# Patient Record
Sex: MALE | Race: WHITE | NOT HISPANIC OR LATINO | Employment: OTHER | URBAN - METROPOLITAN AREA
[De-identification: names, ages, dates, MRNs, and addresses within clinical notes are randomized per-mention and may not be internally consistent; named-entity substitution may affect disease eponyms.]

---

## 2017-01-01 ENCOUNTER — TELEPHONE (OUTPATIENT)
Dept: INTERNAL MEDICINE | Facility: CLINIC | Age: 77
End: 2017-01-01

## 2017-01-01 DIAGNOSIS — N18.30 CHRONIC KIDNEY DISEASE (CKD), STAGE III (MODERATE) (H): ICD-10-CM

## 2017-01-01 NOTE — TELEPHONE ENCOUNTER
Please call patient and discuss renal insufficiency, likely caused by NSAID use.   I advise him to stop taking celebrex, and continue to avoid NSAIDs such as aleve, ibuprofen.   Would like him to repeat creat in 2-4 weeks, then reassess.    Consider tylenol arthritis two twice daily to help manage his joint pains.

## 2017-01-23 ENCOUNTER — DOCUMENTATION ONLY (OUTPATIENT)
Dept: INTERNAL MEDICINE | Facility: CLINIC | Age: 77
End: 2017-01-23

## 2017-01-23 DIAGNOSIS — N18.30 CHRONIC KIDNEY DISEASE (CKD), STAGE III (MODERATE) (H): Primary | ICD-10-CM

## 2017-01-24 DIAGNOSIS — N18.30 CHRONIC KIDNEY DISEASE (CKD), STAGE III (MODERATE) (H): ICD-10-CM

## 2017-01-24 LAB
CREAT SERPL-MCNC: 1.29 MG/DL (ref 0.66–1.25)
CREAT UR-MCNC: 181 MG/DL
GFR SERPL CREATININE-BSD FRML MDRD: 54 ML/MIN/1.7M2
HGB BLD-MCNC: 14.8 G/DL (ref 13.3–17.7)
MICROALBUMIN UR-MCNC: 33 MG/L
MICROALBUMIN/CREAT UR: 18.07 MG/G CR (ref 0–17)

## 2017-01-24 PROCEDURE — 82565 ASSAY OF CREATININE: CPT | Performed by: INTERNAL MEDICINE

## 2017-01-24 PROCEDURE — 82043 UR ALBUMIN QUANTITATIVE: CPT | Performed by: INTERNAL MEDICINE

## 2017-01-24 PROCEDURE — 36415 COLL VENOUS BLD VENIPUNCTURE: CPT | Performed by: INTERNAL MEDICINE

## 2017-01-24 PROCEDURE — 85018 HEMOGLOBIN: CPT | Performed by: INTERNAL MEDICINE

## 2017-01-30 ENCOUNTER — MYC MEDICAL ADVICE (OUTPATIENT)
Dept: INTERNAL MEDICINE | Facility: CLINIC | Age: 77
End: 2017-01-30

## 2017-01-30 DIAGNOSIS — M54.50 LOW BACK PAIN WITHOUT SCIATICA, UNSPECIFIED BACK PAIN LATERALITY, UNSPECIFIED CHRONICITY: Primary | ICD-10-CM

## 2017-02-02 ENCOUNTER — MYC MEDICAL ADVICE (OUTPATIENT)
Dept: INTERNAL MEDICINE | Facility: CLINIC | Age: 77
End: 2017-02-02

## 2017-02-02 RX ORDER — PREDNISONE 10 MG/1
10 TABLET ORAL DAILY
Qty: 30 TABLET | Refills: 1 | Status: SHIPPED | OUTPATIENT
Start: 2017-02-02 | End: 2017-04-12

## 2017-03-23 ENCOUNTER — TELEPHONE (OUTPATIENT)
Dept: NEUROSURGERY | Facility: CLINIC | Age: 77
End: 2017-03-23

## 2017-03-23 NOTE — TELEPHONE ENCOUNTER
Left detailed message for pt. He had surgery over a year ago and explained that he did just get Prednisone on 2-7-17 would not recommend repeat.

## 2017-03-24 DIAGNOSIS — M54.50 LOW BACK PAIN WITHOUT SCIATICA, UNSPECIFIED BACK PAIN LATERALITY, UNSPECIFIED CHRONICITY: ICD-10-CM

## 2017-03-24 RX ORDER — PREDNISONE 10 MG/1
10 TABLET ORAL DAILY
Qty: 30 TABLET | Refills: 1 | Status: CANCELLED | OUTPATIENT
Start: 2017-03-24

## 2017-03-24 NOTE — TELEPHONE ENCOUNTER
.predniSONE (DELTASONE) 10 MG tablet      Last Written Prescription Date: 2/2/17  Last Fill Quantity: 30,  # refills: 1   Last Office Visit with [facility], [facility] or [facility] prescribing provider: 12/30/16

## 2017-03-26 NOTE — TELEPHONE ENCOUNTER
Patient was advised in January to schedule follow-up appointment (discuss 1/30 message).   I am concerned that he has not done the follow-up he was advised.  Also, didn't respond to all my questions.     Neurosurgery has suggest that he not be on prednisone from their viewpoint.    I will refill if and only if patient schedules follow-up to review risks and benefits of this medication.  First available.  In the meantime, I would like him to try to decrease his dosage gradually.

## 2017-03-27 NOTE — TELEPHONE ENCOUNTER
Spoke with pt, and he stated he wasn't going to make an appointment, and that he didn't know how to decrease the dosage as for he is only taking 10 mg. He declined making an appointment with me and said he would handle it on his own.

## 2017-05-18 ENCOUNTER — OFFICE VISIT (OUTPATIENT)
Dept: INTERNAL MEDICINE | Facility: CLINIC | Age: 77
End: 2017-05-18
Payer: COMMERCIAL

## 2017-05-18 VITALS
BODY MASS INDEX: 29.79 KG/M2 | OXYGEN SATURATION: 100 % | TEMPERATURE: 98.2 F | WEIGHT: 190.2 LBS | RESPIRATION RATE: 16 BRPM | HEART RATE: 64 BPM | DIASTOLIC BLOOD PRESSURE: 82 MMHG | SYSTOLIC BLOOD PRESSURE: 128 MMHG

## 2017-05-18 DIAGNOSIS — N40.1 BENIGN NON-NODULAR PROSTATIC HYPERPLASIA WITH LOWER URINARY TRACT SYMPTOMS: ICD-10-CM

## 2017-05-18 DIAGNOSIS — N18.30 CHRONIC KIDNEY DISEASE (CKD), STAGE III (MODERATE) (H): Primary | ICD-10-CM

## 2017-05-18 DIAGNOSIS — R26.89 BALANCE PROBLEMS: ICD-10-CM

## 2017-05-18 DIAGNOSIS — M54.50 LOW BACK PAIN WITHOUT SCIATICA, UNSPECIFIED BACK PAIN LATERALITY, UNSPECIFIED CHRONICITY: ICD-10-CM

## 2017-05-18 LAB
ANION GAP SERPL CALCULATED.3IONS-SCNC: 7 MMOL/L (ref 3–14)
BUN SERPL-MCNC: 25 MG/DL (ref 7–30)
CALCIUM SERPL-MCNC: 9.4 MG/DL (ref 8.5–10.1)
CHLORIDE SERPL-SCNC: 109 MMOL/L (ref 94–109)
CO2 SERPL-SCNC: 25 MMOL/L (ref 20–32)
CREAT SERPL-MCNC: 1.4 MG/DL (ref 0.66–1.25)
GFR SERPL CREATININE-BSD FRML MDRD: 49 ML/MIN/1.7M2
GLUCOSE SERPL-MCNC: 108 MG/DL (ref 70–99)
POTASSIUM SERPL-SCNC: 5.1 MMOL/L (ref 3.4–5.3)
SODIUM SERPL-SCNC: 141 MMOL/L (ref 133–144)

## 2017-05-18 PROCEDURE — 80048 BASIC METABOLIC PNL TOTAL CA: CPT | Performed by: INTERNAL MEDICINE

## 2017-05-18 PROCEDURE — 36415 COLL VENOUS BLD VENIPUNCTURE: CPT | Performed by: INTERNAL MEDICINE

## 2017-05-18 PROCEDURE — 99214 OFFICE O/P EST MOD 30 MIN: CPT | Performed by: INTERNAL MEDICINE

## 2017-05-18 RX ORDER — PREDNISONE 10 MG/1
10 TABLET ORAL DAILY
Qty: 30 TABLET | Refills: 1 | Status: SHIPPED | OUTPATIENT
Start: 2017-05-18 | End: 2017-07-20

## 2017-05-18 RX ORDER — TAMSULOSIN HYDROCHLORIDE 0.4 MG/1
0.4 CAPSULE ORAL DAILY
Qty: 90 CAPSULE | Status: ON HOLD | OUTPATIENT
Start: 2017-05-18 | End: 2017-09-18

## 2017-05-18 NOTE — NURSING NOTE
"Chief Complaint   Patient presents with     Recheck Medication     Prednisone     Arthritis       Initial /82  Pulse 64  Temp 98.2  F (36.8  C) (Oral)  Resp 16  Wt 190 lb 3.2 oz (86.3 kg)  SpO2 100%  BMI 29.79 kg/m2 Estimated body mass index is 29.79 kg/(m^2) as calculated from the following:    Height as of 12/30/16: 5' 7\" (1.702 m).    Weight as of this encounter: 190 lb 3.2 oz (86.3 kg).  Blood pressure completed using cuff size: regular    "

## 2017-05-18 NOTE — PROGRESS NOTES
SUBJECTIVE:                                                    Greyson Haas is a 76 year old male who presents to clinic today for the following health issues:      Medication Followup of Arthritis    Taking Medication as prescribed: yes  Prednisone 10 mg daily    Side Effects:  None    Medication Helping Symptoms:  ?    When he golfs, takes 200 mg of ibuprofen.   Golfing 3 times weekly.  Taking baby aspirin daily  Most bothered by pain in back and legs.   He's not sure the prednisone is helping.       Reviewed and updated as needed this visit by clinical staff:  Medications  Allergies  Soc Hx   Additional history: as documented    Additional issues to address:  1.  Slow voiding, worse at times.   Nocturia x 1-2.  No dysuria, blood.   Problems since catheter fall 2015.   Flomax used very infrequently, helps.  2.  Legs feel stiff, imbalance.   Hasn't fallen.   Problems since lumbar fusion.  Did PT several sessions without much help.   Rides bike and does weights at club, hasn't helped.  - uses cane when out walking, for balance and to take weight off L leg.  - holds on treadmill  - drives gold cart right up to the green      ROS:    Constitutional, HEENT, cardiovascular, pulmonary, gi and gu systems are negative, except as otherwise noted.    OBJECTIVE:                                                      /82  Pulse 64  Temp 98.2  F (36.8  C) (Oral)  Resp 16  Wt 190 lb 3.2 oz (86.3 kg)  SpO2 100%  BMI 29.79 kg/m2  Body mass index is 29.79 kg/(m^2).   No apparent distress  Reg pulse   Mild weakness of dorsiflexion feet  Gait is wide based, slightly uneven       ASSESSMENT:                                                      1.  Follow-up chronic kidney disease, stage 3.   Mostly avoiding NSAIDs.  2.  Arthralgias, helped most by NSAID use  3.  BPH.   Problems/symptoms started after catheter (fall 2015).   Benefit from flomax, but unclear why he hasn't taken this regularly.  3.  Leg weakness,  balance/neuropathic problems after lumbar surgery.    PLAN:                                                      1.  MEDICATIONS:        - Start taking flomax every day       - try stopping prednisone for a couple of weeks, if possible       - Continue other medications without change  2.  Update MD regarding the flomax in a month via Mychart, and also to let MD know if stopping the prednisone made anything worse.  3.  Consider stopping the simvastatin for a month, or trying Lyrica emperically for leg symptoms     Neville Nick MD  [facility]

## 2017-05-18 NOTE — MR AVS SNAPSHOT
After Visit Summary   5/18/2017    Greyson Haas    MRN: [id number]           Patient Information     Date Of Birth          1940        Visit Information        Provider Department      5/18/2017 10:30 AM Neville Nick MD [facility]        Today's Diagnoses     Chronic Kidney Disease, Stage III    -  1    Low back pain without sciatica, unspecified back pain laterality, unspecified chronicity        Benign non-nodular prostatic hyperplasia with lower urinary tract symptoms          Care Instructions    PLAN:                                                      1.  MEDICATIONS:        - Start taking flomax every day       - try stopping prednisone for a couple of weeks, if possible       - Continue other medications without change  2.  Update MD regarding the flomax in a month via SP3H, and also to let MD know if stopping the prednisone made anything worse.  3.  Consider stopping the simvastatin for a month, or trying Lyrica emperically for leg symptoms         Follow-ups after your visit        Who to contact     If you have questions or need follow up information about today's clinic visit or your schedule please contact [facility] directly at [phone].  Normal or non-critical lab and imaging results will be communicated to you by [employer], letter or phone within 4 business days after the clinic has received the results. If you do not hear from us within 7 days, please contact the clinic through [employer] or phone. If you have a critical or abnormal lab result, we will notify you by phone as soon as possible.  Submit refill requests through [employer] or call your pharmacy and they will forward the refill request to us. Please allow 3 business days for your refill to be completed.          Additional Information About Your Visit        LilLuxehart Information     [employer] gives you secure access to your electronic health record. If you see a  primary care provider, you can also send messages to your care team and make appointments. If you have questions, please call your primary care clinic.  If you do not have a primary care provider, please call [phone] and they will assist you.        Care EveryWhere ID     This is your Care EveryWhere ID. This could be used by other organizations to access your [facility] medical records  [id number]        Your Vitals Were     Pulse Temperature Respirations Pulse Oximetry BMI (Body Mass Index)       64 98.2  F (36.8  C) (Oral) 16 100% 29.79 kg/m2        Blood Pressure from Last 3 Encounters:   05/18/17 128/82   12/30/16 124/78   06/22/16 108/75    Weight from Last 3 Encounters:   05/18/17 190 lb 3.2 oz (86.3 kg)   12/30/16 188 lb 8 oz (85.5 kg)   06/22/16 183 lb (83 kg)              We Performed the Following     Basic metabolic panel          Today's Medication Changes          These changes are accurate as of: 5/18/17 11:49 AM.  If you have any questions, ask your nurse or doctor.               Stop taking these medicines if you haven't already. Please contact your care team if you have questions.     celeBREX 200 MG capsule   Generic drug:  celecoxib   Stopped by:  Neville Nick MD           dorzolamide-timolol 2-0.5 % ophthalmic solution   Commonly known as:  COSOPT   Stopped by:  Neville Nick MD           order for DME   Stopped by:  Neville Nick MD                Where to get your medicines      These medications were sent to [facility] [address] [place] [address]  [address] [facility] [address]     Phone:  [phone]     predniSONE 10 MG tablet    tamsulosin 0.4 MG capsule                Primary Care Provider Office Phone # Fax #    Neville Nick [phone]       [facility] [address]  [facility] [address]        Thank you!     Thank you for choosing [facility]  for your care.  Our goal is always to provide you with excellent care. Hearing back from our patients is one way we can continue to improve our services. Please take a few minutes to complete the written survey that you may receive in the mail after your visit with us. Thank you!             Your Updated Medication List - Protect others around you: Learn how to safely use, store and throw away your medicines at www.disposemymeds.org.          This list is accurate as of: 5/18/17 11:49 AM.  Always use your most recent med list.                   Brand Name Dispense Instructions for use    ACETAMINOPHEN PO      Take 1,000 mg by mouth daily as needed for pain Reported on 5/18/2017       allopurinol 300 MG tablet    ZYLOPRIM    90 tablet    Take 1 tablet (300 mg) by mouth daily       aspirin 81 MG tablet      Take 81 mg by mouth daily       doxycycline 100 MG capsule    VIBRAMYCIN     Take 100 mg by mouth daily       lisinopril 20 MG tablet    PRINIVIL/ZESTRIL    45 tablet    Take 0.5 tablets (10 mg) by mouth daily       predniSONE 10 MG tablet    DELTASONE    30 tablet    Take 1 tablet (10 mg) by mouth daily       simvastatin 20 MG tablet    ZOCOR    90 tablet    Take 1 tablet (20 mg) by mouth daily       tamsulosin 0.4 MG capsule    FLOMAX    90 capsule    Take 1 capsule (0.4 mg) by mouth daily       VIAGRA PO      Take 50 mg by mouth daily as needed

## 2017-05-18 NOTE — PATIENT INSTRUCTIONS
PLAN:                                                      1.  MEDICATIONS:        - Start taking flomax every day       - try stopping prednisone for a couple of weeks, if possible       - Continue other medications without change  2.  Update MD regarding the flomax in a month via Mychart, and also to let MD know if stopping the prednisone made anything worse.  3.  Consider stopping the simvastatin for a month, or trying Lyrica emperically for leg symptoms

## 2017-06-19 ENCOUNTER — RADIANT APPOINTMENT (OUTPATIENT)
Dept: GENERAL RADIOLOGY | Facility: CLINIC | Age: 77
End: 2017-06-19
Attending: ORTHOPAEDIC SURGERY
Payer: COMMERCIAL

## 2017-06-19 DIAGNOSIS — R52 PAIN: ICD-10-CM

## 2017-06-19 PROCEDURE — 73502 X-RAY EXAM HIP UNI 2-3 VIEWS: CPT | Mod: TC

## 2017-06-24 DIAGNOSIS — M54.50 LOW BACK PAIN WITHOUT SCIATICA, UNSPECIFIED BACK PAIN LATERALITY, UNSPECIFIED CHRONICITY: ICD-10-CM

## 2017-06-26 NOTE — TELEPHONE ENCOUNTER
predniSONE (DELTASONE) 10 MG tablet      Last Written Prescription Date: 05/18/2017  Last Fill Quantity: 30,  # refills: 1   Last Office Visit with [facility], [facility] or [facility] prescribing provider: 05/18/2017

## 2017-06-28 NOTE — TELEPHONE ENCOUNTER
"Pt returned call, states he has not had the prednisone x 2 days now, states that he continues to have pain.     Also pt states that he does not take the Flomax daily, \"does not want to\", takes it \"here and there\" and it does help.      "

## 2017-06-28 NOTE — TELEPHONE ENCOUNTER
Remind patient that steroids have many long-term complications     Patient was to stop the prednisone, and let me know the results. He was also to give me an update regarding the Flomax. He did not do so. Please call him and find out if he tried stopping prednisone, and what happened.    Do not stop medication at this time, if he hasn't tried this already. If this is the case, needs an office visit to discuss further.

## 2017-06-29 NOTE — TELEPHONE ENCOUNTER
Patient says he has been off prednisone for 3 days now, pain is worse when off of it. Limps around. Has been taking tylenol, it helps but not like ibuprofen did. Says was taken off celebrex and ibuprofen. Asked patient if tylenol helps the same amount that prednisone did. He is unsure. Unsure if prednisone even helped. He said he will try to pay attention to this more and call us back with update. Before when he took prednisone he would just take tylenol when golfing.

## 2017-06-30 NOTE — TELEPHONE ENCOUNTER
Patient was taking 650 mg tylenol. Advised of Dr. Nick's message. He will try the higher dose tylenol BID instead and let us know if that is not working.

## 2017-06-30 NOTE — TELEPHONE ENCOUNTER
Please make sure patient has tried higher dose Tylenol, namely Tylenol arthritis 1300 mg at twice daily.    If so, and it is not working, would resume prednisone 10 mg daily, and schedule follow-up appointment with me.    Please note that there was a prescription sent to [facility] on May 18 for #30 with 1 additional refill, and he should have enough at this time.

## 2017-07-09 RX ORDER — PREDNISONE 10 MG/1
TABLET ORAL
Qty: 30 TABLET | Refills: 0 | OUTPATIENT
Start: 2017-07-09

## 2017-07-20 ENCOUNTER — OFFICE VISIT (OUTPATIENT)
Dept: INTERNAL MEDICINE | Facility: CLINIC | Age: 77
End: 2017-07-20
Payer: COMMERCIAL

## 2017-07-20 VITALS
BODY MASS INDEX: 29.88 KG/M2 | WEIGHT: 190.4 LBS | HEIGHT: 67 IN | TEMPERATURE: 98.4 F | OXYGEN SATURATION: 98 % | DIASTOLIC BLOOD PRESSURE: 82 MMHG | SYSTOLIC BLOOD PRESSURE: 132 MMHG | HEART RATE: 68 BPM

## 2017-07-20 DIAGNOSIS — M25.552 HIP PAIN, LEFT: Primary | ICD-10-CM

## 2017-07-20 DIAGNOSIS — N18.30 CHRONIC KIDNEY DISEASE (CKD), STAGE III (MODERATE) (H): ICD-10-CM

## 2017-07-20 DIAGNOSIS — N40.1 BENIGN NON-NODULAR PROSTATIC HYPERPLASIA WITH LOWER URINARY TRACT SYMPTOMS: ICD-10-CM

## 2017-07-20 PROCEDURE — 99214 OFFICE O/P EST MOD 30 MIN: CPT | Performed by: INTERNAL MEDICINE

## 2017-07-20 RX ORDER — PREDNISONE 10 MG/1
TABLET ORAL
Start: 2017-07-20 | End: 2017-08-03

## 2017-07-20 NOTE — PATIENT INSTRUCTIONS
PLAN:                                                      1.  MEDICATIONS:        - Resume prednisone at 10 mg dose daily.   If no better after 4 days, increase to 20 mg daily       - Continue other medications without change  2.  Send message with update in 1 week  3.  Consider referral to Physical Therapy

## 2017-07-20 NOTE — PROGRESS NOTES
"  SUBJECTIVE:                                                    Greyson Haas is a 76 year old male who presents to clinic today for the following health issues:      New Joint Pain/Bursitis     Onset: 6 weeks    Description:   Location: L lateral thigh, sore spot.   Some lateral and medial pain, L hip area down to knee and below at times.   Pain can be very severe with certain motions, lifting, walking.     Character: throbbing, sometimes dull pains.  Marked pain going up stairs    Intensity: moderate    Progression of Symptoms: same    Accompanying Signs & Symptoms:  Other symptoms: radiation of pain to L groin area and L knee     History:   Previous similar pain: YES      Precipitating factors:   Trauma or overuse: no     Alleviating factors:  Improved by: Patient took some left over endomycin from previous gout and feels like that helped some     Therapies Tried and outcome: steroid injection into burse from Dr. Hood 6/19, zero benefit, worse since.   Tylenol, rest, exercises-not helpful.   Used leftover indocin with benefit        Reviewed and updated as needed this visit by clinical staff:  Medications  Allergies  Soc Hx   Additional history: as documented    Additional issues to address:  1.  Patient tried going off simvastatin for a month, with no difference.  2.  Stopped prednisone.   Overall body aching was a bit worse.  Has been using up to 2000 mg daily (1000 bid), helps pains and stiffness a little.   IBP and celebrex clearly more benefit.    3.  Taking flomax every other day, helping flow, not frequency.    ROS:    Constitutional, cardiovascular, pulmonary, gi and gu systems are negative, except as otherwise noted.      OBJECTIVE:                                                    /82  Pulse 68  Temp 98.4  F (36.9  C) (Oral)  Ht 5' 7\" (1.702 m)  Wt 190 lb 6.4 oz (86.4 kg)  SpO2 98%  BMI 29.82 kg/m2  Body mass index is 29.82 kg/(m^2).   No apparent distress  Limping badly, favoring L " hip  Mild focal tenderness posterior to greater trochanter  Normal range of motion hips, minimal pain with full ROM  Normal strength lower extremities   Mild pain with hip flex, external rotation of hip, hip abduction on left       ASSESSMENT:                                                      1.  Musculoskeletal L hip area pain.   Bursitis versus other cause.  2.  CKD3, for which we are avoiding NSAIDs  3.  BPH, symptoms some better    PLAN:                                                      1.  MEDICATIONS:        - Resume prednisone at 10 mg dose daily.   If no better after 4 days, increase to 20 mg daily       - Continue other medications without change  2.  Send message with update in 1 week  3.  Consider referral to Physical Therapy -  patient declines pending above  4.  Plan to follow-up with ortho if not improved.    Neville Nick MD  [facility]

## 2017-07-20 NOTE — MR AVS SNAPSHOT
After Visit Summary   7/20/2017    Greyson Haas    MRN: [id number]           Patient Information     Date Of Birth          1940        Visit Information        Provider Department      7/20/2017 11:00 AM Neville Nick MD [facility]        Today's Diagnoses     Hip pain, left    -  1    Chronic Kidney Disease, Stage III        Benign non-nodular prostatic hyperplasia with lower urinary tract symptoms          Care Instructions    PLAN:                                                      1.  MEDICATIONS:        - Resume prednisone at 10 mg dose daily.   If no better after 4 days, increase to 20 mg daily       - Continue other medications without change  2.  Send message with update in 1 week  3.  Consider referral to Physical Therapy           Follow-ups after your visit        Who to contact     If you have questions or need follow up information about today's clinic visit or your schedule please contact [facility] directly at [phone].  Normal or non-critical lab and imaging results will be communicated to you by MyChart, letter or phone within 4 business days after the clinic has received the results. If you do not hear from us within 7 days, please contact the clinic through [employer] In[employer] or phone. If you have a critical or abnormal lab result, we will notify you by phone as soon as possible.  Submit refill requests through [employer] or call your pharmacy and they will forward the refill request to us. Please allow 3 business days for your refill to be completed.          Additional Information About Your Visit        MyChart Information     [employer] gives you secure access to your electronic health record. If you see a primary care provider, you can also send messages to your care team and make appointments. If you have questions, please call your primary care clinic.  If you do not have a primary care provider, please call [phone]  "and they will assist you.        Care EveryWhere ID     This is your Care EveryWhere ID. This could be used by other organizations to access your [facility] medical records  [id number]        Your Vitals Were     Pulse Temperature Height Pulse Oximetry BMI (Body Mass Index)       68 98.4  F (36.9  C) (Oral) 5' 7\" (1.702 m) 98% 29.82 kg/m2        Blood Pressure from Last 3 Encounters:   07/20/17 132/82   05/18/17 128/82   12/30/16 124/78    Weight from Last 3 Encounters:   07/20/17 190 lb 6.4 oz (86.4 kg)   05/18/17 190 lb 3.2 oz (86.3 kg)   12/30/16 188 lb 8 oz (85.5 kg)              Today, you had the following     No orders found for display         Today's Medication Changes          These changes are accurate as of: 7/20/17 12:04 PM.  If you have any questions, ask your nurse or doctor.               These medicines have changed or have updated prescriptions.        Dose/Directions    predniSONE 10 MG tablet   Commonly known as:  DELTASONE   This may have changed:    - how much to take  - how to take this  - when to take this  - additional instructions   Used for:  Hip pain, left   Changed by:  Neville Nick MD        Take one daily, as directed   Refills:  0            Where to get your medicines      Some of these will need a paper prescription and others can be bought over the counter.  Ask your nurse if you have questions.     You don't need a prescription for these medications     predniSONE 10 MG tablet                Primary Care Provider Office Phone # Fax #    Neville Nick [phone]       [facility] [address] [place] [address]        Equal Access to Services     JESSICA SIFUENTES AH: Hadii lulú Bowers, wajulioda luqadaha, qaybta kaalmada mariusz, og bolanos. So [facility] [phone].    ATENCIÓN: Si habla español, tiene a [place] disposición servicios gratuitos de asistencia lingüística. Llame al [phone].    We comply " with applicable federal civil rights laws and [place] laws. We do not discriminate on the basis of race, color, national origin, age, disability sex, sexual orientation or gender identity.            Thank you!     Thank you for choosing [facility]  for your care. Our goal is always to provide you with excellent care. Hearing back from our patients is one way we can continue to improve our services. Please take a few minutes to complete the written survey that you may receive in the mail after your visit with us. Thank you!             Your Updated Medication List - Protect others around you: Learn how to safely use, store and throw away your medicines at www.disposemymeds.org.          This list is accurate as of: 7/20/17 12:04 PM.  Always use your most recent med list.                   Brand Name Dispense Instructions for use Diagnosis    ACETAMINOPHEN PO      Take 1,000 mg by mouth daily as needed for pain Reported on 5/18/2017        allopurinol 300 MG tablet    ZYLOPRIM    90 tablet    Take 1 tablet (300 mg) by mouth daily    Gout, unspecified       aspirin 81 MG tablet      Take 81 mg by mouth daily        doxycycline 100 MG capsule    VIBRAMYCIN     Take 100 mg by mouth daily    Blepharitis, unspecified laterality       lisinopril 20 MG tablet    PRINIVIL/ZESTRIL    45 tablet    Take 0.5 tablets (10 mg) by mouth daily    Essential hypertension       predniSONE 10 MG tablet    DELTASONE     Take one daily, as directed    Hip pain, left       simvastatin 20 MG tablet    ZOCOR    90 tablet    Take 1 tablet (20 mg) by mouth daily    Hyperlipidemia LDL goal <130       tamsulosin 0.4 MG capsule    FLOMAX    90 capsule    Take 1 capsule (0.4 mg) by mouth daily    Benign non-nodular prostatic hyperplasia with lower urinary tract symptoms       VIAGRA PO      Take 50 mg by mouth daily as needed

## 2017-07-20 NOTE — NURSING NOTE
"Chief Complaint   Patient presents with     Musculoskeletal Problem     bursitis        Initial /82  Pulse 68  Temp 98.4  F (36.9  C) (Oral)  Ht 5' 7\" (1.702 m)  Wt 190 lb 6.4 oz (86.4 kg)  SpO2 98%  BMI 29.82 kg/m2 Estimated body mass index is 29.82 kg/(m^2) as calculated from the following:    Height as of this encounter: 5' 7\" (1.702 m).    Weight as of this encounter: 190 lb 6.4 oz (86.4 kg).  Medication Reconciliation: complete   Lesley Walker MA   "

## 2017-08-02 DIAGNOSIS — M25.552 HIP PAIN, LEFT: ICD-10-CM

## 2017-08-02 NOTE — TELEPHONE ENCOUNTER
prednisone      Last Written Prescription Date:  HISTORICAL  Last Fill Quantity: na,   # refills: na  Last Office Visit with [facility], [facility] or  [facility] prescribing provider: 7/20/17  Future Office visit:       Routing refill request to provider for review/approval because:  Drug not on the [facility], [facility] or [facility] refill protocol or controlled substance

## 2017-08-03 RX ORDER — PREDNISONE 10 MG/1
TABLET ORAL
Qty: 30 TABLET | Refills: 0 | OUTPATIENT
Start: 2017-08-03

## 2017-08-03 NOTE — TELEPHONE ENCOUNTER
Thanks much.   Since the prednisone is not working, and has the potential for significant long term complications, it should be discontinued.    Please contact patient and advise.

## 2017-08-03 NOTE — TELEPHONE ENCOUNTER
Patient reported he took the Prednisone as prescribed after last office for approximately 1 week with no relief in pain/discomfort.  Saw Dr. Hood and had an MRI of L) hip last Friday.  [facility] and will follow up with Dr. Hood.  Per patient, if injection doesn't help alleviate pain he discussed having a L) DOTTIE with Dr. Hood.

## 2017-08-03 NOTE — TELEPHONE ENCOUNTER
See 7/20 office visit.   Patient has not done follow-up notification, as was emphasized.       Please call and remind him that the after visit summaries we give him have a purpose, and we encourage him to use them to better his care.    Please have him address all instructions from 7/20 office visit.

## 2017-08-18 DIAGNOSIS — M10.9 GOUT, UNSPECIFIED: ICD-10-CM

## 2017-08-18 NOTE — TELEPHONE ENCOUNTER
allopurinol       Last Written Prescription Date: 05/13/16  Last Fill Quantity: 90, # refills: prn  Last Office Visit with G, P or [facility] prescribing provider:  07/20/17        Uric Acid   Date Value Ref Range Status   04/01/2013 4.1 3.5 - 8.5 mg/dL Final   ]  Creatinine   Date Value Ref Range Status   05/18/2017 1.40 (H) 0.66 - 1.25 mg/dL Final   ]  Lab Results   Component Value Date    WBC 6.4 11/08/2015     Lab Results   Component Value Date    RBC 3.58 11/08/2015     Lab Results   Component Value Date    HGB 14.8 01/24/2017     Lab Results   Component Value Date    HCT 32.6 11/08/2015     No components found for: MCT  Lab Results   Component Value Date    MCV 91 11/08/2015     Lab Results   Component Value Date    MCH 30.7 11/08/2015     Lab Results   Component Value Date    MCHC 33.7 11/08/2015     Lab Results   Component Value Date    RDW 13.7 11/08/2015     Lab Results   Component Value Date     11/08/2015     Lab Results   Component Value Date    AST 33 06/15/2010     Lab Results   Component Value Date    ALT 29 12/30/2016

## 2017-08-22 RX ORDER — ALLOPURINOL 300 MG/1
TABLET ORAL
Qty: 90 TABLET | Refills: 3 | Status: ON HOLD | OUTPATIENT
Start: 2017-08-22 | End: 2017-09-18

## 2017-09-01 ENCOUNTER — OFFICE VISIT (OUTPATIENT)
Dept: INTERNAL MEDICINE | Facility: CLINIC | Age: 77
End: 2017-09-01
Payer: COMMERCIAL

## 2017-09-01 VITALS
TEMPERATURE: 98.4 F | DIASTOLIC BLOOD PRESSURE: 68 MMHG | BODY MASS INDEX: 30.79 KG/M2 | WEIGHT: 196.2 LBS | OXYGEN SATURATION: 99 % | HEART RATE: 69 BPM | SYSTOLIC BLOOD PRESSURE: 104 MMHG | HEIGHT: 67 IN

## 2017-09-01 DIAGNOSIS — I10 ESSENTIAL HYPERTENSION: ICD-10-CM

## 2017-09-01 DIAGNOSIS — N18.30 CHRONIC KIDNEY DISEASE (CKD), STAGE III (MODERATE) (H): ICD-10-CM

## 2017-09-01 DIAGNOSIS — Z01.818 PREOP GENERAL PHYSICAL EXAM: Primary | ICD-10-CM

## 2017-09-01 DIAGNOSIS — E78.5 HYPERLIPIDEMIA LDL GOAL <130: ICD-10-CM

## 2017-09-01 LAB
ANION GAP SERPL CALCULATED.3IONS-SCNC: 6 MMOL/L (ref 3–14)
BASOPHILS # BLD AUTO: 0 10E9/L (ref 0–0.2)
BASOPHILS NFR BLD AUTO: 0.1 %
BUN SERPL-MCNC: 18 MG/DL (ref 7–30)
CALCIUM SERPL-MCNC: 9.4 MG/DL (ref 8.5–10.1)
CHLORIDE SERPL-SCNC: 102 MMOL/L (ref 94–109)
CO2 SERPL-SCNC: 27 MMOL/L (ref 20–32)
CREAT SERPL-MCNC: 1.23 MG/DL (ref 0.66–1.25)
DIFFERENTIAL METHOD BLD: NORMAL
EOSINOPHIL # BLD AUTO: 0.1 10E9/L (ref 0–0.7)
EOSINOPHIL NFR BLD AUTO: 1.8 %
ERYTHROCYTE [DISTWIDTH] IN BLOOD BY AUTOMATED COUNT: 13.8 % (ref 10–15)
GFR SERPL CREATININE-BSD FRML MDRD: 57 ML/MIN/1.7M2
GLUCOSE SERPL-MCNC: 94 MG/DL (ref 70–99)
HCT VFR BLD AUTO: 42.7 % (ref 40–53)
HGB BLD-MCNC: 14 G/DL (ref 13.3–17.7)
LYMPHOCYTES # BLD AUTO: 1.9 10E9/L (ref 0.8–5.3)
LYMPHOCYTES NFR BLD AUTO: 26.5 %
MCH RBC QN AUTO: 30.7 PG (ref 26.5–33)
MCHC RBC AUTO-ENTMCNC: 32.8 G/DL (ref 31.5–36.5)
MCV RBC AUTO: 94 FL (ref 78–100)
MONOCYTES # BLD AUTO: 0.7 10E9/L (ref 0–1.3)
MONOCYTES NFR BLD AUTO: 10.2 %
NEUTROPHILS # BLD AUTO: 4.4 10E9/L (ref 1.6–8.3)
NEUTROPHILS NFR BLD AUTO: 61.4 %
PLATELET # BLD AUTO: 172 10E9/L (ref 150–450)
POTASSIUM SERPL-SCNC: 4.3 MMOL/L (ref 3.4–5.3)
RBC # BLD AUTO: 4.56 10E12/L (ref 4.4–5.9)
SODIUM SERPL-SCNC: 135 MMOL/L (ref 133–144)
WBC # BLD AUTO: 7.1 10E9/L (ref 4–11)

## 2017-09-01 PROCEDURE — 36415 COLL VENOUS BLD VENIPUNCTURE: CPT | Performed by: INTERNAL MEDICINE

## 2017-09-01 PROCEDURE — 93000 ELECTROCARDIOGRAM COMPLETE: CPT | Performed by: INTERNAL MEDICINE

## 2017-09-01 PROCEDURE — 80048 BASIC METABOLIC PNL TOTAL CA: CPT | Performed by: INTERNAL MEDICINE

## 2017-09-01 PROCEDURE — 85025 COMPLETE CBC W/AUTO DIFF WBC: CPT | Performed by: INTERNAL MEDICINE

## 2017-09-01 PROCEDURE — 99215 OFFICE O/P EST HI 40 MIN: CPT | Performed by: INTERNAL MEDICINE

## 2017-09-01 RX ORDER — LISINOPRIL 20 MG/1
TABLET ORAL
Status: ON HOLD
Start: 2017-09-01 | End: 2017-09-18

## 2017-09-01 NOTE — NURSING NOTE
"Chief Complaint   Patient presents with     Pre-Op Exam       Initial /68  Pulse 69  Temp 98.4  F (36.9  C) (Oral)  Ht 5' 7\" (1.702 m)  Wt 196 lb 3.2 oz (89 kg)  SpO2 99%  BMI 30.73 kg/m2 Estimated body mass index is 30.73 kg/(m^2) as calculated from the following:    Height as of this encounter: 5' 7\" (1.702 m).    Weight as of this encounter: 196 lb 3.2 oz (89 kg).  Medication Reconciliation: complete   Lesley Walker MA   "

## 2017-09-01 NOTE — PROGRESS NOTES
[address] [address]  [phone]  Dept: [phone]    PRE-OP EVALUATION:  Today's date: 2017    Greyson Haas (: 1940) presents for pre-operative evaluation assessment as requested by Dr. Hood.  He requires evaluation and anesthesia risk assessment prior to undergoing surgery/procedure for treatment of L hip  .  Proposed procedure: Left Total Hip Arthroplasty     Date of Surgery/ Procedure: 2017  Time of Surgery/ Procedure: 11:30 AM  Hospital/Surgical Facility: [facility]    Primary Physician: Neville Nikc  Type of Anesthesia Anticipated: General    Patient has a Health Care Directive or Living Will:  YES scanned in    Preop Questions 2017   1.  Do you have a history of heart attack, stroke, stent, bypass or surgery on an artery in the head, neck, heart or legs? No   2.  Do you ever have any pain or discomfort in your chest? No   3.  Do you have a history of  Heart Failure? No   4.   Are you troubled by shortness of breath when:  walking on a level surface, or up a slight hill, or at night? No   5.  Do you currently have a cold, bronchitis or other respiratory infection? No   6.  Do you have a cough, shortness of breath, or wheezing? No   7.  Do you sometimes get pains in the calves of your legs when you walk? No   8. Do you or anyone in your family have previous history of blood clots? No   9.  Do you or does anyone in your family have a serious bleeding problem such as prolonged bleeding following surgeries or cuts? No   10. Have you ever had problems with anemia or been told to take iron pills? No   11. Have you had any abnormal blood loss such as black, tarry or bloody stools? No   12. Have you ever had a blood transfusion? No   13. Have you or any of your relatives ever had problems with anesthesia? No   14. Do you have sleep apnea, excessive snoring or daytime drowsiness? No   15. Do you have any prosthetic heart  valves? No   16. Do you have prosthetic joints? YES - L knee           HPI:                                                      Brief HPI related to upcoming procedure: progressive and now severe pain in the left hip area.   Groin to lateral hip, down to and including the left knee.      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   Pain has been very severe past month, with any movement of hip or leg.    HYPERTENSION - Patient has longstanding history of mod-severe HTN , currently denies any symptoms referable to elevated blood pressure. Recently, blood pressure readings have been in normal range. Current medication regimen is as listed below. Patient denies any side effects of medication.                                                                                                                                                                                          .  HYPERLIPIDEMIA - Patient has a long history of significant Hyperlipidemia requiring medication for treatment with recent good control.                                                                                                                                                    .  RENAL INSUFFICIENCY - Patient has a longstanding history of chronic renal insufficiency of moderate severity.                                    .    MEDICAL HISTORY:                                                    Patient Active Problem List    Diagnosis Date Noted     Benign non-nodular prostatic hyperplasia with lower urinary tract symptoms 05/18/2017     Priority: Medium     S/P lumbar fusion 11/05/2015     Priority: Medium     Lumbar stenosis 04/23/2015     Priority: Medium     Previous lumbar surgery Dr. Pop  S/P re-do L4-S1 decompression with fusion on 11/5/2015 for claudication and right foot drop Dr. Grove       Kidney stones 09/20/2012     Priority: Medium     First 2004, EWSL Dr. Underwood  Next 2012  Multiple calcium  mariaelena 9/2012       Advanced directives, counseling/discussion 01/19/2012     Priority: Medium     Discussed advance care planning with patient; however, patient declined at this time.   12/9/2013:  Full code, no extended support  Advance Care Planning:   Initial facilitation introduction:   Greyson Haas presented for initial session regarding ACP at the clinic. He was accompanied by wife Mellissa. [employer] information provided and resources reviewed. He currently wishes to complete an ACP document.  He currently has the following questions or concerns about Advance Care Planning: none.  Confirmed/documented designated decision maker(s). See permanent comments section of demographics in clinical tab. Confirmed code status reflects current choices .   Added by Irias Guerrero on 3/31/2015           Pain in joint, lower leg 02/08/2011     Priority: Medium     Abnormal gait 02/08/2011     Priority: Medium     KNEE JOINT REPLACEMENT 02/07/2011     Priority: Medium     Basal cell carcinoma 01/19/2011     Priority: Medium     Chest, Moh's 1/11, Dr. Raj Berg following       Erectile dysfunction 01/19/2011     Priority: Medium     Gouty arthropathy 04/29/2008     Priority: Medium              Anxiety state 01/15/2007     Priority: Medium     Sprain of acromioclavicular ligament 10/17/2006     Priority: Medium     Problem list name updated by automated process. Provider to review       Glaucoma      Priority: Medium     Followed by Dr. Michel         Hyperlipidemia LDL goal <130 06/06/2005     Priority: Medium     Chronic Kidney Disease, Stage III 02/02/2005     Priority: Medium     GOUTY TOPHUS 09/23/2004     Priority: Medium     R third toe       Tinnitus 05/19/2004     Priority: Medium     LVH 05/22/2003     Priority: Medium     Diverticulosis of large intestine 05/12/2003     Priority: Medium     Problem list name updated by automated process. Provider to review       Essential hypertension 02/10/2003      "Priority: Medium     History of rectal cancer 03/28/2002     Priority: Medium     Dr. Rubin        Past Medical History:   Diagnosis Date      RENAL INSUFFICIENCY      Arthritis      Calculus of kidney 9/02, 6/04     Chronic Kidney Disease, Stage III 2/2/2005     Chronic Tinnitus 1960's     Diverticulosis of colon (without mention of hemorrhage)      Essential hypertension, benign      GLAUCOMA     Followed by Dr. 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 Rubin     Pulmonary Nodule, stable      Tubular Adenoma 3/07     Past Surgical History:   Procedure Laterality Date     C NONSPECIFIC PROCEDURE  6/04    cysto, R ureteral stent, ESWL     C NONSPECIFIC PROCEDURE  CHILD    TONSILLECTOMY     C NONSPECIFIC PROCEDURE  AGE 4    L heel osteomyelitis with \"bone scraping\"     OPTICAL TRACKING SYSTEM FUSION SPINE POSTERIOR LUMBAR TWO LEVELS N/A 11/5/2015    Procedure: OPTICAL TRACKING SYSTEM FUSION SPINE POSTERIOR LUMBAR TWO LEVELS;  Surgeon: Mina Grove MD;  Location:  OR     SURGICAL HISTORY OF -   1991    B Shoulder surgeries     SURGICAL HISTORY OF -   1992    lumbar laminectomy     SURGICAL HISTORY OF -   1998    lumbar decompression    Dr. Pop     SURGICAL HISTORY OF -   9/06    L shoulder    Dr. Foley     SURGICAL HISTORY OF -   11/07    partial amputation R 3rd toe (tophus)  Dr. Neal     SURGICAL HISTORY OF -   2010    bilateral cataracts       SURGICAL HISTORY OF -   1/11    Mohs L chest, Dr. Atnhony     SURGICAL HISTORY OF - 1/11    L TKA   Dr. Thomson     Current Outpatient Prescriptions   Medication Sig Dispense Refill     allopurinol (ZYLOPRIM) 300 MG tablet TAKE ONE TABLET BY MOUTH ONCE DAILY 90 tablet 3     tamsulosin (FLOMAX) 0.4 MG capsule " "Take 1 capsule (0.4 mg) by mouth daily 90 capsule prn     simvastatin (ZOCOR) 20 MG tablet Take 1 tablet (20 mg) by mouth daily 90 tablet prn     lisinopril (PRINIVIL/ZESTRIL) 20 MG tablet Take 0.5 tablets (10 mg) by mouth daily 45 tablet prn     ACETAMINOPHEN PO Take 1,000 mg by mouth daily as needed for pain Reported on 5/18/2017       Sildenafil Citrate (VIAGRA PO) Take 50 mg by mouth daily as needed       doxycycline (VIBRAMYCIN) 100 MG capsule Take 100 mg by mouth daily        aspirin 81 MG tablet Take 81 mg by mouth daily        OTC products: None, except as noted above    No Known Allergies   Latex Allergy: NO    Social History   Substance Use Topics     Smoking status: Former Smoker     Packs/day: 1.00     Years: 33.00     Quit date: 1/1/1983     Smokeless tobacco: Never Used     Alcohol use Yes      Comment: Ave 2 daily or more.  Heavy days 5-6, some days none.     History   Drug Use No       REVIEW OF SYSTEMS:                                                    C: NEGATIVE for fever, chills, change in weight  I: NEGATIVE for worrisome rashes, moles or lesions  E: Blepharitis symptoms persist, otherwise NEGATIVE for vision changes or irritation  E/M: NEGATIVE for ear, mouth and throat problems  R: NEGATIVE for significant cough or SOB  B: NEGATIVE for masses, tenderness or discharge  CV: NEGATIVE for chest pain, palpitations or peripheral edema  GI: NEGATIVE for nausea, abdominal pain, heartburn, or change in bowel habits  : Nocturia x 2, decreased stream, otherwise NEGATIVE for frequency, dysuria, or hematuria  M: NEGATIVE for significant arthralgias or myalgia  N: NEGATIVE for weakness, dizziness or paresthesias  E: NEGATIVE for temperature intolerance, skin/hair changes  H: NEGATIVE for bleeding problems  P: NEGATIVE for changes in mood or affect    EXAM:                                                    /68  Pulse 69  Temp 98.4  F (36.9  C) (Oral)  Ht 5' 7\" (1.702 m)  Wt 196 lb 3.2 oz (89 " kg)  SpO2 99%  BMI 30.73 kg/m2    GENERAL APPEARANCE: healthy, alert and no distress     EYES: Eyes grossly normal to inspection     HENT: ear canals and TM's normal and nose and mouth without ulcers or lesions     NECK: no adenopathy, no asymmetry, masses, or scars and thyroid normal to palpation     RESP: lungs clear to auscultation - no rales, rhonchi or wheezes     CV: regular rates and rhythm, normal S1 S2, no S3 or S4 and no murmur, click or rub     ABDOMEN:  soft, nontender, no HSM or masses and bowel sounds normal     MS: extremities normal- no gross deformities noted, no evidence of inflammation in joints, FROM in all extremities.     SKIN: no suspicious lesions or rashes     NEURO: mentation intact, strength and speech normal     PSYCH: mentation appears normal. and affect normal/bright     LYMPHATICS: normal ant/post cervical, supraclavicular nodes    DIAGNOSTICS:                                                    EKG: appears normal, NSR, normal axis, normal intervals, no acute ST/T changes c/w ischemia, no LVH by voltage criteria, unchanged from previous tracings    Recent Labs   Lab Test  05/18/17   1156  01/24/17   0827  12/30/16   1027   11/08/15   0726   11/06/15   0748   02/07/11   1030  01/28/11   0553   HGB   --   14.8   --    --   11.0*   < >  11.3*   < >   --    --    PLT   --    --    --    --   151   --   131*   < >   --    --    INR   --    --    --    --    --    --    --    --   1.89*  1.69*   NA  141   --   143   --   139   < >  143   < >   --    --    POTASSIUM  5.1   --   4.4   --   3.7   < >  3.9   < >   --    --    CR  1.40*  1.29*  1.50*   < >  1.15   < >  1.34*   < >   --    --     < > = values in this interval not displayed.      CBC, BMP normal    IMPRESSION:                                                    Reason for surgery/procedure:  L hip and leg pain, felt related to end stage DJD of hip    Diagnosis/reason for consult:   1.  HTN, controlled.   Blood pressure currently  lower than ideal.  2.  Chronic kidney disease   3.  Other medical status stable    The proposed surgical procedure is considered INTERMEDIATE risk.    REVISED CARDIAC RISK INDEX  The patient has the following serious cardiovascular risks for perioperative complications such as (MI, PE, VFib and 3  AV Block):  No serious cardiac risks  INTERPRETATION: 0 risks: Class I (very low risk - 0.4% complication rate)    The patient has the following additional risks for perioperative complications:  No identified additional risks      ICD-10-CM    1. Preop general physical exam Z01.818 EKG 12-lead complete w/read - Clinics     CBC with platelets differential   2. Essential hypertension I10 lisinopril (PRINIVIL/ZESTRIL) 20 MG tablet     Basic metabolic panel   3. Hyperlipidemia LDL goal <130 E78.5    4. Chronic Kidney Disease, Stage III N18.3        RECOMMENDATIONS:                                                        APPROVAL GIVEN to proceed with proposed procedure, without further diagnostic evaluation    -- Patient is to take NO scheduled medications on the day of surgery   -- Hold aspirin, ibuprofen, aleve, for a week before surgery  -- Try taking two tramadol at a time, to see if this helps     Signed Electronically by: Neville Nick MD    Copy of this evaluation report is provided to requesting physician.    [place] Preop Guidelines

## 2017-09-01 NOTE — MR AVS SNAPSHOT
After Visit Summary   9/1/2017    Greyson Haas    MRN: [id number]           Patient Information     Date Of Birth          1940        Visit Information        Provider Department      9/1/2017 11:00 AM Neville Nick MD [facility]        Today's Diagnoses     Preop general physical exam    -  1    Essential hypertension          Care Instructions      Before Your Surgery      Call your surgeon if there is any change in your health. This includes signs of a cold or flu (such as a sore throat, runny nose, cough, rash or fever).    Do not smoke, drink alcohol or take over the counter medicine (unless your surgeon or primary care doctor tells you to) for the 24 hours before and after surgery.    If you take prescribed drugs: Follow your doctor s orders about which medicines to take and which to stop until after surgery.    Eating and drinking prior to surgery: follow the instructions from your surgeon    Take a shower or bath the night before surgery. Use the soap your surgeon gave you to gently clean your skin. If you do not have soap from your surgeon, use your regular soap. Do not shave or scrub the surgery site.  Wear clean pajamas and have clean sheets on your bed.     OK for surgery if labs are normal    -- Take NO scheduled medications on the day of surgery   -- Hold aspirin, ibuprofen, aleve, for a week before surgery  -- Try taking two tramadol at a time, to see if this helps          Follow-ups after your visit        Your next 10 appointments already scheduled     Sep 21, 2017   Procedure with Aurelio Hood MD   [facility] (--)    [address]  [facility] [id number] [phone]              Who to contact     If you have questions or need follow up information about today's clinic visit or your schedule please contact [facility] directly at [phone].  Normal or non-critical lab and  "imaging results will be communicated to you by MyChart, letter or phone within 4 business days after the clinic has received the results. If you do not hear from us within 7 days, please contact the clinic through [employer] or phone. If you have a critical or abnormal lab result, we will notify you by phone as soon as possible.  Submit refill requests through [employer] or call your pharmacy and they will forward the refill request to us. Please allow 3 business days for your refill to be completed.          Additional Information About Your Visit        i[employer]ories[facility]ursenav Information     [employer] gives you secure access to your electronic health record. If you see a primary care provider, you can also send messages to your care team and make appointments. If you have questions, please call your primary care clinic.  If you do not have a primary care provider, please call [phone] and they will assist you.        Care EveryWhere ID     This is your Care EveryWhere ID. This could be used by other organizations to access your [facility] medical records  [id number]        Your Vitals Were     Pulse Temperature Height Pulse Oximetry BMI (Body Mass Index)       69 98.4  F (36.9  C) (Oral) 5' 7\" (1.702 m) 99% 30.73 kg/m2        Blood Pressure from Last 3 Encounters:   09/01/17 104/68   07/20/17 132/82   05/18/17 128/82    Weight from Last 3 Encounters:   09/01/17 196 lb 3.2 oz (89 kg)   07/20/17 190 lb 6.4 oz (86.4 kg)   05/18/17 190 lb 3.2 oz (86.3 kg)              We Performed the Following     Basic metabolic panel     CBC with platelets differential     EKG 12-lead complete w/read - Clinics          Today's Medication Changes          These changes are accurate as of: 9/1/17 12:04 PM.  If you have any questions, ask your nurse or doctor.               These medicines have changed or have updated prescriptions.        Dose/Directions    lisinopril 20 MG tablet   Commonly known as:  PRINIVIL/ZESTRIL   This may have changed:    - how " much to take  - how to take this  - when to take this  - additional instructions   Used for:  Essential hypertension   Changed by:  Neville Nick MD        Take 1/4 tablet daily   Refills:  0            Where to get your medicines      Some of these will need a paper prescription and others can be bought over the counter.  Ask your nurse if you have questions.     You don't need a prescription for these medications     lisinopril 20 MG tablet                Primary Care Provider Office Phone # Fax #    Neville Nick [phone]       [address] [facility] [address]        Equal Access to Services     [facility]: Hadii aad ku hadasho Soomaali, waaxda luqadaha, qaybta kaalmada adeegyada, waxwally wetzel haysascha yee . So [facility] [phone].    ATENCIÓN: Si habla español, tiene a [place] disposición servicios gratuitos de asistencia lingüística. [facility] [phone].    We comply with applicable federal civil rights laws and [place] laws. We do not discriminate on the basis of race, color, national origin, age, disability sex, sexual orientation or gender identity.            Thank you!     Thank you for choosing [facility]  for your care. Our goal is always to provide you with excellent care. Hearing back from our patients is one way we can continue to improve our services. Please take a few minutes to complete the written survey that you may receive in the mail after your visit with us. Thank you!             Your Updated Medication List - Protect others around you: Learn how to safely use, store and throw away your medicines at www.disposemymeds.org.          This list is accurate as of: 9/1/17 12:04 PM.  Always use your most recent med list.                   Brand Name Dispense Instructions for use Diagnosis    ACETAMINOPHEN PO      Take 1,000 mg by mouth daily as needed for pain Reported on 5/18/2017        allopurinol 300 MG tablet     ZYLOPRIM    90 tablet    TAKE ONE TABLET BY MOUTH ONCE DAILY    Gout, unspecified       aspirin 81 MG tablet      Take 81 mg by mouth daily        doxycycline 100 MG capsule    VIBRAMYCIN     Take 100 mg by mouth daily    Blepharitis, unspecified laterality       lisinopril 20 MG tablet    PRINIVIL/ZESTRIL     Take 1/4 tablet daily    Essential hypertension       simvastatin 20 MG tablet    ZOCOR    90 tablet    Take 1 tablet (20 mg) by mouth daily    Hyperlipidemia LDL goal <130       tamsulosin 0.4 MG capsule    FLOMAX    90 capsule    Take 1 capsule (0.4 mg) by mouth daily    Benign non-nodular prostatic hyperplasia with lower urinary tract symptoms       VIAGRA PO      Take 50 mg by mouth daily as needed

## 2017-09-01 NOTE — PATIENT INSTRUCTIONS
Before Your Surgery      Call your surgeon if there is any change in your health. This includes signs of a cold or flu (such as a sore throat, runny nose, cough, rash or fever).    Do not smoke, drink alcohol or take over the counter medicine (unless your surgeon or primary care doctor tells you to) for the 24 hours before and after surgery.    If you take prescribed drugs: Follow your doctor s orders about which medicines to take and which to stop until after surgery.    Eating and drinking prior to surgery: follow the instructions from your surgeon    Take a shower or bath the night before surgery. Use the soap your surgeon gave you to gently clean your skin. If you do not have soap from your surgeon, use your regular soap. Do not shave or scrub the surgery site.  Wear clean pajamas and have clean sheets on your bed.     OK for surgery if labs are normal    -- Take NO scheduled medications on the day of surgery   -- Hold aspirin, ibuprofen, aleve, for a week before surgery  -- Try taking two tramadol at a time, to see if this helps

## 2017-09-05 ENCOUNTER — TRANSFERRED RECORDS (OUTPATIENT)
Dept: HEALTH INFORMATION MANAGEMENT | Facility: CLINIC | Age: 77
End: 2017-09-05

## 2017-09-12 ENCOUNTER — MYC MEDICAL ADVICE (OUTPATIENT)
Dept: INTERNAL MEDICINE | Facility: CLINIC | Age: 77
End: 2017-09-12

## 2017-09-12 DIAGNOSIS — M25.552 HIP PAIN, LEFT: Primary | ICD-10-CM

## 2017-09-12 RX ORDER — TRAMADOL HYDROCHLORIDE 50 MG/1
50-100 TABLET ORAL EVERY 8 HOURS PRN
Qty: 20 TABLET | Refills: 0 | Status: ON HOLD
Start: 2017-09-12 | End: 2017-09-18

## 2017-09-14 NOTE — H&P (VIEW-ONLY)
[address] [address]  [phone]  Dept: [phone]    PRE-OP EVALUATION:  Today's date: 2017    Greyson Haas (: 1940) presents for pre-operative evaluation assessment as requested by Dr. Hood.  He requires evaluation and anesthesia risk assessment prior to undergoing surgery/procedure for treatment of L hip  .  Proposed procedure: Left Total Hip Arthroplasty     Date of Surgery/ Procedure: 2017  Time of Surgery/ Procedure: 11:30 AM  Hospital/Surgical Facility: [facility]    Primary Physician: Neville Nick  Type of Anesthesia Anticipated: General    Patient has a Health Care Directive or Living Will:  YES scanned in    Preop Questions 2017   1.  Do you have a history of heart attack, stroke, stent, bypass or surgery on an artery in the head, neck, heart or legs? No   2.  Do you ever have any pain or discomfort in your chest? No   3.  Do you have a history of  Heart Failure? No   4.   Are you troubled by shortness of breath when:  walking on a level surface, or up a slight hill, or at night? No   5.  Do you currently have a cold, bronchitis or other respiratory infection? No   6.  Do you have a cough, shortness of breath, or wheezing? No   7.  Do you sometimes get pains in the calves of your legs when you walk? No   8. Do you or anyone in your family have previous history of blood clots? No   9.  Do you or does anyone in your family have a serious bleeding problem such as prolonged bleeding following surgeries or cuts? No   10. Have you ever had problems with anemia or been told to take iron pills? No   11. Have you had any abnormal blood loss such as black, tarry or bloody stools? No   12. Have you ever had a blood transfusion? No   13. Have you or any of your relatives ever had problems with anesthesia? No   14. Do you have sleep apnea, excessive snoring or daytime drowsiness? No   15. Do you have any prosthetic heart  valves? No   16. Do you have prosthetic joints? YES - L knee           HPI:                                                      Brief HPI related to upcoming procedure: progressive and now severe pain in the left hip area.   Groin to lateral hip, down to and including the left knee.      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   Pain has been very severe past month, with any movement of hip or leg.    HYPERTENSION - Patient has longstanding history of mod-severe HTN , currently denies any symptoms referable to elevated blood pressure. Recently, blood pressure readings have been in normal range. Current medication regimen is as listed below. Patient denies any side effects of medication.                                                                                                                                                                                          .  HYPERLIPIDEMIA - Patient has a long history of significant Hyperlipidemia requiring medication for treatment with recent good control.                                                                                                                                                    .  RENAL INSUFFICIENCY - Patient has a longstanding history of chronic renal insufficiency of moderate severity.                                    .    MEDICAL HISTORY:                                                    Patient Active Problem List    Diagnosis Date Noted     Benign non-nodular prostatic hyperplasia with lower urinary tract symptoms 05/18/2017     Priority: Medium     S/P lumbar fusion 11/05/2015     Priority: Medium     Lumbar stenosis 04/23/2015     Priority: Medium     Previous lumbar surgery Dr. Pop  S/P re-do L4-S1 decompression with fusion on 11/5/2015 for claudication and right foot drop Dr. Grove       Kidney stones 09/20/2012     Priority: Medium     First 2004, EWSL Dr. Underwood  Next 2012  Multiple calcium  mariaelena 9/2012       Advanced directives, counseling/discussion 01/19/2012     Priority: Medium     Discussed advance care planning with patient; however, patient declined at this time.   12/9/2013:  Full code, no extended support  Advance Care Planning:   Initial facilitation introduction:   Greyson Haas presented for initial session regarding ACP at the clinic. He was accompanied by wife Mellissa. [employer] information provided and resources reviewed. He currently wishes to complete an ACP document.  He currently has the following questions or concerns about Advance Care Planning: none.  Confirmed/documented designated decision maker(s). See permanent comments section of demographics in clinical tab. Confirmed code status reflects current choices .   Added by Irais Guerrero on 3/31/2015           Pain in joint, lower leg 02/08/2011     Priority: Medium     Abnormal gait 02/08/2011     Priority: Medium     KNEE JOINT REPLACEMENT 02/07/2011     Priority: Medium     Basal cell carcinoma 01/19/2011     Priority: Medium     Chest, Moh's 1/11, Dr. Raj Berg following       Erectile dysfunction 01/19/2011     Priority: Medium     Gouty arthropathy 04/29/2008     Priority: Medium              Anxiety state 01/15/2007     Priority: Medium     Sprain of acromioclavicular ligament 10/17/2006     Priority: Medium     Problem list name updated by automated process. Provider to review       Glaucoma      Priority: Medium     Followed by Dr. Michel         Hyperlipidemia LDL goal <130 06/06/2005     Priority: Medium     Chronic Kidney Disease, Stage III 02/02/2005     Priority: Medium     GOUTY TOPHUS 09/23/2004     Priority: Medium     R third toe       Tinnitus 05/19/2004     Priority: Medium     LVH 05/22/2003     Priority: Medium     Diverticulosis of large intestine 05/12/2003     Priority: Medium     Problem list name updated by automated process. Provider to review       Essential hypertension 02/10/2003      "Priority: Medium     History of rectal cancer 03/28/2002     Priority: Medium     Dr. Rubin        Past Medical History:   Diagnosis Date      RENAL INSUFFICIENCY      Arthritis      Calculus of kidney 9/02, 6/04     Chronic Kidney Disease, Stage III 2/2/2005     Chronic Tinnitus 1960's     Diverticulosis of colon (without mention of hemorrhage)      Essential hypertension, benign      GLAUCOMA     Followed by Dr. 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 Rubin     Pulmonary Nodule, stable      Tubular Adenoma 3/07     Past Surgical History:   Procedure Laterality Date     C NONSPECIFIC PROCEDURE  6/04    cysto, R ureteral stent, ESWL     C NONSPECIFIC PROCEDURE  CHILD    TONSILLECTOMY     C NONSPECIFIC PROCEDURE  AGE 4    L heel osteomyelitis with \"bone scraping\"     OPTICAL TRACKING SYSTEM FUSION SPINE POSTERIOR LUMBAR TWO LEVELS N/A 11/5/2015    Procedure: OPTICAL TRACKING SYSTEM FUSION SPINE POSTERIOR LUMBAR TWO LEVELS;  Surgeon: Mina Grove MD;  Location:  OR     SURGICAL HISTORY OF -   1991    B Shoulder surgeries     SURGICAL HISTORY OF -   1992    lumbar laminectomy     SURGICAL HISTORY OF -   1998    lumbar decompression    Dr. Pop     SURGICAL HISTORY OF -   9/06    L shoulder    Dr. Foley     SURGICAL HISTORY OF -   11/07    partial amputation R 3rd toe (tophus)  Dr. Neal     SURGICAL HISTORY OF -   2010    bilateral cataracts       SURGICAL HISTORY OF -   1/11    Mohs L chest, Dr. Anthony     SURGICAL HISTORY OF - 1/11    L TKA   Dr. Thomson     Current Outpatient Prescriptions   Medication Sig Dispense Refill     allopurinol (ZYLOPRIM) 300 MG tablet TAKE ONE TABLET BY MOUTH ONCE DAILY 90 tablet 3     tamsulosin (FLOMAX) 0.4 MG capsule " "Take 1 capsule (0.4 mg) by mouth daily 90 capsule prn     simvastatin (ZOCOR) 20 MG tablet Take 1 tablet (20 mg) by mouth daily 90 tablet prn     lisinopril (PRINIVIL/ZESTRIL) 20 MG tablet Take 0.5 tablets (10 mg) by mouth daily 45 tablet prn     ACETAMINOPHEN PO Take 1,000 mg by mouth daily as needed for pain Reported on 5/18/2017       Sildenafil Citrate (VIAGRA PO) Take 50 mg by mouth daily as needed       doxycycline (VIBRAMYCIN) 100 MG capsule Take 100 mg by mouth daily        aspirin 81 MG tablet Take 81 mg by mouth daily        OTC products: None, except as noted above    No Known Allergies   Latex Allergy: NO    Social History   Substance Use Topics     Smoking status: Former Smoker     Packs/day: 1.00     Years: 33.00     Quit date: 1/1/1983     Smokeless tobacco: Never Used     Alcohol use Yes      Comment: Ave 2 daily or more.  Heavy days 5-6, some days none.     History   Drug Use No       REVIEW OF SYSTEMS:                                                    C: NEGATIVE for fever, chills, change in weight  I: NEGATIVE for worrisome rashes, moles or lesions  E: Blepharitis symptoms persist, otherwise NEGATIVE for vision changes or irritation  E/M: NEGATIVE for ear, mouth and throat problems  R: NEGATIVE for significant cough or SOB  B: NEGATIVE for masses, tenderness or discharge  CV: NEGATIVE for chest pain, palpitations or peripheral edema  GI: NEGATIVE for nausea, abdominal pain, heartburn, or change in bowel habits  : Nocturia x 2, decreased stream, otherwise NEGATIVE for frequency, dysuria, or hematuria  M: NEGATIVE for significant arthralgias or myalgia  N: NEGATIVE for weakness, dizziness or paresthesias  E: NEGATIVE for temperature intolerance, skin/hair changes  H: NEGATIVE for bleeding problems  P: NEGATIVE for changes in mood or affect    EXAM:                                                    /68  Pulse 69  Temp 98.4  F (36.9  C) (Oral)  Ht 5' 7\" (1.702 m)  Wt 196 lb 3.2 oz (89 " kg)  SpO2 99%  BMI 30.73 kg/m2    GENERAL APPEARANCE: healthy, alert and no distress     EYES: Eyes grossly normal to inspection     HENT: ear canals and TM's normal and nose and mouth without ulcers or lesions     NECK: no adenopathy, no asymmetry, masses, or scars and thyroid normal to palpation     RESP: lungs clear to auscultation - no rales, rhonchi or wheezes     CV: regular rates and rhythm, normal S1 S2, no S3 or S4 and no murmur, click or rub     ABDOMEN:  soft, nontender, no HSM or masses and bowel sounds normal     MS: extremities normal- no gross deformities noted, no evidence of inflammation in joints, FROM in all extremities.     SKIN: no suspicious lesions or rashes     NEURO: mentation intact, strength and speech normal     PSYCH: mentation appears normal. and affect normal/bright     LYMPHATICS: normal ant/post cervical, supraclavicular nodes    DIAGNOSTICS:                                                    EKG: appears normal, NSR, normal axis, normal intervals, no acute ST/T changes c/w ischemia, no LVH by voltage criteria, unchanged from previous tracings    Recent Labs   Lab Test  05/18/17   1156  01/24/17   0827  12/30/16   1027   11/08/15   0726   11/06/15   0748   02/07/11   1030  01/28/11   0553   HGB   --   14.8   --    --   11.0*   < >  11.3*   < >   --    --    PLT   --    --    --    --   151   --   131*   < >   --    --    INR   --    --    --    --    --    --    --    --   1.89*  1.69*   NA  141   --   143   --   139   < >  143   < >   --    --    POTASSIUM  5.1   --   4.4   --   3.7   < >  3.9   < >   --    --    CR  1.40*  1.29*  1.50*   < >  1.15   < >  1.34*   < >   --    --     < > = values in this interval not displayed.      CBC, BMP normal    IMPRESSION:                                                    Reason for surgery/procedure:  L hip and leg pain, felt related to end stage DJD of hip    Diagnosis/reason for consult:   1.  HTN, controlled.   Blood pressure currently  lower than ideal.  2.  Chronic kidney disease   3.  Other medical status stable    The proposed surgical procedure is considered INTERMEDIATE risk.    REVISED CARDIAC RISK INDEX  The patient has the following serious cardiovascular risks for perioperative complications such as (MI, PE, VFib and 3  AV Block):  No serious cardiac risks  INTERPRETATION: 0 risks: Class I (very low risk - 0.4% complication rate)    The patient has the following additional risks for perioperative complications:  No identified additional risks      ICD-10-CM    1. Preop general physical exam Z01.818 EKG 12-lead complete w/read - Clinics     CBC with platelets differential   2. Essential hypertension I10 lisinopril (PRINIVIL/ZESTRIL) 20 MG tablet     Basic metabolic panel   3. Hyperlipidemia LDL goal <130 E78.5    4. Chronic Kidney Disease, Stage III N18.3        RECOMMENDATIONS:                                                        APPROVAL GIVEN to proceed with proposed procedure, without further diagnostic evaluation    -- Patient is to take NO scheduled medications on the day of surgery   -- Hold aspirin, ibuprofen, aleve, for a week before surgery  -- Try taking two tramadol at a time, to see if this helps     Signed Electronically by: Neville Nick MD    Copy of this evaluation report is provided to requesting physician.    [facility] Preop Guidelines

## 2017-09-18 ENCOUNTER — ANESTHESIA (OUTPATIENT)
Dept: SURGERY | Facility: CLINIC | Age: 77
DRG: 470 | End: 2017-09-18
Payer: MEDICARE

## 2017-09-18 ENCOUNTER — HOSPITAL ENCOUNTER (INPATIENT)
Facility: CLINIC | Age: 77
LOS: 4 days | Discharge: HOME | DRG: 470 | End: 2017-09-22
Attending: ORTHOPAEDIC SURGERY | Admitting: ORTHOPAEDIC SURGERY
Payer: MEDICARE

## 2017-09-18 ENCOUNTER — ANESTHESIA EVENT (OUTPATIENT)
Dept: SURGERY | Facility: CLINIC | Age: 77
DRG: 470 | End: 2017-09-18
Payer: MEDICARE

## 2017-09-18 ENCOUNTER — APPOINTMENT (OUTPATIENT)
Dept: PHYSICAL THERAPY | Facility: CLINIC | Age: 77
DRG: 470 | End: 2017-09-18
Attending: ORTHOPAEDIC SURGERY
Payer: MEDICARE

## 2017-09-18 ENCOUNTER — APPOINTMENT (OUTPATIENT)
Dept: GENERAL RADIOLOGY | Facility: CLINIC | Age: 77
DRG: 470 | End: 2017-09-18
Attending: ORTHOPAEDIC SURGERY
Payer: MEDICARE

## 2017-09-18 DIAGNOSIS — Z96.642 H/O TOTAL HIP ARTHROPLASTY, LEFT: Primary | ICD-10-CM

## 2017-09-18 LAB
ABO + RH BLD: NORMAL
ABO + RH BLD: NORMAL
BLD GP AB SCN SERPL QL: NORMAL
BLOOD BANK CMNT PATIENT-IMP: NORMAL
CREAT SERPL-MCNC: 1.18 MG/DL (ref 0.66–1.25)
ERYTHROCYTE [DISTWIDTH] IN BLOOD BY AUTOMATED COUNT: 13.1 % (ref 10–15)
GFR SERPL CREATININE-BSD FRML MDRD: 60 ML/MIN/1.7M2
HCT VFR BLD AUTO: 37.1 % (ref 40–53)
HGB BLD-MCNC: 12.9 G/DL (ref 13.3–17.7)
INR PPP: 0.99 (ref 0.86–1.14)
MCH RBC QN AUTO: 30.5 PG (ref 26.5–33)
MCHC RBC AUTO-ENTMCNC: 34.8 G/DL (ref 31.5–36.5)
MCV RBC AUTO: 88 FL (ref 78–100)
PLATELET # BLD AUTO: 213 10E9/L (ref 150–450)
POTASSIUM SERPL-SCNC: 4 MMOL/L (ref 3.4–5.3)
RBC # BLD AUTO: 4.23 10E12/L (ref 4.4–5.9)
SPECIMEN EXP DATE BLD: NORMAL
WBC # BLD AUTO: 5.6 10E9/L (ref 4–11)

## 2017-09-18 PROCEDURE — 25000128 H RX IP 250 OP 636: Performed by: NURSE ANESTHETIST, CERTIFIED REGISTERED

## 2017-09-18 PROCEDURE — 25000125 ZZHC RX 250: Performed by: ORTHOPAEDIC SURGERY

## 2017-09-18 PROCEDURE — 25000128 H RX IP 250 OP 636: Performed by: ORTHOPAEDIC SURGERY

## 2017-09-18 PROCEDURE — 88304 TISSUE EXAM BY PATHOLOGIST: CPT | Performed by: ORTHOPAEDIC SURGERY

## 2017-09-18 PROCEDURE — 97530 THERAPEUTIC ACTIVITIES: CPT | Mod: GP

## 2017-09-18 PROCEDURE — 86900 BLOOD TYPING SEROLOGIC ABO: CPT | Performed by: ORTHOPAEDIC SURGERY

## 2017-09-18 PROCEDURE — C1776 JOINT DEVICE (IMPLANTABLE): HCPCS | Performed by: ORTHOPAEDIC SURGERY

## 2017-09-18 PROCEDURE — 37000008 ZZH ANESTHESIA TECHNICAL FEE, 1ST 30 MIN: Performed by: ORTHOPAEDIC SURGERY

## 2017-09-18 PROCEDURE — 36415 COLL VENOUS BLD VENIPUNCTURE: CPT | Performed by: ORTHOPAEDIC SURGERY

## 2017-09-18 PROCEDURE — 85610 PROTHROMBIN TIME: CPT | Performed by: ORTHOPAEDIC SURGERY

## 2017-09-18 PROCEDURE — C1713 ANCHOR/SCREW BN/BN,TIS/BN: HCPCS | Performed by: ORTHOPAEDIC SURGERY

## 2017-09-18 PROCEDURE — 40000986 XR PELVIS PORT 1/2 VW

## 2017-09-18 PROCEDURE — 40000170 ZZH STATISTIC PRE-PROCEDURE ASSESSMENT II: Performed by: ORTHOPAEDIC SURGERY

## 2017-09-18 PROCEDURE — 25000125 ZZHC RX 250: Performed by: NURSE ANESTHETIST, CERTIFIED REGISTERED

## 2017-09-18 PROCEDURE — 25000132 ZZH RX MED GY IP 250 OP 250 PS 637: Mod: GY | Performed by: ORTHOPAEDIC SURGERY

## 2017-09-18 PROCEDURE — 82565 ASSAY OF CREATININE: CPT | Performed by: ORTHOPAEDIC SURGERY

## 2017-09-18 PROCEDURE — S0020 INJECTION, BUPIVICAINE HYDRO: HCPCS | Performed by: ORTHOPAEDIC SURGERY

## 2017-09-18 PROCEDURE — 27210794 ZZH OR GENERAL SUPPLY STERILE: Performed by: ORTHOPAEDIC SURGERY

## 2017-09-18 PROCEDURE — 88311 DECALCIFY TISSUE: CPT | Performed by: ORTHOPAEDIC SURGERY

## 2017-09-18 PROCEDURE — 37000009 ZZH ANESTHESIA TECHNICAL FEE, EACH ADDTL 15 MIN: Performed by: ORTHOPAEDIC SURGERY

## 2017-09-18 PROCEDURE — 12000007 ZZH R&B INTERMEDIATE

## 2017-09-18 PROCEDURE — 88304 TISSUE EXAM BY PATHOLOGIST: CPT | Mod: 26 | Performed by: ORTHOPAEDIC SURGERY

## 2017-09-18 PROCEDURE — 0SRB01A REPLACEMENT OF LEFT HIP JOINT WITH METAL SYNTHETIC SUBSTITUTE, UNCEMENTED, OPEN APPROACH: ICD-10-PCS | Performed by: ORTHOPAEDIC SURGERY

## 2017-09-18 PROCEDURE — 97161 PT EVAL LOW COMPLEX 20 MIN: CPT | Mod: GP

## 2017-09-18 PROCEDURE — 88311 DECALCIFY TISSUE: CPT | Mod: 26 | Performed by: ORTHOPAEDIC SURGERY

## 2017-09-18 PROCEDURE — 86901 BLOOD TYPING SEROLOGIC RH(D): CPT | Performed by: ORTHOPAEDIC SURGERY

## 2017-09-18 PROCEDURE — 27210995 ZZH RX 272: Performed by: ORTHOPAEDIC SURGERY

## 2017-09-18 PROCEDURE — 99222 1ST HOSP IP/OBS MODERATE 55: CPT | Performed by: PHYSICIAN ASSISTANT

## 2017-09-18 PROCEDURE — 85027 COMPLETE CBC AUTOMATED: CPT | Performed by: ORTHOPAEDIC SURGERY

## 2017-09-18 PROCEDURE — 36000093 ZZH SURGERY LEVEL 4 1ST 30 MIN: Performed by: ORTHOPAEDIC SURGERY

## 2017-09-18 PROCEDURE — 25000128 H RX IP 250 OP 636: Performed by: ANESTHESIOLOGY

## 2017-09-18 PROCEDURE — 84132 ASSAY OF SERUM POTASSIUM: CPT | Performed by: ORTHOPAEDIC SURGERY

## 2017-09-18 PROCEDURE — 99207 ZZC CONSULT E&M CHANGED TO INITIAL LEVEL: CPT | Performed by: PHYSICIAN ASSISTANT

## 2017-09-18 PROCEDURE — 25800025 ZZH RX 258: Performed by: ORTHOPAEDIC SURGERY

## 2017-09-18 PROCEDURE — A9270 NON-COVERED ITEM OR SERVICE: HCPCS | Mod: GY | Performed by: ORTHOPAEDIC SURGERY

## 2017-09-18 PROCEDURE — 40000193 ZZH STATISTIC PT WARD VISIT

## 2017-09-18 PROCEDURE — 36000063 ZZH SURGERY LEVEL 4 EA 15 ADDTL MIN: Performed by: ORTHOPAEDIC SURGERY

## 2017-09-18 PROCEDURE — 97110 THERAPEUTIC EXERCISES: CPT | Mod: GP

## 2017-09-18 PROCEDURE — 71000012 ZZH RECOVERY PHASE 1 LEVEL 1 FIRST HR: Performed by: ORTHOPAEDIC SURGERY

## 2017-09-18 PROCEDURE — 86850 RBC ANTIBODY SCREEN: CPT | Performed by: ORTHOPAEDIC SURGERY

## 2017-09-18 PROCEDURE — 25000566 ZZH SEVOFLURANE, EA 15 MIN: Performed by: ORTHOPAEDIC SURGERY

## 2017-09-18 DEVICE — IMPLANTABLE DEVICE: Type: IMPLANTABLE DEVICE | Site: HIP | Status: FUNCTIONAL

## 2017-09-18 DEVICE — IMP SCR BONE CAN ACE 6.5X35MM 1217-35-500: Type: IMPLANTABLE DEVICE | Site: HIP | Status: FUNCTIONAL

## 2017-09-18 DEVICE — IMP SCR BONE CAN ACE 6.5X30MM 1217-30-500: Type: IMPLANTABLE DEVICE | Site: HIP | Status: FUNCTIONAL

## 2017-09-18 DEVICE — IMP CUP ACE PINNACLE 60MM 1217-22-060: Type: IMPLANTABLE DEVICE | Site: HIP | Status: FUNCTIONAL

## 2017-09-18 RX ORDER — BUPIVACAINE HYDROCHLORIDE AND EPINEPHRINE 5; 5 MG/ML; UG/ML
INJECTION, SOLUTION PERINEURAL PRN
Status: DISCONTINUED | OUTPATIENT
Start: 2017-09-18 | End: 2017-09-18 | Stop reason: HOSPADM

## 2017-09-18 RX ORDER — LIDOCAINE 40 MG/G
CREAM TOPICAL
Status: DISCONTINUED | OUTPATIENT
Start: 2017-09-18 | End: 2017-09-22 | Stop reason: HOSPADM

## 2017-09-18 RX ORDER — LISINOPRIL 5 MG/1
5 TABLET ORAL EVERY MORNING
Status: DISCONTINUED | OUTPATIENT
Start: 2017-09-19 | End: 2017-09-18

## 2017-09-18 RX ORDER — FENTANYL CITRATE 50 UG/ML
INJECTION, SOLUTION INTRAMUSCULAR; INTRAVENOUS PRN
Status: DISCONTINUED | OUTPATIENT
Start: 2017-09-18 | End: 2017-09-18

## 2017-09-18 RX ORDER — ONDANSETRON 2 MG/ML
4 INJECTION INTRAMUSCULAR; INTRAVENOUS EVERY 30 MIN PRN
Status: DISCONTINUED | OUTPATIENT
Start: 2017-09-18 | End: 2017-09-18 | Stop reason: HOSPADM

## 2017-09-18 RX ORDER — TAMSULOSIN HYDROCHLORIDE 0.4 MG/1
0.4 CAPSULE ORAL EVERY MORNING
Status: DISCONTINUED | OUTPATIENT
Start: 2017-09-18 | End: 2017-09-22 | Stop reason: HOSPADM

## 2017-09-18 RX ORDER — LIDOCAINE HYDROCHLORIDE 20 MG/ML
INJECTION, SOLUTION INFILTRATION; PERINEURAL PRN
Status: DISCONTINUED | OUTPATIENT
Start: 2017-09-18 | End: 2017-09-18

## 2017-09-18 RX ORDER — WARFARIN SODIUM 7.5 MG/1
7.5 TABLET ORAL
Status: COMPLETED | OUTPATIENT
Start: 2017-09-18 | End: 2017-09-18

## 2017-09-18 RX ORDER — CHLORHEXIDINE GLUCONATE 40 MG/ML
SOLUTION TOPICAL ONCE
Status: DISCONTINUED | OUTPATIENT
Start: 2017-09-18 | End: 2017-09-18 | Stop reason: HOSPADM

## 2017-09-18 RX ORDER — PROCHLORPERAZINE MALEATE 5 MG
5 TABLET ORAL EVERY 6 HOURS PRN
Status: DISCONTINUED | OUTPATIENT
Start: 2017-09-18 | End: 2017-09-22 | Stop reason: HOSPADM

## 2017-09-18 RX ORDER — ONDANSETRON 2 MG/ML
4 INJECTION INTRAMUSCULAR; INTRAVENOUS EVERY 6 HOURS PRN
Status: DISCONTINUED | OUTPATIENT
Start: 2017-09-18 | End: 2017-09-22 | Stop reason: HOSPADM

## 2017-09-18 RX ORDER — FERROUS GLUCONATE 324(38)MG
324 TABLET ORAL DAILY
Status: DISCONTINUED | OUTPATIENT
Start: 2017-09-18 | End: 2017-09-22 | Stop reason: HOSPADM

## 2017-09-18 RX ORDER — HYDROXYZINE HYDROCHLORIDE 10 MG/1
10 TABLET, FILM COATED ORAL EVERY 6 HOURS PRN
Status: DISCONTINUED | OUTPATIENT
Start: 2017-09-18 | End: 2017-09-22 | Stop reason: HOSPADM

## 2017-09-18 RX ORDER — AMOXICILLIN 250 MG
1-2 CAPSULE ORAL 2 TIMES DAILY
Status: DISCONTINUED | OUTPATIENT
Start: 2017-09-18 | End: 2017-09-22 | Stop reason: HOSPADM

## 2017-09-18 RX ORDER — SODIUM CHLORIDE, SODIUM LACTATE, POTASSIUM CHLORIDE, CALCIUM CHLORIDE 600; 310; 30; 20 MG/100ML; MG/100ML; MG/100ML; MG/100ML
INJECTION, SOLUTION INTRAVENOUS CONTINUOUS
Status: DISCONTINUED | OUTPATIENT
Start: 2017-09-18 | End: 2017-09-18 | Stop reason: HOSPADM

## 2017-09-18 RX ORDER — ACETAMINOPHEN 325 MG/1
975 TABLET ORAL EVERY 8 HOURS
Status: DISPENSED | OUTPATIENT
Start: 2017-09-18 | End: 2017-09-21

## 2017-09-18 RX ORDER — SODIUM CHLORIDE 9 MG/ML
INJECTION, SOLUTION INTRAVENOUS CONTINUOUS
Status: DISCONTINUED | OUTPATIENT
Start: 2017-09-18 | End: 2017-09-21

## 2017-09-18 RX ORDER — DORZOLAMIDE HYDROCHLORIDE AND TIMOLOL MALEATE 20; 5 MG/ML; MG/ML
1 SOLUTION/ DROPS OPHTHALMIC 2 TIMES DAILY
Status: DISCONTINUED | OUTPATIENT
Start: 2017-09-18 | End: 2017-09-22 | Stop reason: HOSPADM

## 2017-09-18 RX ORDER — ALLOPURINOL 300 MG/1
300 TABLET ORAL EVERY MORNING
Status: DISCONTINUED | OUTPATIENT
Start: 2017-09-18 | End: 2017-09-22 | Stop reason: HOSPADM

## 2017-09-18 RX ORDER — ONDANSETRON 2 MG/ML
INJECTION INTRAMUSCULAR; INTRAVENOUS PRN
Status: DISCONTINUED | OUTPATIENT
Start: 2017-09-18 | End: 2017-09-18

## 2017-09-18 RX ORDER — HYDROMORPHONE HYDROCHLORIDE 1 MG/ML
.3-.5 INJECTION, SOLUTION INTRAMUSCULAR; INTRAVENOUS; SUBCUTANEOUS
Status: DISCONTINUED | OUTPATIENT
Start: 2017-09-18 | End: 2017-09-22 | Stop reason: HOSPADM

## 2017-09-18 RX ORDER — CEFAZOLIN SODIUM 1 G/3ML
1 INJECTION, POWDER, FOR SOLUTION INTRAMUSCULAR; INTRAVENOUS SEE ADMIN INSTRUCTIONS
Status: DISCONTINUED | OUTPATIENT
Start: 2017-09-18 | End: 2017-09-18 | Stop reason: HOSPADM

## 2017-09-18 RX ORDER — BUPIVACAINE HYDROCHLORIDE 2.5 MG/ML
INJECTION, SOLUTION EPIDURAL; INFILTRATION; INTRACAUDAL PRN
Status: DISCONTINUED | OUTPATIENT
Start: 2017-09-18 | End: 2017-09-18 | Stop reason: HOSPADM

## 2017-09-18 RX ORDER — BENZOCAINE/MENTHOL 6 MG-10 MG
LOZENGE MUCOUS MEMBRANE DAILY PRN
COMMUNITY
End: 2019-01-21

## 2017-09-18 RX ORDER — FENTANYL CITRATE 0.05 MG/ML
25-50 INJECTION, SOLUTION INTRAMUSCULAR; INTRAVENOUS
Status: DISCONTINUED | OUTPATIENT
Start: 2017-09-18 | End: 2017-09-18 | Stop reason: HOSPADM

## 2017-09-18 RX ORDER — ACETAMINOPHEN 325 MG/1
650 TABLET ORAL EVERY 4 HOURS PRN
Status: DISCONTINUED | OUTPATIENT
Start: 2017-09-21 | End: 2017-09-22 | Stop reason: HOSPADM

## 2017-09-18 RX ORDER — EPHEDRINE SULFATE 50 MG/ML
INJECTION, SOLUTION INTRAMUSCULAR; INTRAVENOUS; SUBCUTANEOUS PRN
Status: DISCONTINUED | OUTPATIENT
Start: 2017-09-18 | End: 2017-09-18

## 2017-09-18 RX ORDER — NEOSTIGMINE METHYLSULFATE 1 MG/ML
VIAL (ML) INJECTION PRN
Status: DISCONTINUED | OUTPATIENT
Start: 2017-09-18 | End: 2017-09-18

## 2017-09-18 RX ORDER — NALOXONE HYDROCHLORIDE 0.4 MG/ML
.1-.4 INJECTION, SOLUTION INTRAMUSCULAR; INTRAVENOUS; SUBCUTANEOUS
Status: DISCONTINUED | OUTPATIENT
Start: 2017-09-18 | End: 2017-09-22 | Stop reason: HOSPADM

## 2017-09-18 RX ORDER — SIMVASTATIN 10 MG
20 TABLET ORAL EVERY MORNING
Status: DISCONTINUED | OUTPATIENT
Start: 2017-09-18 | End: 2017-09-22 | Stop reason: HOSPADM

## 2017-09-18 RX ORDER — CEFAZOLIN SODIUM 2 G/100ML
2 INJECTION, SOLUTION INTRAVENOUS EVERY 8 HOURS
Status: COMPLETED | OUTPATIENT
Start: 2017-09-18 | End: 2017-09-19

## 2017-09-18 RX ORDER — OXYCODONE HYDROCHLORIDE 5 MG/1
5-10 TABLET ORAL
Status: DISCONTINUED | OUTPATIENT
Start: 2017-09-18 | End: 2017-09-19

## 2017-09-18 RX ORDER — ONDANSETRON 4 MG/1
4 TABLET, ORALLY DISINTEGRATING ORAL EVERY 6 HOURS PRN
Status: DISCONTINUED | OUTPATIENT
Start: 2017-09-18 | End: 2017-09-22 | Stop reason: HOSPADM

## 2017-09-18 RX ORDER — HYDROMORPHONE HYDROCHLORIDE 1 MG/ML
.3-.5 INJECTION, SOLUTION INTRAMUSCULAR; INTRAVENOUS; SUBCUTANEOUS EVERY 5 MIN PRN
Status: DISCONTINUED | OUTPATIENT
Start: 2017-09-18 | End: 2017-09-18 | Stop reason: HOSPADM

## 2017-09-18 RX ORDER — ONDANSETRON 4 MG/1
4 TABLET, ORALLY DISINTEGRATING ORAL EVERY 30 MIN PRN
Status: DISCONTINUED | OUTPATIENT
Start: 2017-09-18 | End: 2017-09-18 | Stop reason: HOSPADM

## 2017-09-18 RX ORDER — GLYCOPYRROLATE 0.2 MG/ML
INJECTION, SOLUTION INTRAMUSCULAR; INTRAVENOUS PRN
Status: DISCONTINUED | OUTPATIENT
Start: 2017-09-18 | End: 2017-09-18

## 2017-09-18 RX ORDER — CEFAZOLIN SODIUM 2 G/100ML
2 INJECTION, SOLUTION INTRAVENOUS
Status: COMPLETED | OUTPATIENT
Start: 2017-09-18 | End: 2017-09-18

## 2017-09-18 RX ORDER — DOXYCYCLINE 100 MG/1
100 CAPSULE ORAL EVERY OTHER DAY
Status: DISCONTINUED | OUTPATIENT
Start: 2017-09-19 | End: 2017-09-22 | Stop reason: HOSPADM

## 2017-09-18 RX ORDER — PROPOFOL 10 MG/ML
INJECTION, EMULSION INTRAVENOUS PRN
Status: DISCONTINUED | OUTPATIENT
Start: 2017-09-18 | End: 2017-09-18

## 2017-09-18 RX ADMIN — DORZOLAMIDE HYDROCHLORIDE AND TIMOLOL MALEATE 1 DROP: 20; 5 SOLUTION/ DROPS OPHTHALMIC at 20:03

## 2017-09-18 RX ADMIN — HYDROXYZINE HYDROCHLORIDE 10 MG: 10 TABLET ORAL at 15:15

## 2017-09-18 RX ADMIN — SENNOSIDES AND DOCUSATE SODIUM 2 TABLET: 8.6; 5 TABLET ORAL at 20:01

## 2017-09-18 RX ADMIN — HYDROXYZINE HYDROCHLORIDE 10 MG: 10 TABLET ORAL at 22:17

## 2017-09-18 RX ADMIN — ONDANSETRON 4 MG: 2 INJECTION INTRAMUSCULAR; INTRAVENOUS at 09:18

## 2017-09-18 RX ADMIN — Medication 5 MG: at 09:15

## 2017-09-18 RX ADMIN — Medication 5 MG: at 07:49

## 2017-09-18 RX ADMIN — HYDROMORPHONE HYDROCHLORIDE 0.5 MG: 1 INJECTION, SOLUTION INTRAMUSCULAR; INTRAVENOUS; SUBCUTANEOUS at 10:03

## 2017-09-18 RX ADMIN — FENTANYL CITRATE 50 MCG: 50 INJECTION, SOLUTION INTRAMUSCULAR; INTRAVENOUS at 10:40

## 2017-09-18 RX ADMIN — HYDROMORPHONE HYDROCHLORIDE 0.5 MG: 1 INJECTION, SOLUTION INTRAMUSCULAR; INTRAVENOUS; SUBCUTANEOUS at 08:25

## 2017-09-18 RX ADMIN — ROCURONIUM BROMIDE 5 MG: 10 INJECTION INTRAVENOUS at 08:47

## 2017-09-18 RX ADMIN — OXYCODONE HYDROCHLORIDE 10 MG: 5 TABLET ORAL at 23:00

## 2017-09-18 RX ADMIN — FENTANYL CITRATE 50 MCG: 50 INJECTION, SOLUTION INTRAMUSCULAR; INTRAVENOUS at 10:09

## 2017-09-18 RX ADMIN — FENTANYL CITRATE 50 MCG: 50 INJECTION, SOLUTION INTRAMUSCULAR; INTRAVENOUS at 08:34

## 2017-09-18 RX ADMIN — PHENYLEPHRINE HYDROCHLORIDE 100 MCG: 10 INJECTION, SOLUTION INTRAMUSCULAR; INTRAVENOUS; SUBCUTANEOUS at 07:50

## 2017-09-18 RX ADMIN — PHENYLEPHRINE HYDROCHLORIDE 100 MCG: 10 INJECTION, SOLUTION INTRAMUSCULAR; INTRAVENOUS; SUBCUTANEOUS at 07:58

## 2017-09-18 RX ADMIN — ACETAMINOPHEN 975 MG: 325 TABLET, FILM COATED ORAL at 13:10

## 2017-09-18 RX ADMIN — PHENYLEPHRINE HYDROCHLORIDE 100 MCG: 10 INJECTION, SOLUTION INTRAMUSCULAR; INTRAVENOUS; SUBCUTANEOUS at 07:49

## 2017-09-18 RX ADMIN — FENTANYL CITRATE 50 MCG: 50 INJECTION, SOLUTION INTRAMUSCULAR; INTRAVENOUS at 08:05

## 2017-09-18 RX ADMIN — HYDROMORPHONE HYDROCHLORIDE 0.5 MG: 1 INJECTION, SOLUTION INTRAMUSCULAR; INTRAVENOUS; SUBCUTANEOUS at 22:17

## 2017-09-18 RX ADMIN — PROPOFOL 200 MG: 10 INJECTION, EMULSION INTRAVENOUS at 07:41

## 2017-09-18 RX ADMIN — FERROUS GLUCONATE 324 MG: 324 TABLET ORAL at 18:46

## 2017-09-18 RX ADMIN — CEFAZOLIN SODIUM 2 G: 2 INJECTION, SOLUTION INTRAVENOUS at 07:43

## 2017-09-18 RX ADMIN — CEFAZOLIN SODIUM 2 G: 2 INJECTION, SOLUTION INTRAVENOUS at 16:40

## 2017-09-18 RX ADMIN — GLYCOPYRROLATE 0.7 MG: 0.2 INJECTION, SOLUTION INTRAMUSCULAR; INTRAVENOUS at 09:24

## 2017-09-18 RX ADMIN — HYDROMORPHONE HYDROCHLORIDE 0.5 MG: 1 INJECTION, SOLUTION INTRAMUSCULAR; INTRAVENOUS; SUBCUTANEOUS at 14:09

## 2017-09-18 RX ADMIN — SODIUM CHLORIDE, POTASSIUM CHLORIDE, SODIUM LACTATE AND CALCIUM CHLORIDE: 600; 310; 30; 20 INJECTION, SOLUTION INTRAVENOUS at 07:29

## 2017-09-18 RX ADMIN — LIDOCAINE HYDROCHLORIDE 100 MG: 20 INJECTION, SOLUTION INFILTRATION; PERINEURAL at 07:41

## 2017-09-18 RX ADMIN — OXYCODONE HYDROCHLORIDE 10 MG: 5 TABLET ORAL at 16:39

## 2017-09-18 RX ADMIN — PHENYLEPHRINE HYDROCHLORIDE 100 MCG: 10 INJECTION, SOLUTION INTRAMUSCULAR; INTRAVENOUS; SUBCUTANEOUS at 07:46

## 2017-09-18 RX ADMIN — NEOSTIGMINE METHYLSULFATE 4.5 MG: 1 INJECTION INTRAMUSCULAR; INTRAVENOUS; SUBCUTANEOUS at 09:24

## 2017-09-18 RX ADMIN — OXYCODONE HYDROCHLORIDE 10 MG: 5 TABLET ORAL at 13:10

## 2017-09-18 RX ADMIN — FENTANYL CITRATE 50 MCG: 50 INJECTION, SOLUTION INTRAMUSCULAR; INTRAVENOUS at 07:38

## 2017-09-18 RX ADMIN — OXYCODONE HYDROCHLORIDE 10 MG: 5 TABLET ORAL at 20:01

## 2017-09-18 RX ADMIN — SODIUM CHLORIDE, POTASSIUM CHLORIDE, SODIUM LACTATE AND CALCIUM CHLORIDE: 600; 310; 30; 20 INJECTION, SOLUTION INTRAVENOUS at 09:10

## 2017-09-18 RX ADMIN — WARFARIN SODIUM 7.5 MG: 7.5 TABLET ORAL at 18:46

## 2017-09-18 RX ADMIN — SODIUM CHLORIDE: 9 INJECTION, SOLUTION INTRAVENOUS at 11:56

## 2017-09-18 RX ADMIN — PHENYLEPHRINE HYDROCHLORIDE 0.25 MCG/KG/MIN: 10 INJECTION, SOLUTION INTRAMUSCULAR; INTRAVENOUS; SUBCUTANEOUS at 07:54

## 2017-09-18 RX ADMIN — ACETAMINOPHEN 975 MG: 325 TABLET, FILM COATED ORAL at 21:17

## 2017-09-18 RX ADMIN — PHENYLEPHRINE HYDROCHLORIDE 100 MCG: 10 INJECTION, SOLUTION INTRAMUSCULAR; INTRAVENOUS; SUBCUTANEOUS at 08:34

## 2017-09-18 RX ADMIN — ROCURONIUM BROMIDE 50 MG: 10 INJECTION INTRAVENOUS at 07:41

## 2017-09-18 RX ADMIN — Medication 5 MG: at 07:46

## 2017-09-18 RX ADMIN — Medication 5 MG: at 08:34

## 2017-09-18 RX ADMIN — HYDROMORPHONE HYDROCHLORIDE 0.5 MG: 1 INJECTION, SOLUTION INTRAMUSCULAR; INTRAVENOUS; SUBCUTANEOUS at 11:53

## 2017-09-18 RX ADMIN — MIDAZOLAM HYDROCHLORIDE 0.5 MG: 1 INJECTION, SOLUTION INTRAMUSCULAR; INTRAVENOUS at 07:29

## 2017-09-18 NOTE — IP AVS SNAPSHOT
MRN:[id number]                      After Visit Summary   9/18/2017    Greyson Haas    MRN: [id number]           Thank you!     Thank you for choosing [facility] for your care. Our goal is always to provide you with excellent care. Hearing back from our patients is one way we can continue to improve our services. Please take a few minutes to complete the written survey that you may receive in the mail after you visit with us. Thank you!        Patient Information     Date Of Birth          1940        Designated Caregiver       Most Recent Value    Caregiver    Will someone help with your care after discharge? yes    Name of designated caregiver Anabel    Phone number of caregiver same as pt    Caregiver address same as pt      About your hospital stay     You were admitted on:  September 18, 2017 You last received care in the:  [facility] [address] [facility]    You were discharged on:  September 22, 2017        Reason for your hospital stay       Osteoarthritis Left Hip                  Who to Call     For medical emergencies, please call 911.  For non-urgent questions about your medical care, please call your primary care provider or clinic, [phone]  For questions related to your surgery, please call your surgery clinic        Attending Provider     Provider Specialty    Aurelio Hood MD Surgery       Primary Care Provider Office Phone # Fax #    Neville Nick -[phone]      After Care Instructions     Activity - Ambulate in hallway       Every shift            Activity - Up ad aj           Activity - Up with assistive device           Activity - Up with nursing assistance           Additional Discharge Instructions       inr  Monday  And call results  So I may adjust the  Warfarin dose.            Diet       Follow this diet upon discharge Regular  :            Weight bearing status       wbat            Wound care       Site:   aquacel on   Change  Only  If  saturating  Instructions:                  Follow-up Appointments     Follow Up       Please follow up with urology for a routine check of how well you're emptying your bladder with voiding and to discuss the kidney abnormality seen on the renal ultrasound (and further imaging that should be done to rule out a harmful abnormality)    [facility]  [address] [place] [address], [address]  [place]. [address]    You may call [phone] with any questions or concerns or if you need to cancel/reschedule appointment.            Follow Up and recommended labs and tests       Follow up with me in 2  weeks            Follow-up and recommended labs and tests        Follow up with Dr Hood/Josias Dangelo in 2 weeks                  Additional Services     Physical Therapy Referral       This therapy referral will be filtered to a centralized scheduling office at [facility] and the patient will receive a call to schedule an appointment at a [facility] location most convenient for them. *   3 visits  over next  10  days    [facility] provides Physical Therapy evaluation and treatment and many specialty services across the [facility] system.  If requesting a specialty program, please choose from the list below.    If you have not heard from the scheduling office within 2 business days, please call [phone] for all locations, with the exception of [place], please call [phone].  Treatment: Evaluation & Treatment  Special Instructions/Modalities: howard  Exercises   wbat  flesx  Less than  120 degrees  Special Programs: post total hip    Please be aware that coverage of these services is subject to the terms and limitations of your health insurance plan.  Call member services at your health plan with any benefit or coverage questions.      **Note to Provider:  If you are referring outside of [facility] for the therapy appointment, please list the name of the  "location in the \"special instructions\" above, print the referral and give to the patient to schedule the appointment.                  Future tests that were ordered for you     AntiEmbolism Stockings       Bilateral below knee length.On in the morning, off at night                  Further instructions from your care team       TOTAL HIP REPLACEMENT TAKE HOME INSTRUCTIONS  Your surgeon will answer any questions about your progress. General guidelines for your care are listed below. Your surgeon may give additional instructions for your care at home. Please follow them carefully.    Activity Level  1. Physical activity may be resumed gradually according to your comfort level and your surgeon s instructions. Follow your exercise program as instructed by your therapist. Do exercises at least twice daily. Refer to pages 19-22 of your \"Total Hip Replacement \" booklet for details.  2. Complete exercises two hours before bedtime to minimize the effect pain may have on sleep.  3. Do not cross legs. Do not bend past 90 .    Good Health Practices  1. Maintain an adequate fluid intake and eat a well balanced diet.  2. Be sure to include the basic food groups, such as dairy products, meat/fish, vegetables, and fruit. Each of these foods contribute to wound healing and increasing your strength.  3. Surgery, decreased activity and pain medication all contribute to a descrease in bowel activity that can result in constipation. It is recommended that you increase your liquid intake, add fiber to your diet, increase activity, and decrease pain medication use. If you have any problems, notify your physician.  4. Wear your anti-embolism stockings day and night until seen by your surgeon. Remove twice a day for one hour at a time. You may hand wash and air dry your stockings.  5. Notify your dentist of your total hip surgery and call your dentist one week before a dental appointment for antibiotics.  If dentist will not prescribe " antibiotics call your surgeon to ask on next steps.      Incision/Dressing Care  1. Keep incision clean and dry.  2. Cover incision if you are still having drainage.  3.  If you have a waterproof dressing ____you can___   Shower. Leave dressing in place until you follow up with your surgeon.    Things to Watch For  1. Check incision daily for increased redness, tenderness, swelling, or drainage along the incision line. If these occur, please notify your doctor. Also, call if you develop a fever above 101 .  2. Please notify your doctor if you experience any calf pain and/or if you have surgical pain not relieved by the pain medication prescribed by your doctor.  3. Shortness of breath unrelieved by rest.    Call you surgeons office with any questions.      Revised 05/08/17        Warfarin Instruction     You have started taking a medicine called warfarin. This is a blood-thinning medicine (anticoagulant). It helps prevent and treat blood clots.      Before leaving the hospital, make sure you know how much to take and how long to take it.      You will need regular blood tests to make sure your blood is clotting safely. It is very important to see your doctor for regular blood tests.    Talk to your doctor before taking any new medicine (this includes over-the-counter drugs and herbal products). Many medicines can interact with warfarin. This may cause more bleeding or too much clotting.     Eating a lot of vitamin K--found in green, leafy vegetables--can change the way warfarin works in your body. Do NOT avoid these foods. Instead, try to eat the same amount each day.     Bleeding is the most common side-effect of warfarin. You may notice bleeding gums, a bloody nose, bruises and bleeding longer when you cut yourself. See a doctor at once if:   o You cough up blood  o You find blood in your stool (poop)  o You have a deep cut, or a cut that bleeds longer than 10 minutes   o You have a bad cut, hard fall, accident or  "hit your head (go to urgent care or the emergency room).    For women who can get pregnant: This medicine can harm an unborn baby. Be very careful not to get pregnant while taking this medicine. If you think you might be pregnant, call your doctor right away.    For more information, read \"Guide to Warfarin Therapy,  the booklet you received in the hospital.        Pending Results     Date and Time Order Name Status Description    9/20/2017 1325 Blood culture Preliminary     9/20/2017 1325 Blood culture Preliminary             Statement of Approval     Ordered          09/22/17 0741  I have reviewed and agree with all the recommendations and orders detailed in this document.  EFFECTIVE NOW     Approved and electronically signed by:  Aurelio Hood MD             Admission Information     Date & Time Provider Department Dept. Phone    9/18/2017 Aurelio Hood MD [facility] [address] [facility] [phone]      Your Vitals Were     Blood Pressure Pulse Temperature Respirations Pulse Oximetry       116/77 (BP Location: Right arm) 81 98.6  F (37  C) (Oral) 17 97%       MyChart Information     [facility] gives you secure access to your electronic health record. If you see a primary care provider, you can also send messages to your care team and make appointments. If you have questions, please call your primary care clinic.  If you do not have a primary care provider, please call [phone] and they will assist you.        Care EveryWhere ID     This is your Care EveryWhere ID. This could be used by other organizations to access your [facility] medical records  [id number]        Equal Access to Services     LETY SIFUENTES AH: Hadii lulú kelly Soporfirio, waaxda luqadaha, qaybta kaalmada og vee. So [facility] [phone].    ATENCIÓN: Si habla español, tiene a [place] disposición servicios gratuitos de asistencia lingüística. Llame al [phone].    We comply with " applicable federal civil rights laws and [place] laws. We do not discriminate on the basis of race, color, national origin, age, disability sex, sexual orientation or gender identity.               Review of your medicines      START taking        Dose / Directions    ferrous gluconate 324 (38 FE) MG tablet   Commonly known as:  FERGON        Dose:  324 mg   Take 1 tablet (324 mg) by mouth daily   Quantity:  60 tablet   Refills:  0       HYDROcodone-acetaminophen 5-325 MG per tablet   Commonly known as:  NORCO        Dose:  1-2 tablet   Take 1-2 tablets by mouth every 6 hours as needed for moderate to severe pain   Quantity:  80 tablet   Refills:  0       senna-docusate 8.6-50 MG per tablet   Commonly known as:  SENOKOT-S;PERICOLACE        Dose:  1-2 tablet   Take 1-2 tablets by mouth 2 times daily   Quantity:  100 tablet   Refills:  0       * Warfarin Therapy Reminder        Take as  Directed  Dose  Will change   Stop  After  10 days   Quantity:  1 each   Refills:  0       * warfarin 2.5 MG tablet   Commonly known as:  COUMADIN        Take 2.5 mg   Thursday and  Friday,  5 mg   Saturday  And  Sunday  Will ck inr on  monday   Quantity:  30 tablet   Refills:  0       * Notice:  This list has 2 medication(s) that are the same as other medications prescribed for you. Read the directions carefully, and ask your doctor or other care provider to review them with you.      CONTINUE these medicines which have NOT CHANGED        Dose / Directions    ACETAMINOPHEN PO        Dose:  1000 mg   Take 1,000 mg by mouth daily as needed for pain Reported on 5/18/2017   Refills:  0       DORZOLAMIDE HCL-TIMOLOL MAL OP        Dose:  1 drop   Place 1 drop into both eyes 2 times daily   Refills:  0       FLOMAX PO        Dose:  0.4 mg   Take 0.4 mg by mouth every morning   Refills:  0       hydrocortisone 1 % cream   Commonly known as:  CORTAID        Apply topically daily as needed for rash or itching (on legs.)   Refills:  0        PRINIVIL PO        Dose:  5 mg   Take 5 mg by mouth every morning (patient takes 0.25 X 20 mg = 5 mg dose)   Refills:  0       REFRESH OP        Dose:  1 drop   Apply 1 drop to eye daily as needed   Refills:  0       ULTRAM PO        Dose:   mg   Take  mg by mouth every 8 hours as needed for moderate to severe pain   Refills:  0       VIAGRA PO        Dose:  50 mg   Take 50 mg by mouth daily as needed   Refills:  0       VIBRAMYCIN PO        Dose:  100 mg   Take 100 mg by mouth every other day (for eyes)   Refills:  0       VITAMIN B-12 SL        Dose:  500 mg   Place 500 mg under the tongue every morning   Refills:  0       ZOCOR PO        Dose:  20 mg   Take 20 mg by mouth every morning   Refills:  0       ZYLOPRIM PO        Dose:  300 mg   Take 300 mg by mouth every morning   Refills:  0         STOP taking     ASPIRIN EC PO                Where to get your medicines      These medications were sent to [facility] - [address]  [address], [place] [address]     Phone:  [phone]     ferrous gluconate 324 (38 FE) MG tablet    senna-docusate 8.6-50 MG per tablet    warfarin 2.5 MG tablet         Some of these will need a paper prescription and others can be bought over the counter. Ask your nurse if you have questions.     Bring a paper prescription for each of these medications     HYDROcodone-acetaminophen 5-325 MG per tablet    Warfarin Therapy Reminder               ANTIBIOTIC INSTRUCTION     You've Been Prescribed an Antibiotic - Now What?  Your healthcare team thinks that you or your loved one might have an infection. Some infections can be treated with antibiotics, which are powerful, life-saving drugs. Like all medications, antibiotics have side effects and should only be used when necessary. There are some important things you should know about your antibiotic treatment.      Your healthcare team may run tests before you start taking an  antibiotic.    Your team may take samples (e.g., from your blood, urine or other areas) to run tests to look for bacteria. These test can be important to determine if you need an antibiotic at all and, if you do, which antibiotic will work best.      Within a few days, your healthcare team might change or even stop your antibiotic.    Your team may start you on an antibiotic while they are working to find out what is making you sick.    Your team might change your antibiotic because test results show that a different antibiotic would be better to treat your infection.    In some cases, once your team has more information, they learn that you do not need an antibiotic at all. They may find out that you don't have an infection, or that the antibiotic you're taking won't work against your infection. For example, an infection caused by a virus can't be treated with antibiotics. Staying on an antibiotic when you don't need it is more likely to be harmful than helpful.      You may experience side effects from your antibiotic.    Like all medications, antibiotics have side effects. Some of these can be serious.    Let you healthcare team know if you have any known allergies when you are admitted to the hospital.    One significant side effect of nearly all antibiotics is the risk of severe and sometimes deadly diarrhea caused by Clostridium difficile (C. Difficile). This occurs when a person takes antibiotics because some good germs are destroyed. Antibiotic use allows C. diificile to take over, putting patients at high risk for this serious infection.    As a patient or caregiver, it is important to understand your or your loved one's antibiotic treatment. It is especially important for caregivers to speak up when patients can't speak for themselves. Here are some important questions to ask your healthcare team.    What infection is this antibiotic treating and how do you know I have that infection?    What side effects  might occur from this antibiotic?    How long will I need to take this antibiotic?    Is it safe to take this antibiotic with other medications or supplements (e.g., vitamins) that I am taking?     Are there any special directions I need to know about taking this antibiotic? For example, should I take it with food?    How will I be monitored to know whether my infection is responding to the antibiotic?    What tests may help to make sure the right antibiotic is prescribed for me?      Information provided by:  www.cdc.gov/getsmart  U.S. Department of Health and Human Services  Centers for disease Control and Prevention  National Center for Emerging and Zoonotic Infectious Diseases  Division of Healthcare Quality Promotion         Protect others around you: Learn how to safely use, store and throw away your medicines at www.disposemymeds.org.             Medication List: This is a list of all your medications and when to take them. Check marks below indicate your daily home schedule. Keep this list as a reference.      Medications           Morning Afternoon Evening Bedtime As Needed    ACETAMINOPHEN PO   Take 1,000 mg by mouth daily as needed for pain Reported on 5/18/2017   Last time this was given:  650 mg on 9/22/2017  6:05 AM                                DORZOLAMIDE HCL-TIMOLOL MAL OP   Place 1 drop into both eyes 2 times daily   Last time this was given:  1 drop on 9/22/2017 10:00 AM                                ferrous gluconate 324 (38 FE) MG tablet   Commonly known as:  FERGON   Take 1 tablet (324 mg) by mouth daily   Last time this was given:  324 mg on 9/22/2017  9:58 AM                                FLOMAX PO   Take 0.4 mg by mouth every morning   Last time this was given:  0.4 mg on 9/22/2017  9:58 AM                                HYDROcodone-acetaminophen 5-325 MG per tablet   Commonly known as:  NORCO   Take 1-2 tablets by mouth every 6 hours as needed for moderate to severe pain   Last time this  was given:  2 tablets on 9/20/2017 12:22 PM                                hydrocortisone 1 % cream   Commonly known as:  CORTAID   Apply topically daily as needed for rash or itching (on legs.)                                PRINIVIL PO   Take 5 mg by mouth every morning (patient takes 0.25 X 20 mg = 5 mg dose)                                REFRESH OP   Apply 1 drop to eye daily as needed   Last time this was given:  1 drop on 9/22/2017 10:01 AM                                senna-docusate 8.6-50 MG per tablet   Commonly known as:  SENOKOT-S;PERICOLACE   Take 1-2 tablets by mouth 2 times daily   Last time this was given:  2 tablets on 9/22/2017  9:58 AM                                ULTRAM PO   Take  mg by mouth every 8 hours as needed for moderate to severe pain                                VIAGRA PO   Take 50 mg by mouth daily as needed                                VIBRAMYCIN PO   Take 100 mg by mouth every other day (for eyes)   Last time this was given:  100 mg on 9/19/2017  6:42 AM                                VITAMIN B-12 SL   Place 500 mg under the tongue every morning                                * Warfarin Therapy Reminder   Take as  Directed  Dose  Will change   Stop  After  10 days                                * warfarin 2.5 MG tablet   Commonly known as:  COUMADIN   Take 2.5 mg   Thursday and  Friday,  5 mg   Saturday  And  Sunday  Will ck inr on  monday   Last time this was given:  2.5 mg on 9/21/2017  9:50 PM                                ZOCOR PO   Take 20 mg by mouth every morning                                ZYLOPRIM PO   Take 300 mg by mouth every morning                                * Notice:  This list has 2 medication(s) that are the same as other medications prescribed for you. Read the directions carefully, and ask your doctor or other care provider to review them with you.

## 2017-09-18 NOTE — BRIEF OP NOTE
[facility] Brief Operative Note    Pre-operative diagnosis: END STAGE LEFT HIP OA   Post-operative diagnosis  same   Procedure: Procedure(s):  LEFT TOTAL HIP ARTHROPLASTY(DEPUY)^ - Wound Class: I-Clean   Surgeon(s): Surgeon(s) and Role:     * Aurelio Hood MD - Primary   asst  earl Dangelo   Estimated blood loss:   See anesthesia  log   Specimens:   Head  Sent  To path  Due  To atypical   Pain  And   Progression  o  disease   Findings:

## 2017-09-18 NOTE — PROGRESS NOTES
Telephone report given to [facility] RN.  Patient will be transported to . 2 via cart accompanied by na.  Belongings: cane and a hospital bag. Family : wife in [place]

## 2017-09-18 NOTE — PROGRESS NOTES
09/18/17 1500   Quick Adds   Type of Visit Initial PT Evaluation   Living Environment   Lives With spouse   Living Arrangements house   Number of Stairs to Enter Home 2  (no rail)   Number of Stairs Within Home 0   Transportation Available family or friend will provide;car   Self-Care   Usual Activity Tolerance moderate  (Limited community ambulator)   Equipment Currently Used at Home cane, straight;walker, rolling;raised toilet  (4WW)   Functional Level Prior   Ambulation 1-->assistive equipment  (FWW or cane)   Transferring 1-->assistive equipment   Toileting 0-->independent   Bathing 0-->independent   Dressing 2-->assistive person  (Wife would occasionally help)   Fall history within last six months no   General Information   Onset of Illness/Injury or Date of Surgery - Date 09/18/17   Referring Physician MD Sana   Patient/Family Goals Statement Return home   Pertinent History of Current Problem (include personal factors and/or comorbidities that impact the POC) 76 year old male s/p L DOTTIE.    Precautions/Limitations fall precautions;left hip precautions   Weight-Bearing Status - LLE weight-bearing as tolerated   General Info Comments Posterior-lateral or posterior precautions, WBAT, ambulate with assist   Cognitive Status Examination   Orientation orientation to person, place and time   Level of Consciousness alert;confused   Follows Commands and Answers Questions able to follow single-step instructions   Pain Assessment   Patient Currently in Pain Yes, see Vital Sign flowsheet   Integumentary/Edema   Integumentary/Edema Comments Dressing to L LE clean, dry and intact. +Hemovac.    Range of Motion (ROM)   ROM Comment Decreased L LE AROM secondary to pain, weakness. Other extremities WFL's.    Strength   Strength Comments Decreased generally. Needs physical assist with mobility as noted below.    Bed Mobility   Bed Mobility Comments Supine to sit with mod A x 1, HOB elevated.    Transfer Skills   Transfer  "Comments Sit to/from stand with CGA.    Gait   Gait Comments Ambulates 3' with FWW and CGA.    Balance   Balance Comments Requires bilateral UE support on FWW for safe mobility at this time.    Sensory Examination   Sensory Perception Comments Denies burning, numbness and tingling   Coordination   Coordination no deficits were identified   Modality Interventions   Planned Modality Interventions Cryotherapy   Planned Modality Interventions Comments PRN   General Therapy Interventions   Planned Therapy Interventions bed mobility training;gait training;ROM;strengthening;transfer training;home program guidelines;progressive activity/exercise   Clinical Impression   Criteria for Skilled Therapeutic Intervention yes, treatment indicated   PT Diagnosis Impaired gait   Influenced by the following impairments Pain, decreased L LE AROM/strength, impaired balance   Functional limitations due to impairments Decreased independence with bed mobility, transfers and ambulation   Clinical Presentation Stable/Uncomplicated   Clinical Presentation Rationale Medically stable.    Clinical Decision Making (Complexity) Low complexity   Therapy Frequency` 2 times/day   Predicted Duration of Therapy Intervention (days/wks) 4 days   Anticipated Discharge Disposition Home with Assist;Home with Home Therapy   Risk & Benefits of therapy have been explained Yes   Patient, Family & other staff in agreement with plan of care Yes   [facility] [employer] \"6 Clicks\"   2016, Trustees of [facility], under license to [employer].  All rights reserved.   6 Clicks Short Forms Basic Mobility Inpatient Short Form   [facility] AM-PAC  \"6 Clicks\" V.2 Basic Mobility Inpatient Short Form   1. Turning from your back to your side while in a flat bed without using bedrails? 3 - A Little   2. Moving from lying on your back to sitting on the side of a flat bed without using bedrails? 2 - A Lot   3. Moving to and from a bed to a chair (including " a wheelchair)? 3 - A Little   4. Standing up from a chair using your arms (e.g., wheelchair, or bedside chair)? 3 - A Little   5. To walk in hospital room? 3 - A Little   6. Climbing 3-5 steps with a railing? 2 - A Lot   Basic Mobility Raw Score (Score out of 24.Lower scores equate to lower levels of function) 16   Total Evaluation Time   Total Evaluation Time (Minutes) 10

## 2017-09-18 NOTE — IP AVS SNAPSHOT
[address] [facility] [address]    [address] [place] [address]    Phone:  [phone]                                       After Visit Summary   9/18/2017    Greyson Haas    MRN: [id number]           After Visit Summary Signature Page     I have received my discharge instructions, and my questions have been answered. I have discussed any challenges I see with this plan with the nurse or doctor.    ..........................................................................................................................................  Patient/Patient Representative Signature      ..........................................................................................................................................  Patient Representative Print Name and Relationship to Patient    ..................................................               ................................................  Date                                            Time    ..........................................................................................................................................  Reviewed by Signature/Title    ...................................................              ..............................................  Date                                                            Time

## 2017-09-18 NOTE — IP AVS SNAPSHOT
` `           [place] [address] [facility]: [phone]                 INTERAGENCY TRANSFER FORM - NOTES (H&P, Discharge Summary, Consults, Procedures, Therapies)   2017                    Hospital Admission Date: 2017  GREYSON YUAN LYNNETTE   : 1940  Sex: Male        Patient PCP Information     Provider PCP Type    Neville Nick MD General         History & Physicals      Interval H&P Note by Rochelle Cortez at 2017 10:41 AM     Author:  Rochelle Cortez Service:  (none) Author Type:  HUC    Filed:  2017 10:42 AM Date of Service:  2017 10:41 AM Creation Time:  2017 10:41 AM    Status:  Signed :  Rochelle Cortez [place]         This note is for the purpose of making the H&P performed in clinic within the last 30 days available in the hospital encounter.[ME1.1]     Revision History        User Key Date/Time User Provider Type Action    > ME1.1 2017 10:42 AM Rochelle Cortez [facility] [place]          Source Note     Author:  Neville Nick MD Service:  (none) Author Type:  Physician    Filed:  2017  2:11 AM Date of Service:  2017 11:00 AM Creation Time:  2017 10:42 AM    Status:  Signed :  Neville Nick MD (Physician)              [address] [address]  [phone]  Dept: [phone]    PRE-OP EVALUATION:  Today's date: 2017    Greyson Haas (: 1940) presents for pre-operative evaluation assessment as requested by Dr. Hood.  He requires evaluation and anesthesia risk assessment prior to undergoing surgery/procedure for treatment of L hip  .  Proposed procedure: Left Total Hip Arthroplasty     Date of Surgery/ Procedure: 2017  Time of Surgery/ Procedure: 11:30 AM  Hospital/Surgical Facility: [facility]    Primary Physician: Neville Nick  Type of Anesthesia Anticipated: General    Patient has a Health Care Directive  or Living Will:  YES scanned in    Preop Questions 9/1/2017   1.  Do you have a history of heart attack, stroke, stent, bypass or surgery on an artery in the head, neck, heart or legs? No   2.  Do you ever have any pain or discomfort in your chest? No   3.  Do you have a history of  Heart Failure? No   4.   Are you troubled by shortness of breath when:  walking on a level surface, or up a slight hill, or at night? No   5.  Do you currently have a cold, bronchitis or other respiratory infection? No   6.  Do you have a cough, shortness of breath, or wheezing? No   7.  Do you sometimes get pains in the calves of your legs when you walk? No   8. Do you or anyone in your family have previous history of blood clots? No   9.  Do you or does anyone in your family have a serious bleeding problem such as prolonged bleeding following surgeries or cuts? No   10. Have you ever had problems with anemia or been told to take iron pills? No   11. Have you had any abnormal blood loss such as black, tarry or bloody stools? No   12. Have you ever had a blood transfusion? No   13. Have you or any of your relatives ever had problems with anesthesia? No   14. Do you have sleep apnea, excessive snoring or daytime drowsiness? No   15. Do you have any prosthetic heart valves? No   16. Do you have prosthetic joints? YES - L knee           HPI:                                                      Brief HPI related to upcoming procedure:[LK1.1] progressive and now severe pain in the left hip area.   Groin to lateral hip, down to and including the left knee.      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   Pain has been very severe past month, with any movement of hip or leg.    HYPERTENSION - Patient has longstanding history of mod-severe HTN , currently denies any symptoms referable to elevated blood pressure.[BE1.1] Recently, b[BE1.2]lood pressure readings[BE1.1] have[BE1.2] been in normal range. Current  medication regimen is as listed below. Patient denies any side effects of medication.                                                                                                                                                                                          .  HYPERLIPIDEMIA - Patient has a long history of significant Hyperlipidemia requiring medication for treatment with recent[BE1.1] good[BE1.2] control.                                                                                                                                                    .  RENAL INSUFFICIENCY - Patient has a longstanding history of chronic renal insufficiency of moderate severity.                                    .[BE1.1]    MEDICAL HISTORY:[LK1.1]                                                    Patient Active Problem List    Diagnosis Date Noted     Benign non-nodular prostatic hyperplasia with lower urinary tract symptoms 05/18/2017     Priority: Medium     S/P lumbar fusion 11/05/2015     Priority: Medium     Lumbar stenosis 04/23/2015     Priority: Medium     Previous lumbar surgery Dr. Pop  S/P re-do L4-S1 decompression with fusion on 11/5/2015 for claudication and right foot drop Dr. Grove       Kidney stones 09/20/2012     Priority: Medium     First 2004, EWSL Dr. Underwood  Next 2012  Multiple calcium stones 9/2012       Advanced directives, counseling/discussion 01/19/2012     Priority: Medium     Discussed advance care planning with patient; however, patient declined at this time.   12/9/2013:  Full code, no extended support  Advance Care Planning:   Initial facilitation introduction:   Greyson Haas presented for initial session regarding ACP at the clinic. He was accompanied by wife Mellissa. Honoring Choices information provided and resources reviewed. He currently wishes to complete an ACP document.  He currently has the following questions or concerns about Advance Care Planning: none.   Confirmed/documented designated decision maker(s). See permanent comments section of demographics in clinical tab. Confirmed code status reflects current choices .   Added by Irais Guerrero on 3/31/2015           Pain in joint, lower leg 02/08/2011     Priority: Medium     Abnormal gait 02/08/2011     Priority: Medium     KNEE JOINT REPLACEMENT 02/07/2011     Priority: Medium     Basal cell carcinoma 01/19/2011     Priority: Medium     Chest, Moh's 1/11, Dr. Raj Berg following       Erectile dysfunction 01/19/2011     Priority: Medium     Gouty arthropathy 04/29/2008     Priority: Medium              Anxiety state 01/15/2007     Priority: Medium     Sprain of acromioclavicular ligament 10/17/2006     Priority: Medium     Problem list name updated by automated process. Provider to review       Glaucoma      Priority: Medium     Followed by Dr. Michel         Hyperlipidemia LDL goal <130 06/06/2005     Priority: Medium     Chronic Kidney Disease, Stage III 02/02/2005     Priority: Medium     GOUTY TOPHUS 09/23/2004     Priority: Medium     R third toe       Tinnitus 05/19/2004     Priority: Medium     LVH 05/22/2003     Priority: Medium     Diverticulosis of large intestine 05/12/2003     Priority: Medium     Problem list name updated by automated process. Provider to review       Essential hypertension 02/10/2003     Priority: Medium     History of rectal cancer 03/28/2002     Priority: Medium     Dr. Rubin[BE1.3]        Past Medical History:   Diagnosis Date      RENAL INSUFFICIENCY      Arthritis      Calculus of kidney 9/02, 6/04     Chronic Kidney Disease, Stage III 2/2/2005     Chronic Tinnitus 1960's     Diverticulosis of colon (without mention of hemorrhage)      Essential hypertension, benign      GLAUCOMA     Followed by Dr. Monique     Gouty arthropathy 4/29/2008     Gouty tophi of other sites 8/04    R third toe     HYPERLIPIDEMIA      Hyperplastic Polyps Colon 11/03     LVH 5/03     Malignant  "neoplasm of rectum (H) 3/02    Colon cancer, T1N0 lesion treated with three episodes endocavitary radiation by Dr. Rubin     Pulmonary Nodule, stable      Tubular Adenoma 3/07     Past Surgical History:   Procedure Laterality Date     C NONSPECIFIC PROCEDURE  6/04    cysto, R ureteral stent, ESWL     C NONSPECIFIC PROCEDURE  CHILD    TONSILLECTOMY     C NONSPECIFIC PROCEDURE  AGE 4    L heel osteomyelitis with \"bone scraping\"     OPTICAL TRACKING SYSTEM FUSION SPINE POSTERIOR LUMBAR TWO LEVELS N/A 11/5/2015    Procedure: OPTICAL TRACKING SYSTEM FUSION SPINE POSTERIOR LUMBAR TWO LEVELS;  Surgeon: Mina Grove MD;  Location: [facility] OR     SURGICAL HISTORY OF -   1991    B Shoulder surgeries     SURGICAL HISTORY OF -   1992    lumbar laminectomy     SURGICAL HISTORY OF -   1998    lumbar decompression    Dr. Pop     SURGICAL HISTORY OF -   9/06    L shoulder    Dr. Foley     SURGICAL HISTORY OF -   11/07    partial amputation R 3rd toe (tophus)  Dr. Neal     SURGICAL HISTORY OF -   2010    bilateral cataracts       SURGICAL HISTORY OF -   1/11    Mohs L chest, Dr. Anthony     SURGICAL HISTORY OF - 1/11    L TKA   Dr. Thomson     Current Outpatient Prescriptions   Medication Sig Dispense Refill     allopurinol (ZYLOPRIM) 300 MG tablet TAKE ONE TABLET BY MOUTH ONCE DAILY 90 tablet 3     tamsulosin (FLOMAX) 0.4 MG capsule Take 1 capsule (0.4 mg) by mouth daily 90 capsule prn     simvastatin (ZOCOR) 20 MG tablet Take 1 tablet (20 mg) by mouth daily 90 tablet prn     lisinopril (PRINIVIL/ZESTRIL) 20 MG tablet Take 0.5 tablets (10 mg) by mouth daily 45 tablet prn     ACETAMINOPHEN PO Take 1,000 mg by mouth daily as needed for pain Reported on 5/18/2017       Sildenafil Citrate (VIAGRA PO) Take 50 mg by mouth daily as needed       doxycycline (VIBRAMYCIN) 100 MG capsule Take 100 mg by mouth daily        aspirin 81 MG tablet Take 81 mg by mouth daily        OTC products:[LK1.1] None, except as noted " "above[BE1.2]    No Known Allergies   Latex Allergy:[LK1.1] NO[BE1.2]    Social History   Substance Use Topics     Smoking status: Former Smoker     Packs/day: 1.00     Years: 33.00     Quit date: 1/1/1983     Smokeless tobacco: Never Used     Alcohol use Yes      Comment: Ave 2 daily or more.  Heavy days 5-6, some days none.     History   Drug Use No       REVIEW OF SYSTEMS:[LK1.1]                                                    C: NEGATIVE for fever, chills, change in weight  I: NEGATIVE for worrisome rashes, moles or lesions  E: Blepharitis[BE1.2] symptoms persist[BE1.1], otherwise NEGATIVE for vision changes or irritation  E/M: NEGATIVE for ear, mouth and throat problems  R: NEGATIVE for significant cough or SOB  B: NEGATIVE for masses, tenderness or discharge  CV: NEGATIVE for chest pain, palpitations or peripheral edema  GI: NEGATIVE for nausea, abdominal pain, heartburn, or change in bowel habits  :[BE1.2] Nocturia x 2, decreased stream[BE1.1], otherwise NEGATIVE for frequency, dysuria, or hematuria  M: NEGATIVE for significant arthralgias or myalgia  N: NEGATIVE for weakness, dizziness or paresthesias  E: NEGATIVE for temperature intolerance, skin/hair changes  H: NEGATIVE for bleeding problems  P: NEGATIVE for changes in mood or affect[BE1.2]    EXAM:[LK1.1]                                                    /68  Pulse 69  Temp 98.4  F (36.9  C) (Oral)  Ht 5' 7\" (1.702 m)  Wt 196 lb 3.2 oz (89 kg)  SpO2 99%  BMI 30.73 kg/m2[BE1.4]    GENERAL APPEARANCE: healthy, alert and no distress     EYES: Eyes grossly normal to inspection     HENT: ear canals and TM's normal and nose and mouth without ulcers or lesions     NECK: no adenopathy, no asymmetry, masses, or scars and thyroid normal to palpation     RESP: lungs clear to auscultation - no rales, rhonchi or wheezes     CV: regular rates and rhythm, normal S1 S2, no S3 or S4 and no murmur, click or rub     ABDOMEN:  soft, nontender, no HSM or " masses and bowel sounds normal     MS: extremities normal- no gross deformities noted, no evidence of inflammation in joints, FROM in all extremities.     SKIN: no suspicious lesions or rashes     NEURO: mentation intact, strength and speech normal     PSYCH: mentation appears normal. and affect normal/bright     LYMPHATICS: normal ant/post cervical, supraclavicular nodes    DIAGNOSTI[BE1.2]CS:[LK1.1]                                                    EKG: appears normal, NSR, normal axis, normal intervals, no acute ST/T changes c/w ischemia, no LVH by voltage criteria, unchanged from previous tracings[BE1.5]    Recent Labs   Lab Test  05/18/17   1156  01/24/17   0827  12/30/16   1027   11/08/15   0726   11/06/15   0748   02/07/11   1030  01/28/11   0553   HGB   --   14.8   --    --   11.0*   < >  11.3*   < >   --    --    PLT   --    --    --    --   151   --   131*   < >   --    --    INR   --    --    --    --    --    --    --    --   1.89*  1.69*   NA  141   --   143   --   139   < >  143   < >   --    --    POTASSIUM  5.1   --   4.4   --   3.7   < >  3.9   < >   --    --    CR  1.40*  1.29*  1.50*   < >  1.15   < >  1.34*   < >   --    --     < > = values in this interval not displayed.[LK1.1]      CBC, BMP normal[BE1.2]    IMPRESSION:[LK1.1]                                                    Reason for surgery/procedure:  L hip and leg pain, felt related to end stage DJD of hip    Diagnosis/reason for consult:   1.  HTN, controlled.   Blood pressure currently lower than ideal.  2.  Chronic kidney disease   3.  Other medical status stable[BE1.5]    The proposed surgical procedure is considered[LK1.1] INTERMEDIATE[BE1.5] risk.    REVISED CARDIAC RISK INDEX  The patient has the following serious cardiovascular risks for perioperative complications such as (MI, PE, VFib and 3  AV Block):[LK1.1]  No serious cardiac risks[BE1.5]  INTERPRETATION:[LK1.1] 0 risks: Class I (very low risk - 0.4% complication  rate)[BE1.5]    The patient has the following additional risks for perioperative complications:[LK1.1]  No identified additional risks[BE1.5]      ICD-10-CM    1. Preop general physical exam Z01.818 EKG 12-lead complete w/read - Clinics     CBC with platelets differential   2. Essential hypertension I10 lisinopril (PRINIVIL/ZESTRIL) 20 MG tablet     Basic metabolic panel   3. Hyperlipidemia LDL goal <130 E78.5    4. Chronic Kidney Disease, Stage III N18.3[BE1.6]        RECOMMENDATIONS:[LK1.1]                                                        APPROVAL GIVEN to proceed with proposed procedure, without further diagnostic evaluation[BE1.2]    -- Patient is to take NO scheduled medications on the day of surgery   -- Hold aspirin, ibuprofen, aleve, for a week before surgery  -- Try taking two tramadol at a time, to see if this helps[BE1.5]     Signed Electronically by: Neville Nick MD    Copy of this evaluation report is provided to requesting physician.    [facility] Preop Guidelines[LK1.1]      Revision History        User Key Date/Time User Provider Type Action    > BE1.6 9/14/2017 10:42 AM Neville Nick MD Physician Sign     BE1.2 9/7/2017  2:05 AM Neville Nick MD Physician      BE1.3 9/1/2017 12:00 PM Neville Nick MD Physician      BE1.5 9/1/2017 11:57 AM Neville Nick MD Physician      BE1.4 9/1/2017 11:44 AM Neville Nick MD Physician      BE1.1 9/1/2017 11:39 AM Neville Nick MD Physician      LK1.1 9/1/2017 11:15 AM Lesley Walker Medical Assistant                   H&P (View-Only) by Neville Nick MD at 9/1/2017 11:00 AM     Author:  Neville Nick MD Service:  (none) Author Type:  Physician    Filed:  9/7/2017  2:11 AM Date of Service:  9/1/2017 11:00 AM Creation Time:  9/14/2017 10:42 AM    Status:  Signed :  Neville Nick MD (Physician)           [address] [id number] [phone]  Dept:  [phone]    PRE-OP EVALUATION:  Today's date: 2017    Greyson Haas (: 1940) presents for pre-operative evaluation assessment as requested by Dr. Hood.  He requires evaluation and anesthesia risk assessment prior to undergoing surgery/procedure for treatment of L hip  .  Proposed procedure: Left Total Hip Arthroplasty     Date of Surgery/ Procedure: 2017  Time of Surgery/ Procedure: 11:30 AM  Hospital/Surgical Facility: [facility]    Primary Physician: Neville Nick  Type of Anesthesia Anticipated: General    Patient has a Health Care Directive or Living Will:  YES scanned in    Preop Questions 2017   1.  Do you have a history of heart attack, stroke, stent, bypass or surgery on an artery in the head, neck, heart or legs? No   2.  Do you ever have any pain or discomfort in your chest? No   3.  Do you have a history of  Heart Failure? No   4.   Are you troubled by shortness of breath when:  walking on a level surface, or up a slight hill, or at night? No   5.  Do you currently have a cold, bronchitis or other respiratory infection? No   6.  Do you have a cough, shortness of breath, or wheezing? No   7.  Do you sometimes get pains in the calves of your legs when you walk? No   8. Do you or anyone in your family have previous history of blood clots? No   9.  Do you or does anyone in your family have a serious bleeding problem such as prolonged bleeding following surgeries or cuts? No   10. Have you ever had problems with anemia or been told to take iron pills? No   11. Have you had any abnormal blood loss such as black, tarry or bloody stools? No   12. Have you ever had a blood transfusion? No   13. Have you or any of your relatives ever had problems with anesthesia? No   14. Do you have sleep apnea, excessive snoring or daytime drowsiness? No   15. Do you have any prosthetic heart valves? No   16. Do you have prosthetic joints? YES - L knee           HPI:                                                       Brief HPI related to upcoming procedure:[LK1.1] progressive and now severe pain in the left hip area.   Groin to lateral hip, down to and including the left knee.      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   Pain has been very severe past month, with any movement of hip or leg.    HYPERTENSION - Patient has longstanding history of mod-severe HTN , currently denies any symptoms referable to elevated blood pressure.[BE1.1] Recently, b[BE1.2]lood pressure readings[BE1.1] have[BE1.2] been in normal range. Current medication regimen is as listed below. Patient denies any side effects of medication.                                                                                                                                                                                          .  HYPERLIPIDEMIA - Patient has a long history of significant Hyperlipidemia requiring medication for treatment with recent[BE1.1] good[BE1.2] control.                                                                                                                                                    .  RENAL INSUFFICIENCY - Patient has a longstanding history of chronic renal insufficiency of moderate severity.                                    .[BE1.1]    MEDICAL HISTORY:[LK1.1]                                                    Patient Active Problem List    Diagnosis Date Noted     Benign non-nodular prostatic hyperplasia with lower urinary tract symptoms 05/18/2017     Priority: Medium     S/P lumbar fusion 11/05/2015     Priority: Medium     Lumbar stenosis 04/23/2015     Priority: Medium     Previous lumbar surgery Dr. Pop  S/P re-do L4-S1 decompression with fusion on 11/5/2015 for claudication and right foot drop Dr. Grove       Kidney stones 09/20/2012     Priority: Medium     First 2004, EWSL Dr. Underwood  Next 2012  Multiple calcium stones 9/2012       Advanced directives,  counseling/discussion 01/19/2012     Priority: Medium     Discussed advance care planning with patient; however, patient declined at this time.   12/9/2013:  Full code, no extended support  Advance Care Planning:   Initial facilitation introduction:   Greyson Haas presented for initial session regarding ACP at the clinic. He was accompanied by wife Mellissa. Honoring Choices information provided and resources reviewed. He currently wishes to complete an ACP document.  He currently has the following questions or concerns about Advance Care Planning: none.  Confirmed/documented designated decision maker(s). See permanent comments section of demographics in clinical tab. Confirmed code status reflects current choices .   Added by Irais Guerrero on 3/31/2015           Pain in joint, lower leg 02/08/2011     Priority: Medium     Abnormal gait 02/08/2011     Priority: Medium     KNEE JOINT REPLACEMENT 02/07/2011     Priority: Medium     Basal cell carcinoma 01/19/2011     Priority: Medium     Chest, Moh's 1/11, Dr. Raj Berg following       Erectile dysfunction 01/19/2011     Priority: Medium     Gouty arthropathy 04/29/2008     Priority: Medium              Anxiety state 01/15/2007     Priority: Medium     Sprain of acromioclavicular ligament 10/17/2006     Priority: Medium     Problem list name updated by automated process. Provider to review       Glaucoma      Priority: Medium     Followed by Dr. Michel         Hyperlipidemia LDL goal <130 06/06/2005     Priority: Medium     Chronic Kidney Disease, Stage III 02/02/2005     Priority: Medium     GOUTY TOPHUS 09/23/2004     Priority: Medium     R third toe       Tinnitus 05/19/2004     Priority: Medium     LVH 05/22/2003     Priority: Medium     Diverticulosis of large intestine 05/12/2003     Priority: Medium     Problem list name updated by automated process. Provider to review       Essential hypertension 02/10/2003     Priority: Medium     History of rectal  "cancer 03/28/2002     Priority: Medium     Dr. Rubin[BE1.3]        Past Medical History:   Diagnosis Date      RENAL INSUFFICIENCY      Arthritis      Calculus of kidney 9/02, 6/04     Chronic Kidney Disease, Stage III 2/2/2005     Chronic Tinnitus 1960's     Diverticulosis of colon (without mention of hemorrhage)      Essential hypertension, benign      GLAUCOMA     Followed by Dr. 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 Rubin     Pulmonary Nodule, stable      Tubular Adenoma 3/07     Past Surgical History:   Procedure Laterality Date     C NONSPECIFIC PROCEDURE  6/04    cysto, R ureteral stent, ESWL     C NONSPECIFIC PROCEDURE  CHILD    TONSILLECTOMY     C NONSPECIFIC PROCEDURE  AGE 4    L heel osteomyelitis with \"bone scraping\"     OPTICAL TRACKING SYSTEM FUSION SPINE POSTERIOR LUMBAR TWO LEVELS N/A 11/5/2015    Procedure: OPTICAL TRACKING SYSTEM FUSION SPINE POSTERIOR LUMBAR TWO LEVELS;  Surgeon: Mina Grove MD;  Location:  OR     SURGICAL HISTORY OF -   1991    B Shoulder surgeries     SURGICAL HISTORY OF -   1992    lumbar laminectomy     SURGICAL HISTORY OF -   1998    lumbar decompression    Dr. Pop     SURGICAL HISTORY OF -   9/06    L shoulder    Dr. Foley     SURGICAL HISTORY OF -   11/07    partial amputation R 3rd toe (tophus)  Dr. Neal     SURGICAL HISTORY OF -   2010    bilateral cataracts       SURGICAL HISTORY OF -   1/11    Mohs L chest, Dr. Anthony     SURGICAL HISTORY OF - 1/11    L TKA   Dr. Thomson     Current Outpatient Prescriptions   Medication Sig Dispense Refill     allopurinol (ZYLOPRIM) 300 MG tablet TAKE ONE TABLET BY MOUTH ONCE DAILY 90 tablet 3     tamsulosin (FLOMAX) 0.4 MG capsule Take 1 capsule (0.4 mg) by mouth " daily 90 capsule prn     simvastatin (ZOCOR) 20 MG tablet Take 1 tablet (20 mg) by mouth daily 90 tablet prn     lisinopril (PRINIVIL/ZESTRIL) 20 MG tablet Take 0.5 tablets (10 mg) by mouth daily 45 tablet prn     ACETAMINOPHEN PO Take 1,000 mg by mouth daily as needed for pain Reported on 5/18/2017       Sildenafil Citrate (VIAGRA PO) Take 50 mg by mouth daily as needed       doxycycline (VIBRAMYCIN) 100 MG capsule Take 100 mg by mouth daily        aspirin 81 MG tablet Take 81 mg by mouth daily        OTC products:[LK1.1] None, except as noted above[BE1.2]    No Known Allergies   Latex Allergy:[LK1.1] NO[BE1.2]    Social History   Substance Use Topics     Smoking status: Former Smoker     Packs/day: 1.00     Years: 33.00     Quit date: 1/1/1983     Smokeless tobacco: Never Used     Alcohol use Yes      Comment: Ave 2 daily or more.  Heavy days 5-6, some days none.     History   Drug Use No       REVIEW OF SYSTEMS:[LK1.1]                                                    C: NEGATIVE for fever, chills, change in weight  I: NEGATIVE for worrisome rashes, moles or lesions  E: Blepharitis[BE1.2] symptoms persist[BE1.1], otherwise NEGATIVE for vision changes or irritation  E/M: NEGATIVE for ear, mouth and throat problems  R: NEGATIVE for significant cough or SOB  B: NEGATIVE for masses, tenderness or discharge  CV: NEGATIVE for chest pain, palpitations or peripheral edema  GI: NEGATIVE for nausea, abdominal pain, heartburn, or change in bowel habits  :[BE1.2] Nocturia x 2, decreased stream[BE1.1], otherwise NEGATIVE for frequency, dysuria, or hematuria  M: NEGATIVE for significant arthralgias or myalgia  N: NEGATIVE for weakness, dizziness or paresthesias  E: NEGATIVE for temperature intolerance, skin/hair changes  H: NEGATIVE for bleeding problems  P: NEGATIVE for changes in mood or affect[BE1.2]    EXAM:[LK1.1]                                                    /68  Pulse 69  Temp 98.4  F (36.9  C) (Oral)   "Ht 5' 7\" (1.702 m)  Wt 196 lb 3.2 oz (89 kg)  SpO2 99%  BMI 30.73 kg/m2[BE1.4]    GENERAL APPEARANCE: healthy, alert and no distress     EYES: Eyes grossly normal to inspection     HENT: ear canals and TM's normal and nose and mouth without ulcers or lesions     NECK: no adenopathy, no asymmetry, masses, or scars and thyroid normal to palpation     RESP: lungs clear to auscultation - no rales, rhonchi or wheezes     CV: regular rates and rhythm, normal S1 S2, no S3 or S4 and no murmur, click or rub     ABDOMEN:  soft, nontender, no HSM or masses and bowel sounds normal     MS: extremities normal- no gross deformities noted, no evidence of inflammation in joints, FROM in all extremities.     SKIN: no suspicious lesions or rashes     NEURO: mentation intact, strength and speech normal     PSYCH: mentation appears normal. and affect normal/bright     LYMPHATICS: normal ant/post cervical, supraclavicular nodes    DIAGNOSTI[BE1.2]CS:[LK1.1]                                                    EKG: appears normal, NSR, normal axis, normal intervals, no acute ST/T changes c/w ischemia, no LVH by voltage criteria, unchanged from previous tracings[BE1.5]    Recent Labs   Lab Test  05/18/17   1156  01/24/17   0827  12/30/16   1027   11/08/15   0726   11/06/15   0748   02/07/11   1030  01/28/11   0553   HGB   --   14.8   --    --   11.0*   < >  11.3*   < >   --    --    PLT   --    --    --    --   151   --   131*   < >   --    --    INR   --    --    --    --    --    --    --    --   1.89*  1.69*   NA  141   --   143   --   139   < >  143   < >   --    --    POTASSIUM  5.1   --   4.4   --   3.7   < >  3.9   < >   --    --    CR  1.40*  1.29*  1.50*   < >  1.15   < >  1.34*   < >   --    --     < > = values in this interval not displayed.[LK1.1]      CBC, BMP normal[BE1.2]    IMPRESSION:[LK1.1]                                                    Reason for surgery/procedure:  L hip and leg pain, felt related to end stage DJD " of hip    Diagnosis/reason for consult:   1.  HTN, controlled.   Blood pressure currently lower than ideal.  2.  Chronic kidney disease   3.  Other medical status stable[BE1.5]    The proposed surgical procedure is considered[LK1.1] INTERMEDIATE[BE1.5] risk.    REVISED CARDIAC RISK INDEX  The patient has the following serious cardiovascular risks for perioperative complications such as (MI, PE, VFib and 3  AV Block):[LK1.1]  No serious cardiac risks[BE1.5]  INTERPRETATION:[LK1.1] 0 risks: Class I (very low risk - 0.4% complication rate)[BE1.5]    The patient has the following additional risks for perioperative complications:[LK1.1]  No identified additional risks[BE1.5]      ICD-10-CM    1. Preop general physical exam Z01.818 EKG 12-lead complete w/read - Clinics     CBC with platelets differential   2. Essential hypertension I10 lisinopril (PRINIVIL/ZESTRIL) 20 MG tablet     Basic metabolic panel   3. Hyperlipidemia LDL goal <130 E78.5    4. Chronic Kidney Disease, Stage III N18.3[BE1.6]        RECOMMENDATIONS:[LK1.1]                                                        APPROVAL GIVEN to proceed with proposed procedure, without further diagnostic evaluation[BE1.2]    -- Patient is to take NO scheduled medications on the day of surgery   -- Hold aspirin, ibuprofen, aleve, for a week before surgery  -- Try taking two tramadol at a time, to see if this helps[BE1.5]     Signed Electronically by: Neville Nick MD    Copy of this evaluation report is provided to requesting physician.    [facility] Preop Guidelines[LK1.1]     Revision History        User Key Date/Time User Provider Type Action    > BE1.6 9/14/2017 10:42 AM Neville Nick MD Physician Sign     BE1.2 9/7/2017  2:05 AM Neville Nick MD Physician      BE1.3 9/1/2017 12:00 PM Neville Nick MD Physician      BE1.5 9/1/2017 11:57 AM Neville Nick MD Physician      BE1.4 9/1/2017 11:44 AM Neville Nick MD Physician      BE1.1 9/1/2017 11:39  AM Neville Nick MD Physician      LK1.1 9/1/2017 11:15 AM Lesley Walker Medical Assistant                   Discharge Summaries     No notes of this type exist for this encounter.         Consult Notes      Consults by Andreia Irvin PA-C at 9/21/2017 12:17 PM     Author:  Andreia Irvin PA-C Service:  Urology Author Type:  Physician Assistant - LUCIANA    Filed:  9/21/2017  2:33 PM Date of Service:  9/21/2017 12:17 PM Creation Time:  9/21/2017 12:16 PM    Status:  Cosign Needed :  Andreia Irvin PA-C (Physician Assistant - LUCIANA)    Cosign Required:  Yes         Consult Orders:    1. Urology IP Consult: urinary retention. possible renal mass Lt, and mild hydronephrosis.; Consultant may enter orders: Yes; Patient to be seen: Routine - within 24 hours; Requested Clinic/Group: [facility] [[id number]] ordered by Candida Chen MD at 09/20/17 1636                Urology consult for:[MS1.1] Urinary retention, possible lesion on renal U/S[MS1.2]   Req provider:[MS1.1] Dr. Chen[MS1.2]    HPI: Pt is an[MS1.1] unassigned 77yo male with a urologic PMH significant for kidney stones,[MS1.2] BPH on Flomax, nocturia, ?one UTI years ago. Pt saw Dr. Marrufo ~10-15 years ago for the kidney stones. Pt's full PMH is listed below. Pt was admitted in the care of Orthopedics 9/18/17 and is status post left total hip arthroplasty. Procedure was performed by Dr. Hood under general anesthesia.  The patient tolerated the procedure well with estimated blood loss of 400 mL. After surgery, pt was voiding in small amounts and PTA Flomax was ordered. Pt continued to struggle to fully empty bladder. Pt was bladder scanned for post-void residuals. Pt did have a PVR as high as 600cc, but then he was able to void 180cc and was straight cathed for 400cc. His other PVRs have been between 250-400cc. The hospitalist service was concerned, so urology was consulted.    Today, pt is found resting comfortably in  bed. Pt reports he hasn't followed with a urologist in a long time, he only followed 1-2 times in the past for kidney stones. Pt has some irritative voiding symptoms at baseline. He reports he usually has some frequency, urgency, slow stream, intermittent hesitancy, some feeling of incomplete bladder emptying and usually nocturia 2-3x per night, though there are some nights he sleeps through the night. Pt denies dysuria aside from expected discomfort following catheterization, hematuria, pyuria, and foul smell to the urine. Pt acknowledges that he probably isn't emptying his bladder all the way at baseline, but he feels like he's getting by ok and he didn't come into the hospital to treat that. He just wants to focus on the hip right now.[MS1.3] He would prefer to address the urination problem at a later time when he's feeling better and is fully recovered from the hip.[MS1.4]       Past Medical History:   Diagnosis Date      RENAL INSUFFICIENCY      Arthritis      Calculus of kidney 9/02, 6/04     Chronic Kidney Disease, Stage III 2/2/2005     Chronic Tinnitus 1960's     Diverticulosis of colon (without mention of hemorrhage)      Essential hypertension, benign      Glaucoma     Followed by Dr. 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 Rubin     Nocturia     x 2-3     Pulmonary Nodule, stable      Tubular Adenoma 3/07     Review Of Systems:   General: Denies F/C  Respiratory: Denies SOB  Cardiovascular: Denies CP  Gastrointestinal: Denies N/V  Genitourinary: See HPI  Musculoskeletal: Denies LE[MS1.1] swelling[MS1.3]  Neurologic: Denies HA  Psychiatric: Denies confusion  Skin: no concerning lesions  Hematology/immunology: no unexpected bruising    Past Surgical History:  "  Procedure Laterality Date     ARTHROPLASTY HIP Left 9/18/2017    Procedure: ARTHROPLASTY HIP;  LEFT TOTAL HIP ARTHROPLASTY(DEPUY)^;  Surgeon: Aurelio Hood MD;  Location: [facility] OR     C NONSPECIFIC PROCEDURE  6/04    cysto, R ureteral stent, ESWL     C NONSPECIFIC PROCEDURE  CHILD    TONSILLECTOMY     C NONSPECIFIC PROCEDURE  AGE 4    L heel osteomyelitis with \"bone scraping\"     OPTICAL TRACKING SYSTEM FUSION SPINE POSTERIOR LUMBAR TWO LEVELS N/A 11/5/2015    Procedure: OPTICAL TRACKING SYSTEM FUSION SPINE POSTERIOR LUMBAR TWO LEVELS;  Surgeon: Mina Grove MD;  Location: [facility] OR     SURGICAL HISTORY OF -   1991    B Shoulder surgeries     SURGICAL HISTORY OF -   1992    lumbar laminectomy     SURGICAL HISTORY OF -   1998    lumbar decompression    Dr. Pop     SURGICAL HISTORY OF -   9/06    L shoulder    Dr. Foley     SURGICAL HISTORY OF -   11/07    partial amputation R 3rd toe ([facility])  Dr. Neal     SURGICAL HISTORY OF -   2010    bilateral cataracts       SURGICAL HISTORY OF -   1/11    Mohs L chest, Dr. Anthony     SURGICAL HISTORY OF -   1/11    L TKA   Dr. Thomson     Social Hx:  Social History     Social History     Marital status:      Spouse name: N/A     Number of children: N/A     Years of education: N/A     Occupational History     Not on file.     Social History Main Topics     Smoking status: Former Smoker     Packs/day: 1.00     Years: 27.00     Quit date: 1/1/1983     Smokeless tobacco: Never Used     Alcohol use Yes      Comment: Ave 2 daily or more.  Heavy days 5-6, some days none.     Drug use: No     Sexual activity: Yes     Partners: Female     Other Topics Concern     Caffeine Concern Yes     3 cups     Social History Narrative     Meds:  Prescriptions Prior to Admission   Medication Sig Dispense Refill Last Dose     Allopurinol (ZYLOPRIM PO) Take 300 mg by mouth every morning   9/17/2017 at am     Doxycycline Monohydrate (VIBRAMYCIN PO) Take 100 mg by mouth every " other day (for eyes)   9/17/2017 at am     Lisinopril (PRINIVIL PO) Take 5 mg by mouth every morning (patient takes 0.25 X 20 mg = 5 mg dose)   9/18/2017 at 0430     Simvastatin (ZOCOR PO) Take 20 mg by mouth every morning   9/17/2017 at am     Tamsulosin HCl (FLOMAX PO) Take 0.4 mg by mouth every morning   9/17/2017 at am     TraMADol HCl (ULTRAM PO) Take  mg by mouth every 8 hours as needed for moderate to severe pain   9/17/2017 at 2100     DORZOLAMIDE HCL-TIMOLOL MAL OP Place 1 drop into both eyes 2 times daily   9/18/2017 at 0430     Polyvinyl Alcohol-Povidone (REFRESH OP) Apply 1 drop to eye daily as needed   9/17/2017 at hs     Cyanocobalamin (VITAMIN B-12 SL) Place 500 mg under the tongue every morning   9/17/2017 at am     hydrocortisone (CORTAID) 1 % cream Apply topically daily as needed for rash or itching (on legs.)   9/13/2017 at am     ACETAMINOPHEN PO Take 1,000 mg by mouth daily as needed for pain Reported on 5/18/2017   Over 1 month ago at prn     Sildenafil Citrate (VIAGRA PO) Take 50 mg by mouth daily as needed   Over 1 month ago at prn     [DISCONTINUED] ASPIRIN EC PO Take 81 mg by mouth every morning   9/14/2017 at am      No Known Allergies    Labs:  ROUTINE IP LABS (Last four results)  BMP  Recent Labs  Lab 09/21/17  0603 09/20/17  0620 09/19/17  0655 09/18/17  0700     --  138  --    POTASSIUM 3.7  --  4.0 4.0   CHLORIDE 107  --  106  --    MIREYA 9.0  --  8.4*  --    CO2 23  --  24  --    BUN 18  --  19  --    CR 1.43* 1.51* 1.56* 1.18   * 117* 132*  --      CBC  Recent Labs  Lab 09/21/17  0603 09/20/17  1553 09/20/17  0620 09/19/17  0655 09/18/17  0700   WBC 5.5 6.6  --   --  5.6   RBC 3.05*  --   --   --  4.23*   HGB 9.1*  --  9.8* 10.7* 12.9*   HCT 27.0*  --   --   --  37.1*   MCV 89  --   --   --  88   MCH 29.8  --   --   --  30.5   MCHC 33.7  --   --   --  34.8   RDW 13.1  --   --   --  13.1   *  --   --   --  213     INR  Recent Labs  Lab 09/21/17  0603  09/20/17  0620 09/19/17  0655 09/18/17  0700   INR 1.89* 1.53* 1.21* 0.99       UA RESULTS:  Recent Labs   Lab Test  09/20/17   1425   04/21/10   1038   COLOR  Yellow   < >  Yellow   APPEARANCE  Clear   < >  Clear   URINEGLC  Negative   < >  Negative   URINEBILI  Negative   < >  Negative   URINEKETONE  Negative   < >  Negative   SG  1.009   < >  1.025   UBLD  Negative   < >  Negative   URINEPH  5.5   < >  5.0   PROTEIN  10*   < >  Negative   UROBILINOGEN   --    --   0.2   NITRITE  Negative   < >  Negative   LEUKEST  Negative   < >  Negative   RBCU  <1   < >  O - 2   WBCU  1   < >  O - 2    < > = values in this interval not displayed.     UC:[MS1.1]Not done as UA benign[MS1.3]    Exam:  /73 (BP Location: Right arm)  Pulse 86  Temp 98.8  F (37.1  C) (Oral)  Resp 17  SpO2 94%    Intake/Output Summary (Last 24 hours) at 09/21/17 1243  Last data filed at 09/21/17 1014   Gross per 24 hour   Intake              240 ml   Output             2250 ml   Net            -2010 ml     EXAM:   Constitutional: healthy, alert, no acute distress and cooperative   Cardiovascular: RRR  Respiratory: CTAB anteriorly, no secondary muscle use  Psychiatric: mentation appears normal and affect bright[MS1.1]. Pt pleasant[MS1.2]  Neck: no asymmetry  Abdomen:  abdomen soft, non-tender. No masses, no CVAT  : No urinary catheter in place  MSK: no calf tenderness,[MS1.1] PCDs on[MS1.2]  Skin: no open lesions, extremities warm and well perfused  Hematology: no bruising on visible skin    Assessment/plan:[MS1.1]    Urinary retention, BPH with nocturia at baseline[MS1.2]   -[MS1.1]Continue Flomax   -We recommend rodriguez placement. However, pt does not want rodirguez placed as he feels like his urination is mostly at his baseline and he does ok with that and pt thinks his urination will get better with time and as his mobility gets better.   -Pt was warned that he is at risk for going into acute urinary retention and it's possible he would have  to return to the ER or have some damage to his bladder. Pt acknowledges this risk, but feels he'll be fine and prefers to not have a catheter.   -I offered to make pt a follow-up appointment in the clinic in a couple of weeks to reassess the voiding. Pt declined- pt wants to recover from the ortho surgery and then make a F/U after that. Pt was given a card with out info and I'll put an order in the discharge orders    Possible lesion on renal U/S   -Abdominal MRI with contrast per radiology recommendations   -Can order at an outpatient F/U as were unable to do this morning at pt must be NPO[MS1.2]      Discussed exam findings, lab and imaging results and plan with Dr. Casas.  Please contact me with any questions or concerns.     Andreia Irvin PA-C  [facility]  Pager:  [phone]  After 4pm and on weekends, please call [phone][MS1.1]       Revision History        User Key Date/Time User Provider Type Action    > MS1.4 9/21/2017  2:33 PM Andreia Irvin PA-C Physician Assistant - C Sign     MS1.3 9/21/2017  2:32 PM Andreia Irvin PA-C Physician Assistant - C Sign     MS1.2 9/21/2017  1:46 PM Andreia Irvin PA-C Physician Assistant - C      MS1.1 9/21/2017 12:43 PM Andreia Irvin PA-C Physician Assistant - C             Consults signed by Marie Shearer PA-C at 9/18/2017  3:04 PM  Also signed by Jose Henriquez MD at 9/19/2017  8:28 AM      Author:  Marie Shearer PA-C Service:  Hospitalist Author Type:  Physician Assistant - LUCIANA    Filed:  9/18/2017  3:04 PM Date of Service:  9/18/2017  1:49 PM Creation Time:  9/18/2017  2:59 PM    Status:  Attested :  Marie Shearer PA-C (Physician Assistant - LUCIANA)    Cosigner:  Jose Henriquez MD at 9/19/2017  8:28 AM        Attestation signed by Jose Henriquez MD at 9/19/2017  8:28 AM        Physician Attestation   I, Jose Henriquez, have reviewed and discussed with the advanced practice provider their history,  physical and plan for Greyson Haas. I did not participate in a shared visit by interviewing or examining the patient and this should be billed as an advanced practice provider only visit.    Jose Henriquez  Date of Service (when I saw the patient): I did not personally see this patient today.                               PRIMARY CARE PROVIDER:  Neville Nick MD      REQUESTING PHYSICIAN:  Aurelio Hood MD      REASON FOR CONSULTATION:  Postoperative medical management.      HISTORY OF PRESENT ILLNESS:  Greyson Haas is a very pleasant 76-year-old male with past medical history of hypertension, glaucoma and dyslipidemia who is admitted in the care of Orthopedics and is status post left total hip arthroplasty.  Procedure was performed by Dr. Hood under general anesthesia.  The patient tolerated the procedure well with estimated blood loss of 400 mL.      Presently the patient is evaluated in his hospital room.  He is having some postoperative pain currently, though just received a dose of IV Dilaudid.  He denies chest pain or shortness breath.  No nausea or vomiting.      PAST MEDICAL HISTORY:   1.  BPH.   2.  History of lumbar fusion.   3.  History of nephrolithiasis.   4.  Gout.   5.  Dyslipidemia.   6.  Hypertension.   7.  Glaucoma.      PRIOR TO ADMISSION MEDICATIONS:[KG1.1]   Prior to Admission medications    Medication Sig Last Dose Taking? Auth Provider   Allopurinol (ZYLOPRIM PO) Take 300 mg by mouth every morning 9/17/2017 at am Yes Reported, Patient   ASPIRIN EC PO Take 81 mg by mouth every morning 9/14/2017 at am Yes Reported, Patient   Doxycycline Monohydrate (VIBRAMYCIN PO) Take 100 mg by mouth every other day (for eyes) 9/17/2017 at am Yes Reported, Patient   Lisinopril (PRINIVIL PO) Take 5 mg by mouth every morning (patient takes 0.25 X 20 mg = 5 mg dose) 9/18/2017 at 0430 Yes Reported, Patient   Simvastatin (ZOCOR PO) Take 20 mg by mouth every morning 9/17/2017 at am Yes Reported, Patient    Tamsulosin HCl (FLOMAX PO) Take 0.4 mg by mouth every morning 9/17/2017 at am Yes Reported, Patient   TraMADol HCl (ULTRAM PO) Take  mg by mouth every 8 hours as needed for moderate to severe pain 9/17/2017 at 2100 Yes Reported, Patient   DORZOLAMIDE HCL-TIMOLOL MAL OP Place 1 drop into both eyes 2 times daily 9/18/2017 at 0430 Yes Reported, Patient   Polyvinyl Alcohol-Povidone (REFRESH OP) Apply 1 drop to eye daily as needed 9/17/2017 at hs Yes Reported, Patient   Cyanocobalamin (VITAMIN B-12 SL) Place 500 mg under the tongue every morning 9/17/2017 at am Yes Reported, Patient   hydrocortisone (CORTAID) 1 % cream Apply topically daily as needed for rash or itching (on legs.) 9/13/2017 at am Yes Reported, Patient   ACETAMINOPHEN PO Take 1,000 mg by mouth daily as needed for pain Reported on 5/18/2017 Over 1 month ago at prn Yes Reported, Patient   Sildenafil Citrate (VIAGRA PO) Take 50 mg by mouth daily as needed Over 1 month ago at prn Yes Reported, Patient[KG1.2]          SURGICAL HISTORY:   1.  Bilateral shoulder arthroplasties.   2.  Lumbar laminectomy.   3.  Decompression.   4.  Partial amputation of left toe.   5.  Bilateral cataracts.   6.  Mohs.   7.  Left TKA.   8.  ESWL.   9.  Tonsillectomy.      FAMILY HISTORY:     1.  Mother had dyslipidemia.   2.  Father had CHF.      SOCIAL HISTORY:  He is a previous smoker, he quit smoking in 1983.  Drinks alcohol 2 drinks per night.  Denies history of withdrawal.      REVIEW OF SYSTEMS:  A 10-point review of systems was completed.  Pertinent positives noted in the HPI, other systems negative.      PHYSICAL EXAMINATION:   GENERAL:  Greyson Haas is a well-developed, well-nourished 76-year-old male who is lying in bed and appears in pain.   VITAL SIGNS:  Blood pressure is 92/64, heart rate 68, temperature 97.8.   HEENT:  Normocephalic, atraumatic.  Eyes:  Pupils equal, round, reactive to light, lids and sclerae are clear, extraocular movements intact.   Oropharynx is midline, posterior pharynx is clear.  Mucous membranes appear moist.   NECK:  Supple, no adenopathy, no thyromegaly.   CARDIOVASCULAR:  Regular rate and rhythm, no murmurs.   PULMONARY:  Normal effort.  Clear to auscultation bilaterally.   ABDOMEN:  Normal bowel sounds, abdomen is soft and nontender.   EXTREMITIES:  Dorsalis pedis and radial pulses are palpable bilaterally.   NEUROLOGIC:  Alert and oriented.  Cranial nerves II-XII grossly intact.      LABORATORY DATA:  Reviewed.      ASSESSMENT:  Dominic Haas is a 76-year-old male with past medical history of hypertension, dyslipidemia and glaucoma who is admitted to the care of Orthopedics and is status post left total hip arthroplasty.   1.  Status post left total hip arthroplasty,  postoperative day #0.  Routine postoperative cares and pain control will be deferred to Orthopedics.   2.  Hypertension.  BP is soft postoperatively, though the patient is asymptomatic.  Her prior to admission regimen includes lisinopril 5 mg daily.  Will hold for now and resume as indicated.   3.  Glaucoma.  Continue prior to admission eye drops as well as prophylactic doxycycline.   4.  Gout.  Continue prior to admission allopurinol.   5.  Dyslipidemia.  Continue prior to admission Zocor.   6.  Deep venous thrombosis prophylaxis.  Deferred to Orthopedics.  The patient has been initiated on warfarin postoperatively.      CODE STATUS:  Full code.      We, the Hospitalist Service, thank you for this consult.  We will follow along with you.  Please call with questions.      This patient was discussed with Dr. Henriquez of the Hospitalist Service who independently interviewed the patient.  He is in agreement with the above plan.         JOSE LUIS HENRIQUEZ MD       As dictated by JONATHAN HUTTON PA-C            D: 2017 13:49   T: 2017 14:59   MT: CD      Name:     DOMINIC HAAS   MRN:      [id number]        Account:       [id number]   :      1940            Consult Date:  09/18/2017      Document: [id number]       cc: Neville Hood MD[KG1.1]        Revision History        User Key Date/Time User Provider Type Action    > KG1.1 9/18/2017  3:04 PM Marie Shearer PA-C Physician Assistant - LUCIANA Sign     KG1.2 9/18/2017  3:02 PM Marie Shearer PA-C Physician Assistant - LUCIANA      [N/A] 9/18/2017  2:59 PM Marie Shearer PA-C Physician Assistant - LUCIANA Edit                     Progress Notes - Physician (Notes from 09/18/17 through 09/21/17)      Progress Notes by Thao Dukes MSW at 9/21/2017  3:42 PM     Author:  Thao Dukes MSW Service:  (none) Author Type:      Filed:  9/21/2017  3:57 PM Date of Service:  9/21/2017  3:42 PM Creation Time:  9/21/2017  3:42 PM    Status:  Signed :  Thao Dukes MSW ()         Care Transition Initial Assessment - ADDIE  Reason For Consult: discharge planning  Met with: Patient and Family    Active Problems:    H/O total hip arthroplasty, left         DATA  Lives With: spouse  Living Arrangements: house  Description of Support System: Supportive, Involved  Who is your support system?: Wife, Children  Identified issues/concerns regarding health management: SW consulted for discharge planning. Patient is Greyson, a 76 year-old male who was admitted on 9/18 for a total hip arthroplasty. His tentative discharge date is 9/22. Patient lives with his wife in a house with 2 stairs to enter the home and 12 stairs within the home. Previous to this admission, he was independent with ADLs. His wife is able to assist him at home. SW met with patient, contacted his wife via phone, and reviewed medical record. Discharge is unclear at this time. Per ortho MD, the recommendation is TCU. Per physical and occupational therapy notes, the recommendation is home with support. SW spoke to patient's wife, who asked for [employer] [place] Patient said he would like  to avoid private room fees. SW put in referrals for all 3 of these facilities via DOD. SW will continue to coordinate with medical staff on 9/22 to create a firmer discharge plan.               Transportation Available: car, family or friend will provide    ASSESSMENT  Cognitive Status:  awake, alert and oriented  Concerns to be addressed: discharge planning.     PLAN  Financial costs for the patient includes N/A.  Patient given options and choices for discharge TCU choices.  Patient/family is agreeable to the plan?  Yes  Patient Goals and Preferences: Unclear at this time.  Patient anticipates discharging to:  Unclear at this time.[NO1.1]    Thao Dukes[NO1.2], BRANDY MERRILL[NO1.1]           Revision History        User Key Date/Time User Provider Type Action    > NO1.2 9/21/2017  3:57 PM Thao Dukes MSW  Sign     NO1.1 9/21/2017  3:42 PM Thao Dukes MSW              Progress Notes by Candida Chen MD at 9/21/2017  3:11 PM     Author:  Candida Chen MD Service:  Hospitalist Author Type:  Physician    Filed:  9/21/2017  3:14 PM Date of Service:  9/21/2017  3:11 PM Creation Time:  9/21/2017  3:11 PM    Status:  Signed :  Candida Chen MD (Physician)         Hospitalist update note:    Spiked temp of 100.6 (was 101.9 yesterday), no focal symptoms. Work up yesterday negative. WBC today remains normal. Procal remains elevated though slight down today. Continue to hold off on abx. Follow fever curve.  - Re culture blood x2 if T>101.    Candida Chen MD  Hospitalist[TK1.1]     Revision History        User Key Date/Time User Provider Type Action    > TK1.1 9/21/2017  3:14 PM Candida Chen MD Physician Sign            Progress Notes by Candida Chen MD at 9/21/2017 12:26 PM     Author:  Candida Chen MD Service:  Hospitalist Author Type:  Physician    Filed:  9/21/2017 12:36 PM Date of Service:  9/21/2017 12:26 PM Creation Time:  9/21/2017 12:26 PM    Status:   Signed :  Candida hCen MD (Physician)         [facility]    Hospitalist Progress Note    Date of Service (when I saw the patient): 09/21/2017    Assessment & Plan     Greyson Haas is a 76-year-old male with past medical history of hypertension, dyslipidemia and glaucoma who is admitted to the care of Orthopedics and is status post left total hip arthroplasty.      Status post left total hip arthroplasty on 9/18/17:   - Routine postoperative cares and pain control will be deferred to Orthopedics.     Post op fever: Daily spike of fever>101 on post op day 1 and 2.  - No focal symptoms except urinary retention  - 9/20: CXR and UA unremarkable, Blood cultures sent, pro sergo elevated but WBC counts remain normal.   - Suspect likely due to atelactasis. Encourage IS, reviewed with patient.   - Hold off on abx, and follow fever curve and culture.     JOSELITO, on CKD stage 3  Urinary retention and possible renal mass.  Suspect JOSELITO is due to low BP, and possibly BPH/obstructive. Has ongoing BPH symptoms and was recommended flomax by PCP.   - admission creatinine 1.23 trended up to 1.56 and now 1.43.   - encourage po fluids, continue holding PTA Lisinopril & repeat labs daily am  - Urology consult given suspected renal mass vs normal lobulation, and urinary retention and Lt hydronephrosis. MRI kidney ordered.  - discussed with patient and RN to have rodriguez catheter instead of straight cath if develops retention again.     Hypertension:    - BP is soft postoperatively, though the patient was asymptomatic  - encourage po fluids, continue holding prior to admission lisinopril 5 mg daily.      Acute Blood loss anemia  Thrombocytopenia  - Anemia likely due to recent surgery and blood loss,   - preop hgb 12.9-> 10.7->9.8->9.1 this am  - consider OTC po iron at d/c  - thrombocytopenia likely due to consumption. Monitor.     Glaucoma and blepharitis  - Continue prior to admission eye drops as well as  prophylactic doxycycline.      Gout:   - Continue prior to admission allopurinol.      Dyslipidemia:    - Continue prior to admission Zocor.      Deep venous thrombosis prophylaxis:  on warfarin  post operatively.       CODE STATUS:  Full code    Disposition :To TCU, Likely 1-2 days when urology work up complete, and afebrile >24 hours. Discussed with patient, discussed with his wife 9/21      Candida Chen MD  Hospitalist    Interval History    Fever 101.9 yesterday afternoon then trended down. Denies purulent cough, dyspnea, chest pain. Has urinary hesitancy and retaining and needed straight cath x2 in last 24 hours, but denies burning urination/suprapubic pain, and pain.    - Post op pain controlled. Doing well otherwise, worked with therapy this am and feels tired.    -Data reviewed today: I reviewed all new labs and imaging results over the last 24 hours. I personally reviewed no images or EKG's today.    Physical Exam   Temp: 98.8  F (37.1  C) Temp src: Oral BP: 108/73 Pulse: 86 Heart Rate: 82 Resp: 16 SpO2: 94 % O2 Device: None (Room air)    There were no vitals filed for this visit.  Vital Signs with Ranges  Temp:  [98.2  F (36.8  C)-101.9  F (38.8  C)] 98.8  F (37.1  C)  Pulse:  [86-92] 86  Heart Rate:  [] 82  Resp:  [16] 16  BP: ()/(61-73) 108/73  SpO2:  [90 %-96 %] 94 %  I/O last 3 completed shifts:  In: 480 [P.O.:480]  Out: 2150 [Urine:2150]    Constitutional: Alert, awake. Up in chair, not in distress.   HEENT: PERRLA EOMI  Respiratory: Bilateral equal air entry, basilar crepts,no wheezing or respiratory distress. On room air.  Cardiovascular: Regular s1s2, no murmur, rub or gallop. No tachycardia.  GI: Soft, non distended, non tender, bowel tones active.  Skin/Integumen: No rash, no blister.  Other:  Lt hip with dressing.    Medications     Warfarin Therapy Reminder       NaCl 100 mL/hr at 09/19/17 1854       allopurinol (ZYLOPRIM) tablet 300 mg  300 mg Oral QAM     dorzolamide-timolol   1 drop Both Eyes BID     doxycycline  100 mg Oral Every Other Day     simvastatin (ZOCOR) tablet 20 mg  20 mg Oral QAM     tamsulosin  0.4 mg Oral QAM     sodium chloride (PF)  3 mL Intracatheter Q8H     ferrous gluconate  324 mg Oral Daily     acetaminophen  975 mg Oral Q8H     senna-docusate  1-2 tablet Oral BID       Data     Recent Labs  Lab 09/21/17  0603 09/20/17  1553 09/20/17  0620 09/19/17  0655 09/18/17  0700   WBC 5.5 6.6  --   --  5.6   HGB 9.1*  --  9.8* 10.7* 12.9*   MCV 89  --   --   --  88   *  --   --   --  213   INR 1.89*  --  1.53* 1.21* 0.99     --   --  138  --    POTASSIUM 3.7  --   --  4.0 4.0   CHLORIDE 107  --   --  106  --    CO2 23  --   --  24  --    BUN 18  --   --  19  --    CR 1.43*  --  1.51* 1.56* 1.18   ANIONGAP 9  --   --  8  --    MIREYA 9.0  --   --  8.4*  --    *  --  117* 132*  --        Recent Results (from the past 24 hour(s))   XR Chest 2 Views    Narrative    XR CHEST 2 VW 9/20/2017 2:04 PM    COMPARISON: 9/20/2012    HISTORY: Fever.      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 No new focal airspace disease. No pleural effusion  or pneumothorax.    JORDAN LAGOS MD   US Renal Complete    Narrative    ULTRASOUND RETROPERITONEAL COMPLETE 9/20/2017 3:23 PM     HISTORY: To evaluate for hydronephrosis.    COMPARISON: None.    FINDINGS: The bilateral renal parenchyma are normal in echogenicity  without evidence for shadowing stone. Dromedary hump versus possible  mass in the mid right kidney measuring 3.2 x 2.6 x 3.4 cm. Exophytic  simple cyst in the inferior left kidney measuring 7.4 cm. Mild left  hydronephrosis. Right kidney measures 11.0 x 6.0 x 6.2 cm and the left  measures 13.2 x 6.4 x 4.8 cm. Cortical thickness is 1.3 cm on the  right and 1.4 cm on the left.  Bladder is unremarkable given its level  of distention.      Impression    IMPRESSION:   1. Mild left hydronephrosis.  2. Possible  normal lobulation/dromedary hump in the left kidney versus  a solid left renal mass. Contrast-enhanced renal MRI recommended for  further evaluation.    LISA PINTO MD[TK1.1]           Revision History        User Key Date/Time User Provider Type Action    > TK1.1 2017 12:36 PM Candida Chen MD Physician Sign            Progress Notes by Aurelio Hood MD at 2017 10:42 AM     Author:  Aurelio Hood MD Service:  Orthopedics Author Type:  Physician    Filed:  2017 10:49 AM Date of Service:  2017 10:42 AM Creation Time:  2017 10:42 AM    Status:  Addendum :  Aurelio Hood MD (Physician)         Greyson Haas  2017  POD #3    Doing well.  Frustrated  With lack of getting him  Out  Of bed  To walk  Or even  Sit  Up.  Temperatures:  Current - Temp: 98.8  F (37.1  C); Max - Temp  Av.6  F (37.6  C)  Min: 98.2  F (36.8  C)  Max: 101.9  F (38.8  C)  Pulse range: Pulse  Av  Min: 86  Max: 92  Blood pressure range: Systolic (24hrs), Av , Min:99 , Max:115   ; Diastolic (24hrs), Av, Min:61, Max:73    CMS:  intact  Labs:   Results for orders placed or performed during the hospital encounter of 17 (from the past 24 hour(s))   XR Chest 2 Views    Narrative    XR CHEST 2 VW 2017 2:04 PM    COMPARISON: 2012    HISTORY: Fever.      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 No new focal airspace disease. No pleural effusion  or pneumothorax.    JORDAN LAGOS MD   UA with Microscopic reflex to Culture   Result Value Ref Range    Color Urine Yellow     Appearance Urine Clear     Glucose Urine Negative NEG^Negative mg/dL    Bilirubin Urine Negative NEG^Negative    Ketones Urine Negative NEG^Negative mg/dL    Specific Gravity Urine 1.009 1.003 - 1.035    Blood Urine Negative NEG^Negative    pH Urine 5.5 5.0 - 7.0 pH    Protein Albumin Urine 10 (A) NEG^Negative mg/dL    Urobilinogen mg/dL  Normal 0.0 - 2.0 mg/dL    Nitrite Urine Negative NEG^Negative    Leukocyte Esterase Urine Negative NEG^Negative    Source Catheterized Urine     WBC Urine 1 0 - 2 /HPF    RBC Urine <1 0 - 2 /HPF    Mucous Urine Present (A) NEG^Negative /LPF   Blood culture   Result Value Ref Range    Specimen Description Blood Right Arm     Special Requests Aerobic and anaerobic bottles received     Culture Micro No growth after 3 hours    Blood culture   Result Value Ref Range    Specimen Description Blood Left Arm     Special Requests Aerobic and anaerobic bottles received     Culture Micro No growth after 3 hours    US Renal Complete    Narrative    ULTRASOUND RETROPERITONEAL COMPLETE 9/20/2017 3:23 PM     HISTORY: To evaluate for hydronephrosis.    COMPARISON: None.    FINDINGS: The bilateral renal parenchyma are normal in echogenicity  without evidence for shadowing stone. Dromedary hump versus possible  mass in the mid right kidney measuring 3.2 x 2.6 x 3.4 cm. Exophytic  simple cyst in the inferior left kidney measuring 7.4 cm. Mild left  hydronephrosis. Right kidney measures 11.0 x 6.0 x 6.2 cm and the left  measures 13.2 x 6.4 x 4.8 cm. Cortical thickness is 1.3 cm on the  right and 1.4 cm on the left.  Bladder is unremarkable given its level  of distention.      Impression    IMPRESSION:   1. Mild left hydronephrosis.  2. Possible normal lobulation/dromedary hump in the left kidney versus  a solid left renal mass. Contrast-enhanced renal MRI recommended for  further evaluation.    LISA PINTO MD   Procalcitonin   Result Value Ref Range    Procalcitonin 1.87 ng/ml   WBC and differential   Result Value Ref Range    WBC 6.6 4.0 - 11.0 10e9/L    Diff Method Automated Method     % Neutrophils 70.2 %    % Lymphocytes 16.7 %    % Monocytes 11.1 %    % Eosinophils 1.7 %    % Basophils 0.0 %    % Immature Granulocytes 0.3 %    Nucleated RBCs 0 0 /100    Absolute Neutrophil 4.6 1.6 - 8.3 10e9/L    Absolute Lymphocytes 1.1 0.8 -  5.3 10e9/L    Absolute Monocytes 0.7 0.0 - 1.3 10e9/L    Absolute Eosinophils 0.1 0.0 - 0.7 10e9/L    Absolute Basophils 0.0 0.0 - 0.2 10e9/L    Abs Immature Granulocytes 0.0 0 - 0.4 10e9/L    Absolute Nucleated RBC 0.0    INR   Result Value Ref Range    INR 1.89 (H) 0.86 - 1.14   CBC with platelets differential   Result Value Ref Range    WBC 5.5 4.0 - 11.0 10e9/L    RBC Count 3.05 (L) 4.4 - 5.9 10e12/L    Hemoglobin 9.1 (L) 13.3 - 17.7 g/dL    Hematocrit 27.0 (L) 40.0 - 53.0 %    MCV 89 78 - 100 fl    MCH 29.8 26.5 - 33.0 pg    MCHC 33.7 31.5 - 36.5 g/dL    RDW 13.1 10.0 - 15.0 %    Platelet Count 137 (L) 150 - 450 10e9/L    Diff Method Automated Method     % Neutrophils 66.9 %    % Lymphocytes 20.2 %    % Monocytes 10.5 %    % Eosinophils 1.8 %    % Basophils 0.2 %    % Immature Granulocytes 0.4 %    Nucleated RBCs 0 0 /100    Absolute Neutrophil 3.7 1.6 - 8.3 10e9/L    Absolute Lymphocytes 1.1 0.8 - 5.3 10e9/L    Absolute Monocytes 0.6 0.0 - 1.3 10e9/L    Absolute Eosinophils 0.1 0.0 - 0.7 10e9/L    Absolute Basophils 0.0 0.0 - 0.2 10e9/L    Abs Immature Granulocytes 0.0 0 - 0.4 10e9/L    Absolute Nucleated RBC 0.0    Basic metabolic panel   Result Value Ref Range    Sodium 139 133 - 144 mmol/L    Potassium 3.7 3.4 - 5.3 mmol/L    Chloride 107 94 - 109 mmol/L    Carbon Dioxide 23 20 - 32 mmol/L    Anion Gap 9 3 - 14 mmol/L    Glucose 101 (H) 70 - 99 mg/dL    Urea Nitrogen 18 7 - 30 mg/dL    Creatinine 1.43 (H) 0.66 - 1.25 mg/dL    GFR Estimate 48 (L) >60 mL/min/1.7m2    GFR Estimate If Black 58 (L) >60 mL/min/1.7m2    Calcium 9.0 8.5 - 10.1 mg/dL       PLAN:  Discharge plan: now wants  A nh   Prefers  PHB      After  Urology sees to decide  What  To do  About  Catheter    If not  Emergent  Further work  Could be done as out pt  As I  Am  Aware  How  Limited urologist are  With a fresh  Total hip pt.[JT1.1]  insp spirometer  As  Most likely atelectasis  From bed  Rest  And  A general   Anesthesia  Mobilize[JT1.2]    Pt  Feels  He  Is  Doing fine aswell.[JT1.3]     Revision History        User Key Date/Time User Provider Type Action    > JT1.3 9/21/2017 10:49 AM Aurelio Hood MD Physician Addend     JT1.2 9/21/2017 10:48 AM Aurelio Hood MD Physician Addend     JT1.1 9/21/2017 10:44 AM Aurelio Hood MD Physician Sign            Progress Notes by Aurelio Hood MD at 9/21/2017  6:07 AM     Author:  Aurelio Hood MD Service:  Orthopedics Author Type:  Physician    Filed:  9/21/2017  6:09 AM Date of Service:  9/21/2017  6:07 AM Creation Time:  9/21/2017  6:07 AM    Status:  Signed :  Aurelio Hood MD (Physician)         Ortho     Low  Grade  Temp  Jamarcus  Pod 2       assymptomatic    Wbc  Nl    There  Was  No  Listing  Of 101.9  Only  100.1       All theses  Are  So  Common after  A general  Anesthesia  Especially when  Pt  Has  Barely  Been  Gotten out of  Bed\  Plan  D/c  As  Scheduled    Ck  uc  As  He  ahd a  Irizarry  In place.[JT1.1]     Revision History        User Key Date/Time User Provider Type Action    > JT1.1 9/21/2017  6:09 AM Aurelio Hood MD Physician Sign            Progress Notes by Candida Chen MD at 9/20/2017  4:37 PM     Author:  Candida Chen MD Service:  Hospitalist Author Type:  Physician    Filed:  9/20/2017  4:41 PM Date of Service:  9/20/2017  4:37 PM Creation Time:  9/20/2017  4:37 PM    Status:  Signed :  Candida Chen MD (Physician)         Hospitalist update:    Spiked fever >101  No focal symptoms except urinary retention  CXR and UA unremarkable  Blood cultures sent  WBC normal, pro calcitonin pending.    Hold off on abx as no clear source of infection  Follow fever curve.  Urology consult given suspected renal mass vs normal lobulation, also urinary retention.    Called his wife Mellissa, says she is at store and to call later, will follow  Candida Chen MD  Hospitalist[TK1.1]     Revision History        User Key Date/Time  User Provider Type Action    > TK1.1 9/20/2017  4:41 PM Candida Chen MD Physician Sign            Progress Notes by Jo Rodriguez RN at 9/20/2017  2:49 PM     Author:  Jo Rodriguez RN Service:  Skilled Nursing Author Type:  Registered Nurse    Filed:  9/20/2017  2:51 PM Date of Service:  9/20/2017  2:49 PM Creation Time:  9/20/2017  2:49 PM    Status:  Signed :  Jo Rodriguez RN (Registered Nurse)         Straight cath due to PVR 469ml. Pt tolerating straight cath well. Very little resistance when inserting catheter. Bleeding from urethra. Denies pain. INR 1.53 this AM. Will monitor.[LY1.1]      Revision History        User Key Date/Time User Provider Type Action    > LY1.1 9/20/2017  2:51 PM Jo Rodriguez RN Registered Nurse Sign            Progress Notes by Candida Chen MD at 9/20/2017 10:30 AM     Author:  Candida Chen MD Service:  Hospitalist Author Type:  Physician    Filed:  9/20/2017 10:38 AM Date of Service:  9/20/2017 10:30 AM Creation Time:  9/20/2017 10:30 AM    Status:  Signed :  Candida Chen MD (Physician)         [facility]    Hospitalist Progress Note    Date of Service (when I saw the patient): 09/20/2017    Assessment & Plan     Greyson Haas is a 76-year-old male with past medical history of hypertension, dyslipidemia and glaucoma who is admitted to the care of Orthopedics and is status post left total hip arthroplasty.      Status post left total hip arthroplasty on 9/18/17:   - Routine postoperative cares and pain control will be deferred to Orthopedics.      JOSELITO, on CKD stage 3: Suspect  due to low BP, and possibly BPH/obstructive.  - admission creatinine 1.23 trended up and stable at 1.56.   - encourage po fluids, continue holding PTA Lisinopril & repeat labs daily am  - will check for renal US given BPH symptoms     Hypertension:    - BP is soft postoperatively, though the patient was asymptomatic  - encourage po fluids, continue holding  prior to admission lisinopril 5 mg daily.  - BP normal now     Acute Blood loss anemia:  - due to recent surg  - preop hgb 12.9-> 10.7->9.8 this am  - consider OTC po iron at d/c     BPH:  - restarted PTA Flomax,  bladder scan & straight if needed (patient declined)     Glaucoma and blepharitis  - Continue prior to admission eye drops as well as prophylactic doxycycline.      Gout:   - Continue prior to admission allopurinol.      Dyslipidemia:    - Continue prior to admission Zocor.      Deep venous thrombosis prophylaxis:  on warfarin  postoperatively.       CODE STATUS:  Full code    Disposition : plan per primary team.     Disposition: Expected discharge: monitor another day with progression of therapy and also fever curve, and then per primary. Discussed with patient and RN.    Candida Chen MD  Hospitalist    Interval History   Spiked fever yesterday evening>101. Denies cough, dyspnea, and pain, diarrhea. Had urinary hesitancy and PVR was 204 but denies dysuria or increased frequency.   - Post op pain controlled. Doing well otherwise, worked with therapy this am and feels tired.    -Data reviewed today: I reviewed all new labs and imaging results over the last 24 hours. I personally reviewed no images or EKG's today.    Physical Exam   Temp: 98.3  F (36.8  C) Temp src: Oral BP: 114/76 Pulse: 106 Heart Rate: 68 Resp: 16 SpO2: 96 % O2 Device: None (Room air)    There were no vitals filed for this visit.  Vital Signs with Ranges  Temp:  [97.9  F (36.6  C)-101.2  F (38.4  C)] 98.3  F (36.8  C)  Pulse:  [106] 106  Heart Rate:  [] 68  Resp:  [16-20] 16  BP: ()/(55-80) 114/76  SpO2:  [94 %-98 %] 96 %  I/O last 3 completed shifts:  In: 3000 [P.O.:1200; I.V.:1800]  Out: 550 [Urine:550]    Constitutional: Alert, awake. Not in distress.   HEENT: PERRLA EOMI  Respiratory: Bilateral equal air entry, clear to auscultation. No respiratory distress.  Cardiovascular: Regular s1s2, no murmur, rub or gallop. No  tachycardia.  GI: Soft, non distended, non tender, bowel tones active.  Skin/Integumen: No rash, no blister.  Other:  Lt hip with dressing.    Medications     Warfarin Therapy Reminder       NaCl 100 mL/hr at 17 1854       warfarin  5 mg Oral ONCE at 18:00     allopurinol (ZYLOPRIM) tablet 300 mg  300 mg Oral QAM     dorzolamide-timolol  1 drop Both Eyes BID     doxycycline  100 mg Oral Every Other Day     simvastatin (ZOCOR) tablet 20 mg  20 mg Oral QAM     tamsulosin  0.4 mg Oral QAM     sodium chloride (PF)  3 mL Intracatheter Q8H     ferrous gluconate  324 mg Oral Daily     acetaminophen  975 mg Oral Q8H     senna-docusate  1-2 tablet Oral BID       Data     Recent Labs  Lab 17  0620 17  0655 17  0700   WBC  --   --  5.6   HGB 9.8* 10.7* 12.9*   MCV  --   --  88   PLT  --   --  213   INR 1.53* 1.21* 0.99   NA  --  138  --    POTASSIUM  --  4.0 4.0   CHLORIDE  --  106  --    CO2  --  24  --    BUN  --  19  --    CR 1.51* 1.56* 1.18   ANIONGAP  --  8  --    MIREYA  --  8.4*  --    * 132*  --        No results found for this or any previous visit (from the past 24 hour(s)).[TK1.1]     Revision History        User Key Date/Time User Provider Type Action    > TK1.1 2017 10:38 AM Candida Chen MD Physician Sign            Progress Notes by Joesph Dangelo at 2017 10:31 AM     Author:  Joesph Dangelo Service:  (none) Author Type:  (none)    Filed:  2017 10:32 AM Date of Service:  2017 10:31 AM Creation Time:  2017 10:31 AM    Status:  Signed :  Joesph Dangelo  2017  POD #2 Left Total hip Arthroplasty    Doing well.  Clean wound without signs of infection.  Normal healing wound.  No immediate surgical complications identified.  No excessive bleeding  Pain well-controlled.  Tolerating physical therapy and rehabilitation well.  Temperatures:  Current - Temp: 98.3  F (36.8  C); Max - Temp  Av.9  F (37.2  C)  Min: 97.9   F (36.6  C)  Max: 101.2  F (38.4  C)  Pulse range: Pulse  Av  Min: 106  Max: 106  Blood pressure range: Systolic (24hrs), Av , Min:81 , Max:122   ; Diastolic (24hrs), Av, Min:55, Max:80    CMS:   Labs:   Results for orders placed or performed during the hospital encounter of 17 (from the past 24 hour(s))   Hemoglobin   Result Value Ref Range    Hemoglobin 9.8 (L) 13.3 - 17.7 g/dL   INR   Result Value Ref Range    INR 1.53 (H) 0.86 - 1.14   Glucose   Result Value Ref Range    Glucose 117 (H) 70 - 99 mg/dL   Creatinine   Result Value Ref Range    Creatinine 1.51 (H) 0.66 - 1.25 mg/dL    GFR Estimate 45 (L) >60 mL/min/1.7m2    GFR Estimate If Black 54 (L) >60 mL/min/1.7m2       PLAN: [facility] [place][GW1.1]         Revision History        User Key Date/Time User Provider Type Action    > GW1.1 2017 10:32 AM Joesph Dangelo (none) Sign            Progress Notes by Alex Hills OT at 2017  8:40 AM     Author:  Alex Hills OT Service:  (none) Author Type:  Occupational Therapist    Filed:  2017  8:40 AM Date of Service:  2017  8:40 AM Creation Time:  2017  8:40 AM    Status:  Signed :  Alex Hills OT (Occupational Therapist)          17 0830   Quick Adds   Type of Visit Initial Occupational Therapy Evaluation   Living Environment   Lives With spouse   Living Arrangements house  (1 level with basement)   Home Accessibility stairs to enter home;stairs within home   Number of Stairs to Enter Home 2   Number of Stairs Within Home 12   Transportation Available car;family or friend will provide   Living Environment Comment pt has been independent in ADLS at home   Self-Care   Dominant Hand right   Usual Activity Tolerance good   Current Activity Tolerance fair   Regular Exercise no   Equipment Currently Used at Home cane, straight;walker, rolling  (4 WW)   Functional Level Prior   Ambulation 1-->assistive equipment   Transferring 1-->assistive equipment    Toileting 0-->independent   Bathing 0-->independent   Dressing 0-->independent   Eating 0-->independent   Communication 0-->understands/communicates without difficulty   Swallowing 0-->swallows foods/liquids without difficulty   Cognition 0 - no cognition issues reported   Fall history within last six months no   Which of the above functional risks had a recent onset or change? ambulation;transferring;bathing;dressing   Prior Functional Level Comment pt is retired, wife is home and can help   General Information   Onset of Illness/Injury or Date of Surgery - Date 09/18/17   Referring Physician Aurelio Shields   Patient/Family Goals Statement return home to usual routine   Additional Occupational Profile Info/Pertinent History of Current Problem pt admitted for a left THR.  PMH includes JOSELITO stage III, HTn, BPH, glaucoma, gout, dyslipidemia   Precautions/Limitations fall precautions;left hip precautions   General Observations Pt laying in bed, willing to participate   Cognitive Status Examination   Orientation orientation to person, place and time   Level of Consciousness alert   Able to Follow Commands WNL/WFL   Personal Safety (Cognitive) WNL/WFL   Memory intact   Attention No deficits were identified   Visual Perception   Visual Perception Wears glasses   Visual Perception Comments pt states he has blepharitis as well   Pain Assessment   Patient Currently in Pain Yes, see Vital Sign flowsheet   Range of Motion (ROM)   ROM Quick Adds No deficits were identified   ROM Comment B UEs   Strength   Manual Muscle Testing Quick Adds No deficits were identified   Strength Comments B UEs   Coordination   Upper Extremity Coordination No deficits were identified   Mobility   Bed Mobility Bed mobility skill: Supine to sit   Bed Mobility Skill: Supine to Sit   Level of [place]: Supine/Sit moderate assist (50% patients effort)   Physical Assist/Nonphysical Assist: Supine/Sit verbal cues;1 person assist   Assistive Device:  Supine/Sit bedrail   Transfer Skill: Bed to Chair/Chair to Bed   Level of [place]: Bed to Chair minimum assist (75% patients effort)   Physical Assist/Nonphysical Assist: Bed to Chair verbal cues;1 person assist   Weight-Bearing Restrictions weight-bearing as tolerated   Assistive Device - Transfer Skill Bed to Chair Chair to Bed Rehab Eval rolling walker   Transfer Skill: Sit to Stand   Level of [place]: Sit/Stand minimum assist (75% patients effort)   Physical Assist/Nonphysical Assist: Sit/Stand verbal cues;1 person assist   Transfer Skill: Sit to Stand weight-bearing as tolerated   Assistive Device for Transfer: Sit/Stand rolling walker   Lower Body Dressing   Level of [place]: Dress Lower Body stand-by assist   Physical Assist/Nonphysical Assist: Dress Lower Body verbal cues;1 person assist   Assistive Device long-handled shoe horn;reacher   Eating/Self Feeding   Level of [place]: Eating independent   Activities of Daily Living Analysis   Impairments Contributing to Impaired Activities of Daily Living pain;post surgical precautions;ROM decreased;strength decreased   General Therapy Interventions   Planned Therapy Interventions ADL retraining;transfer training;home program guidelines   Clinical Impression   Criteria for Skilled Therapeutic Interventions Met yes, treatment indicated   OT Diagnosis decreased independence with ADLs   Influenced by the following impairments pain, post surgical precautions, weakness   Assessment of Occupational Performance 3-5 Performance Deficits   Identified Performance Deficits decreased independence with grooming, bathing, dressing, functional mobility, household tasks   Clinical Decision Making (Complexity) Low complexity   Therapy Frequency daily   Predicted Duration of Therapy Intervention (days/wks) 3 days   Anticipated Discharge Disposition Home with Assist   Risks and Benefits of Treatment have been explained. Yes   Patient, Family & other staff in  "agreement with plan of care Yes   [facility] TM \"6 Clicks\"   2016, Trustees of [facility], under license to [employer].  All rights reserved.   6 Clicks Short Forms Daily Activity Inpatient Short Form   [facility] AM-PAC  \"6 Clicks\" Daily Activity Inpatient Short Form   1. Putting on and taking off regular lower body clothing? 3 - A Little   2. Bathing (including washing, rinsing, drying)? 3 - A Little   3. Toileting, which includes using toilet, bedpan or urinal? 3 - A Little   4. Putting on and taking off regular upper body clothing? 4 - None   5. Taking care of personal grooming such as brushing teeth? 4 - None   6. Eating meals? 4 - None   Daily Activity Raw Score (Score out of 24.Lower scores equate to lower levels of function) 21   Total Evaluation Time   Total Evaluation Time (Minutes) 15[JF1.1]        Revision History        User Key Date/Time User Provider Type Action    > JF1.1 9/20/2017  8:40 AM Alex Hills OT Occupational Therapist Sign            Progress Notes by Robyn Ford MD at 9/19/2017  4:30 PM     Author:  Robyn Ford MD Service:  Hospitalist Author Type:  Physician    Filed:  9/19/2017  6:12 PM Date of Service:  9/19/2017  4:30 PM Creation Time:  9/19/2017  4:30 PM    Status:  Signed :  oRbyn Ford MD (Physician)         [facility]  Hospitalist Progress Note  Date of service (when I saw the patient) 09/19/2017:         Assessment and Plan:    Ms Greyson Haas is a 76-year-old male with past medical history of hypertension, dyslipidemia and glaucoma who is admitted to the care of Orthopedics and is status post left total hip arthroplasty.      Status post left total hip arthroplasty on 9/18/17:   - Routine postoperative cares and pain control will be deferred to Orthopedics.      JOSELITO, C KD stage 3:[PK1.1]  - due to low BP, recent ACEI (on hold)[PK1.2]  - admission creatinine 1.23-> 1.56 this " am[PK1.1]  - encourage po fluids, continue holding PTA Lisinopril &repeat labs in am[PK1.2]     Hypertension[PK1.1]:    -[PK1.2] BP is soft postoperatively, though the patient is asymptomatic[PK1.1]  - encourage po fluids,continue holding[PK1.2] prior to admission lisinopril 5 mg daily.[PK1.1]     Acute Blood loss anemia:  - due to recent surg  - preop hgb 12.9-> 10.7 this am  - consider OTC po iron at d/c     BPH:  - pt not voided as yet   - restarted PTA Flomax, encourage po fluid, bladder scan & straight if needed[PK1.2]     Glaucoma:[PK1.1]    -[PK1.2] Continue prior to admission eye drops as well as prophylactic doxycycline.      Gout:   - Continue prior to admission allopurinol.      Dyslipidemia:    - Continue prior to admission Zocor.      Deep venous thrombosis prophylaxis:  on warfarin  postoperatively.       CODE STATUS:  Full code  Disposition : plan per primary team.    Robyn Ford MD.  Hospitalist [phone] (7am -6 pm)                 Interval History:[PK1.1]   Not voided since am[PK1.2]              Medications:       warfarin  5 mg Oral ONCE at 18:00     allopurinol (ZYLOPRIM) tablet 300 mg  300 mg Oral QAM     dorzolamide-timolol  1 drop Both Eyes BID     doxycycline  100 mg Oral Every Other Day     simvastatin (ZOCOR) tablet 20 mg  20 mg Oral QAM     tamsulosin  0.4 mg Oral QAM     sodium chloride (PF)  3 mL Intracatheter Q8H     ferrous gluconate  324 mg Oral Daily     acetaminophen  975 mg Oral Q8H     senna-docusate  1-2 tablet Oral BID     HYDROcodone-acetaminophen, polyvinyl alcohol-povidone, lidocaine (buffered or not buffered), lidocaine 4%, sodium chloride (PF), sore throat lozenge, naloxone, Warfarin Therapy Reminder, HYDROmorphone, [START ON 9/21/2017] acetaminophen, hydrOXYzine, prochlorperazine **OR** prochlorperazine, ondansetron **OR** ondansetron               Physical Exam:   Blood pressure 102/70, temperature 97.9  F (36.6  C), temperature source Oral, resp. rate 16, SpO2  96 %.  Wt Readings from Last 4 Encounters:   17 89 kg (196 lb 3.2 oz)   17 86.4 kg (190 lb 6.4 oz)   17 86.3 kg (190 lb 3.2 oz)   16 85.5 kg (188 lb 8 oz)         Vital Sign Ranges  Temperature Temp  Av.3  F (37.4  C)  Min: 97.9  F (36.6  C)  Max: 100.4  F (38  C)   Blood pressure Systolic (24hrs), Av , Min:81 , Max:115        Diastolic (24hrs), Av, Min:55, Max:81      Pulse No Data Recorded   Respirations Resp  Av  Min: 16  Max: 16   Pulse oximetry SpO2  Av %  Min: 92 %  Max: 98 %         Intake/Output Summary (Last 24 hours) at 17 1630  Last data filed at 17 0918   Gross per 24 hour   Intake              820 ml   Output              350 ml   Net              470 ml       Constitutional: Awake, alert, cooperative, no apparent distress   Lungs: Clear to auscultation bilaterally, no crackles or wheezing   Cardiovascular: Regular rate and rhythm, normal S1 and S2, and no murmur noted   Abdomen: Normal bowel sounds, soft, non-distended, non-tender   Skin: No rashes, no cyanosis, no edema   Neuro:                Data:   All laboratory and imaging data in the past 24 hours reviewed[PK1.1]       Revision History        User Key Date/Time User Provider Type Action    > PK1.2 2017  6:12 PM Robyn Ford MD Physician Sign     PK1.1 2017  4:30 PM Robyn Ford MD Physician             Progress Notes by Aurelio Hood MD at 2017 11:53 AM     Author:  Aurelio Hood MD Service:  Orthopedics Author Type:  Physician    Filed:  2017 12:16 PM Date of Service:  2017 11:53 AM Creation Time:  2017 11:53 AM    Status:  Addendum :  Aurelio Hood MD (Physician)         Greyson Haas  2017  POD #1    Doing well.  No immediate surgical complications identified.  No excessive bleeding  Pain well-controlled.  Tolerating physical therapy and rehabilitation well.  Temperatures:  Current - Temp: 97.9  F  (36.6  C); Max - Temp  Av.5  F (37.5  C)  Min: 97.9  F (36.6  C)  Max: 100.4  F (38  C)  Pulse range: No Data Recorded  Blood pressure range: Systolic (24hrs), Av , Min:81 , Max:143   ; Diastolic (24hrs), Av, Min:55, Max:87    CMS: intact    xrays  Look good  Labs:   Results for orders placed or performed during the hospital encounter of 17 (from the past 24 hour(s))   Hemoglobin   Result Value Ref Range    Hemoglobin 10.7 (L) 13.3 - 17.7 g/dL   Basic metabolic panel   Result Value Ref Range    Sodium 138 133 - 144 mmol/L    Potassium 4.0 3.4 - 5.3 mmol/L    Chloride 106 94 - 109 mmol/L    Carbon Dioxide 24 20 - 32 mmol/L    Anion Gap 8 3 - 14 mmol/L    Glucose 132 (H) 70 - 99 mg/dL    Urea Nitrogen 19 7 - 30 mg/dL    Creatinine 1.56 (H) 0.66 - 1.25 mg/dL    GFR Estimate 43 (L) >60 mL/min/1.7m2    GFR Estimate If Black 52 (L) >60 mL/min/1.7m2    Calcium 8.4 (L) 8.5 - 10.1 mg/dL   INR   Result Value Ref Range    INR 1.21 (H) 0.86 - 1.14       PLAN:  Discharge plan: home[JT1.1]    Thursday  Push po fluids    Cr  Up a  Bit    pukse is nl  With  bp occ dropping  To  98  To  100[JT1.2]       Revision History        User Key Date/Time User Provider Type Action    > JT1.2 2017 12:16 PM Aurelio Hood MD Physician Addend     JT1.1 2017 11:54 AM Aurelio Hood MD Physician Sign            Progress Notes by Norah Thomas PT at 2017  3:40 PM     Author:  Norah Thomas PT Service:  (none) Author Type:  Physical Therapist    Filed:  2017  3:40 PM Date of Service:  2017  3:40 PM Creation Time:  2017  3:40 PM    Status:  Signed :  Bowell, [place], PT (Physical Therapist)            17 1500   Quick Adds   Type of Visit Initial PT Evaluation   Living Environment   Lives With spouse   Living Arrangements house   Number of Stairs to Enter Home 2  (no rail)   Number of Stairs Within Home 0   Transportation Available family or friend will provide;car   Self-Care    Usual Activity Tolerance moderate  (Limited community ambulator)   Equipment Currently Used at Home cane, straight;walker, rolling;raised toilet  (4WW)   Functional Level Prior   Ambulation 1-->assistive equipment  (FWW or cane)   Transferring 1-->assistive equipment   Toileting 0-->independent   Bathing 0-->independent   Dressing 2-->assistive person  (Wife would occasionally help)   Fall history within last six months no   General Information   Onset of Illness/Injury or Date of Surgery - Date 09/18/17   Referring Physician MD Sana   Patient/Family Goals Statement Return home   Pertinent History of Current Problem (include personal factors and/or comorbidities that impact the POC) 76 year old male s/p L DOTTIE.    Precautions/Limitations fall precautions;left hip precautions   Weight-Bearing Status - LLE weight-bearing as tolerated   General Info Comments Posterior-lateral or posterior precautions, WBAT, ambulate with assist   Cognitive Status Examination   Orientation orientation to person, place and time   Level of Consciousness alert;confused   Follows Commands and Answers Questions able to follow single-step instructions   Pain Assessment   Patient Currently in Pain Yes, see Vital Sign flowsheet   Integumentary/Edema   Integumentary/Edema Comments Dressing to L LE clean, dry and intact. +Hemovac.    Range of Motion (ROM)   ROM Comment Decreased L LE AROM secondary to pain, weakness. Other extremities WFL's.    Strength   Strength Comments Decreased generally. Needs physical assist with mobility as noted below.    Bed Mobility   Bed Mobility Comments Supine to sit with mod A x 1, HOB elevated.    Transfer Skills   Transfer Comments Sit to/from stand with CGA.    Gait   Gait Comments Ambulates 3' with FWW and CGA.    Balance   Balance Comments Requires bilateral UE support on FWW for safe mobility at this time.    Sensory Examination   Sensory Perception Comments Denies burning, numbness and tingling  "  Coordination   Coordination no deficits were identified   Modality Interventions   Planned Modality Interventions Cryotherapy   Planned Modality Interventions Comments PRN   General Therapy Interventions   Planned Therapy Interventions bed mobility training;gait training;ROM;strengthening;transfer training;home program guidelines;progressive activity/exercise   Clinical Impression   Criteria for Skilled Therapeutic Intervention yes, treatment indicated   PT Diagnosis Impaired gait   Influenced by the following impairments Pain, decreased L LE AROM/strength, impaired balance   Functional limitations due to impairments Decreased independence with bed mobility, transfers and ambulation   Clinical Presentation Stable/Uncomplicated   Clinical Presentation Rationale Medically stable.    Clinical Decision Making (Complexity) Low complexity   Therapy Frequency` 2 times/day   Predicted Duration of Therapy Intervention (days/wks) 4 days   Anticipated Discharge Disposition Home with Assist;Home with Home Therapy   Risk & Benefits of therapy have been explained Yes   Patient, Family & other staff in agreement with plan of care Yes   [facility] \"6 Clicks\"   2016, Trustees of [facility], under license to [employer].  All rights reserved.   6 Clicks Short Forms Basic Mobility Inpatient Short Form   [facility]  \"6 Clicks\" V.2 Basic Mobility Inpatient Short Form   1. Turning from your back to your side while in a flat bed without using bedrails? 3 - A Little   2. Moving from lying on your back to sitting on the side of a flat bed without using bedrails? 2 - A Lot   3. Moving to and from a bed to a chair (including a wheelchair)? 3 - A Little   4. Standing up from a chair using your arms (e.g., wheelchair, or bedside chair)? 3 - A Little   5. To walk in hospital room? 3 - A Little   6. Climbing 3-5 steps with a railing? 2 - A Lot   Basic Mobility Raw Score (Score out of 24.Lower scores equate " to lower levels of function) 16   Total Evaluation Time   Total Evaluation Time (Minutes) 10[BB1.1]        Revision History        User Key Date/Time User Provider Type Action    > BB1.1 9/18/2017  3:40 PM Norah Thomas PT Physical Therapist Sign            Progress Notes by Kuldeep Kothari at 9/18/2017  2:53 PM     Author:  Kuldeep Kothari Service:  Spiritual Health Author Type:      Filed:  9/18/2017  3:02 PM Date of Service:  9/18/2017  2:53 PM Creation Time:  9/18/2017  2:53 PM    Status:  Signed :  Kuldeep Kothari ()         SPIRITUAL HEALTH SERVICES Progress Note  [facility]     visited pt on request. Pt recovering from hip replacement and was a bit groggy from pain meds but wanted to talk. Pt is lifelong member of [employer], has been  to his wife, Mellissa, for 54 years has 5 children and 11 grandchildren. He has been retired from the [email or web address] business for 20 years and plays golf 3 times a week with a group of long time friends. Pt says he has been blessed and expressed thanks to God. Pt declined visit from  but says he will accept communion from lay eucharistic ministers.    SH provided reflective listening and encouragement. Pt welcomed prayer. Pt says he will DSC in a day or two.    No plans  To follow but  remains available on request.    Kuldeep Kothari M.Div.  Chaplain Resident  [phone] Pager[LW1.1]     Revision History        User Key Date/Time User Provider Type Action    > LW1.1 9/18/2017  3:02 PM Kuldeep Kothari Sign            Progress Notes by Shawn Martinez RN at 9/18/2017 10:42 AM     Author:  Shawn Martinez RN Service:  (none) Author Type:  Registered Nurse    Filed:  9/18/2017 10:43 AM Date of Service:  9/18/2017 10:42 AM Creation Time:  9/18/2017 10:42 AM    Status:  Signed :  Shawn Martinez RN (Registered Nurse)         Telephone report given to [facility] RN.  Patient [facility] cart accompanied by na.  Belongings:  cane and a hospital bag. Family : wife in [place][AL1.1]     Revision History        User Key Date/Time User Provider Type Action    > AL1.1 9/18/2017 10:43 AM Shawn Martinez RN Registered Nurse Sign            Progress Notes by Shawn Martinez RN at 9/18/2017 10:16 AM     Author:  Shawn Martinez RN Service:  (none) Author Type:  Registered Nurse    Filed:  9/18/2017 10:16 AM Date of Service:  9/18/2017 10:16 AM Creation Time:  9/18/2017 10:16 AM    Status:  Signed :  Shawn Martinez RN (Registered Nurse)         2 X-ray view/s of  the left hip done in PACU.[AL1.1]     Revision History        User Key Date/Time User Provider Type Action    > AL1.1 9/18/2017 10:16 AM Shawn Martinez RN Registered Nurse Sign            Progress Notes by Katiuska Thompson at 9/18/2017  6:45 AM     Author:  Katiuska Thompson Service:  (none) Author Type:  (none)    Filed:  9/18/2017  6:46 AM Date of Service:  9/18/2017  6:45 AM Creation Time:  9/18/2017  6:45 AM    Status:  Signed :  Katiuska Thompson         Admission medication history interview status for the 9/18/2017  admission is complete. See EPIC admission navigator for prior to admission medications     Medication history source reliability:Good    Medication history interview source(s):Patient    Medication history resources (including written lists, pill bottles, clinic record):None    Primary pharmacy.[facility]    Additional medication history information not noted on PTA med list :None    Time spent in this activity: 30 minutes    Prior to Admission medications    Medication Sig Last Dose Taking? Auth Provider   Allopurinol (ZYLOPRIM PO) Take 300 mg by mouth every morning 9/17/2017 at am Yes Reported, Patient   ASPIRIN EC PO Take 81 mg by mouth every morning 9/14/2017 at am Yes Reported, Patient   Doxycycline Monohydrate (VIBRAMYCIN PO) Take 100 mg by mouth every other day (for eyes) 9/17/2017 at am Yes Reported, Patient   Lisinopril (PRINIVIL PO) Take 5 mg by  mouth every morning (patient takes 0.25 X 20 mg = 5 mg dose) 9/18/2017 at 0430 Yes Reported, Patient   Simvastatin (ZOCOR PO) Take 20 mg by mouth every morning 9/17/2017 at am Yes Reported, Patient   Tamsulosin HCl (FLOMAX PO) Take 0.4 mg by mouth every morning 9/17/2017 at am Yes Reported, Patient   TraMADol HCl (ULTRAM PO) Take  mg by mouth every 8 hours as needed for moderate to severe pain 9/17/2017 at 2100 Yes Reported, Patient   DORZOLAMIDE HCL-TIMOLOL MAL OP Place 1 drop into both eyes 2 times daily 9/18/2017 at 0430 Yes Reported, Patient   Polyvinyl Alcohol-Povidone (REFRESH OP) Apply 1 drop to eye daily as needed 9/17/2017 at hs Yes Reported, Patient   Cyanocobalamin (VITAMIN B-12 SL) Place 500 mg under the tongue every morning 9/17/2017 at am Yes Reported, Patient   hydrocortisone (CORTAID) 1 % cream Apply topically daily as needed for rash or itching (on legs.) 9/13/2017 at am Yes Reported, Patient   ACETAMINOPHEN PO Take 1,000 mg by mouth daily as needed for pain Reported on 5/18/2017 Over 1 month ago at prn Yes Reported, Patient   Sildenafil Citrate (VIAGRA PO) Take 50 mg by mouth daily as needed Over 1 month ago at prn Yes Reported, Patient[CN1.1]            Revision History        User Key Date/Time User Provider Type Action    > CN1.1 9/18/2017  6:46 AM Katiuska Thompson (none) Sign                  Procedure Notes     No notes of this type exist for this encounter.         Progress Notes - Therapies (Notes from 09/18/17 through 09/21/17)      Progress Notes by Joesph Dangelo at 9/20/2017 10:31 AM     Author:  Joesph Dangelo Service:  (none) Author Type:  (none)    Filed:  9/20/2017 10:32 AM Date of Service:  9/20/2017 10:31 AM Creation Time:  9/20/2017 10:31 AM    Status:  Signed :  Joesph Dangelo  9/20/2017  POD #2 Left Total hip Arthroplasty    Doing well.  Clean wound without signs of infection.  Normal healing wound.  No immediate surgical  complications identified.  No excessive bleeding  Pain well-controlled.  Tolerating physical therapy and rehabilitation well.  Temperatures:  Current - Temp: 98.3  F (36.8  C); Max - Temp  Av.9  F (37.2  C)  Min: 97.9  F (36.6  C)  Max: 101.2  F (38.4  C)  Pulse range: Pulse  Av  Min: 106  Max: 106  Blood pressure range: Systolic (24hrs), Av , Min:81 , Max:122   ; Diastolic (24hrs), Av, Min:55, Max:80    CMS:   Labs:   Results for orders placed or performed during the hospital encounter of 17 (from the past 24 hour(s))   Hemoglobin   Result Value Ref Range    Hemoglobin 9.8 (L) 13.3 - 17.7 g/dL   INR   Result Value Ref Range    INR 1.53 (H) 0.86 - 1.14   Glucose   Result Value Ref Range    Glucose 117 (H) 70 - 99 mg/dL   Creatinine   Result Value Ref Range    Creatinine 1.51 (H) 0.66 - 1.25 mg/dL    GFR Estimate 45 (L) >60 mL/min/1.7m2    GFR Estimate If Black 54 (L) >60 mL/min/1.7m2       PLAN: Home [place][GW1.1]         Revision History        User Key Date/Time User Provider Type Action    > GW1.1 2017 10:32 AM Joesph Dangelo (none) Sign            Progress Notes by Alex Hills OT at 2017  8:40 AM     Author:  Alex Hills OT Service:  (none) Author Type:  Occupational Therapist    Filed:  2017  8:40 AM Date of Service:  2017  8:40 AM Creation Time:  2017  8:40 AM    Status:  Signed :  Alex Hills OT (Occupational Therapist)          17 0830   Quick Adds   Type of Visit Initial Occupational Therapy Evaluation   Living Environment   Lives With spouse   Living Arrangements house  (1 level with basement)   Home Accessibility stairs to enter home;stairs within home   Number of Stairs to Enter Home 2   Number of Stairs Within Home 12   Transportation Available car;family or friend will provide   Living Environment Comment pt has been independent in ADLS at home   Self-Care   Dominant Hand right   Usual Activity Tolerance good   Current  Activity Tolerance fair   Regular Exercise no   Equipment Currently Used at Home cane, straight;walker, rolling  (4 WW)   Functional Level Prior   Ambulation 1-->assistive equipment   Transferring 1-->assistive equipment   Toileting 0-->independent   Bathing 0-->independent   Dressing 0-->independent   Eating 0-->independent   Communication 0-->understands/communicates without difficulty   Swallowing 0-->swallows foods/liquids without difficulty   Cognition 0 - no cognition issues reported   Fall history within last six months no   Which of the above functional risks had a recent onset or change? ambulation;transferring;bathing;dressing   Prior Functional Level Comment pt is retired, wife is home and can help   General Information   Onset of Illness/Injury or Date of Surgery - Date 09/18/17   Referring Physician Aurelio Shields   Patient/Family Goals Statement return home to usual routine   Additional Occupational Profile Info/Pertinent History of Current Problem pt admitted for a left THR.  PMH includes JOSELITO stage III, HTn, BPH, glaucoma, gout, dyslipidemia   Precautions/Limitations fall precautions;left hip precautions   General Observations Pt laying in bed, willing to participate   Cognitive Status Examination   Orientation orientation to person, place and time   Level of Consciousness alert   Able to Follow Commands WNL/WFL   Personal Safety (Cognitive) WNL/WFL   Memory intact   Attention No deficits were identified   Visual Perception   Visual Perception Wears glasses   Visual Perception Comments pt states he has blepharitis as well   Pain Assessment   Patient Currently in Pain Yes, see Vital Sign flowsheet   Range of Motion (ROM)   ROM Quick Adds No deficits were identified   ROM Comment B UEs   Strength   Manual Muscle Testing Quick Adds No deficits were identified   Strength Comments B UEs   Coordination   Upper Extremity Coordination No deficits were identified   Mobility   Bed Mobility Bed mobility skill:  Supine to sit   Bed Mobility Skill: Supine to Sit   Level of Jerome: Supine/Sit moderate assist (50% patients effort)   Physical Assist/Nonphysical Assist: Supine/Sit verbal cues;1 person assist   Assistive Device: Supine/Sit bedrail   Transfer Skill: Bed to Chair/Chair to Bed   Level of Jerome: Bed to Chair minimum assist (75% patients effort)   Physical Assist/Nonphysical Assist: Bed to Chair verbal cues;1 person assist   Weight-Bearing Restrictions weight-bearing as tolerated   Assistive Device - Transfer Skill Bed to Chair Chair to Bed Rehab Eval rolling walker   Transfer Skill: Sit to Stand   Level of Jerome: Sit/Stand minimum assist (75% patients effort)   Physical Assist/Nonphysical Assist: Sit/Stand verbal cues;1 person assist   Transfer Skill: Sit to Stand weight-bearing as tolerated   Assistive Device for Transfer: Sit/Stand rolling walker   Lower Body Dressing   Level of Jerome: Dress Lower Body stand-by assist   Physical Assist/Nonphysical Assist: Dress Lower Body verbal cues;1 person assist   Assistive Device long-handled shoe horn;reacher   Eating/Self Feeding   Level of Jerome: Eating independent   Activities of Daily Living Analysis   Impairments Contributing to Impaired Activities of Daily Living pain;post surgical precautions;ROM decreased;strength decreased   General Therapy Interventions   Planned Therapy Interventions ADL retraining;transfer training;home program guidelines   Clinical Impression   Criteria for Skilled Therapeutic Interventions Met yes, treatment indicated   OT Diagnosis decreased independence with ADLs   Influenced by the following impairments pain, post surgical precautions, weakness   Assessment of Occupational Performance 3-5 Performance Deficits   Identified Performance Deficits decreased independence with grooming, bathing, dressing, functional mobility, household tasks   Clinical Decision Making (Complexity) Low complexity   Therapy Frequency  "daily   Predicted Duration of Therapy Intervention (days/wks) 3 days   Anticipated Discharge Disposition Home with Assist   Risks and Benefits of Treatment have been explained. Yes   Patient, Family & other staff in agreement with plan of care Yes   [facility] TM \"6 Clicks\"   2016, Trustees of [facility], under license to [employer].  All rights reserved.   6 Clicks Short Forms Daily Activity Inpatient Short Form   [facility]  \"6 Clicks\" Daily Activity Inpatient Short Form   1. Putting on and taking off regular lower body clothing? 3 - A Little   2. Bathing (including washing, rinsing, drying)? 3 - A Little   3. Toileting, which includes using toilet, bedpan or urinal? 3 - A Little   4. Putting on and taking off regular upper body clothing? 4 - None   5. Taking care of personal grooming such as brushing teeth? 4 - None   6. Eating meals? 4 - None   Daily Activity Raw Score (Score out of 24.Lower scores equate to lower levels of function) 21   Total Evaluation Time   Total Evaluation Time (Minutes) 15[JF1.1]        Revision History        User Key Date/Time User Provider Type Action    > JF1.1 9/20/2017  8:40 AM Alex Hills OT Occupational Therapist Sign            Progress Notes by Norah Thomas PT at 9/18/2017  3:40 PM     Author:  Norah Thomas PT Service:  (none) Author Type:  Physical Therapist    Filed:  9/18/2017  3:40 PM Date of Service:  9/18/2017  3:40 PM Creation Time:  9/18/2017  3:40 PM    Status:  Signed :  Norah Thomas PT (Physical Therapist)            09/18/17 1500   Quick Adds   Type of Visit Initial PT Evaluation   Living Environment   Lives With spouse   Living Arrangements house   Number of Stairs to Enter Home 2  (no rail)   Number of Stairs Within Home 0   Transportation Available family or friend will provide;car   Self-Care   Usual Activity Tolerance moderate  (Limited community ambulator)   Equipment Currently Used at Home cane, " straight;walker, rolling;raised toilet  (4WW)   Functional Level Prior   Ambulation 1-->assistive equipment  (FWW or cane)   Transferring 1-->assistive equipment   Toileting 0-->independent   Bathing 0-->independent   Dressing 2-->assistive person  (Wife would occasionally help)   Fall history within last six months no   General Information   Onset of Illness/Injury or Date of Surgery - Date 09/18/17   Referring Physician MD Sana   Patient/Family Goals Statement Return home   Pertinent History of Current Problem (include personal factors and/or comorbidities that impact the POC) 76 year old male s/p L DOTTIE.    Precautions/Limitations fall precautions;left hip precautions   Weight-Bearing Status - LLE weight-bearing as tolerated   General Info Comments Posterior-lateral or posterior precautions, WBAT, ambulate with assist   Cognitive Status Examination   Orientation orientation to person, place and time   Level of Consciousness alert;confused   Follows Commands and Answers Questions able to follow single-step instructions   Pain Assessment   Patient Currently in Pain Yes, see Vital Sign flowsheet   Integumentary/Edema   Integumentary/Edema Comments Dressing to L LE clean, dry and intact. +Hemovac.    Range of Motion (ROM)   ROM Comment Decreased L LE AROM secondary to pain, weakness. Other extremities WFL's.    Strength   Strength Comments Decreased generally. Needs physical assist with mobility as noted below.    Bed Mobility   Bed Mobility Comments Supine to sit with mod A x 1, HOB elevated.    Transfer Skills   Transfer Comments Sit to/from stand with CGA.    Gait   Gait Comments Ambulates 3' with FWW and CGA.    Balance   Balance Comments Requires bilateral UE support on FWW for safe mobility at this time.    Sensory Examination   Sensory Perception Comments Denies burning, numbness and tingling   Coordination   Coordination no deficits were identified   Modality Interventions   Planned Modality Interventions  "Cryotherapy   Planned Modality Interventions Comments PRN   General Therapy Interventions   Planned Therapy Interventions bed mobility training;gait training;ROM;strengthening;transfer training;home program guidelines;progressive activity/exercise   Clinical Impression   Criteria for Skilled Therapeutic Intervention yes, treatment indicated   PT Diagnosis Impaired gait   Influenced by the following impairments Pain, decreased L LE AROM/strength, impaired balance   Functional limitations due to impairments Decreased independence with bed mobility, transfers and ambulation   Clinical Presentation Stable/Uncomplicated   Clinical Presentation Rationale Medically stable.    Clinical Decision Making (Complexity) Low complexity   Therapy Frequency` 2 times/day   Predicted Duration of Therapy Intervention (days/wks) 4 days   Anticipated Discharge Disposition Home with Assist;Home with Home Therapy   Risk & Benefits of therapy have been explained Yes   Patient, Family & other staff in agreement with plan of care Yes   [facility] TM \"6 Clicks\"   2016, Trustees of [facility], under license to "[employer].  All rights reserved.   6 Clicks Short Forms Basic Mobility Inpatient Short Form   [facility]  \"6 Clicks\" V.2 Basic Mobility Inpatient Short Form   1. Turning from your back to your side while in a flat bed without using bedrails? 3 - A Little   2. Moving from lying on your back to sitting on the side of a flat bed without using bedrails? 2 - A Lot   3. Moving to and from a bed to a chair (including a wheelchair)? 3 - A Little   4. Standing up from a chair using your arms (e.g., wheelchair, or bedside chair)? 3 - A Little   5. To walk in hospital room? 3 - A Little   6. Climbing 3-5 steps with a railing? 2 - A Lot   Basic Mobility Raw Score (Score out of 24.Lower scores equate to lower levels of function) 16   Total Evaluation Time   Total Evaluation Time (Minutes) 10[BB1.1]        Revision " History        User Key Date/Time User Provider Type Action    > BB1.1 9/18/2017  3:40 PM Norah Thomas, PT Physical Therapist Sign            Progress Notes by Katiuska Thompson at 9/18/2017  6:45 AM     Author:  Katiuska Thompson Service:  (none) Author Type:  (none)    Filed:  9/18/2017  6:46 AM Date of Service:  9/18/2017  6:45 AM Creation Time:  9/18/2017  6:45 AM    Status:  Signed :  Katiuska Thompson         Admission medication history interview status for the 9/18/2017  admission is complete. See EPIC admission navigator for prior to admission medications     Medication history source reliability:Good    Medication history interview source(s):Patient    Medication history resources (including written lists, pill bottles, clinic record):None    Primary pharmacy.[facility]    Additional medication history information not noted on PTA med list :None    Time spent in this activity: 30 minutes    Prior to Admission medications    Medication Sig Last Dose Taking? Auth Provider   Allopurinol (ZYLOPRIM PO) Take 300 mg by mouth every morning 9/17/2017 at am Yes Reported, Patient   ASPIRIN EC PO Take 81 mg by mouth every morning 9/14/2017 at am Yes Reported, Patient   Doxycycline Monohydrate (VIBRAMYCIN PO) Take 100 mg by mouth every other day (for eyes) 9/17/2017 at am Yes Reported, Patient   Lisinopril (PRINIVIL PO) Take 5 mg by mouth every morning (patient takes 0.25 X 20 mg = 5 mg dose) 9/18/2017 at 0430 Yes Reported, Patient   Simvastatin (ZOCOR PO) Take 20 mg by mouth every morning 9/17/2017 at am Yes Reported, Patient   Tamsulosin HCl (FLOMAX PO) Take 0.4 mg by mouth every morning 9/17/2017 at am Yes Reported, Patient   TraMADol HCl (ULTRAM PO) Take  mg by mouth every 8 hours as needed for moderate to severe pain 9/17/2017 at 2100 Yes Reported, Patient   DORZOLAMIDE HCL-TIMOLOL MAL OP Place 1 drop into both eyes 2 times daily 9/18/2017 at 0430 Yes Reported, Patient   Polyvinyl  Alcohol-Povidone (REFRESH OP) Apply 1 drop to eye daily as needed 9/17/2017 at hs Yes Reported, Patient   Cyanocobalamin (VITAMIN B-12 SL) Place 500 mg under the tongue every morning 9/17/2017 at am Yes Reported, Patient   hydrocortisone (CORTAID) 1 % cream Apply topically daily as needed for rash or itching (on legs.) 9/13/2017 at am Yes Reported, Patient   ACETAMINOPHEN PO Take 1,000 mg by mouth daily as needed for pain Reported on 5/18/2017 Over 1 month ago at prn Yes Reported, Patient   Sildenafil Citrate (VIAGRA PO) Take 50 mg by mouth daily as needed Over 1 month ago at prn Yes Reported, Patient[CN1.1]            Revision History        User Key Date/Time User Provider Type Action    > CN1.1 9/18/2017  6:46 AM Katiuska Thompson (none) Sign

## 2017-09-18 NOTE — PLAN OF CARE
Problem: Goal Outcome Summary  Goal: Goal Outcome Summary  PT: Orders received, evaluation completed and treatment initiated. 76 year old male s/p L DOTTIE. WBAT, Posterior or posterior-lateral precautions. Pt reports modified independence at baseline with 4WW or straight cane for mobility. Pt lives in a house with 2 stairs (no rail) to enter with all needs met on one level. Pt reports his wife is in good health and can assist as needed on discharge.      Discharge Planner PT   Patient plan for discharge: Home with wife assist  Current status: Supine to/from sit with mod A. Sit to/from stand with CGA. Ambulates 7' with FWW and CGA x 1. No L LE buckling noted. Tolerates L DOTTIE exercises.   Barriers to return to prior living situation: Level of assist for mobility, stairs - will address in therapy  Recommendations for discharge: TBD POD 2  Rationale for recommendations: TBD POD 2       Entered by: Norah Thomas 09/18/2017 3:47 PM

## 2017-09-18 NOTE — ANESTHESIA PREPROCEDURE EVALUATION
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 Rubin     Nocturia     x 2-3     Pulmonary Nodule, stable      Tubular Adenoma 3/07     Past Surgical History:   Procedure Laterality Date     C NONSPECIFIC PROCEDURE  6/04    cysto, R ureteral stent, ESWL     C NONSPECIFIC PROCEDURE  CHILD    TONSILLECTOMY     C NONSPECIFIC PROCEDURE  AGE 4    L heel osteomyelitis with \"bone scraping\"     OPTICAL TRACKING SYSTEM FUSION SPINE POSTERIOR LUMBAR TWO LEVELS N/A 11/5/2015    Procedure: OPTICAL TRACKING SYSTEM FUSION SPINE POSTERIOR LUMBAR TWO LEVELS;  Surgeon: Mina Grove MD;  Location:  OR     SURGICAL HISTORY OF -   1991    B Shoulder surgeries     SURGICAL HISTORY OF -   1992    lumbar laminectomy     SURGICAL HISTORY OF -   1998    lumbar decompression    Dr. Pop     SURGICAL HISTORY OF -   9/06    L shoulder    Dr. Foley     SURGICAL HISTORY OF -   11/07    partial amputation R 3rd toe (tophus)  Dr. Neal     SURGICAL HISTORY OF -   2010    bilateral cataracts       SURGICAL HISTORY OF -   1/11    Mohs L chest, Dr. Anthony     SURGICAL HISTORY OF - 1/11    L TKA   Dr. Thomson     Prior to Admission medications    Medication Sig Start Date End Date Taking? Authorizing Provider   Allopurinol (ZYLOPRIM PO) Take 300 mg by mouth every morning   Yes " Reported, Patient   ASPIRIN EC PO Take 81 mg by mouth every morning   Yes Reported, Patient   Doxycycline Monohydrate (VIBRAMYCIN PO) Take 100 mg by mouth every other day (for eyes)   Yes Reported, Patient   Lisinopril (PRINIVIL PO) Take 5 mg by mouth every morning (patient takes 0.25 X 20 mg = 5 mg dose)   Yes Reported, Patient   Simvastatin (ZOCOR PO) Take 20 mg by mouth every morning   Yes Reported, Patient   Tamsulosin HCl (FLOMAX PO) Take 0.4 mg by mouth every morning   Yes Reported, Patient   TraMADol HCl (ULTRAM PO) Take  mg by mouth every 8 hours as needed for moderate to severe pain   Yes Reported, Patient   DORZOLAMIDE HCL-TIMOLOL MAL OP Place 1 drop into both eyes 2 times daily   Yes Reported, Patient   Polyvinyl Alcohol-Povidone (REFRESH OP) Apply 1 drop to eye daily as needed   Yes Reported, Patient   Cyanocobalamin (VITAMIN B-12 SL) Place 500 mg under the tongue every morning   Yes Reported, Patient   hydrocortisone (CORTAID) 1 % cream Apply topically daily as needed for rash or itching (on legs.)   Yes Reported, Patient   ACETAMINOPHEN PO Take 1,000 mg by mouth daily as needed for pain Reported on 5/18/2017   Yes Reported, Patient   Sildenafil Citrate (VIAGRA PO) Take 50 mg by mouth daily as needed   Yes Reported, Patient     Current Facility-Administered Medications Ordered in Epic   Medication Dose Route Frequency Last Rate Last Dose     chlorhexidine 4 % solution   Topical Once        Or     chlorhexidine 2 % pads   Topical Once        Or     antimicrobial soap   Topical Once         chlorhexidine 4 % solution   Topical Once        Or     chlorhexidine 2 % pads   Topical Once        Or     antimicrobial soap   Topical Once         chlorhexidine 2 % pads   Topical Once        Or     antimicrobial soap   Topical Once         ceFAZolin sodium-dextrose (ANCEF) infusion 2 g  2 g Intravenous Pre-Op/Pre-procedure x 1 dose         ceFAZolin (ANCEF) 1 g vial to attach to  ml bag for ADULT or 50  ml bag for PEDS  1 g Intravenous See Admin Instructions         tranexamic acid (CYKLOKAPRON) 2 g in NaCl 0.9 % 30 mL intra-articular solution  2 g INTRA-ARTICULAR Once         lidocaine 1 % 1 mL  1 mL Other Q1H PRN         lactated ringers infusion   Intravenous Continuous         No current Epic-ordered outpatient prescriptions on file.     Wt Readings from Last 1 Encounters:   09/01/17 89 kg (196 lb 3.2 oz)     Temp Readings from Last 1 Encounters:   09/18/17 36.2  C (97.2  F) (Temporal)     BP Readings from Last 6 Encounters:   09/18/17 134/86   09/01/17 104/68   07/20/17 132/82   05/18/17 128/82   12/30/16 124/78   06/22/16 108/75     Pulse Readings from Last 4 Encounters:   09/01/17 69   07/20/17 68   05/18/17 64   12/30/16 68     Resp Readings from Last 1 Encounters:   09/18/17 16     SpO2 Readings from Last 1 Encounters:   09/18/17 100%     Recent Labs   Lab Test  09/01/17   1207  05/18/17   1156   NA  135  141   POTASSIUM  4.3  5.1   CHLORIDE  102  109   CO2  27  25   ANIONGAP  6  7   GLC  94  108*   BUN  18  25   CR  1.23  1.40*   MIREYA  9.4  9.4     Recent Labs   Lab Test  12/30/16   1027  12/11/14   1223   06/15/10   0803  10/21/09   0754   AST   --    --    --   33  49   ALT  29  27   < >  19  17    < > = values in this interval not displayed.     Recent Labs   Lab Test  09/01/17   1207  01/24/17   0827  11/08/15   0726   WBC  7.1   --   6.4   HGB  14.0  14.8  11.0*   PLT  172   --   151     Recent Labs   Lab Test  02/07/11   1030  01/28/11   0553   INR  1.89*  1.69*      No results for input(s): TROPI in the last 63221 hours.  RECENT LABS:   ECG:   ECHO:   CXR:    Anesthesia Evaluation     . Pt has had prior anesthetic.     No history of anesthetic complications          ROS/MED HX    ENT/Pulmonary:       Neurologic:       Cardiovascular:     (+) hypertension----. : . . . :. .       METS/Exercise Tolerance:     Hematologic:         Musculoskeletal:         GI/Hepatic:        (-) GERD    Renal/Genitourinary:     (+) chronic renal disease, type: CRI,       Endo:         Psychiatric:         Infectious Disease:         Malignancy:         Other:                     Physical Exam  Normal systems: cardiovascular and pulmonary    Airway   Mallampati: I  Neck ROM: full    Dental   (+) caps    Cardiovascular       Pulmonary                     Anesthesia Plan      History & Physical Review  History and physical reviewed and following examination; no interval change.    ASA Status:  2 .    NPO Status:  > 8 hours    Plan for General and ETT with Intravenous induction. Maintenance will be Balanced.    PONV prophylaxis:  Ondansetron (or other 5HT-3)  Additional equipment: Videolaryngoscope precedex      Postoperative Care      Consents  Anesthetic plan, risks, benefits and alternatives discussed with:  Patient..                          .

## 2017-09-18 NOTE — ANESTHESIA POSTPROCEDURE EVALUATION
Patient: Greyson Haas    Procedure(s):  LEFT TOTAL HIP ARTHROPLASTY(DEPUY)^ - Wound Class: I-Clean    Diagnosis:END STAGE LEFT HIP OA  Diagnosis Additional Information: No value filed.    Anesthesia Type:  General, ETT    Note:  Anesthesia Post Evaluation    Patient location during evaluation: PACU  Patient participation: Able to fully participate in evaluation  Level of consciousness: awake and alert  Pain management: adequate  Airway patency: patent  Cardiovascular status: acceptable  Respiratory status: acceptable  Hydration status: acceptable  PONV: none and controlled     Anesthetic complications: None          Last vitals:  Vitals:    09/18/17 1409 09/18/17 1424 09/18/17 1500   BP:   101/68   Resp: 18 18 18   Temp:   37.4  C (99.3  F)   SpO2:   98%         Electronically Signed By: Morteza Alvarenga MD  September 18, 2017  4:38 PM

## 2017-09-18 NOTE — CONSULTS
PRIMARY CARE PROVIDER:  Neville Nick MD      REQUESTING PHYSICIAN:  Aurelio Hood MD      REASON FOR CONSULTATION:  Postoperative medical management.      HISTORY OF PRESENT ILLNESS:  Greyson Haas is a very pleasant 76-year-old male with past medical history of hypertension, glaucoma and dyslipidemia who is admitted in the care of Orthopedics and is status post left total hip arthroplasty.  Procedure was performed by Dr. Hood under general anesthesia.  The patient tolerated the procedure well with estimated blood loss of 400 mL.      Presently the patient is evaluated in his hospital room.  He is having some postoperative pain currently, though just received a dose of IV Dilaudid.  He denies chest pain or shortness breath.  No nausea or vomiting.      PAST MEDICAL HISTORY:   1.  BPH.   2.  History of lumbar fusion.   3.  History of nephrolithiasis.   4.  Gout.   5.  Dyslipidemia.   6.  Hypertension.   7.  Glaucoma.      PRIOR TO ADMISSION MEDICATIONS:   Prior to Admission medications    Medication Sig Last Dose Taking? Auth Provider   Allopurinol (ZYLOPRIM PO) Take 300 mg by mouth every morning 9/17/2017 at am Yes Reported, Patient   ASPIRIN EC PO Take 81 mg by mouth every morning 9/14/2017 at am Yes Reported, Patient   Doxycycline Monohydrate (VIBRAMYCIN PO) Take 100 mg by mouth every other day (for eyes) 9/17/2017 at am Yes Reported, Patient   Lisinopril (PRINIVIL PO) Take 5 mg by mouth every morning (patient takes 0.25 X 20 mg = 5 mg dose) 9/18/2017 at 0430 Yes Reported, Patient   Simvastatin (ZOCOR PO) Take 20 mg by mouth every morning 9/17/2017 at am Yes Reported, Patient   Tamsulosin HCl (FLOMAX PO) Take 0.4 mg by mouth every morning 9/17/2017 at am Yes Reported, Patient   TraMADol HCl (ULTRAM PO) Take  mg by mouth every 8 hours as needed for moderate to severe pain 9/17/2017 at 2100 Yes Reported, Patient   DORZOLAMIDE HCL-TIMOLOL MAL OP Place 1 drop into both eyes 2 times daily 9/18/2017 at 0430  Yes Reported, Patient   Polyvinyl Alcohol-Povidone (REFRESH OP) Apply 1 drop to eye daily as needed 9/17/2017 at hs Yes Reported, Patient   Cyanocobalamin (VITAMIN B-12 SL) Place 500 mg under the tongue every morning 9/17/2017 at am Yes Reported, Patient   hydrocortisone (CORTAID) 1 % cream Apply topically daily as needed for rash or itching (on legs.) 9/13/2017 at am Yes Reported, Patient   ACETAMINOPHEN PO Take 1,000 mg by mouth daily as needed for pain Reported on 5/18/2017 Over 1 month ago at prn Yes Reported, Patient   Sildenafil Citrate (VIAGRA PO) Take 50 mg by mouth daily as needed Over 1 month ago at prn Yes Reported, Patient          SURGICAL HISTORY:   1.  Bilateral shoulder arthroplasties.   2.  Lumbar laminectomy.   3.  Decompression.   4.  Partial amputation of left toe.   5.  Bilateral cataracts.   6.  Mohs.   7.  Left TKA.   8.  ESWL.   9.  Tonsillectomy.      FAMILY HISTORY:     1.  Mother had dyslipidemia.   2.  Father had CHF.      SOCIAL HISTORY:  He is a previous smoker, he quit smoking in 1983.  Drinks alcohol 2 drinks per night.  Denies history of withdrawal.      REVIEW OF SYSTEMS:  A 10-point review of systems was completed.  Pertinent positives noted in the HPI, other systems negative.      PHYSICAL EXAMINATION:   GENERAL:  Greyson Haas is a well-developed, well-nourished 76-year-old male who is lying in bed and appears in pain.   VITAL SIGNS:  Blood pressure is 92/64, heart rate 68, temperature 97.8.   HEENT:  Normocephalic, atraumatic.  Eyes:  Pupils equal, round, reactive to light, lids and sclerae are clear, extraocular movements intact.  Oropharynx is midline, posterior pharynx is clear.  Mucous membranes appear moist.   NECK:  Supple, no adenopathy, no thyromegaly.   CARDIOVASCULAR:  Regular rate and rhythm, no murmurs.   PULMONARY:  Normal effort.  Clear to auscultation bilaterally.   ABDOMEN:  Normal bowel sounds, abdomen is soft and nontender.   EXTREMITIES:  Dorsalis pedis and  radial pulses are palpable bilaterally.   NEUROLOGIC:  Alert and oriented.  Cranial nerves II-XII grossly intact.      LABORATORY DATA:  Reviewed.      ASSESSMENT:  Greyson Haas is a 76-year-old male with past medical history of hypertension, dyslipidemia and glaucoma who is admitted to the care of Orthopedics and is status post left total hip arthroplasty.   1.  Status post left total hip arthroplasty,  postoperative day #0.  Routine postoperative cares and pain control will be deferred to Orthopedics.   2.  Hypertension.  BP is soft postoperatively, though the patient is asymptomatic.  Her prior to admission regimen includes lisinopril 5 mg daily.  Will hold for now and resume as indicated.   3.  Glaucoma.  Continue prior to admission eye drops as well as prophylactic doxycycline.   4.  Gout.  Continue prior to admission allopurinol.   5.  Dyslipidemia.  Continue prior to admission Zocor.   6.  Deep venous thrombosis prophylaxis.  Deferred to Orthopedics.  The patient has been initiated on warfarin postoperatively.      CODE STATUS:  Full code.      We, the Hospitalist Service, thank you for this consult.  We will follow along with you.  Please call with questions.      This patient was discussed with Dr. Henriuqez of the Hospitalist Service who independently interviewed the patient.  He is in agreement with the above plan.         JOSE LUIS HENRIQUEZ MD       As dictated by JONATHAN HUTTON PA-C            D: 2017 13:49   T: 2017 14:59   MT: [id number]      Name:     GREYSON HAAS   MRN:      [id number]        Account:       [id number]   :      1940           Consult Date:  2017      Document: [id number]       cc: Neville Hood MD

## 2017-09-18 NOTE — PLAN OF CARE
Problem: Goal Outcome Summary  Goal: Goal Outcome Summary  Outcome: No Change  Pt arrived on the floor from PACU @ 1100. VSS, A&Ox4. CMS intact, dressing CDI. Irizarry and hemovac patent. Tolerating clear liquids and crackers. Repositioning in bed. Oxycodone and IV dilaudid given for pain control. Will continue to monitor.

## 2017-09-18 NOTE — ANESTHESIA CARE TRANSFER NOTE
Patient: Greyson Haas    Procedure(s):  LEFT TOTAL HIP ARTHROPLASTY(DEPUY)^ - Wound Class: I-Clean    Diagnosis: END STAGE LEFT HIP OA  Diagnosis Additional Information: No value filed.    Anesthesia Type:   General, ETT     Note:  Airway :Face Mask  Patient transferred to:PACU  Comments: Arousable.  Denies pain, N&V.  Report to RN.      Vitals: (Last set prior to Anesthesia Care Transfer)    CRNA VITALS  9/18/2017 0911 - 9/18/2017 0944      9/18/2017             Pulse: 79    SpO2: 99 %    Resp Rate (set): 10                Electronically Signed By: Petty Hampton  September 18, 2017  9:44 AM

## 2017-09-18 NOTE — PROGRESS NOTES
SPIRITUAL HEALTH SERVICES Progress Note  [facility]     visited pt on request. Pt recovering from hip replacement and was a bit groggy from pain meds but wanted to talk. Pt is lifelong member of [facility], has been  to his wife, Mellissa, for 54 years has 5 children and 11 grandchildren. He has been retired from the [employer] business for 20 years and plays golf 3 times a week with a group of long time friends. Pt says he has been blessed and expressed thanks to God. Pt declined visit from [facility] [[email or web address]] but says he will accept communion from lay eucharistic ministers.     provided reflective listening and encouragement. Pt welcomed prayer. Pt says he will DSC in a day or two.    No plans  To follow but  remains available on request.    Kuldeep Kothari M.Div.  Chaplain Resident  [phone] Pager

## 2017-09-18 NOTE — PROGRESS NOTES
Admission medication history interview status for the 9/18/2017  admission is complete. See EPIC admission navigator for prior to admission medications     Medication history source reliability:Good    Medication history interview source(s):Patient    Medication history resources (including written lists, pill bottles, clinic record):None    Primary pharmacy.[facility]    Additional medication history information not noted on PTA med list :None    Time spent in this activity: 30 minutes    Prior to Admission medications    Medication Sig Last Dose Taking? Auth Provider   Allopurinol (ZYLOPRIM PO) Take 300 mg by mouth every morning 9/17/2017 at am Yes Reported, Patient   ASPIRIN EC PO Take 81 mg by mouth every morning 9/14/2017 at am Yes Reported, Patient   Doxycycline Monohydrate (VIBRAMYCIN PO) Take 100 mg by mouth every other day (for eyes) 9/17/2017 at am Yes Reported, Patient   Lisinopril (PRINIVIL PO) Take 5 mg by mouth every morning (patient takes 0.25 X 20 mg = 5 mg dose) 9/18/2017 at 0430 Yes Reported, Patient   Simvastatin (ZOCOR PO) Take 20 mg by mouth every morning 9/17/2017 at am Yes Reported, Patient   Tamsulosin HCl (FLOMAX PO) Take 0.4 mg by mouth every morning 9/17/2017 at am Yes Reported, Patient   TraMADol HCl (ULTRAM PO) Take  mg by mouth every 8 hours as needed for moderate to severe pain 9/17/2017 at 2100 Yes Reported, Patient   DORZOLAMIDE HCL-TIMOLOL MAL OP Place 1 drop into both eyes 2 times daily 9/18/2017 at 0430 Yes Reported, Patient   Polyvinyl Alcohol-Povidone (REFRESH OP) Apply 1 drop to eye daily as needed 9/17/2017 at hs Yes Reported, Patient   Cyanocobalamin (VITAMIN B-12 SL) Place 500 mg under the tongue every morning 9/17/2017 at am Yes Reported, Patient   hydrocortisone (CORTAID) 1 % cream Apply topically daily as needed for rash or itching (on legs.) 9/13/2017 at am Yes Reported, Patient   ACETAMINOPHEN PO Take 1,000 mg by mouth daily as needed for pain  Reported on 5/18/2017 Over 1 month ago at prn Yes Reported, Patient   Sildenafil Citrate (VIAGRA PO) Take 50 mg by mouth daily as needed Over 1 month ago at prn Yes Reported, Patient

## 2017-09-18 NOTE — IP AVS SNAPSHOT
"          [place] [address] [facility]: [phone]                                              INTERAGENCY TRANSFER FORM - LAB / IMAGING / EKG / EMG RESULTS   2017                    Hospital Admission Date: 2017  DOMINIC CHURCH   : 1940  Sex: Male        Attending Provider: Aurelio Hood MD     Allergies:  No Known Allergies    Infection:  None   Service:  SURGERY    Ht:  1.702 m (5' 7\")   Wt:  89 kg (196 lb 3.2 oz)   Admission Wt:  --    BMI:  30.73 kg/m 2   BSA:  2.05 m 2            Patient PCP Information     Provider PCP Type    Neville Nick MD General         Lab Results - 3 Days      Blood culture [[id number]]  Resulted: 17 1546, Result status: Preliminary result    Ordering provider: Candida Chen MD  17 1325 Resulting lab: [facility]    Specimen Information    Type Source Collected On   Blood  17 1445   Comment:  Left Arm          Components       Value Reference Range Flag Lab   Specimen Description Blood Left Arm      Special Requests Aerobic and anaerobic bottles received   75   Culture Micro No growth after 20 hours   75            Blood culture [[id number]]  Resulted: 17 1546, Result status: Preliminary result    Ordering provider: Candida Chen MD  17 1325 Resulting lab: [facility]    Specimen Information    Type Source Collected On   Blood  17 1440   Comment:  Right Arm          Components       Value Reference Range Flag Lab   Specimen Description Blood Right Arm      Special Requests Aerobic and anaerobic bottles received   75   Culture Micro No growth after 20 hours   75            Procalcitonin [[id number]]  Resulted: 17 1314, Result status: Final result    Ordering provider: Candida Chen MD  17 1003 Resulting lab: [facility]    Specimen Information    Type Source Collected On   Blood  17 1110          Components  "      Value Reference Range Flag Lab   Procalcitonin 1.77 ng/ml  FrStHsLb   Comment:         0.50-1.99 ng/ml  Moderate risk of systemic infection.  Recommendation:   Recommend antibiotics. Evaluate culture results and clinical features to   target antibacterial therapy. Obtain blood cultures and other relevant   cultures if not done.  If empiric antibiotics were started, recheck PCT in: 2 days to guide   antibiotic de-escalation.  Discontinue or de-escalate antibiotics when PCT   concentration is <80% of peak or abs PCT <0.5. If empiric antibiotics were NOT   started, recheck PCT in 6-24 hours to re-evaluate need for antibiotics.              Basic metabolic panel [[id number]] (Abnormal)  Resulted: 09/21/17 0639, Result status: Final result    Ordering provider: Candida Chen MD  09/21/17 0000 Resulting lab: [facility]    Specimen Information    Type Source Collected On   Blood  09/21/17 0603          Components       Value Reference Range Flag Lab   Sodium 139 133 - 144 mmol/L  FrStHsLb   Potassium 3.7 3.4 - 5.3 mmol/L  FrStHsLb   Chloride 107 94 - 109 mmol/L  FrStHsLb   Carbon Dioxide 23 20 - 32 mmol/L  FrStHsLb   Anion Gap 9 3 - 14 mmol/L  FrStHsLb   Glucose 101 70 - 99 mg/dL H FrStHsLb   Urea Nitrogen 18 7 - 30 mg/dL  FrStHsLb   Creatinine 1.43 0.66 - 1.25 mg/dL H FrStHsLb   GFR Estimate 48 >60 mL/min/1.7m2 L FrStHsLb   Comment:  Non  GFR Calc   GFR Estimate If Black 58 >60 mL/min/1.7m2 L FrStHsLb   Comment:  African American GFR Calc   Calcium 9.0 8.5 - 10.1 mg/dL  FrStHsLb            INR [[id number]] (Abnormal)  Resulted: 09/21/17 0630, Result status: Final result    Ordering provider: Aurelio Hodo MD  09/21/17 0000 Resulting lab: [facility]    Specimen Information    Type Source Collected On   Blood  09/21/17 0603          Components       Value Reference Range Flag Lab   INR 1.89 0.86 - 1.14 H FrStHsLb            CBC with platelets differential  [[id number]] (Abnormal)  Resulted: 09/21/17 0618, Result status: Final result    Ordering provider: Candida Chen MD  09/21/17 0000 Resulting lab: [facility]    Specimen Information    Type Source Collected On   Blood  09/21/17 0603          Components       Value Reference Range Flag Lab   WBC 5.5 4.0 - 11.0 10e9/L  FrStHsLb   RBC Count 3.05 4.4 - 5.9 10e12/L L FrStHsLb   Hemoglobin 9.1 13.3 - 17.7 g/dL L FrStHsLb   Hematocrit 27.0 40.0 - 53.0 % L FrStHsLb   MCV 89 78 - 100 fl  FrStHsLb   MCH 29.8 26.5 - 33.0 pg  FrStHsLb   MCHC 33.7 31.5 - 36.5 g/dL  FrStHsLb   RDW 13.1 10.0 - 15.0 %  FrStHsLb   Platelet Count 137 150 - 450 10e9/L L FrStHsLb   Diff Method Automated Method   FrStHsLb   % Neutrophils 66.9 %  FrStHsLb   % Lymphocytes 20.2 %  FrStHsLb   % Monocytes 10.5 %  FrStHsLb   % Eosinophils 1.8 %  FrStHsLb   % Basophils 0.2 %  FrStHsLb   % Immature Granulocytes 0.4 %  FrStHsLb   Nucleated RBCs 0 0 /100  FrStHsLb   Absolute Neutrophil 3.7 1.6 - 8.3 10e9/L  FrStHsLb   Absolute Lymphocytes 1.1 0.8 - 5.3 10e9/L  FrStHsLb   Absolute Monocytes 0.6 0.0 - 1.3 10e9/L  FrStHsLb   Absolute Eosinophils 0.1 0.0 - 0.7 10e9/L  FrStHsLb   Absolute Basophils 0.0 0.0 - 0.2 10e9/L  FrStHsLb   Abs Immature Granulocytes 0.0 0 - 0.4 10e9/L  FrStHsLb   Absolute Nucleated RBC 0.0   FrStHsLb            Procalcitonin [[id number]]  Resulted: 09/20/17 1705, Result status: Final result    Ordering provider: Candida Chen MD  09/20/17 1518 Resulting lab: [facility]    Specimen Information    Type Source Collected On   Blood  09/20/17 1553          Components       Value Reference Range Flag Lab   Procalcitonin 1.87 ng/ml  [facility]   Comment:         0.50-1.99 ng/ml  Moderate risk of systemic infection.  Recommendation:   Recommend antibiotics. Evaluate culture results and clinical features to   target antibacterial therapy. Obtain blood cultures and other relevant   cultures if not done.  If empiric  antibiotics were started, recheck PCT in: 2 days to guide   antibiotic de-escalation.  Discontinue or de-escalate antibiotics when PCT   concentration is <80% of peak or abs PCT <0.5. If empiric antibiotics were NOT   started, recheck PCT in 6-24 hours to re-evaluate need for antibiotics.              WBC and differential [[id number]]  Resulted: 09/20/17 1625, Result status: Final result    Ordering provider: Candida Chen MD  09/20/17 1518 Resulting lab: [facility]    Specimen Information    Type Source Collected On   Blood  09/20/17 1553          Components       Value Reference Range Flag Lab   WBC 6.6 4.0 - 11.0 10e9/L  FrStHsLb   Diff Method Automated Method   FrStHsLb   % Neutrophils 70.2 %  FrStHsLb   % Lymphocytes 16.7 %  FrStHsLb   % Monocytes 11.1 %  FrStHsLb   % Eosinophils 1.7 %  FrStHsLb   % Basophils 0.0 %  FrStHsLb   % Immature Granulocytes 0.3 %  FrStHsLb   Nucleated RBCs 0 0 /100  FrStHsLb   Absolute Neutrophil 4.6 1.6 - 8.3 10e9/L  FrStHsLb   Absolute Lymphocytes 1.1 0.8 - 5.3 10e9/L  FrStHsLb   Absolute Monocytes 0.7 0.0 - 1.3 10e9/L  FrStHsLb   Absolute Eosinophils 0.1 0.0 - 0.7 10e9/L  FrStHsLb   Absolute Basophils 0.0 0.0 - 0.2 10e9/L  FrStHsLb   Abs Immature Granulocytes 0.0 0 - 0.4 10e9/L  FrStHsLb   Absolute Nucleated RBC 0.0   FrStHsLb            Surgical pathology exam [[id number]]  Resulted: 09/20/17 1556, Result status: Final result    Ordering provider: Vilma Hood MD  09/18/17 0882 Resulting lab: [facility]    Specimen Information    Type Source Collected On   Bone Hip, Left 09/18/17 0831   Comment:  Gross & Microscopic  Formalin time: 0832  Room Phone # [phone]          Components       Value Reference Range Flag Lab   Copath Report --      Result:         Patient Name: DOMINIC CHURCH  MR#: [id number]  Specimen #: [id number]  Collected: 9/18/2017  Received: 9/18/2017  Reported: 9/20/2017 15:53  Ordering Phy(s): VILMA HOOD    For improved result formatting,  "select 'View Enhanced Report Format'  under Linked Documents section.    SPECIMEN(S):  Femoral head, left    FINAL DIAGNOSIS:  Bone, left femoral head, resection  - Morphologic features compatible with degenerative joint disease, no  evidence of malignancy    Electronically signed out by:    Ceferino Herrera M.D.    GROSS:  The specimen is received in formalin with the patient's name and proper  identification labeled \"left femoral head \".  The specimen consists of  tan surgically removed femoral head(5.5 x 5.5 x 4.1 cm) with femoral  neck(2 cm in length and 4.5 x 3.3 cm).  The articulating surface is  severely eroded.  Representative sections are submitted in one cassette  for decal. (Dictated by: Enzo Jackson 9/18/2017 03:16 PM)    MICROSCOPIC:  Sections show non-neoplastic  bone and cartilage associated with  fibrosis, granulation tissue and focal new bone formation.  The  cartilaginous component demonstrates a fibrillated appearance,  suggestive of degenerative joint disease.  Pockets of trilineage  hematopoiesis are noted within the marrow cavity.  No malignant features  are apparent.    CPT Codes:  A: 61936-UG2, 64231-RRV    TESTING LAB LOCATION:  [address]  [address]  [phone]    COLLECTION SITE:  Client: [facility]  Location: [facility] (S)              UA with Microscopic reflex to Culture [[id number]] (Abnormal)  Resulted: 09/20/17 1504, Result status: Final result    Ordering provider: Candida Chen MD  09/20/17 1325 Resulting lab: [facility]    Specimen Information    Type Source Collected On   Catheterized Urine Urine catheter 09/20/17 1425          Components       Value Reference Range Flag Lab   Color Urine Yellow   FrStHsLb   Appearance Urine Clear   FrStHsLb   Glucose Urine Negative NEG^Negative mg/dL  FrStHsLb   Bilirubin Urine Negative NEG^Negative  FrStHsLb   Ketones Urine Negative NEG^Negative mg/dL  " FrStHsLb   Specific Gravity Urine 1.009 1.003 - 1.035  FrStHsLb   Blood Urine Negative NEG^Negative  FrStHsLb   pH Urine 5.5 5.0 - 7.0 pH  FrStHsLb   Protein Albumin Urine 10 NEG^Negative mg/dL A FrStHsLb   Urobilinogen mg/dL Normal 0.0 - 2.0 mg/dL  FrStHsLb   Nitrite Urine Negative NEG^Negative  FrStHsLb   Leukocyte Esterase Urine Negative NEG^Negative  FrStHsLb   Source Catheterized Urine   FrStHsLb   WBC Urine 1 0 - 2 /HPF  FrStHsLb   RBC Urine <1 0 - 2 /HPF  FrStHsLb   Mucous Urine Present NEG^Negative /LPF A FrStHsLb            Creatinine [[id number]] (Abnormal)  Resulted: 09/20/17 0723, Result status: Final result    Ordering provider: Aurelio Hood MD  09/20/17 0620 Resulting lab: [facility]    Specimen Information    Type Source Collected On     09/20/17 0620          Components       Value Reference Range Flag Lab   Creatinine 1.51 0.66 - 1.25 mg/dL H FrStHsLb   GFR Estimate 45 >60 mL/min/1.7m2 L FrStHsLb   Comment:  Non  GFR Calc   GFR Estimate If Black 54 >60 mL/min/1.7m2 L FrStHsLb   Comment:   GFR Calc            INR [[id number]] (Abnormal)  Resulted: 09/20/17 0702, Result status: Final result    Ordering provider: Aurelio Hood MD  09/20/17 0000 Resulting lab: [facility]    Specimen Information    Type Source Collected On   Blood  09/20/17 0620          Components       Value Reference Range Flag Lab   INR 1.53 0.86 - 1.14 H FrStHsLb            Glucose [[id number]] (Abnormal)  Resulted: 09/20/17 0657, Result status: Final result    Ordering provider: Aurelio Hood MD  09/20/17 0115 Resulting lab: [facility]    Specimen Information    Type Source Collected On   Blood  09/20/17 0620          Components       Value Reference Range Flag Lab   Glucose 117 70 - 99 mg/dL H FrStHsLb            Hemoglobin [[id number]] (Abnormal)  Resulted: 09/20/17 0642, Result status: Final result    Ordering provider: Aurelio Hood,  MD  09/20/17 0000 Resulting lab: [facility]    Specimen Information    Type Source Collected On   Blood  09/20/17 0620          Components       Value Reference Range Flag Lab   Hemoglobin 9.8 13.3 - 17.7 g/dL L FrStHsLb            INR [[id number]] (Abnormal)  Resulted: 09/19/17 0734, Result status: Final result    Ordering provider: Aurelio Hood MD  09/19/17 0000 Resulting lab: [facility]    Specimen Information    Type Source Collected On   Blood  09/19/17 0655          Components       Value Reference Range Flag Lab   INR 1.21 0.86 - 1.14 H FrStHsLb            Basic metabolic panel [[id number]] (Abnormal)  Resulted: 09/19/17 0730, Result status: Final result    Ordering provider: Aurelio Hood MD  09/19/17 0000 Resulting lab: [facility]    Specimen Information    Type Source Collected On   Blood  09/19/17 0655          Components       Value Reference Range Flag Lab   Sodium 138 133 - 144 mmol/L  FrStHsLb   Potassium 4.0 3.4 - 5.3 mmol/L  FrStHsLb   Chloride 106 94 - 109 mmol/L  FrStHsLb   Carbon Dioxide 24 20 - 32 mmol/L  FrStHsLb   Anion Gap 8 3 - 14 mmol/L  FrStHsLb   Glucose 132 70 - 99 mg/dL H FrStHsLb   Urea Nitrogen 19 7 - 30 mg/dL  FrStHsLb   Creatinine 1.56 0.66 - 1.25 mg/dL H FrStHsLb   GFR Estimate 43 >60 mL/min/1.7m2 L FrStHsLb   Comment:  Non  GFR Calc   GFR Estimate If Black 52 >60 mL/min/1.7m2 L FrStHsLb   Comment:  African American GFR Calc   Calcium 8.4 8.5 - 10.1 mg/dL L FrStHsLb            Hemoglobin [[id number]] (Abnormal)  Resulted: 09/19/17 0719, Result status: Final result    Ordering provider: Aurelio Hood MD  09/19/17 0000 Resulting lab: [facility]    Specimen Information    Type Source Collected On   Blood  09/19/17 0655          Components       Value Reference Range Flag Lab   Hemoglobin 10.7 13.3 - 17.7 g/dL L FrStHsLb            INR [[id number]]  Resulted: 09/18/17 0742, Result status: Final  result    Ordering provider: Aurelio Hood MD  09/18/17 0624 Resulting lab: [facility]    Specimen Information    Type Source Collected On   Blood  09/18/17 0700          Components       Value Reference Range Flag Lab   INR 0.99 0.86 - 1.14  FrStHsLb            Creatinine [[id number]] (Abnormal)  Resulted: 09/18/17 0731, Result status: Final result    Ordering provider: Aurelio Hood MD  09/18/17 0624 Resulting lab: [facility]    Specimen Information    Type Source Collected On   Blood  09/18/17 0700          Components       Value Reference Range Flag Lab   Creatinine 1.18 0.66 - 1.25 mg/dL  FrStHsLb   GFR Estimate 60 >60 mL/min/1.7m2 L FrStHsLb   Comment:  Non  GFR Calc   GFR Estimate If Black 72 >60 mL/min/1.7m2  FrStHsLb   Comment:   GFR Calc            Potassium [[id number]]  Resulted: 09/18/17 0731, Result status: Final result    Ordering provider: Elyse Vasquez  09/18/17 0624 Resulting lab: [facility]    Specimen Information    Type Source Collected On   Blood  09/18/17 0700          Components       Value Reference Range Flag Lab   Potassium 4.0 3.4 - 5.3 mmol/L  FrStHsLb            CBC with platelets [[id number]] (Abnormal)  Resulted: 09/18/17 0715, Result status: Final result    Ordering provider: Aurelio Hood MD  09/18/17 0624 Resulting lab: [facility]    Specimen Information    Type Source Collected On   Blood  09/18/17 0700          Components       Value Reference Range Flag Lab   WBC 5.6 4.0 - 11.0 10e9/L  FrStHsLb   RBC Count 4.23 4.4 - 5.9 10e12/L L FrStHsLb   Hemoglobin 12.9 13.3 - 17.7 g/dL L FrStHsLb   Hematocrit 37.1 40.0 - 53.0 % L FrStHsLb   MCV 88 78 - 100 fl  FrStHsLb   MCH 30.5 26.5 - 33.0 pg  FrStHsLb   MCHC 34.8 31.5 - 36.5 g/dL  FrStHsLb   RDW 13.1 10.0 - 15.0 %  FrStHsLb   Platelet Count 213 150 - 450 10e9/L  FrStHsLb            Testing Performed By     Lab - Abbreviation Name  Director Address Valid Date Range    14 - FrStHsLb [facility] Unknown [address] [place] [address] 05/08/15 1057 - Present    75 - Unknown [facility] Unknown [address] [facility] [address] 01/15/15 1019 - Present    88 - Unknown [facility] Unknown Unknown 10/30/02 0000 - Present            Unresulted Labs (24h ago through future)    Start       Ordered    09/22/17 0600  CBC with platelets differential  AM DRAW,   Routine     Comments:  Last Lab Result: Hemoglobin (g/dL)       Date                     Value                 09/21/2017               9.1 (L)          ----------    09/21/17 1511    09/19/17 0600  INR  (Provider dosed and managed warfarin (COUMADIN)  )  DAILY,   Routine      09/18/17 1052    Unscheduled  Hemoglobin  CONDITIONAL X 2,   Routine     Comments:  Release on POD 1 and POD 2 if the morning Hgb is less than 8.0    09/18/17 1052         Imaging Results - 3 Days      US Renal Complete [[id number]]  Resulted: 09/20/17 1601, Result status: Final result    Ordering provider: Candida Chen MD  09/20/17 1038 Resulted by: Alex Ceballos MD    Performed: 09/20/17 1455 - 09/20/17 1523 Resulting lab: RADIOLOGY RESULTS    Narrative:       ULTRASOUND RETROPERITONEAL COMPLETE 9/20/2017 3:23 PM     HISTORY: To evaluate for hydronephrosis.    COMPARISON: None.    FINDINGS: The bilateral renal parenchyma are normal in echogenicity  without evidence for shadowing stone. Dromedary hump versus possible  mass in the mid right kidney measuring 3.2 x 2.6 x 3.4 cm. Exophytic  simple cyst in the inferior left kidney measuring 7.4 cm. Mild left  hydronephrosis. Right kidney measures 11.0 x 6.0 x 6.2 cm and the left  measures 13.2 x 6.4 x 4.8 cm. Cortical thickness is 1.3 cm on the  right and 1.4 cm on the left.  Bladder is unremarkable given its level  of distention.      Impression:       IMPRESSION:   1. Mild left hydronephrosis.  2. Possible normal  lobulation/dromedary hump in the left kidney versus  a solid left renal mass. Contrast-enhanced renal MRI recommended for  further evaluation.    LISA PINTO MD      XR Chest 2 Views [[id number]]  Resulted: 09/20/17 1407, Result status: Final result    Ordering provider: Candida Chen MD  09/20/17 1325 Resulted by: Jordan Lagos MD    Performed: 09/20/17 1354 - 09/20/17 1404 Resulting lab: RADIOLOGY RESULTS    Narrative:       XR CHEST 2 VW 9/20/2017 2:04 PM    COMPARISON: 9/20/2012    HISTORY: Fever.      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 No new focal airspace disease. No pleural effusion  or pneumothorax.    JORDAN LAGOS MD      XR Pelvis Port 1/2 Views [[id number]]  Resulted: 09/18/17 1022, Result status: Final result    Ordering provider: Aurelio Hood MD  09/18/17 0911 Resulted by: Jordan Lagos MD    Performed: 09/18/17 0940 - 09/18/17 0955 Resulting lab: RADIOLOGY RESULTS    Narrative:       XR PELVIS PORT 1/2 VW 9/18/2017 9:55 AM    COMPARISON: 6/19/2017    HISTORY: Arthroplasty.      Impression:       IMPRESSION:   1. Left total hip arthroplasty changes. Hardware appears intact.  Surgical drain in place.  2. Severe right hip osteoarthritis again seen.  3. No fractures are seen.    JORDAN LAGOS MD      Testing Performed By     Lab - Abbreviation Name Director Address Valid Date Range    104 - Rad Rslts RADIOLOGY RESULTS Unknown Unknown 02/16/05 1553 - Present            Encounter-Level Documents:     There are no encounter-level documents.      Order-Level Documents:     There are no order-level documents.

## 2017-09-19 ENCOUNTER — APPOINTMENT (OUTPATIENT)
Dept: PHYSICAL THERAPY | Facility: CLINIC | Age: 77
DRG: 470 | End: 2017-09-19
Attending: ORTHOPAEDIC SURGERY
Payer: MEDICARE

## 2017-09-19 LAB
ANION GAP SERPL CALCULATED.3IONS-SCNC: 8 MMOL/L (ref 3–14)
BUN SERPL-MCNC: 19 MG/DL (ref 7–30)
CALCIUM SERPL-MCNC: 8.4 MG/DL (ref 8.5–10.1)
CHLORIDE SERPL-SCNC: 106 MMOL/L (ref 94–109)
CO2 SERPL-SCNC: 24 MMOL/L (ref 20–32)
CREAT SERPL-MCNC: 1.56 MG/DL (ref 0.66–1.25)
GFR SERPL CREATININE-BSD FRML MDRD: 43 ML/MIN/1.7M2
GLUCOSE SERPL-MCNC: 132 MG/DL (ref 70–99)
HGB BLD-MCNC: 10.7 G/DL (ref 13.3–17.7)
INR PPP: 1.21 (ref 0.86–1.14)
POTASSIUM SERPL-SCNC: 4 MMOL/L (ref 3.4–5.3)
SODIUM SERPL-SCNC: 138 MMOL/L (ref 133–144)

## 2017-09-19 PROCEDURE — 40000193 ZZH STATISTIC PT WARD VISIT

## 2017-09-19 PROCEDURE — 25000128 H RX IP 250 OP 636: Performed by: ORTHOPAEDIC SURGERY

## 2017-09-19 PROCEDURE — 97110 THERAPEUTIC EXERCISES: CPT | Mod: GP

## 2017-09-19 PROCEDURE — A9270 NON-COVERED ITEM OR SERVICE: HCPCS | Mod: GY | Performed by: ORTHOPAEDIC SURGERY

## 2017-09-19 PROCEDURE — 36415 COLL VENOUS BLD VENIPUNCTURE: CPT | Performed by: ORTHOPAEDIC SURGERY

## 2017-09-19 PROCEDURE — 99207 ZZC MOONLIGHTING INDICATOR: CPT | Performed by: INTERNAL MEDICINE

## 2017-09-19 PROCEDURE — 99232 SBSQ HOSP IP/OBS MODERATE 35: CPT | Performed by: INTERNAL MEDICINE

## 2017-09-19 PROCEDURE — 97116 GAIT TRAINING THERAPY: CPT | Mod: GP

## 2017-09-19 PROCEDURE — 85018 HEMOGLOBIN: CPT | Performed by: ORTHOPAEDIC SURGERY

## 2017-09-19 PROCEDURE — 12000007 ZZH R&B INTERMEDIATE

## 2017-09-19 PROCEDURE — 25000132 ZZH RX MED GY IP 250 OP 250 PS 637: Mod: GY | Performed by: ORTHOPAEDIC SURGERY

## 2017-09-19 PROCEDURE — 85610 PROTHROMBIN TIME: CPT | Performed by: ORTHOPAEDIC SURGERY

## 2017-09-19 PROCEDURE — 97530 THERAPEUTIC ACTIVITIES: CPT | Mod: GP

## 2017-09-19 PROCEDURE — 80048 BASIC METABOLIC PNL TOTAL CA: CPT | Performed by: ORTHOPAEDIC SURGERY

## 2017-09-19 RX ORDER — WARFARIN SODIUM 5 MG/1
5 TABLET ORAL
Status: DISCONTINUED | OUTPATIENT
Start: 2017-09-19 | End: 2017-09-19

## 2017-09-19 RX ORDER — HYDROCODONE BITARTRATE AND ACETAMINOPHEN 5; 325 MG/1; MG/1
1-2 TABLET ORAL EVERY 4 HOURS PRN
Status: DISCONTINUED | OUTPATIENT
Start: 2017-09-19 | End: 2017-09-20

## 2017-09-19 RX ORDER — WARFARIN SODIUM 5 MG/1
5 TABLET ORAL
Status: COMPLETED | OUTPATIENT
Start: 2017-09-19 | End: 2017-09-19

## 2017-09-19 RX ADMIN — HYDROCODONE BITARTRATE AND ACETAMINOPHEN 1 TABLET: 5; 325 TABLET ORAL at 17:07

## 2017-09-19 RX ADMIN — OXYCODONE HYDROCHLORIDE 10 MG: 5 TABLET ORAL at 02:27

## 2017-09-19 RX ADMIN — HYDROCODONE BITARTRATE AND ACETAMINOPHEN 1 TABLET: 5; 325 TABLET ORAL at 13:10

## 2017-09-19 RX ADMIN — ACETAMINOPHEN 975 MG: 325 TABLET, FILM COATED ORAL at 06:42

## 2017-09-19 RX ADMIN — SODIUM CHLORIDE: 9 INJECTION, SOLUTION INTRAVENOUS at 18:54

## 2017-09-19 RX ADMIN — CEFAZOLIN SODIUM 2 G: 2 INJECTION, SOLUTION INTRAVENOUS at 01:01

## 2017-09-19 RX ADMIN — OXYCODONE HYDROCHLORIDE 10 MG: 5 TABLET ORAL at 06:42

## 2017-09-19 RX ADMIN — SENNOSIDES AND DOCUSATE SODIUM 2 TABLET: 8.6; 5 TABLET ORAL at 21:16

## 2017-09-19 RX ADMIN — WARFARIN SODIUM 5 MG: 5 TABLET ORAL at 18:46

## 2017-09-19 RX ADMIN — TAMSULOSIN HYDROCHLORIDE 0.4 MG: 0.4 CAPSULE ORAL at 14:47

## 2017-09-19 RX ADMIN — SODIUM CHLORIDE: 9 INJECTION, SOLUTION INTRAVENOUS at 09:18

## 2017-09-19 RX ADMIN — DORZOLAMIDE HYDROCHLORIDE AND TIMOLOL MALEATE 1 DROP: 20; 5 SOLUTION/ DROPS OPHTHALMIC at 20:26

## 2017-09-19 RX ADMIN — DOXYCYCLINE HYCLATE 100 MG: 100 CAPSULE, GELATIN COATED ORAL at 06:42

## 2017-09-19 RX ADMIN — HYDROCODONE BITARTRATE AND ACETAMINOPHEN 2 TABLET: 5; 325 TABLET ORAL at 21:16

## 2017-09-19 RX ADMIN — DORZOLAMIDE HYDROCHLORIDE AND TIMOLOL MALEATE 1 DROP: 20; 5 SOLUTION/ DROPS OPHTHALMIC at 09:33

## 2017-09-19 NOTE — PLAN OF CARE
Problem: Goal Outcome Summary  Goal: Goal Outcome Summary  Outcome: Therapy, progress toward functional goals as expected  Patient plan for discharge: Home with wife assist  Current status: Supine to sit with min/mod assist. Sit to/from stand with FWW and CGA. Pt able to take a few, small turning steps with FWW and CGA to turn and sit in w/c for breakfast. Tolerates L DOTTIE exercises moderately. Pt sitting up in w/c at end of session with all needs in reach and chair alarm on.   Barriers to return to prior living situation: Level of assist for mobility, stairs - will address in therapy  Recommendations for discharge: TBD POD 2  Rationale for recommendations: TBD POD 2

## 2017-09-19 NOTE — PLAN OF CARE
Problem: Goal Outcome Summary  Goal: Goal Outcome Summary  Outcome: Improving  Patient is A&O, VSS except soft BP, CMS intact, regular diet, up with assist of 1. Irizarry d/c-DTV, dressing C/D/I, IV infusing, Oxycodone and Tylenol for pain control, and Hemovac patent. He is progressing per plan of care, will continue to monitor.

## 2017-09-19 NOTE — PROGRESS NOTES
[facility]  Hospitalist Progress Note  Date of service (when I saw the patient) 09/19/2017:         Assessment and Plan:    Ms Greyson Haas is a 76-year-old male with past medical history of hypertension, dyslipidemia and glaucoma who is admitted to the care of Orthopedics and is status post left total hip arthroplasty.      Status post left total hip arthroplasty on 9/18/17:   - Routine postoperative cares and pain control will be deferred to Orthopedics.      JOSELITO, C KD stage 3:  - due to low BP, recent ACEI (on hold)  - admission creatinine 1.23-> 1.56 this am  - encourage po fluids, continue holding PTA Lisinopril &repeat labs in am     Hypertension:    - BP is soft postoperatively, though the patient is asymptomatic  - encourage po fluids,continue holding prior to admission lisinopril 5 mg daily.     Acute Blood loss anemia:  - due to recent surg  - preop hgb 12.9-> 10.7 this am  - consider OTC po iron at d/c     BPH:  - pt not voided as yet   - restarted PTA Flomax, encourage po fluid, bladder scan & straight if needed     Glaucoma:    - Continue prior to admission eye drops as well as prophylactic doxycycline.      Gout:   - Continue prior to admission allopurinol.      Dyslipidemia:    - Continue prior to admission Zocor.      Deep venous thrombosis prophylaxis:  on warfarin  postoperatively.       CODE STATUS:  Full code  Disposition : plan per primary team.    Robyn Ford MD.  Hospitalist [phone] (7am -6 pm)                 Interval History:   Not voided since am              Medications:       warfarin  5 mg Oral ONCE at 18:00     allopurinol (ZYLOPRIM) tablet 300 mg  300 mg Oral QAM     dorzolamide-timolol  1 drop Both Eyes BID     doxycycline  100 mg Oral Every Other Day     simvastatin (ZOCOR) tablet 20 mg  20 mg Oral QAM     tamsulosin  0.4 mg Oral QAM     sodium chloride (PF)  3 mL Intracatheter Q8H     ferrous gluconate  324 mg Oral Daily     acetaminophen  975 mg Oral  Q8H     senna-docusate  1-2 tablet Oral BID     HYDROcodone-acetaminophen, polyvinyl alcohol-povidone, lidocaine (buffered or not buffered), lidocaine 4%, sodium chloride (PF), sore throat lozenge, naloxone, Warfarin Therapy Reminder, HYDROmorphone, [START ON 2017] acetaminophen, hydrOXYzine, prochlorperazine **OR** prochlorperazine, ondansetron **OR** ondansetron               Physical Exam:   Blood pressure 102/70, temperature 97.9  F (36.6  C), temperature source Oral, resp. rate 16, SpO2 96 %.  Wt Readings from Last 4 Encounters:   17 89 kg (196 lb 3.2 oz)   17 86.4 kg (190 lb 6.4 oz)   17 86.3 kg (190 lb 3.2 oz)   16 85.5 kg (188 lb 8 oz)         Vital Sign Ranges  Temperature Temp  Av.3  F (37.4  C)  Min: 97.9  F (36.6  C)  Max: 100.4  F (38  C)   Blood pressure Systolic (24hrs), Av , Min:81 , Max:115        Diastolic (24hrs), Av, Min:55, Max:81      Pulse No Data Recorded   Respirations Resp  Av  Min: 16  Max: 16   Pulse oximetry SpO2  Av %  Min: 92 %  Max: 98 %         Intake/Output Summary (Last 24 hours) at 17 1630  Last data filed at 17 0918   Gross per 24 hour   Intake              820 ml   Output              350 ml   Net              470 ml       Constitutional: Awake, alert, cooperative, no apparent distress   Lungs: Clear to auscultation bilaterally, no crackles or wheezing   Cardiovascular: Regular rate and rhythm, normal S1 and S2, and no murmur noted   Abdomen: Normal bowel sounds, soft, non-distended, non-tender   Skin: No rashes, no cyanosis, no edema   Neuro:                Data:   All laboratory and imaging data in the past 24 hours reviewed

## 2017-09-19 NOTE — PLAN OF CARE
Problem: Goal Outcome Summary  Goal: Goal Outcome Summary  Outcome: Improving  Pt remains A/O, up with A1 walker and GB. IVF. rodriguez draining. Oxy, iv dilaudid, atarax for pain. Hvc. Continue to monitor.

## 2017-09-19 NOTE — PROGRESS NOTES
Greyson Haas  2017  POD #1    Doing well.  No immediate surgical complications identified.  No excessive bleeding  Pain well-controlled.  Tolerating physical therapy and rehabilitation well.  Temperatures:  Current - Temp: 97.9  F (36.6  C); Max - Temp  Av.5  F (37.5  C)  Min: 97.9  F (36.6  C)  Max: 100.4  F (38  C)  Pulse range: No Data Recorded  Blood pressure range: Systolic (24hrs), Av , Min:81 , Max:143   ; Diastolic (24hrs), Av, Min:55, Max:87    CMS: intact    xrays  Look good  Labs:   Results for orders placed or performed during the hospital encounter of 17 (from the past 24 hour(s))   Hemoglobin   Result Value Ref Range    Hemoglobin 10.7 (L) 13.3 - 17.7 g/dL   Basic metabolic panel   Result Value Ref Range    Sodium 138 133 - 144 mmol/L    Potassium 4.0 3.4 - 5.3 mmol/L    Chloride 106 94 - 109 mmol/L    Carbon Dioxide 24 20 - 32 mmol/L    Anion Gap 8 3 - 14 mmol/L    Glucose 132 (H) 70 - 99 mg/dL    Urea Nitrogen 19 7 - 30 mg/dL    Creatinine 1.56 (H) 0.66 - 1.25 mg/dL    GFR Estimate 43 (L) >60 mL/min/1.7m2    GFR Estimate If Black 52 (L) >60 mL/min/1.7m2    Calcium 8.4 (L) 8.5 - 10.1 mg/dL   INR   Result Value Ref Range    INR 1.21 (H) 0.86 - 1.14       PLAN:  Discharge plan: home    Thursday  Push po fluids    Cr  Up a  Bit    pukse is nl  With  bp occ dropping  To  98  To  100

## 2017-09-19 NOTE — PLAN OF CARE
Problem: Goal Outcome Summary  Goal: Goal Outcome Summary  Outcome: Improving  Pt A & O, BP's soft, RA. IV infusing. CMS intact, new dressing applied and hemovac d/c'd. Taking Norco for pain with relief. Voiding per urinal, fluids encouraged.Bladder scanned for 204 at 1700 with Dr. Ford present. Pt refuses straight cath,  encouraged fluids and will wait with st cath at this time.  Up w/ 1 & walker. Tolerating reg diet. Progressing per plan of care, will continue to monitor.

## 2017-09-20 ENCOUNTER — APPOINTMENT (OUTPATIENT)
Dept: PHYSICAL THERAPY | Facility: CLINIC | Age: 77
DRG: 470 | End: 2017-09-20
Attending: ORTHOPAEDIC SURGERY
Payer: MEDICARE

## 2017-09-20 ENCOUNTER — APPOINTMENT (OUTPATIENT)
Dept: GENERAL RADIOLOGY | Facility: CLINIC | Age: 77
DRG: 470 | End: 2017-09-20
Attending: HOSPITALIST
Payer: MEDICARE

## 2017-09-20 ENCOUNTER — APPOINTMENT (OUTPATIENT)
Dept: OCCUPATIONAL THERAPY | Facility: CLINIC | Age: 77
DRG: 470 | End: 2017-09-20
Attending: ORTHOPAEDIC SURGERY
Payer: MEDICARE

## 2017-09-20 ENCOUNTER — APPOINTMENT (OUTPATIENT)
Dept: ULTRASOUND IMAGING | Facility: CLINIC | Age: 77
DRG: 470 | End: 2017-09-20
Attending: HOSPITALIST
Payer: MEDICARE

## 2017-09-20 LAB
ALBUMIN UR-MCNC: 10 MG/DL
APPEARANCE UR: CLEAR
BASOPHILS # BLD AUTO: 0 10E9/L (ref 0–0.2)
BASOPHILS NFR BLD AUTO: 0 %
BILIRUB UR QL STRIP: NEGATIVE
COLOR UR AUTO: YELLOW
COPATH REPORT: NORMAL
CREAT SERPL-MCNC: 1.51 MG/DL (ref 0.66–1.25)
DIFFERENTIAL METHOD BLD: NORMAL
EOSINOPHIL # BLD AUTO: 0.1 10E9/L (ref 0–0.7)
EOSINOPHIL NFR BLD AUTO: 1.7 %
GFR SERPL CREATININE-BSD FRML MDRD: 45 ML/MIN/1.7M2
GLUCOSE SERPL-MCNC: 117 MG/DL (ref 70–99)
GLUCOSE UR STRIP-MCNC: NEGATIVE MG/DL
HGB BLD-MCNC: 9.8 G/DL (ref 13.3–17.7)
HGB UR QL STRIP: NEGATIVE
IMM GRANULOCYTES # BLD: 0 10E9/L (ref 0–0.4)
IMM GRANULOCYTES NFR BLD: 0.3 %
INR PPP: 1.53 (ref 0.86–1.14)
KETONES UR STRIP-MCNC: NEGATIVE MG/DL
LEUKOCYTE ESTERASE UR QL STRIP: NEGATIVE
LYMPHOCYTES # BLD AUTO: 1.1 10E9/L (ref 0.8–5.3)
LYMPHOCYTES NFR BLD AUTO: 16.7 %
MONOCYTES # BLD AUTO: 0.7 10E9/L (ref 0–1.3)
MONOCYTES NFR BLD AUTO: 11.1 %
MUCOUS THREADS #/AREA URNS LPF: PRESENT /LPF
NEUTROPHILS # BLD AUTO: 4.6 10E9/L (ref 1.6–8.3)
NEUTROPHILS NFR BLD AUTO: 70.2 %
NITRATE UR QL: NEGATIVE
NRBC # BLD AUTO: 0 10*3/UL
NRBC BLD AUTO-RTO: 0 /100
PH UR STRIP: 5.5 PH (ref 5–7)
PROCALCITONIN SERPL-MCNC: 1.87 NG/ML
RBC #/AREA URNS AUTO: <1 /HPF (ref 0–2)
SOURCE: ABNORMAL
SP GR UR STRIP: 1.01 (ref 1–1.03)
UROBILINOGEN UR STRIP-MCNC: NORMAL MG/DL (ref 0–2)
WBC # BLD AUTO: 6.6 10E9/L (ref 4–11)
WBC #/AREA URNS AUTO: 1 /HPF (ref 0–2)

## 2017-09-20 PROCEDURE — 85004 AUTOMATED DIFF WBC COUNT: CPT | Performed by: HOSPITALIST

## 2017-09-20 PROCEDURE — 85018 HEMOGLOBIN: CPT | Performed by: ORTHOPAEDIC SURGERY

## 2017-09-20 PROCEDURE — 76770 US EXAM ABDO BACK WALL COMP: CPT

## 2017-09-20 PROCEDURE — 36415 COLL VENOUS BLD VENIPUNCTURE: CPT | Performed by: HOSPITALIST

## 2017-09-20 PROCEDURE — 85610 PROTHROMBIN TIME: CPT | Performed by: ORTHOPAEDIC SURGERY

## 2017-09-20 PROCEDURE — 97116 GAIT TRAINING THERAPY: CPT | Mod: GP

## 2017-09-20 PROCEDURE — 82947 ASSAY GLUCOSE BLOOD QUANT: CPT | Performed by: ORTHOPAEDIC SURGERY

## 2017-09-20 PROCEDURE — 84145 PROCALCITONIN (PCT): CPT | Performed by: HOSPITALIST

## 2017-09-20 PROCEDURE — 85048 AUTOMATED LEUKOCYTE COUNT: CPT | Performed by: HOSPITALIST

## 2017-09-20 PROCEDURE — A9270 NON-COVERED ITEM OR SERVICE: HCPCS | Mod: GY | Performed by: HOSPITALIST

## 2017-09-20 PROCEDURE — 87040 BLOOD CULTURE FOR BACTERIA: CPT | Performed by: HOSPITALIST

## 2017-09-20 PROCEDURE — 99232 SBSQ HOSP IP/OBS MODERATE 35: CPT | Performed by: HOSPITALIST

## 2017-09-20 PROCEDURE — 36415 COLL VENOUS BLD VENIPUNCTURE: CPT | Performed by: ORTHOPAEDIC SURGERY

## 2017-09-20 PROCEDURE — 82565 ASSAY OF CREATININE: CPT | Performed by: ORTHOPAEDIC SURGERY

## 2017-09-20 PROCEDURE — 25000132 ZZH RX MED GY IP 250 OP 250 PS 637: Mod: GY | Performed by: HOSPITALIST

## 2017-09-20 PROCEDURE — 81001 URINALYSIS AUTO W/SCOPE: CPT | Performed by: HOSPITALIST

## 2017-09-20 PROCEDURE — 25000132 ZZH RX MED GY IP 250 OP 250 PS 637: Mod: GY | Performed by: ORTHOPAEDIC SURGERY

## 2017-09-20 PROCEDURE — 82565 ASSAY OF CREATININE: CPT | Performed by: HOSPITALIST

## 2017-09-20 PROCEDURE — A9270 NON-COVERED ITEM OR SERVICE: HCPCS | Mod: GY | Performed by: ORTHOPAEDIC SURGERY

## 2017-09-20 PROCEDURE — 25000125 ZZHC RX 250

## 2017-09-20 PROCEDURE — 40000193 ZZH STATISTIC PT WARD VISIT

## 2017-09-20 PROCEDURE — 97110 THERAPEUTIC EXERCISES: CPT | Mod: GP

## 2017-09-20 PROCEDURE — 71020 XR CHEST 2 VW: CPT

## 2017-09-20 PROCEDURE — 97165 OT EVAL LOW COMPLEX 30 MIN: CPT | Mod: GO | Performed by: OCCUPATIONAL THERAPIST

## 2017-09-20 PROCEDURE — 97535 SELF CARE MNGMENT TRAINING: CPT | Mod: GO | Performed by: OCCUPATIONAL THERAPIST

## 2017-09-20 PROCEDURE — 12000007 ZZH R&B INTERMEDIATE

## 2017-09-20 PROCEDURE — 40000133 ZZH STATISTIC OT WARD VISIT: Performed by: OCCUPATIONAL THERAPIST

## 2017-09-20 RX ORDER — WARFARIN SODIUM 5 MG/1
5 TABLET ORAL
Status: COMPLETED | OUTPATIENT
Start: 2017-09-20 | End: 2017-09-20

## 2017-09-20 RX ORDER — AMOXICILLIN 250 MG
1-2 CAPSULE ORAL 2 TIMES DAILY
Qty: 100 TABLET | Refills: 0 | Status: SHIPPED | OUTPATIENT
Start: 2017-09-20 | End: 2018-01-18

## 2017-09-20 RX ORDER — OXYCODONE HYDROCHLORIDE 5 MG/1
5-10 TABLET ORAL
Status: DISCONTINUED | OUTPATIENT
Start: 2017-09-20 | End: 2017-09-22 | Stop reason: HOSPADM

## 2017-09-20 RX ORDER — HYDROCODONE BITARTRATE AND ACETAMINOPHEN 5; 325 MG/1; MG/1
1-2 TABLET ORAL EVERY 6 HOURS PRN
Qty: 80 TABLET | Refills: 0 | Status: SHIPPED | OUTPATIENT
Start: 2017-09-20 | End: 2018-01-18

## 2017-09-20 RX ORDER — FERROUS GLUCONATE 324(38)MG
324 TABLET ORAL DAILY
Qty: 60 TABLET | Refills: 0 | Status: SHIPPED | OUTPATIENT
Start: 2017-09-20 | End: 2018-01-18

## 2017-09-20 RX ADMIN — OXYCODONE HYDROCHLORIDE 10 MG: 5 TABLET ORAL at 18:50

## 2017-09-20 RX ADMIN — TAMSULOSIN HYDROCHLORIDE 0.4 MG: 0.4 CAPSULE ORAL at 08:06

## 2017-09-20 RX ADMIN — DORZOLAMIDE HYDROCHLORIDE AND TIMOLOL MALEATE 1 DROP: 20; 5 SOLUTION/ DROPS OPHTHALMIC at 22:07

## 2017-09-20 RX ADMIN — ACETAMINOPHEN 975 MG: 325 TABLET, FILM COATED ORAL at 15:32

## 2017-09-20 RX ADMIN — ACETAMINOPHEN 975 MG: 325 TABLET, FILM COATED ORAL at 23:42

## 2017-09-20 RX ADMIN — OXYCODONE HYDROCHLORIDE 10 MG: 5 TABLET ORAL at 22:04

## 2017-09-20 RX ADMIN — SENNOSIDES AND DOCUSATE SODIUM 2 TABLET: 8.6; 5 TABLET ORAL at 22:04

## 2017-09-20 RX ADMIN — HYDROCODONE BITARTRATE AND ACETAMINOPHEN 2 TABLET: 5; 325 TABLET ORAL at 08:05

## 2017-09-20 RX ADMIN — LIDOCAINE HYDROCHLORIDE: 20 JELLY TOPICAL at 14:19

## 2017-09-20 RX ADMIN — HYDROCODONE BITARTRATE AND ACETAMINOPHEN 2 TABLET: 5; 325 TABLET ORAL at 12:22

## 2017-09-20 RX ADMIN — SENNOSIDES AND DOCUSATE SODIUM 2 TABLET: 8.6; 5 TABLET ORAL at 08:05

## 2017-09-20 RX ADMIN — HYDROCODONE BITARTRATE AND ACETAMINOPHEN 1 TABLET: 5; 325 TABLET ORAL at 02:28

## 2017-09-20 RX ADMIN — DORZOLAMIDE HYDROCHLORIDE AND TIMOLOL MALEATE 1 DROP: 20; 5 SOLUTION/ DROPS OPHTHALMIC at 08:07

## 2017-09-20 RX ADMIN — WARFARIN SODIUM 5 MG: 5 TABLET ORAL at 18:36

## 2017-09-20 NOTE — PROGRESS NOTES
Greyson Haas  2017  POD #2 Left Total hip Arthroplasty    Doing well.  Clean wound without signs of infection.  Normal healing wound.  No immediate surgical complications identified.  No excessive bleeding  Pain well-controlled.  Tolerating physical therapy and rehabilitation well.  Temperatures:  Current - Temp: 98.3  F (36.8  C); Max - Temp  Av.9  F (37.2  C)  Min: 97.9  F (36.6  C)  Max: 101.2  F (38.4  C)  Pulse range: Pulse  Av  Min: 106  Max: 106  Blood pressure range: Systolic (24hrs), Av , Min:81 , Max:122   ; Diastolic (24hrs), Av, Min:55, Max:80    CMS:   Labs:   Results for orders placed or performed during the hospital encounter of 17 (from the past 24 hour(s))   Hemoglobin   Result Value Ref Range    Hemoglobin 9.8 (L) 13.3 - 17.7 g/dL   INR   Result Value Ref Range    INR 1.53 (H) 0.86 - 1.14   Glucose   Result Value Ref Range    Glucose 117 (H) 70 - 99 mg/dL   Creatinine   Result Value Ref Range    Creatinine 1.51 (H) 0.66 - 1.25 mg/dL    GFR Estimate 45 (L) >60 mL/min/1.7m2    GFR Estimate If Black 54 (L) >60 mL/min/1.7m2       PLAN: Home [place]

## 2017-09-20 NOTE — PLAN OF CARE
Problem: Goal Outcome Summary  Goal: Goal Outcome Summary  Outcome: Therapy, progress toward functional goals as expected  Patient plan for discharge: Home with wife assist  Current status: Supine to sit with min assist for L LE. Sit to/from stand with FWW and SBA. Pt amb 50 ft x 1 with FWW and SBA. Up/down platform step x 1 with FWW and CGA. Tolerates DOTTIE exercises moderately. Pt returned supine at end of session  with all needs in reach and bed alarm on.   Barriers to return to prior living situation: Level of assist for mobility, stairs - will address in therapy  Recommendations for discharge: home with assist per the plan established by the Physical Therapist   Rationale for recommendations: Pt progressing well, will have assist from wife and has all necessary DME at home.

## 2017-09-20 NOTE — PLAN OF CARE
Problem: Goal Outcome Summary  Goal: Goal Outcome Summary  Outcome: Improving  Patient is A&O, VSS on RA, CMS intact, regular diet, up with assist of 1. He slept thru the night, dressing CDI, voiding in small amount every couple of hours, taking Norco for pain control. He is progressing per plan of care, will continue to monitor

## 2017-09-20 NOTE — PLAN OF CARE
Problem: Goal Outcome Summary  Goal: Goal Outcome Summary  Outcome: Therapy, progress towards functional goals is fair  OT: Pt is a 76 year old male admitted for a left THR.  PMH includes JOSELITO stage III, HTn, BPH, glaucoma, gout, dyslipidemia.  Lives with spouse in a one level home, 2 steps to enter.  Pt and spouse are retired.     Discharge Planner OT   Patient plan for discharge: Home with assist  Current status: OT: Pt willing to participate, complaining of some more difficulty moving left leg and pain in leg today.  Rating pain 4/10 in bed, higher once up.  Came up to EOB with Mod A.  Stood with Min A to go to chair.  Cues needed for walker safety.  Pt worked with LE dressing, able to don and doff socks with AE.  Stood to use urinal with SBA.  Set up for grooming tasks, completed washing eyes and brushing teeth with setup.    Barriers to return to prior living situation: Pain, fatigue.  Recommendations for discharge: Home with assist  Rationale for recommendations: Pt doing well despite some additional pain this morning, appears to have a good understanding of AE and precautions, good support at home.       Entered by: Alex Hills 09/20/2017 8:44 AM

## 2017-09-20 NOTE — PROGRESS NOTES
09/20/17 0830   Quick Adds   Type of Visit Initial Occupational Therapy Evaluation   Living Environment   Lives With spouse   Living Arrangements house  (1 level with basement)   Home Accessibility stairs to enter home;stairs within home   Number of Stairs to Enter Home 2   Number of Stairs Within Home 12   Transportation Available car;family or friend will provide   Living Environment Comment pt has been independent in ADLS at home   Self-Care   Dominant Hand right   Usual Activity Tolerance good   Current Activity Tolerance fair   Regular Exercise no   Equipment Currently Used at Home cane, straight;walker, rolling  (4 WW)   Functional Level Prior   Ambulation 1-->assistive equipment   Transferring 1-->assistive equipment   Toileting 0-->independent   Bathing 0-->independent   Dressing 0-->independent   Eating 0-->independent   Communication 0-->understands/communicates without difficulty   Swallowing 0-->swallows foods/liquids without difficulty   Cognition 0 - no cognition issues reported   Fall history within last six months no   Which of the above functional risks had a recent onset or change? ambulation;transferring;bathing;dressing   Prior Functional Level Comment pt is retired, wife is home and can help   General Information   Onset of Illness/Injury or Date of Surgery - Date 09/18/17   Referring Physician Aurelio Shields   Patient/Family Goals Statement return home to usual routine   Additional Occupational Profile Info/Pertinent History of Current Problem pt admitted for a left THR.  PMH includes JOSELITO stage III, HTn, BPH, glaucoma, gout, dyslipidemia   Precautions/Limitations fall precautions;left hip precautions   General Observations Pt laying in bed, willing to participate   Cognitive Status Examination   Orientation orientation to person, place and time   Level of Consciousness alert   Able to Follow Commands WNL/WFL   Personal Safety (Cognitive) WNL/WFL   Memory intact   Attention No deficits were  identified   Visual Perception   Visual Perception Wears glasses   Visual Perception Comments pt states he has blepharitis as well   Pain Assessment   Patient Currently in Pain Yes, see Vital Sign flowsheet   Range of Motion (ROM)   ROM Quick Adds No deficits were identified   ROM Comment B UEs   Strength   Manual Muscle Testing Quick Adds No deficits were identified   Strength Comments B UEs   Coordination   Upper Extremity Coordination No deficits were identified   Mobility   Bed Mobility Bed mobility skill: Supine to sit   Bed Mobility Skill: Supine to Sit   Level of Falls: Supine/Sit moderate assist (50% patients effort)   Physical Assist/Nonphysical Assist: Supine/Sit verbal cues;1 person assist   Assistive Device: Supine/Sit bedrail   Transfer Skill: Bed to Chair/Chair to Bed   Level of Falls: Bed to Chair minimum assist (75% patients effort)   Physical Assist/Nonphysical Assist: Bed to Chair verbal cues;1 person assist   Weight-Bearing Restrictions weight-bearing as tolerated   Assistive Device - Transfer Skill Bed to Chair Chair to Bed Rehab Eval rolling walker   Transfer Skill: Sit to Stand   Level of Falls: Sit/Stand minimum assist (75% patients effort)   Physical Assist/Nonphysical Assist: Sit/Stand verbal cues;1 person assist   Transfer Skill: Sit to Stand weight-bearing as tolerated   Assistive Device for Transfer: Sit/Stand rolling walker   Lower Body Dressing   Level of Falls: Dress Lower Body stand-by assist   Physical Assist/Nonphysical Assist: Dress Lower Body verbal cues;1 person assist   Assistive Device long-handled shoe horn;reacher   Eating/Self Feeding   Level of Falls: Eating independent   Activities of Daily Living Analysis   Impairments Contributing to Impaired Activities of Daily Living pain;post surgical precautions;ROM decreased;strength decreased   General Therapy Interventions   Planned Therapy Interventions ADL retraining;transfer training;home  "program guidelines   Clinical Impression   Criteria for Skilled Therapeutic Interventions Met yes, treatment indicated   OT Diagnosis decreased independence with ADLs   Influenced by the following impairments pain, post surgical precautions, weakness   Assessment of Occupational Performance 3-5 Performance Deficits   Identified Performance Deficits decreased independence with grooming, bathing, dressing, functional mobility, household tasks   Clinical Decision Making (Complexity) Low complexity   Therapy Frequency daily   Predicted Duration of Therapy Intervention (days/wks) 3 days   Anticipated Discharge Disposition Home with Assist   Risks and Benefits of Treatment have been explained. Yes   Patient, Family & other staff in agreement with plan of care Yes   [facility] TM \"6 Clicks\"   2016, Trustees of [facility], under license to [email or web address].  All rights reserved.   6 Clicks Short Forms Daily Activity Inpatient Short Form   [facility]  \"6 Clicks\" Daily Activity Inpatient Short Form   1. Putting on and taking off regular lower body clothing? 3 - A Little   2. Bathing (including washing, rinsing, drying)? 3 - A Little   3. Toileting, which includes using toilet, bedpan or urinal? 3 - A Little   4. Putting on and taking off regular upper body clothing? 4 - None   5. Taking care of personal grooming such as brushing teeth? 4 - None   6. Eating meals? 4 - None   Daily Activity Raw Score (Score out of 24.Lower scores equate to lower levels of function) 21   Total Evaluation Time   Total Evaluation Time (Minutes) 15     "

## 2017-09-20 NOTE — PROGRESS NOTES
[facility]    Hospitalist Progress Note    Date of Service (when I saw the patient): 09/20/2017    Assessment & Plan     Greyson Haas is a 76-year-old male with past medical history of hypertension, dyslipidemia and glaucoma who is admitted to the care of Orthopedics and is status post left total hip arthroplasty.      Status post left total hip arthroplasty on 9/18/17:   - Routine postoperative cares and pain control will be deferred to Orthopedics.      JOSELITO, on CKD stage 3: Suspect  due to low BP, and possibly BPH/obstructive.  - admission creatinine 1.23 trended up and stable at 1.56.   - encourage po fluids, continue holding PTA Lisinopril & repeat labs daily am  - will check for renal US given BPH symptoms     Hypertension:    - BP is soft postoperatively, though the patient was asymptomatic  - encourage po fluids, continue holding prior to admission lisinopril 5 mg daily.  - BP normal now     Acute Blood loss anemia:  - due to recent surg  - preop hgb 12.9-> 10.7->9.8 this am  - consider OTC po iron at d/c     BPH:  - restarted PTA Flomax,  bladder scan & straight if needed (patient declined)     Glaucoma and blepharitis  - Continue prior to admission eye drops as well as prophylactic doxycycline.      Gout:   - Continue prior to admission allopurinol.      Dyslipidemia:    - Continue prior to admission Zocor.      Deep venous thrombosis prophylaxis:  on warfarin  postoperatively.       CODE STATUS:  Full code    Disposition : plan per primary team.     Disposition: Expected discharge: monitor another day with progression of therapy and also fever curve, and then per primary. Discussed with patient and RN.    Candida Chen MD  Hospitalist    Interval History   Spiked fever yesterday evening>101. Denies cough, dyspnea, and pain, diarrhea. Had urinary hesitancy and PVR was 204 but denies dysuria or increased frequency.   - Post op pain controlled. Doing well otherwise, worked with therapy  this am and feels tired.    -Data reviewed today: I reviewed all new labs and imaging results over the last 24 hours. I personally reviewed no images or EKG's today.    Physical Exam   Temp: 98.3  F (36.8  C) Temp src: Oral BP: 114/76 Pulse: 106 Heart Rate: 68 Resp: 16 SpO2: 96 % O2 Device: None (Room air)    There were no vitals filed for this visit.  Vital Signs with Ranges  Temp:  [97.9  F (36.6  C)-101.2  F (38.4  C)] 98.3  F (36.8  C)  Pulse:  [106] 106  Heart Rate:  [] 68  Resp:  [16-20] 16  BP: ()/(55-80) 114/76  SpO2:  [94 %-98 %] 96 %  I/O last 3 completed shifts:  In: 3000 [P.O.:1200; I.V.:1800]  Out: 550 [Urine:550]    Constitutional: Alert, awake. Not in distress.   HEENT: PERRLA EOMI  Respiratory: Bilateral equal air entry, clear to auscultation. No respiratory distress.  Cardiovascular: Regular s1s2, no murmur, rub or gallop. No tachycardia.  GI: Soft, non distended, non tender, bowel tones active.  Skin/Integumen: No rash, no blister.  Other:  Lt hip with dressing.    Medications     Warfarin Therapy Reminder       NaCl 100 mL/hr at 09/19/17 1854       warfarin  5 mg Oral ONCE at 18:00     allopurinol (ZYLOPRIM) tablet 300 mg  300 mg Oral QAM     dorzolamide-timolol  1 drop Both Eyes BID     doxycycline  100 mg Oral Every Other Day     simvastatin (ZOCOR) tablet 20 mg  20 mg Oral QAM     tamsulosin  0.4 mg Oral QAM     sodium chloride (PF)  3 mL Intracatheter Q8H     ferrous gluconate  324 mg Oral Daily     acetaminophen  975 mg Oral Q8H     senna-docusate  1-2 tablet Oral BID       Data     Recent Labs  Lab 09/20/17  0620 09/19/17  0655 09/18/17  0700   WBC  --   --  5.6   HGB 9.8* 10.7* 12.9*   MCV  --   --  88   PLT  --   --  213   INR 1.53* 1.21* 0.99   NA  --  138  --    POTASSIUM  --  4.0 4.0   CHLORIDE  --  106  --    CO2  --  24  --    BUN  --  19  --    CR 1.51* 1.56* 1.18   ANIONGAP  --  8  --    MIREYA  --  8.4*  --    * 132*  --        No results found for this or any  previous visit (from the past 24 hour(s)).

## 2017-09-20 NOTE — PLAN OF CARE
Problem: Goal Outcome Summary  Goal: Goal Outcome Summary  Outcome: No Change  A/Ox4. CMS intact. Scant serous drainage on L hip. New dressing applied. Moderate pain managed with prn oxycodone. Assist x1, GB and walker for mobility. Temp of 101.9F. Chest xray done. BC pending. Urinary retention. Straight cath if PVR above 250ml. Straight cath 600ml x1 at 1430. Renal US done. Urology consulted. Passing flatus. Vss, except temp. Discharge pending.

## 2017-09-20 NOTE — PROGRESS NOTES
Straight cath due to PVR 469ml. Pt tolerating straight cath well. Very little resistance when inserting catheter. Bleeding from urethra. Denies pain. INR 1.53 this AM. Will monitor.

## 2017-09-20 NOTE — PROGRESS NOTES
Hospitalist update:    Spiked fever >101  No focal symptoms except urinary retention  CXR and UA unremarkable  Blood cultures sent  WBC normal, pro calcitonin pending.    Hold off on abx as no clear source of infection  Follow fever curve.  Urology consult given suspected renal mass vs normal lobulation, also urinary retention.    Called his wife Mellissa, says she is at store and to call later, will follow  Candida Chen MD  Hospitalist

## 2017-09-21 ENCOUNTER — APPOINTMENT (OUTPATIENT)
Dept: PHYSICAL THERAPY | Facility: CLINIC | Age: 77
DRG: 470 | End: 2017-09-21
Attending: ORTHOPAEDIC SURGERY
Payer: MEDICARE

## 2017-09-21 ENCOUNTER — APPOINTMENT (OUTPATIENT)
Dept: OCCUPATIONAL THERAPY | Facility: CLINIC | Age: 77
DRG: 470 | End: 2017-09-21
Attending: ORTHOPAEDIC SURGERY
Payer: MEDICARE

## 2017-09-21 LAB
ANION GAP SERPL CALCULATED.3IONS-SCNC: 9 MMOL/L (ref 3–14)
BASOPHILS # BLD AUTO: 0 10E9/L (ref 0–0.2)
BASOPHILS NFR BLD AUTO: 0.2 %
BUN SERPL-MCNC: 18 MG/DL (ref 7–30)
CALCIUM SERPL-MCNC: 9 MG/DL (ref 8.5–10.1)
CHLORIDE SERPL-SCNC: 107 MMOL/L (ref 94–109)
CO2 SERPL-SCNC: 23 MMOL/L (ref 20–32)
CREAT SERPL-MCNC: 1.43 MG/DL (ref 0.66–1.25)
DIFFERENTIAL METHOD BLD: ABNORMAL
EOSINOPHIL # BLD AUTO: 0.1 10E9/L (ref 0–0.7)
EOSINOPHIL NFR BLD AUTO: 1.8 %
ERYTHROCYTE [DISTWIDTH] IN BLOOD BY AUTOMATED COUNT: 13.1 % (ref 10–15)
GFR SERPL CREATININE-BSD FRML MDRD: 48 ML/MIN/1.7M2
GLUCOSE SERPL-MCNC: 101 MG/DL (ref 70–99)
HCT VFR BLD AUTO: 27 % (ref 40–53)
HGB BLD-MCNC: 9.1 G/DL (ref 13.3–17.7)
IMM GRANULOCYTES # BLD: 0 10E9/L (ref 0–0.4)
IMM GRANULOCYTES NFR BLD: 0.4 %
INR PPP: 1.89 (ref 0.86–1.14)
LYMPHOCYTES # BLD AUTO: 1.1 10E9/L (ref 0.8–5.3)
LYMPHOCYTES NFR BLD AUTO: 20.2 %
MCH RBC QN AUTO: 29.8 PG (ref 26.5–33)
MCHC RBC AUTO-ENTMCNC: 33.7 G/DL (ref 31.5–36.5)
MCV RBC AUTO: 89 FL (ref 78–100)
MONOCYTES # BLD AUTO: 0.6 10E9/L (ref 0–1.3)
MONOCYTES NFR BLD AUTO: 10.5 %
NEUTROPHILS # BLD AUTO: 3.7 10E9/L (ref 1.6–8.3)
NEUTROPHILS NFR BLD AUTO: 66.9 %
NRBC # BLD AUTO: 0 10*3/UL
NRBC BLD AUTO-RTO: 0 /100
PLATELET # BLD AUTO: 137 10E9/L (ref 150–450)
POTASSIUM SERPL-SCNC: 3.7 MMOL/L (ref 3.4–5.3)
PROCALCITONIN SERPL-MCNC: 1.77 NG/ML
RBC # BLD AUTO: 3.05 10E12/L (ref 4.4–5.9)
SODIUM SERPL-SCNC: 139 MMOL/L (ref 133–144)
WBC # BLD AUTO: 5.5 10E9/L (ref 4–11)

## 2017-09-21 PROCEDURE — 97110 THERAPEUTIC EXERCISES: CPT | Mod: GP

## 2017-09-21 PROCEDURE — 97530 THERAPEUTIC ACTIVITIES: CPT | Mod: GP

## 2017-09-21 PROCEDURE — 25000132 ZZH RX MED GY IP 250 OP 250 PS 637: Mod: GY | Performed by: ORTHOPAEDIC SURGERY

## 2017-09-21 PROCEDURE — 40000133 ZZH STATISTIC OT WARD VISIT: Performed by: OCCUPATIONAL THERAPY ASSISTANT

## 2017-09-21 PROCEDURE — 99233 SBSQ HOSP IP/OBS HIGH 50: CPT | Performed by: HOSPITALIST

## 2017-09-21 PROCEDURE — 80048 BASIC METABOLIC PNL TOTAL CA: CPT | Performed by: ORTHOPAEDIC SURGERY

## 2017-09-21 PROCEDURE — 85610 PROTHROMBIN TIME: CPT | Performed by: ORTHOPAEDIC SURGERY

## 2017-09-21 PROCEDURE — 36415 COLL VENOUS BLD VENIPUNCTURE: CPT | Performed by: ORTHOPAEDIC SURGERY

## 2017-09-21 PROCEDURE — 12000007 ZZH R&B INTERMEDIATE

## 2017-09-21 PROCEDURE — 40000193 ZZH STATISTIC PT WARD VISIT

## 2017-09-21 PROCEDURE — 25000132 ZZH RX MED GY IP 250 OP 250 PS 637: Mod: GY | Performed by: HOSPITALIST

## 2017-09-21 PROCEDURE — 97535 SELF CARE MNGMENT TRAINING: CPT | Mod: GO | Performed by: OCCUPATIONAL THERAPY ASSISTANT

## 2017-09-21 PROCEDURE — 85025 COMPLETE CBC W/AUTO DIFF WBC: CPT | Performed by: ORTHOPAEDIC SURGERY

## 2017-09-21 PROCEDURE — A9270 NON-COVERED ITEM OR SERVICE: HCPCS | Mod: GY | Performed by: ORTHOPAEDIC SURGERY

## 2017-09-21 PROCEDURE — 97116 GAIT TRAINING THERAPY: CPT | Mod: GP

## 2017-09-21 PROCEDURE — 84145 PROCALCITONIN (PCT): CPT | Performed by: HOSPITALIST

## 2017-09-21 PROCEDURE — A9270 NON-COVERED ITEM OR SERVICE: HCPCS | Mod: GY | Performed by: HOSPITALIST

## 2017-09-21 PROCEDURE — 36415 COLL VENOUS BLD VENIPUNCTURE: CPT | Performed by: HOSPITALIST

## 2017-09-21 RX ORDER — WARFARIN SODIUM 2.5 MG/1
2.5 TABLET ORAL ONCE
Status: COMPLETED | OUTPATIENT
Start: 2017-09-21 | End: 2017-09-21

## 2017-09-21 RX ORDER — WARFARIN SODIUM 2.5 MG/1
TABLET ORAL
Qty: 30 TABLET | Refills: 0 | Status: SHIPPED | OUTPATIENT
Start: 2017-09-21 | End: 2017-09-27

## 2017-09-21 RX ORDER — WARFARIN SODIUM 5 MG/1
5 TABLET ORAL
Status: DISCONTINUED | OUTPATIENT
Start: 2017-09-21 | End: 2017-09-21 | Stop reason: DRUGHIGH

## 2017-09-21 RX ADMIN — OXYCODONE HYDROCHLORIDE 10 MG: 5 TABLET ORAL at 20:41

## 2017-09-21 RX ADMIN — OXYCODONE HYDROCHLORIDE 10 MG: 5 TABLET ORAL at 12:14

## 2017-09-21 RX ADMIN — ACETAMINOPHEN 650 MG: 325 TABLET, FILM COATED ORAL at 20:41

## 2017-09-21 RX ADMIN — OXYCODONE HYDROCHLORIDE 10 MG: 5 TABLET ORAL at 04:42

## 2017-09-21 RX ADMIN — ACETAMINOPHEN 650 MG: 325 TABLET, FILM COATED ORAL at 16:23

## 2017-09-21 RX ADMIN — OXYCODONE HYDROCHLORIDE 10 MG: 5 TABLET ORAL at 08:57

## 2017-09-21 RX ADMIN — ACETAMINOPHEN 975 MG: 325 TABLET, FILM COATED ORAL at 08:57

## 2017-09-21 RX ADMIN — DORZOLAMIDE HYDROCHLORIDE AND TIMOLOL MALEATE 1 DROP: 20; 5 SOLUTION/ DROPS OPHTHALMIC at 20:43

## 2017-09-21 RX ADMIN — WARFARIN SODIUM 2.5 MG: 2.5 TABLET ORAL at 21:50

## 2017-09-21 RX ADMIN — TAMSULOSIN HYDROCHLORIDE 0.4 MG: 0.4 CAPSULE ORAL at 08:57

## 2017-09-21 RX ADMIN — FERROUS GLUCONATE 324 MG: 324 TABLET ORAL at 08:57

## 2017-09-21 RX ADMIN — OXYCODONE HYDROCHLORIDE 10 MG: 5 TABLET ORAL at 16:23

## 2017-09-21 RX ADMIN — SENNOSIDES AND DOCUSATE SODIUM 2 TABLET: 8.6; 5 TABLET ORAL at 20:41

## 2017-09-21 RX ADMIN — SENNOSIDES AND DOCUSATE SODIUM 2 TABLET: 8.6; 5 TABLET ORAL at 08:57

## 2017-09-21 NOTE — CONSULTS
Urology consult for: Urinary retention, possible lesion on renal U/S   Req provider: Dr. Chen    HPI: Pt is an unassigned 77yo male with a urologic PMH significant for kidney stones, BPH on Flomax, nocturia, ?one UTI years ago. Pt saw Dr. Marrufo ~10-15 years ago for the kidney stones. Pt's full PMH is listed below. Pt was admitted in the care of Orthopedics 9/18/17 and is status post left total hip arthroplasty. Procedure was performed by Dr. Hood under general anesthesia.  The patient tolerated the procedure well with estimated blood loss of 400 mL. After surgery, pt was voiding in small amounts and PTA Flomax was ordered. Pt continued to struggle to fully empty bladder. Pt was bladder scanned for post-void residuals. Pt did have a PVR as high as 600cc, but then he was able to void 180cc and was straight cathed for 400cc. His other PVRs have been between 250-400cc. The hospitalist service was concerned, so urology was consulted.    Today, pt is found resting comfortably in bed. Pt reports he hasn't followed with a urologist in a long time, he only followed 1-2 times in the past for kidney stones. Pt has some irritative voiding symptoms at baseline. He reports he usually has some frequency, urgency, slow stream, intermittent hesitancy, some feeling of incomplete bladder emptying and usually nocturia 2-3x per night, though there are some nights he sleeps through the night. Pt denies dysuria aside from expected discomfort following catheterization, hematuria, pyuria, and foul smell to the urine. Pt acknowledges that he probably isn't emptying his bladder all the way at baseline, but he feels like he's getting by ok and he didn't come into the hospital to treat that. He just wants to focus on the hip right now. He would prefer to address the urination problem at a later time when he's feeling better and is fully recovered from the hip.       Past Medical History:   Diagnosis Date      RENAL INSUFFICIENCY       "Arthritis      Calculus of kidney 9/02, 6/04     Chronic Kidney Disease, Stage III 2/2/2005     Chronic Tinnitus 1960's     Diverticulosis of colon (without mention of hemorrhage)      Essential hypertension, benign      Glaucoma     Followed by Dr. 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 Rubin     Nocturia     x 2-3     Pulmonary Nodule, stable      Tubular Adenoma 3/07     Review Of Systems:   General: Denies F/C  Respiratory: Denies SOB  Cardiovascular: Denies CP  Gastrointestinal: Denies N/V  Genitourinary: See HPI  Musculoskeletal: Denies LE swelling  Neurologic: Denies HA  Psychiatric: Denies confusion  Skin: no concerning lesions  Hematology/immunology: no unexpected bruising    Past Surgical History:   Procedure Laterality Date     ARTHROPLASTY HIP Left 9/18/2017    Procedure: ARTHROPLASTY HIP;  LEFT TOTAL HIP ARTHROPLASTY(DEPUY)^;  Surgeon: Aurelio Hood MD;  Location:  OR     C NONSPECIFIC PROCEDURE  6/04    cysto, R ureteral stent, ESWL     C NONSPECIFIC PROCEDURE  CHILD    TONSILLECTOMY     C NONSPECIFIC PROCEDURE  AGE 4    L heel osteomyelitis with \"bone scraping\"     OPTICAL TRACKING SYSTEM FUSION SPINE POSTERIOR LUMBAR TWO LEVELS N/A 11/5/2015    Procedure: OPTICAL TRACKING SYSTEM FUSION SPINE POSTERIOR LUMBAR TWO LEVELS;  Surgeon: Mina Grove MD;  Location:  OR     SURGICAL HISTORY OF -   1991    B Shoulder surgeries     SURGICAL HISTORY OF -   1992    lumbar laminectomy     SURGICAL HISTORY OF -   1998    lumbar decompression    Dr. Pop     SURGICAL HISTORY OF -   9/06    L shoulder    Dr. Foley     SURGICAL HISTORY OF -   11/07    partial amputation R 3rd toe (tophus)  Dr. Neal     SURGICAL HISTORY OF -   2010    bilateral " cataracts       SURGICAL HISTORY OF -   1/11    Mohs L chest, Dr. Anthony     SURGICAL HISTORY OF - 1/11    L TKA   Dr. Thomson     Social Hx:  Social History     Social History     Marital status:      Spouse name: N/A     Number of children: N/A     Years of education: N/A     Occupational History     Not on file.     Social History Main Topics     Smoking status: Former Smoker     Packs/day: 1.00     Years: 27.00     Quit date: 1/1/1983     Smokeless tobacco: Never Used     Alcohol use Yes      Comment: Ave 2 daily or more.  Heavy days 5-6, some days none.     Drug use: No     Sexual activity: Yes     Partners: Female     Other Topics Concern     Caffeine Concern Yes     3 cups     Social History Narrative     Meds:  Prescriptions Prior to Admission   Medication Sig Dispense Refill Last Dose     Allopurinol (ZYLOPRIM PO) Take 300 mg by mouth every morning   9/17/2017 at am     Doxycycline Monohydrate (VIBRAMYCIN PO) Take 100 mg by mouth every other day (for eyes)   9/17/2017 at am     Lisinopril (PRINIVIL PO) Take 5 mg by mouth every morning (patient takes 0.25 X 20 mg = 5 mg dose)   9/18/2017 at 0430     Simvastatin (ZOCOR PO) Take 20 mg by mouth every morning   9/17/2017 at am     Tamsulosin HCl (FLOMAX PO) Take 0.4 mg by mouth every morning   9/17/2017 at am     TraMADol HCl (ULTRAM PO) Take  mg by mouth every 8 hours as needed for moderate to severe pain   9/17/2017 at 2100     DORZOLAMIDE HCL-TIMOLOL MAL OP Place 1 drop into both eyes 2 times daily   9/18/2017 at 0430     Polyvinyl Alcohol-Povidone (REFRESH OP) Apply 1 drop to eye daily as needed   9/17/2017 at hs     Cyanocobalamin (VITAMIN B-12 SL) Place 500 mg under the tongue every morning   9/17/2017 at am     hydrocortisone (CORTAID) 1 % cream Apply topically daily as needed for rash or itching (on legs.)   9/13/2017 at am     ACETAMINOPHEN PO Take 1,000 mg by mouth daily as needed for pain Reported on 5/18/2017   Over 1 month ago at prn      Sildenafil Citrate (VIAGRA PO) Take 50 mg by mouth daily as needed   Over 1 month ago at prn     [DISCONTINUED] ASPIRIN EC PO Take 81 mg by mouth every morning   9/14/2017 at am      No Known Allergies    Labs:  ROUTINE IP LABS (Last four results)  BMP  Recent Labs  Lab 09/21/17 0603 09/20/17 0620 09/19/17  0655 09/18/17  0700     --  138  --    POTASSIUM 3.7  --  4.0 4.0   CHLORIDE 107  --  106  --    MIREYA 9.0  --  8.4*  --    CO2 23  --  24  --    BUN 18  --  19  --    CR 1.43* 1.51* 1.56* 1.18   * 117* 132*  --      CBC  Recent Labs  Lab 09/21/17 0603 09/20/17  1553 09/20/17 0620 09/19/17 0655 09/18/17  0700   WBC 5.5 6.6  --   --  5.6   RBC 3.05*  --   --   --  4.23*   HGB 9.1*  --  9.8* 10.7* 12.9*   HCT 27.0*  --   --   --  37.1*   MCV 89  --   --   --  88   MCH 29.8  --   --   --  30.5   MCHC 33.7  --   --   --  34.8   RDW 13.1  --   --   --  13.1   *  --   --   --  213     INR  Recent Labs  Lab 09/21/17 0603 09/20/17 0620 09/19/17  0655 09/18/17  0700   INR 1.89* 1.53* 1.21* 0.99       UA RESULTS:  Recent Labs   Lab Test  09/20/17   1425   04/21/10   1038   COLOR  Yellow   < >  Yellow   APPEARANCE  Clear   < >  Clear   URINEGLC  Negative   < >  Negative   URINEBILI  Negative   < >  Negative   URINEKETONE  Negative   < >  Negative   SG  1.009   < >  1.025   UBLD  Negative   < >  Negative   URINEPH  5.5   < >  5.0   PROTEIN  10*   < >  Negative   UROBILINOGEN   --    --   0.2   NITRITE  Negative   < >  Negative   LEUKEST  Negative   < >  Negative   RBCU  <1   < >  O - 2   WBCU  1   < >  O - 2    < > = values in this interval not displayed.     UC:Not done as UA benign    Exam:  /73 (BP Location: Right arm)  Pulse 86  Temp 98.8  F (37.1  C) (Oral)  Resp 17  SpO2 94%    Intake/Output Summary (Last 24 hours) at 09/21/17 1243  Last data filed at 09/21/17 1014   Gross per 24 hour   Intake              240 ml   Output             2250 ml   Net            -2010 ml     EXAM:    Constitutional: healthy, alert, no acute distress and cooperative   Cardiovascular: RRR  Respiratory: CTAB anteriorly, no secondary muscle use  Psychiatric: mentation appears normal and affect bright. Pt pleasant  Neck: no asymmetry  Abdomen:  abdomen soft, non-tender. No masses, no CVAT  : No urinary catheter in place  MSK: no calf tenderness, PCDs on  Skin: no open lesions, extremities warm and well perfused  Hematology: no bruising on visible skin    Assessment/plan:    Urinary retention, BPH with nocturia at baseline   -Continue Flomax   -We recommend rodriguez placement. However, pt does not want rodriguez placed as he feels like his urination is mostly at his baseline and he does ok with that and pt thinks his urination will get better with time and as his mobility gets better.   -Pt was warned that he is at risk for going into acute urinary retention and it's possible he would have to return to the ER or have some damage to his bladder. Pt acknowledges this risk, but feels he'll be fine and prefers to not have a catheter.   -I offered to make pt a follow-up appointment in the clinic in a couple of weeks to reassess the voiding. Pt declined- pt wants to recover from the ortho surgery and then make a F/U after that. Pt was given a card with our info and I'll put an order in the discharge orders    Possible lesion on renal U/S   -Abdominal MRI with contrast per radiology recommendations   -Can order at an outpatient F/U appointment as we were unable to do this morning b/c pt must be NPO x6 hours. I don't want to hold up pt's D/C and this isn't urgent and will likely be benign.      Discussed exam findings, lab and imaging results and plan with Dr. Casas.  Please contact me with any questions or concerns.     Andreia Irvin PA-C  [facility]  Pager:  [phone]  After 4pm and on weekends, please call [phone]

## 2017-09-21 NOTE — PLAN OF CARE
Problem: Goal Outcome Summary  Goal: Goal Outcome Summary  Outcome: Improving  Pt alert and oriented, up with assist of 1, straight cath for total of 600cc,  Bladder scanned 252, due to void, urology consult today, temp within normal, discharge pending.

## 2017-09-21 NOTE — PROGRESS NOTES
Ortho     Low  Grade  Temp  Jamarcus  Pod 2       assymptomatic    Wbc  Nl    There  Was  No  Listing  Of 101.9  Only  100.1       All theses  Are  So  Common after  A general  Anesthesia  Especially when  Pt  Has  Barely  Been  Gotten out of  Bed\  Plan  D/c  As  Scheduled    Ck  uc  As  He  ahd a  Irizarry  In place.

## 2017-09-21 NOTE — PROGRESS NOTES
Hospitalist update note:    Spiked temp of 100.6 (was 101.9 yesterday), no focal symptoms. Work up yesterday negative. WBC today remains normal. Procal remains elevated though slight down today. Continue to hold off on abx. Follow fever curve.  - Re culture blood x2 if T>101.    Candida Chen MD  Hospitalist

## 2017-09-21 NOTE — PROGRESS NOTES
[facility]    Hospitalist Progress Note    Date of Service (when I saw the patient): 09/21/2017    Assessment & Plan     Greyson Haas is a 76-year-old male with past medical history of hypertension, dyslipidemia and glaucoma who is admitted to the care of Orthopedics and is status post left total hip arthroplasty.      Status post left total hip arthroplasty on 9/18/17:   - Routine postoperative cares and pain control will be deferred to Orthopedics.     Post op fever: Daily spike of fever>101 on post op day 1 and 2.  - No focal symptoms except urinary retention  - 9/20: CXR and UA unremarkable, Blood cultures sent, pro sergo elevated but WBC counts remain normal.   - Suspect likely due to atelactasis. Encourage IS, reviewed with patient.   - Hold off on abx, and follow fever curve and culture.     JOSELITO, on CKD stage 3  Urinary retention and possible renal mass.  Suspect JOSELITO is due to low BP, and possibly BPH/obstructive. Has ongoing BPH symptoms and was recommended flomax by PCP.   - admission creatinine 1.23 trended up to 1.56 and now 1.43.   - encourage po fluids, continue holding PTA Lisinopril & repeat labs daily am  - Urology consult given suspected renal mass vs normal lobulation, and urinary retention and Lt hydronephrosis. MRI kidney ordered.  - discussed with patient and RN to have rodriguez catheter instead of straight cath if develops retention again.     Hypertension:    - BP is soft postoperatively, though the patient was asymptomatic  - encourage po fluids, continue holding prior to admission lisinopril 5 mg daily.      Acute Blood loss anemia  Thrombocytopenia  - Anemia likely due to recent surgery and blood loss,   - preop hgb 12.9-> 10.7->9.8->9.1 this am  - consider OTC po iron at d/c  - thrombocytopenia likely due to consumption. Monitor.     Glaucoma and blepharitis  - Continue prior to admission eye drops as well as prophylactic doxycycline.      Gout:   - Continue prior to  admission allopurinol.      Dyslipidemia:    - Continue prior to admission Zocor.      Deep venous thrombosis prophylaxis:  on warfarin  post operatively.       CODE STATUS:  Full code    Disposition :To TCU, Likely 1-2 days when urology work up complete, and afebrile >24 hours. Discussed with patient, discussed with his wife 9/21      Candida Chen MD  Hospitalist    Interval History    Fever 101.9 yesterday afternoon then trended down. Denies purulent cough, dyspnea, chest pain. Has urinary hesitancy and retaining and needed straight cath x2 in last 24 hours, but denies burning urination/suprapubic pain, and pain.    - Post op pain controlled. Doing well otherwise, worked with therapy this am and feels tired.    -Data reviewed today: I reviewed all new labs and imaging results over the last 24 hours. I personally reviewed no images or EKG's today.    Physical Exam   Temp: 98.8  F (37.1  C) Temp src: Oral BP: 108/73 Pulse: 86 Heart Rate: 82 Resp: 16 SpO2: 94 % O2 Device: None (Room air)    There were no vitals filed for this visit.  Vital Signs with Ranges  Temp:  [98.2  F (36.8  C)-101.9  F (38.8  C)] 98.8  F (37.1  C)  Pulse:  [86-92] 86  Heart Rate:  [] 82  Resp:  [16] 16  BP: ()/(61-73) 108/73  SpO2:  [90 %-96 %] 94 %  I/O last 3 completed shifts:  In: 480 [P.O.:480]  Out: 2150 [Urine:2150]    Constitutional: Alert, awake. Up in chair, not in distress.   HEENT: PERRLA EOMI  Respiratory: Bilateral equal air entry, basilar crepts,no wheezing or respiratory distress. On room air.  Cardiovascular: Regular s1s2, no murmur, rub or gallop. No tachycardia.  GI: Soft, non distended, non tender, bowel tones active.  Skin/Integumen: No rash, no blister.  Other:  Lt hip with dressing.    Medications     Warfarin Therapy Reminder       NaCl 100 mL/hr at 09/19/17 1854       allopurinol (ZYLOPRIM) tablet 300 mg  300 mg Oral QAM     dorzolamide-timolol  1 drop Both Eyes BID     doxycycline  100 mg Oral Every  Other Day     simvastatin (ZOCOR) tablet 20 mg  20 mg Oral QAM     tamsulosin  0.4 mg Oral QAM     sodium chloride (PF)  3 mL Intracatheter Q8H     ferrous gluconate  324 mg Oral Daily     acetaminophen  975 mg Oral Q8H     senna-docusate  1-2 tablet Oral BID       Data     Recent Labs  Lab 09/21/17  0603 09/20/17  1553 09/20/17  0620 09/19/17  0655 09/18/17  0700   WBC 5.5 6.6  --   --  5.6   HGB 9.1*  --  9.8* 10.7* 12.9*   MCV 89  --   --   --  88   *  --   --   --  213   INR 1.89*  --  1.53* 1.21* 0.99     --   --  138  --    POTASSIUM 3.7  --   --  4.0 4.0   CHLORIDE 107  --   --  106  --    CO2 23  --   --  24  --    BUN 18  --   --  19  --    CR 1.43*  --  1.51* 1.56* 1.18   ANIONGAP 9  --   --  8  --    MIREYA 9.0  --   --  8.4*  --    *  --  117* 132*  --        Recent Results (from the past 24 hour(s))   XR Chest 2 Views    Narrative    XR CHEST 2 VW 9/20/2017 2:04 PM    COMPARISON: 9/20/2012    HISTORY: Fever.      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 No new focal airspace disease. No pleural effusion  or pneumothorax.    JORDAN LAGOS MD   US Renal Complete    Narrative    ULTRASOUND RETROPERITONEAL COMPLETE 9/20/2017 3:23 PM     HISTORY: To evaluate for hydronephrosis.    COMPARISON: None.    FINDINGS: The bilateral renal parenchyma are normal in echogenicity  without evidence for shadowing stone. Dromedary hump versus possible  mass in the mid right kidney measuring 3.2 x 2.6 x 3.4 cm. Exophytic  simple cyst in the inferior left kidney measuring 7.4 cm. Mild left  hydronephrosis. Right kidney measures 11.0 x 6.0 x 6.2 cm and the left  measures 13.2 x 6.4 x 4.8 cm. Cortical thickness is 1.3 cm on the  right and 1.4 cm on the left.  Bladder is unremarkable given its level  of distention.      Impression    IMPRESSION:   1. Mild left hydronephrosis.  2. Possible normal lobulation/dromedary hump in the left kidney  versus  a solid left renal mass. Contrast-enhanced renal MRI recommended for  further evaluation.    LISA PINTO MD

## 2017-09-21 NOTE — PLAN OF CARE
Problem: Goal Outcome Summary  Goal: Goal Outcome Summary  Discharge Planner OT   Patient plan for discharge: Home with spouse assist as needed  Current status: Pt educated on safety with completing shower transfer which pt verbalized good understanding.  Reviewed hip precautions and use of AE to complete dressing, toileting. Pt stated he has them at home and declined to trial here. Stated he will be fine with completing and spouse available to assist as needed also.    Barriers to return to prior living situation: pain  Recommendations for discharge: Home with spouse assist as needed per plan established by the Occupational Therapist  Rationale for recommendations: Pt will have assist as needed at home       Entered by: Sharmaine Eaton 09/21/2017 10:48 AM   pt declines need for OT, will discharge per pt request. GOALS NOT MET, see discharge summary

## 2017-09-21 NOTE — PROGRESS NOTES
Care Transition Initial Assessment - ADDIE  Reason For Consult: discharge planning  Met with: Patient and Family    Active Problems:    H/O total hip arthroplasty, left         DATA  Lives With: spouse  Living Arrangements: house  Description of Support System: Supportive, Involved  Who is your support system?: Wife, Children  Identified issues/concerns regarding health management: SW consulted for discharge planning. Patient is Greyson, a 76 year-old male who was admitted on 9/18 for a total hip arthroplasty. His tentative discharge date is 9/22. Patient lives with his wife in a house with 2 stairs to enter the home and 12 stairs within the home. Previous to this admission, he was independent with ADLs. His wife is able to assist him at home. SW met with patient, contacted his wife via phone, and reviewed medical record. Discharge is unclear at this time. Per ortho MD, the recommendation is TCU. Per physical and occupational therapy notes, the recommendation is home with support. SW spoke to patient's wife, who asked for [facility], [facility] or [facility]. Patient said he would like to avoid private room fees. SW put in referrals for all 3 of these facilities via [facility]. ADDIE will continue to coordinate with medical staff on 9/22 to create a firmer discharge plan.               Transportation Available: car, family or friend will provide    ASSESSMENT  Cognitive Status:  awake, alert and oriented  Concerns to be addressed: discharge planning.     PLAN  Financial costs for the patient includes N/A.  Patient given options and choices for discharge TCU choices.  Patient/family is agreeable to the plan?  Yes  Patient Goals and Preferences: Unclear at this time.  Patient anticipates discharging to:  Unclear at this time.    Thao Dukes, DESIRAE, LGSW

## 2017-09-21 NOTE — PLAN OF CARE
Problem: Goal Outcome Summary  Goal: Goal Outcome Summary  Outcome: Improving  A&O x4 VSS on RA CMS intact, Sterile Dressing change done this shift. aquacel in place. Up with A1 & walker Voiding per urinal. Pt having urinary retention, straight cathed on previous shifts. Refusing straight cath and indwelling rodriguez this shift, urology aware. Pt had temperature of 100.6 this shift, instructed pt to use IS more frequently. Temp down too 99.7 following use of IS.  Taking oxycodone for pain Progressing per plan of care. Plan to d/c tomorrow.

## 2017-09-21 NOTE — PLAN OF CARE
Problem: Goal Outcome Summary  Goal: Goal Outcome Summary  Outcome: Therapy, progress toward functional goals as expected  Patient plan for discharge: Home with wife assist  Current status: Supine to sit with SBA. Sit to/from stand with FWW and SBA. Pt amb 100 ft x 1 with FWW and SBA. Up/down 3 steps x 1 with 1 rail and CGA. Tolerates DOTTIE exercises moderately. Pt using bathroom at end of session with nursing staff.   Barriers to return to prior living situation: Level of assist for mobility, stairs - will address in therapy  Recommendations for discharge: home with assist per the plan established by the Physical Therapist   Rationale for recommendations: Pt progressing well, will have assist from wife and has all necessary DME at home.

## 2017-09-21 NOTE — PROGRESS NOTES
Greyson Haas  2017  POD #3    Doing well.  Frustrated  With lack of getting him  Out  Of bed  To walk  Or even  Sit  Up.  Temperatures:  Current - Temp: 98.8  F (37.1  C); Max - Temp  Av.6  F (37.6  C)  Min: 98.2  F (36.8  C)  Max: 101.9  F (38.8  C)  Pulse range: Pulse  Av  Min: 86  Max: 92  Blood pressure range: Systolic (24hrs), Av , Min:99 , Max:115   ; Diastolic (24hrs), Av, Min:61, Max:73    CMS:  intact  Labs:   Results for orders placed or performed during the hospital encounter of 17 (from the past 24 hour(s))   XR Chest 2 Views    Narrative    XR CHEST 2 VW 2017 2:04 PM    COMPARISON: 2012    HISTORY: Fever.      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 No new focal airspace disease. No pleural effusion  or pneumothorax.    JORDAN LAGOS MD   UA with Microscopic reflex to Culture   Result Value Ref Range    Color Urine Yellow     Appearance Urine Clear     Glucose Urine Negative NEG^Negative mg/dL    Bilirubin Urine Negative NEG^Negative    Ketones Urine Negative NEG^Negative mg/dL    Specific Gravity Urine 1.009 1.003 - 1.035    Blood Urine Negative NEG^Negative    pH Urine 5.5 5.0 - 7.0 pH    Protein Albumin Urine 10 (A) NEG^Negative mg/dL    Urobilinogen mg/dL Normal 0.0 - 2.0 mg/dL    Nitrite Urine Negative NEG^Negative    Leukocyte Esterase Urine Negative NEG^Negative    Source Catheterized Urine     WBC Urine 1 0 - 2 /HPF    RBC Urine <1 0 - 2 /HPF    Mucous Urine Present (A) NEG^Negative /LPF   Blood culture   Result Value Ref Range    Specimen Description Blood Right Arm     Special Requests Aerobic and anaerobic bottles received     Culture Micro No growth after 3 hours    Blood culture   Result Value Ref Range    Specimen Description Blood Left Arm     Special Requests Aerobic and anaerobic bottles received     Culture Micro No growth after 3 hours    US Renal Complete    Narrative     ULTRASOUND RETROPERITONEAL COMPLETE 9/20/2017 3:23 PM     HISTORY: To evaluate for hydronephrosis.    COMPARISON: None.    FINDINGS: The bilateral renal parenchyma are normal in echogenicity  without evidence for shadowing stone. Dromedary hump versus possible  mass in the mid right kidney measuring 3.2 x 2.6 x 3.4 cm. Exophytic  simple cyst in the inferior left kidney measuring 7.4 cm. Mild left  hydronephrosis. Right kidney measures 11.0 x 6.0 x 6.2 cm and the left  measures 13.2 x 6.4 x 4.8 cm. Cortical thickness is 1.3 cm on the  right and 1.4 cm on the left.  Bladder is unremarkable given its level  of distention.      Impression    IMPRESSION:   1. Mild left hydronephrosis.  2. Possible normal lobulation/dromedary hump in the left kidney versus  a solid left renal mass. Contrast-enhanced renal MRI recommended for  further evaluation.    LISA PINTO MD   Procalcitonin   Result Value Ref Range    Procalcitonin 1.87 ng/ml   WBC and differential   Result Value Ref Range    WBC 6.6 4.0 - 11.0 10e9/L    Diff Method Automated Method     % Neutrophils 70.2 %    % Lymphocytes 16.7 %    % Monocytes 11.1 %    % Eosinophils 1.7 %    % Basophils 0.0 %    % Immature Granulocytes 0.3 %    Nucleated RBCs 0 0 /100    Absolute Neutrophil 4.6 1.6 - 8.3 10e9/L    Absolute Lymphocytes 1.1 0.8 - 5.3 10e9/L    Absolute Monocytes 0.7 0.0 - 1.3 10e9/L    Absolute Eosinophils 0.1 0.0 - 0.7 10e9/L    Absolute Basophils 0.0 0.0 - 0.2 10e9/L    Abs Immature Granulocytes 0.0 0 - 0.4 10e9/L    Absolute Nucleated RBC 0.0    INR   Result Value Ref Range    INR 1.89 (H) 0.86 - 1.14   CBC with platelets differential   Result Value Ref Range    WBC 5.5 4.0 - 11.0 10e9/L    RBC Count 3.05 (L) 4.4 - 5.9 10e12/L    Hemoglobin 9.1 (L) 13.3 - 17.7 g/dL    Hematocrit 27.0 (L) 40.0 - 53.0 %    MCV 89 78 - 100 fl    MCH 29.8 26.5 - 33.0 pg    MCHC 33.7 31.5 - 36.5 g/dL    RDW 13.1 10.0 - 15.0 %    Platelet Count 137 (L) 150 - 450 10e9/L    Diff Method  Automated Method     % Neutrophils 66.9 %    % Lymphocytes 20.2 %    % Monocytes 10.5 %    % Eosinophils 1.8 %    % Basophils 0.2 %    % Immature Granulocytes 0.4 %    Nucleated RBCs 0 0 /100    Absolute Neutrophil 3.7 1.6 - 8.3 10e9/L    Absolute Lymphocytes 1.1 0.8 - 5.3 10e9/L    Absolute Monocytes 0.6 0.0 - 1.3 10e9/L    Absolute Eosinophils 0.1 0.0 - 0.7 10e9/L    Absolute Basophils 0.0 0.0 - 0.2 10e9/L    Abs Immature Granulocytes 0.0 0 - 0.4 10e9/L    Absolute Nucleated RBC 0.0    Basic metabolic panel   Result Value Ref Range    Sodium 139 133 - 144 mmol/L    Potassium 3.7 3.4 - 5.3 mmol/L    Chloride 107 94 - 109 mmol/L    Carbon Dioxide 23 20 - 32 mmol/L    Anion Gap 9 3 - 14 mmol/L    Glucose 101 (H) 70 - 99 mg/dL    Urea Nitrogen 18 7 - 30 mg/dL    Creatinine 1.43 (H) 0.66 - 1.25 mg/dL    GFR Estimate 48 (L) >60 mL/min/1.7m2    GFR Estimate If Black 58 (L) >60 mL/min/1.7m2    Calcium 9.0 8.5 - 10.1 mg/dL       PLAN:  Discharge plan: now wants  A nh   Prefers  PHB      After  Urology sees to decide  What  To do  About  Catheter    If not  Emergent  Further work  Could be done as out pt  As I  Am  Aware  How  Limited urologist are  With a fresh  Total hip pt.  insp spirometer  As  Most likely atelectasis  From bed  Rest  And  A general  Anesthesia  Mobilize    Pt  Feels  He  Is  Doing fine aswell.

## 2017-09-22 ENCOUNTER — APPOINTMENT (OUTPATIENT)
Dept: PHYSICAL THERAPY | Facility: CLINIC | Age: 77
DRG: 470 | End: 2017-09-22
Attending: ORTHOPAEDIC SURGERY
Payer: MEDICARE

## 2017-09-22 VITALS
RESPIRATION RATE: 17 BRPM | SYSTOLIC BLOOD PRESSURE: 116 MMHG | OXYGEN SATURATION: 97 % | HEART RATE: 81 BPM | DIASTOLIC BLOOD PRESSURE: 77 MMHG | TEMPERATURE: 98.6 F

## 2017-09-22 LAB
BASOPHILS # BLD AUTO: 0 10E9/L (ref 0–0.2)
BASOPHILS NFR BLD AUTO: 0.2 %
DIFFERENTIAL METHOD BLD: ABNORMAL
EOSINOPHIL # BLD AUTO: 0.1 10E9/L (ref 0–0.7)
EOSINOPHIL NFR BLD AUTO: 3.3 %
ERYTHROCYTE [DISTWIDTH] IN BLOOD BY AUTOMATED COUNT: 13.1 % (ref 10–15)
HCT VFR BLD AUTO: 27.1 % (ref 40–53)
HGB BLD-MCNC: 9.2 G/DL (ref 13.3–17.7)
IMM GRANULOCYTES # BLD: 0 10E9/L (ref 0–0.4)
IMM GRANULOCYTES NFR BLD: 0.7 %
INR PPP: 2.1 (ref 0.86–1.14)
LYMPHOCYTES # BLD AUTO: 1.2 10E9/L (ref 0.8–5.3)
LYMPHOCYTES NFR BLD AUTO: 27.9 %
MCH RBC QN AUTO: 30 PG (ref 26.5–33)
MCHC RBC AUTO-ENTMCNC: 33.9 G/DL (ref 31.5–36.5)
MCV RBC AUTO: 88 FL (ref 78–100)
MONOCYTES # BLD AUTO: 0.5 10E9/L (ref 0–1.3)
MONOCYTES NFR BLD AUTO: 11.4 %
NEUTROPHILS # BLD AUTO: 2.4 10E9/L (ref 1.6–8.3)
NEUTROPHILS NFR BLD AUTO: 56.5 %
NRBC # BLD AUTO: 0 10*3/UL
NRBC BLD AUTO-RTO: 0 /100
PLATELET # BLD AUTO: 164 10E9/L (ref 150–450)
RBC # BLD AUTO: 3.07 10E12/L (ref 4.4–5.9)
WBC # BLD AUTO: 4.3 10E9/L (ref 4–11)

## 2017-09-22 PROCEDURE — 25000132 ZZH RX MED GY IP 250 OP 250 PS 637: Mod: GY | Performed by: HOSPITALIST

## 2017-09-22 PROCEDURE — 36415 COLL VENOUS BLD VENIPUNCTURE: CPT | Performed by: ORTHOPAEDIC SURGERY

## 2017-09-22 PROCEDURE — 85025 COMPLETE CBC W/AUTO DIFF WBC: CPT | Performed by: ORTHOPAEDIC SURGERY

## 2017-09-22 PROCEDURE — A9270 NON-COVERED ITEM OR SERVICE: HCPCS | Mod: GY | Performed by: HOSPITALIST

## 2017-09-22 PROCEDURE — 85610 PROTHROMBIN TIME: CPT | Performed by: ORTHOPAEDIC SURGERY

## 2017-09-22 PROCEDURE — 25000132 ZZH RX MED GY IP 250 OP 250 PS 637: Mod: GY | Performed by: ORTHOPAEDIC SURGERY

## 2017-09-22 PROCEDURE — A9270 NON-COVERED ITEM OR SERVICE: HCPCS | Mod: GY | Performed by: ORTHOPAEDIC SURGERY

## 2017-09-22 PROCEDURE — 40000193 ZZH STATISTIC PT WARD VISIT

## 2017-09-22 PROCEDURE — 99232 SBSQ HOSP IP/OBS MODERATE 35: CPT | Performed by: HOSPITALIST

## 2017-09-22 PROCEDURE — 97110 THERAPEUTIC EXERCISES: CPT | Mod: GP

## 2017-09-22 RX ORDER — BISACODYL 10 MG
10 SUPPOSITORY, RECTAL RECTAL DAILY PRN
Status: DISCONTINUED | OUTPATIENT
Start: 2017-09-22 | End: 2017-09-22 | Stop reason: HOSPADM

## 2017-09-22 RX ADMIN — BISACODYL 10 MG: 10 SUPPOSITORY RECTAL at 11:31

## 2017-09-22 RX ADMIN — FERROUS GLUCONATE 324 MG: 324 TABLET ORAL at 09:58

## 2017-09-22 RX ADMIN — OXYCODONE HYDROCHLORIDE 10 MG: 5 TABLET ORAL at 09:57

## 2017-09-22 RX ADMIN — ACETAMINOPHEN 650 MG: 325 TABLET, FILM COATED ORAL at 00:49

## 2017-09-22 RX ADMIN — OXYCODONE HYDROCHLORIDE 10 MG: 5 TABLET ORAL at 06:05

## 2017-09-22 RX ADMIN — SENNOSIDES AND DOCUSATE SODIUM 2 TABLET: 8.6; 5 TABLET ORAL at 09:58

## 2017-09-22 RX ADMIN — TAMSULOSIN HYDROCHLORIDE 0.4 MG: 0.4 CAPSULE ORAL at 09:58

## 2017-09-22 RX ADMIN — ACETAMINOPHEN 650 MG: 325 TABLET, FILM COATED ORAL at 06:05

## 2017-09-22 RX ADMIN — OXYCODONE HYDROCHLORIDE 10 MG: 5 TABLET ORAL at 13:15

## 2017-09-22 RX ADMIN — OXYCODONE HYDROCHLORIDE 5 MG: 5 TABLET ORAL at 00:51

## 2017-09-22 RX ADMIN — DORZOLAMIDE HYDROCHLORIDE AND TIMOLOL MALEATE 1 DROP: 20; 5 SOLUTION/ DROPS OPHTHALMIC at 10:00

## 2017-09-22 NOTE — PROGRESS NOTES
As I   Suggested  There is no acute    Issues,     Pt  Really  Could go home    The only  Tcu  I want  For him  Is  PHB  As I  Advised  Soc  Serv    D/ home  Vs tcu  today

## 2017-09-22 NOTE — PLAN OF CARE
Problem: Goal Outcome Summary  Goal: Goal Outcome Summary  Outcome: Improving  Pt is A & O, VSS on RA. Afebrile. CMS intact and aquacel dressing is C/D/I. Up with assist of 1 using a GB and walker. Pain managed with oxycodone and tylenol. Voiding adequately in the urinal. Progressing well per POC.

## 2017-09-22 NOTE — PLAN OF CARE
Problem: Goal Outcome Summary  Goal: Goal Outcome Summary  Outcome: Therapy, progress toward functional goals as expected  Patient plan for discharge: Home with wife assist  Current status: Supine to/from sit IND. Sit to/from stand with FWW and SBA. Pt amb 10 ft x 2 with FWW and SBA. Tolerates DOTTIE exercises well. Pt returned to room with rehab aide at end of session.  Barriers to return to prior living situation: none noted at this time  Recommendations for discharge: home with assist per the plan established by the Physical Therapist   Rationale for recommendations: Pt progressing well, will have assist from wife and has all necessary DME at home.        Pt discharging home today.    PT goals partially met.

## 2017-09-22 NOTE — PLAN OF CARE
Problem: Goal Outcome Summary  Goal: Goal Outcome Summary  Outcome: Improving  A&O x4 VSS on RA, afebrile. CMS intact Dressing C/D/I, aquacel in place to d/c to home. Up with A1& walker Voiding per urinal Taking oxycodone for pain. Pt d/c'd to home with spouse, medications sent with pt.

## 2017-09-22 NOTE — PLAN OF CARE
Problem: Goal Outcome Summary  Goal: Goal Outcome Summary  Outcome: No Change  Pain controlled w/ Oxycodone and Tylenol. Up w/ assist of 1, walker and gait belt. Tolerating diet. Voiding per urinal. Max temp this shift 99.8. Plan to DC home tomorrow. Continue to monitor.

## 2017-09-22 NOTE — DISCHARGE INSTRUCTIONS
"TOTAL HIP REPLACEMENT TAKE HOME INSTRUCTIONS  Your surgeon will answer any questions about your progress. General guidelines for your care are listed below. Your surgeon may give additional instructions for your care at home. Please follow them carefully.    Activity Level  1. Physical activity may be resumed gradually according to your comfort level and your surgeon s instructions. Follow your exercise program as instructed by your therapist. Do exercises at least twice daily. Refer to pages 19-22 of your \"Total Hip Replacement \" booklet for details.  2. Complete exercises two hours before bedtime to minimize the effect pain may have on sleep.  3. Do not cross legs. Do not bend past 90 .    Good Health Practices  1. Maintain an adequate fluid intake and eat a well balanced diet.  2. Be sure to include the basic food groups, such as dairy products, meat/fish, vegetables, and fruit. Each of these foods contribute to wound healing and increasing your strength.  3. Surgery, decreased activity and pain medication all contribute to a descrease in bowel activity that can result in constipation. It is recommended that you increase your liquid intake, add fiber to your diet, increase activity, and decrease pain medication use. If you have any problems, notify your physician.  4. Wear your anti-embolism stockings day and night until seen by your surgeon. Remove twice a day for one hour at a time. You may hand wash and air dry your stockings.  5. Notify your dentist of your total hip surgery and call your dentist one week before a dental appointment for antibiotics.  If dentist will not prescribe antibiotics call your surgeon to ask on next steps.      Incision/Dressing Care  1. Keep incision clean and dry.  2. Cover incision if you are still having drainage.  3.  If you have a waterproof dressing ____you can___   Shower. Leave dressing in place until you follow up with your surgeon.    Things to Watch For  1. Check incision " daily for increased redness, tenderness, swelling, or drainage along the incision line. If these occur, please notify your doctor. Also, call if you develop a fever above 101 .  2. Please notify your doctor if you experience any calf pain and/or if you have surgical pain not relieved by the pain medication prescribed by your doctor.  3. Shortness of breath unrelieved by rest.    Call you surgeons office with any questions.      Revised 05/08/17

## 2017-09-22 NOTE — PROGRESS NOTES
[facility]    Hospitalist Progress Note    Date of Service (when I saw the patient): 09/22/2017    Assessment & Plan     Greyson Haas is a 76-year-old male with past medical history of hypertension, dyslipidemia and glaucoma who is admitted to the care of Orthopedics and is status post left total hip arthroplasty.      Status post left total hip arthroplasty on 9/18/17:   - Routine postoperative cares and pain control will be deferred to Orthopedics.     Post op fever: Daily spike of fever>101 on post op day 1 and 2 upto 101.9. but no focal symptoms except urinary retention.  - 9/20: CXR and UA unremarkable, Blood cultures sent and remain negative, though pro sergo was elevated, serial WBCs remain normal, and fever trended down and now he is afebrile almost 24 hours.  - Suspect likely due to atelactasis. Encourage IS, reviewed with patient.      JOSELITO, on CKD stage 3  Urinary retention and possible renal mass.  Suspect JOSELITO is due to low BP, and possibly BPH/obstructive. Has ongoing BPH symptoms and was recommended flomax by PCP.   - admission creatinine 1.23 trended up to 1.56 and now 1.43.   - encourage po fluids,   PTA Lisinopril held and as Cr stable and near baseline, resume at discharge  - Urology consult given suspected renal mass vs normal lobulation, and urinary retention and Lt hydronephrosis. MRI kidney was planned but not completed and urologoy would like to follow up as outpatient and get it done outpatient.  - discussed with patient and RN to have rodriguez catheter instead of straight cath if develops retention again but he is voiding now.      Hypertension:    - BP is soft postoperatively, though the patient was asymptomatic  - resume PTA lisinopril 5 mg daily at discharge.      Acute Blood loss anemia  Thrombocytopenia  - Anemia likely due to recent surgery and blood loss,   - preop hgb 12.9-> 10.7->9.8->9.1 this am  - consider OTC po iron at d/c  - thrombocytopenia likely due to  consumption resolved     Glaucoma and blepharitis  - Continue prior to admission eye drops as well as prophylactic doxycycline.      Gout:   - Continue prior to admission allopurinol.      Dyslipidemia:    - Continue prior to admission Zocor.      Deep venous thrombosis prophylaxis:  on warfarin  post operatively.       CODE STATUS:  Full code    Disposition : ok for discharge today, discussed with his wife this am      Candida Chen MD  Hospitalist    Interval History    Fever has subsided, denies dyspnea or cough or chest pain. No urinary retention, voiding small amounts though and has not needed catheter.     - Post op pain controlled. Doing well otherwise.    -Data reviewed today: I reviewed all new labs and imaging results over the last 24 hours. I personally reviewed no images or EKG's today.    Physical Exam   Temp: 98.6  F (37  C) Temp src: Oral BP: 116/77 Pulse: 81 Heart Rate: 80 Resp: 16 SpO2: 97 % O2 Device: None (Room air)    There were no vitals filed for this visit.  Vital Signs with Ranges  Temp:  [98.1  F (36.7  C)-100.6  F (38.1  C)] 98.6  F (37  C)  Pulse:  [81] 81  Heart Rate:  [] 80  Resp:  [16-18] 16  BP: (104-130)/(69-85) 116/77  SpO2:  [96 %-97 %] 97 %  I/O last 3 completed shifts:  In: 840 [P.O.:840]  Out: 1550 [Urine:1550]    Constitutional: Alert, awake. comfortable  HEENT: PERRLA EOMI  Respiratory: Bilateral equal air entry,  no crackles or wheezing or respiratory distress. On room air.  Cardiovascular: Regular s1s2, no murmur, rub or gallop. No tachycardia.  GI: Soft, non distended, non tender, bowel tones active.  Skin/Integumen: No rash, no blister.    Medications     Warfarin Therapy Reminder         allopurinol (ZYLOPRIM) tablet 300 mg  300 mg Oral QAM     dorzolamide-timolol  1 drop Both Eyes BID     doxycycline  100 mg Oral Every Other Day     simvastatin (ZOCOR) tablet 20 mg  20 mg Oral QAM     tamsulosin  0.4 mg Oral QAM     sodium chloride (PF)  3 mL Intracatheter Q8H      ferrous gluconate  324 mg Oral Daily     senna-docusate  1-2 tablet Oral BID       Data     Recent Labs  Lab 09/22/17  0625 09/21/17  0603 09/20/17  1553 09/20/17  0620 09/19/17  0655 09/18/17  0700   WBC 4.3 5.5 6.6  --   --  5.6   HGB 9.2* 9.1*  --  9.8* 10.7* 12.9*   MCV 88 89  --   --   --  88    137*  --   --   --  213   INR 2.10* 1.89*  --  1.53* 1.21* 0.99   NA  --  139  --   --  138  --    POTASSIUM  --  3.7  --   --  4.0 4.0   CHLORIDE  --  107  --   --  106  --    CO2  --  23  --   --  24  --    BUN  --  18  --   --  19  --    CR  --  1.43*  --  1.51* 1.56* 1.18   ANIONGAP  --  9  --   --  8  --    MIREYA  --  9.0  --   --  8.4*  --    GLC  --  101*  --  117* 132*  --        No results found for this or any previous visit (from the past 24 hour(s)).

## 2017-09-22 NOTE — PROGRESS NOTES
"ADDIE    D: ADDIE met with patient to discuss discharge plan. Patient stated \"I am going home.\" ADDIE reviewed medical record and discussed with bedside nurse, who said she felt he is safe to discharge home.     P: Continue to follow.  "

## 2017-09-25 ENCOUNTER — ANTICOAGULATION THERAPY VISIT (OUTPATIENT)
Dept: ANTICOAGULATION | Facility: CLINIC | Age: 77
End: 2017-09-25
Payer: COMMERCIAL

## 2017-09-25 ENCOUNTER — TELEPHONE (OUTPATIENT)
Dept: INTERNAL MEDICINE | Facility: CLINIC | Age: 77
End: 2017-09-25

## 2017-09-25 DIAGNOSIS — Z78.9 DEEP VEIN THROMBOSIS (DVT) PROPHYLAXIS PRESCRIBED AT DISCHARGE: Primary | ICD-10-CM

## 2017-09-25 DIAGNOSIS — Z79.01 LONG-TERM (CURRENT) USE OF ANTICOAGULANTS: ICD-10-CM

## 2017-09-25 DIAGNOSIS — Z96.642 H/O TOTAL HIP ARTHROPLASTY, LEFT: ICD-10-CM

## 2017-09-25 DIAGNOSIS — Z98.890 STATUS POST HIP SURGERY: Primary | ICD-10-CM

## 2017-09-25 LAB — INR POINT OF CARE: 2.5 (ref 0.86–1.14)

## 2017-09-25 PROCEDURE — 36416 COLLJ CAPILLARY BLOOD SPEC: CPT

## 2017-09-25 PROCEDURE — 85610 PROTHROMBIN TIME: CPT | Mod: QW

## 2017-09-25 NOTE — TELEPHONE ENCOUNTER
Reason for Call: Request for an order or referral:    Order or referral being requested: Home care PT    Date needed: as soon as possible    Has the patient been seen by the PCP for this problem? YES    Additional comments: Pt had hip surgery from . After surgery  wanted Greyson to get home care PT and at the time Greyson didn't think it was necessary. Greyson now wants home care PT and they were informed  would now have to order it. Please call wife when orders are placed.    Phone number Patient can be reached at:  Home number on file [phone] (home)    Best Time:  asap    Can we leave a detailed message on this number?  YES    Call taken on 9/25/2017 at 1:04 PM by Lillie Lynch

## 2017-09-25 NOTE — MR AVS SNAPSHOT
Greyson Haas   9/25/2017 11:00 AM   Anticoagulation Therapy Visit    Description:  76 year old male   Provider:   ANTICOAGULATION CLINIC   Department:   Anti Coagulation           INR as of 9/25/2017     Today's INR 2.5      Anticoagulation Summary as of 9/25/2017     INR goal 2.0-3.0   Today's INR 2.5   Full instructions 9/25: 5 mg; 9/26: 5 mg; 9/27: 5 mg; 9/28: 5 mg   Next INR check 9/29/2017    Indications   Long-term (current) use of anticoagulants [Z79.01] [Z79.01]  H/O total hip arthroplasty  left [Z96.642]         Your next Anticoagulation Clinic appointment(s)     Sep 29, 2017 11:15 AM CDT   Anticoagulation Visit with  ANTICOAGULATION CLINIC   [facility]    [address] [address] [phone]              Contact Numbers     [facility]  Please call  [phone] to cancel and/or reschedule your appointment   Please call  [phone] with any problems or questions regarding your therapy.        September 2017 Details    Sun Mon Tue Wed Thu Fri Sat          1               2                 3               4               5               6               7               8               9                 10               11               12               13               14               15               16                 17               18               19               20               21               22               23                 24               25      5 mg   See details      26      5 mg         27      5 mg         28      5 mg         29            30                Date Details   09/25 This INR check       Date of next INR:  9/29/2017         How to take your warfarin dose     To take:  5 mg Take 2 of the 2.5 mg tablets.

## 2017-09-26 LAB
BACTERIA SPEC CULT: NO GROWTH
BACTERIA SPEC CULT: NO GROWTH
Lab: NORMAL
Lab: NORMAL
SPECIMEN SOURCE: NORMAL
SPECIMEN SOURCE: NORMAL

## 2017-09-26 NOTE — OP NOTE
DATE OF SURGERY:  09/18/2017.      PREOPERATIVE DIAGNOSIS:  End-stage arthrosis of the hip.      POSTOPERATIVE DIAGNOSIS:  End-stage arthrosis of the hip.      PROCEDURE:  Left total hip arthroplasty.      ANESTHESIA:  General.       LEFT TOTAL HIP IMPLANT:  DePuy total hip arthroplasty, press-fit.  See nurse's notes for stickers, but maybe the sizes were 8 high-offset femoral component, size 60 cup, 2 screws and a +4 neutral liner.  The head was 36 mm.      ESTIMATED BLOOD LOSS:  300 mL.      COMPLICATIONS:  None.      ANTIBIOTICS:  Given preop.      DVT PROPHYLAXIS:  Warfarin, sequential calf compression devices, all which will begin postop.       DESCRIPTION OF PROCEDURE:  Risks, options, alternatives gone over with Greyson Haas.  We proceeded to the operating room, prepped and draped in the usual fashion.  Lateral position was utilized.  Posterior approach made at about 4 inches.      Deep dissection.  Short external rotators retracted posteriorly.  Self-retaining retractor placed.  Short external rotators were tagged with #5 FiberWire.  We opened up the capsule, sutured it to the short external rotators for later reanastomosis.  Dislocated the hip out.  End-stage arthrosis was noted.  Did send to pathologist because of the atypical presentation and rapid progression of his disease.  However, there was some delaminating cartilage.  To the naked eye, saw nothing adverse.      Resected about a fingerbreadth below the  and then blunt retractors placed anteriorly and inferiorly, sharp acetabular placed above the cup.  Labrum removed, reamed up to size 57, 58 and finally 59.  Put a 60 cup in there, 2 screws.  Good alignment, anteversion and verticality.  Trial liner placed.  Brought the neck up, reamed and rasped, lateralizing as best we could.  Size 8 high-offset stem trial was placed in there.  Trial reduction was done.  Pulled out the trial components, secured a good solid liner in the cup.  Double and triple  checked that and then femoral neck was brought up in the incision.  With a femoral neck elevator, placed the prosthesis down, trying to keep it out of valgus, varus and anteverted about 12-15 degrees.  Secured down nicely.  Trials were then performed.  The liner was a 10 degree liner placed posteriorly and the cup a size 60 liner appropriately and then 2 screws, both 30.      Excellent stability noted both in both flexion and extension.   short extensor rotators and the capsule were reattached through drill hole in the trochanter and the gluteus medius.  Should add there was severe trochanteric bursitis present when we got in.   gluteus medius was .  We repaired that a bit.  Deep fascia with interrupted 0 Ethibond followed by 0 V-Loc, 2-0 Vicryl, staples.  Injected tranexamic acid in the joint at the end of the case.  Drain was brought out.  Held clamped for 2 hours and tranexamic acid injected in the joint.  Also used 60 mL of Marcaine.  Leg lengths looked good.  Transferred awake and alert to the recovery room.  Abductor pillow applied.      PLAN:   flexion and adduction beyond 120 degrees.  She will be weightbearing as tolerated.         VILMA NICHOLSON MD             D: 2017 09:14   T: 2017 11:48   MT: LESA      Name:     DOMINIC CHURCH   MRN:      [id number]        Account:        [id number]   :      1940           Procedure Date: 2017      Document: [id number]

## 2017-09-26 NOTE — TELEPHONE ENCOUNTER
Wife calling and wants PT ordered orthopedics states cannot do now that pt declined . Also pt is having night sweats , no fever and loss of appetite . Can pt use Tums? was told not to use anything over the counter .Nury Barrera RN

## 2017-09-26 NOTE — DISCHARGE SUMMARY
Admitted for total hip arthroplasty.  He saw Dr. Nick.  Preoperative was cleared.  Postop he does have some renal dysfunction.  It was elected not to use Lovenox.  We are using Coumadin for a week, followed by aspirin therapy for 30 days.  Doing very well.  Lost some confidence and we were going to discharge him and then primary care.  Because of one spiked temp, Hospitalist Service kept him in spite of all negative workup.  Advised him it is very likely low respirations and atelectasis.  He was in a bad room and nobody got him out of bed, could hardly get up and walk, so he just got some sluggishness.  Extra day turned him around.  He is independent.  He is discharged home.  I did set him up for 3 visits of home PT.  Check his INR once; will get just estimated doses on that and will see him back in the office 2-1/2 weeks postop.  Any concerns or questions, clarifications, he knows how to get ahold of me.  He will resume all prehospital care plans, diets, medications.      PROCEDURE:  Left total hip arthroplasty.  Still awaiting the pathology of his head.  I just sent that because it just was very atypical.      Concerns questions, clarifications, he knows how to get ahold of me.  They apparently found no neoplasm.      Any concerns or questions, clarifications.       DIAGNOSIS:     1.  Left total hip arthroplasty secondary to severe osteoarthritis.   2.  Acute blood loss anemia, not requiring transfusion.   3.  Chronic renal failure.  However, about his normal creatinine level without problem with good hydration.        He had a low-grade temp x1 in the hospital, never repeated so he was discharged home after negative workup.  Urology saw him after a long wait, but they declared not to do anything until he is in the office.         VILMA NICHOLSON MD             D: 2017 11:35   T: 2017 12:39   MT: PHILIP      Name:     DOMINIC CHURCH   MRN:      [id number]        Account:        [id number]   :       1940           Admit Date:     [id number]                                  Discharge Date: 09/22/2017      Document: [id number]

## 2017-09-26 NOTE — PROGRESS NOTES
Ortho    Pt  Had  3  Visits  For  Home  Pt  Set up   I will manage  Inrs/dose     he  Will be  Done  With  Warfarin after  Friday  Thanks

## 2017-09-27 NOTE — TELEPHONE ENCOUNTER
I know of no reason that he cannot use Tums, or why orthopedist cannot order physical therapy.  Please contact patient and advise.    Home PT ordered.  May not be covered. May need home RN to assess initially.  If not covered, would suggest he go to MED for therapy.

## 2017-09-29 ENCOUNTER — ANTICOAGULATION THERAPY VISIT (OUTPATIENT)
Dept: ANTICOAGULATION | Facility: CLINIC | Age: 77
End: 2017-09-29
Payer: COMMERCIAL

## 2017-09-29 LAB — INR POINT OF CARE: 3.2 (ref 0.86–1.14)

## 2017-09-29 PROCEDURE — 36416 COLLJ CAPILLARY BLOOD SPEC: CPT

## 2017-09-29 PROCEDURE — 85610 PROTHROMBIN TIME: CPT | Mod: QW

## 2017-09-29 NOTE — MR AVS SNAPSHOT
Greyson Haas   9/29/2017 11:15 AM   Anticoagulation Therapy Visit    Description:  76 year old male   Provider:   ANTICOAGULATION CLINIC   Department:   Anti Coagulation           INR as of 9/29/2017     Today's INR 3.2!      Anticoagulation Summary as of 9/29/2017     INR goal 2.0-3.0   Today's INR 3.2!   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        September 2017 Details    Sun Mon Tue Wed Thu Fri Sat          1               2                 3               4               5               6               7               8               9                 10               11               12               13               14               15               16                 17               18               19               20               21               22               23                 24               25               26               27               28               29      2.5 mg   See details      30      2.5 mg          Date Details   09/29 This INR check               How to take your warfarin dose     To take:  2.5 mg Take 1 of the 2.5 mg tablets.           October 2017 Details    Sun Mon Tue Wed Thu Fri Sat     1      2.5 mg         2            3               4               5               6               7                 8               9                10               11               12               13               14                 15               16               17               18               19               20               21                 22               23               24               25               26               27               28                 29               30               31                    Date Details   No additional details    Date of next INR:  10/2/2017         How to take your warfarin dose     To take:  2.5 mg Take 1 of the 2.5 mg tablets.

## 2017-09-29 NOTE — PROGRESS NOTES
ANTICOAGULATION FOLLOW-UP CLINIC VISIT    Patient Name:  Greyson Haas  Date:  9/29/2017  Contact Type:  Face to Face    SUBJECTIVE:     Patient Findings     Positives No Problem Findings           OBJECTIVE    INR Protime   Date Value Ref Range Status   09/29/2017 3.2 (A) 0.86 - 1.14 Final       ASSESSMENT / PLAN  INR assessment SUPRA    Recheck INR In: 3 DAYS    INR Location Clinic      Anticoagulation Summary as of 9/29/2017     INR goal 2.0-3.0   Today's INR 3.2!   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

## 2017-10-02 ENCOUNTER — ANTICOAGULATION THERAPY VISIT (OUTPATIENT)
Dept: ANTICOAGULATION | Facility: CLINIC | Age: 77
End: 2017-10-02
Payer: COMMERCIAL

## 2017-10-02 LAB — INR POINT OF CARE: 2.2 (ref 0.86–1.14)

## 2017-10-02 PROCEDURE — 36416 COLLJ CAPILLARY BLOOD SPEC: CPT

## 2017-10-02 PROCEDURE — 85610 PROTHROMBIN TIME: CPT | Mod: QW

## 2017-10-02 NOTE — MR AVS SNAPSHOT
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        October 2017 Details    Sun Mon Tue Wed Thu Fri Sat     1               2      5 mg   See details      3      2.5 mg         4      5 mg         5      2.5 mg         6      5 mg         7      2.5 mg           8      5 mg         9            10               11               12               13               14                 15               16               17               18               19               20               21                 22               23               24               25               26               27               28                 29               30               31                    Date Details   10/02 This INR check       Date of next INR:  10/9/2017         How to take your warfarin dose     To take:  2.5 mg Take 1 of the 2.5 mg tablets.    To take:  5 mg Take 2 of the 2.5 mg  tablets.

## 2017-10-02 NOTE — PROGRESS NOTES
ANTICOAGULATION FOLLOW-UP CLINIC VISIT    Patient Name:  Greyson Haas  Date:  10/2/2017  Contact Type:  Face to Face    SUBJECTIVE:     Patient Findings     Positives No Problem Findings           OBJECTIVE    INR Protime   Date Value Ref Range Status   10/02/2017 2.2 (A) 0.86 - 1.14 Final       ASSESSMENT / PLAN  INR assessment THER    Recheck INR In: 1 WEEK    INR Location Clinic      Anticoagulation Summary as of 10/2/2017     INR goal 2.0-3.0   Today's INR 2.2   Maintenance plan 2.5 mg (2.5 mg x 1) on Tue, Thu, Sat; 5 mg (2.5 mg x 2) all other days   Full instructions 10/2: 5 mg; 10/3: 2.5 mg; 10/4: 5 mg; 10/5: 2.5 mg; 10/6: 5 mg; 10/7: 2.5 mg; 10/8: 5 mg; Otherwise 2.5 mg on Tue, Thu, Sat; 5 mg all other days   Weekly total 27.5 mg   Plan last modified Lisa Jones RN (10/2/2017)   Next INR check 10/9/2017   Target end date Indefinite    Indications   Long-term (current) use of anticoagulants [Z79.01] [Z79.01]  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

## 2017-10-09 ENCOUNTER — TRANSFERRED RECORDS (OUTPATIENT)
Dept: HEALTH INFORMATION MANAGEMENT | Facility: CLINIC | Age: 77
End: 2017-10-09

## 2017-11-08 ENCOUNTER — TRANSFERRED RECORDS (OUTPATIENT)
Dept: HEALTH INFORMATION MANAGEMENT | Facility: CLINIC | Age: 77
End: 2017-11-08

## 2017-11-27 ENCOUNTER — RADIANT APPOINTMENT (OUTPATIENT)
Dept: GENERAL RADIOLOGY | Facility: CLINIC | Age: 77
End: 2017-11-27
Attending: ORTHOPAEDIC SURGERY
Payer: COMMERCIAL

## 2017-11-27 DIAGNOSIS — R52 PAIN: ICD-10-CM

## 2017-11-27 PROCEDURE — 73502 X-RAY EXAM HIP UNI 2-3 VIEWS: CPT | Mod: TC

## 2017-12-28 ENCOUNTER — THERAPY VISIT (OUTPATIENT)
Dept: PHYSICAL THERAPY | Facility: CLINIC | Age: 77
End: 2017-12-28
Payer: COMMERCIAL

## 2017-12-28 DIAGNOSIS — Z96.642 AFTERCARE FOLLOWING LEFT HIP JOINT REPLACEMENT SURGERY: Primary | ICD-10-CM

## 2017-12-28 DIAGNOSIS — Z47.1 AFTERCARE FOLLOWING LEFT HIP JOINT REPLACEMENT SURGERY: Primary | ICD-10-CM

## 2017-12-28 DIAGNOSIS — M25.552 HIP PAIN, LEFT: ICD-10-CM

## 2017-12-28 PROCEDURE — 97110 THERAPEUTIC EXERCISES: CPT | Mod: GP | Performed by: PHYSICAL THERAPIST

## 2017-12-28 PROCEDURE — 97161 PT EVAL LOW COMPLEX 20 MIN: CPT | Mod: GP | Performed by: PHYSICAL THERAPIST

## 2017-12-28 PROCEDURE — 97112 NEUROMUSCULAR REEDUCATION: CPT | Mod: GP | Performed by: PHYSICAL THERAPIST

## 2017-12-28 PROCEDURE — G8979 MOBILITY GOAL STATUS: HCPCS | Mod: GP | Performed by: PHYSICAL THERAPIST

## 2017-12-28 PROCEDURE — G8978 MOBILITY CURRENT STATUS: HCPCS | Mod: GP | Performed by: PHYSICAL THERAPIST

## 2017-12-28 ASSESSMENT — ACTIVITIES OF DAILY LIVING (ADL)
STEPPING_UP_AND_DOWN_CURBS: NO DIFFICULTY AT ALL
DEEP_SQUATTING: NO DIFFICULTY AT ALL
WALKING_DOWN_STEEP_HILLS: NO DIFFICULTY AT ALL
PUTTING_ON_SOCKS_AND_SHOES: NO DIFFICULTY AT ALL
RECREATIONAL_ACTIVITIES: NO DIFFICULTY AT ALL
HOS_ADL_ITEM_SCORE_TOTAL: 62
HOS_ADL_SCORE(%): 91.18
GETTING_INTO_AND_OUT_OF_AN_AVERAGE_CAR: NO DIFFICULTY AT ALL
LIGHT_TO_MODERATE_WORK: MODERATE DIFFICULTY
WALKING_UP_STEEP_HILLS: NO DIFFICULTY AT ALL
WALKING_APPROXIMATELY_10_MINUTES: NO DIFFICULTY AT ALL
GETTING_INTO_AND_OUT_OF_A_BATHTUB: NO DIFFICULTY AT ALL
HOS_ADL_HIGHEST_POTENTIAL_SCORE: 68
GOING_UP_1_FLIGHT_OF_STAIRS: NO DIFFICULTY AT ALL
WALKING_INITIALLY: NO DIFFICULTY AT ALL
SITTING_FOR_15_MINUTES: NO DIFFICULTY AT ALL
WALKING_15_MINUTES_OR_GREATER: NO DIFFICULTY AT ALL
TWISTING/PIVOTING_ON_INVOLVED_LEG: NO DIFFICULTY AT ALL
STANDING_FOR_15_MINUTES: NO DIFFICULTY AT ALL
ROLLING_OVER_IN_BED: MODERATE DIFFICULTY
HEAVY_WORK: MODERATE DIFFICULTY
HOW_WOULD_YOU_RATE_YOUR_CURRENT_LEVEL_OF_FUNCTION_DURING_YOUR_USUAL_ACTIVITIES_OF_DAILY_LIVING_FROM_0_TO_100_WITH_100_BEING_YOUR_LEVEL_OF_FUNCTION_PRIOR_TO_YOUR_HIP_PROBLEM_AND_0_BEING_THE_INABILITY_TO_PERFORM_ANY_OF_YOUR_USUAL_DAILY_ACTIVITIES?: 70
GOING_DOWN_1_FLIGHT_OF_STAIRS: NO DIFFICULTY AT ALL
HOS_ADL_COUNT: 17

## 2017-12-28 NOTE — PROGRESS NOTES
[facility] Initial Evaluation  Subjective:  Patient is a 77 year old male presenting with rehab left hip hpi.   Greyson Haas is a 77 year old male with a left hip condition.  Condition occurred with:  Degenerative joint disease.    This is a new condition  Pt S/p L DOTTIE  DOS - 9/18/17  Pt reports 4 episodes of hip subluxation since then; pt was progressing really well for 5 weeks post op after which he started to get these episodes; hip subluxes with over stretching, rolling in bed; twisting while opening the door, and while he played golf; pt has been performing a lot of exercises at home; walks on the treadmill for 30 mins and bikes for 30 mins everyday; Pt is a little frustrated about his situation and is unsure if PT is gonna help; MD recommended PT 1-10 visits     H/o lumbar fusion - L hip abd weakness and R foot drop since then  .    Patient reports pain:  Lateral and groin.     and is intermittent Pain Scale: 0/10 at rest.  Associated symptoms:  Loss of motion/stiffness. Pain is worse during the day.  Exacerbated by: stretching. and relieved by bracing/immobilizing.  Since onset symptoms are unchanged.    Previous treatment: 3 home PT sessions post op.  There was significant improvement following previous treatment.  General health as reported by patient is fair.  Pertinent medical history includes:  Cancer (colon).      Current medications:  High blood pressure medication.  Current occupation is Retired  Plays golf.    Employment tasks: household chores.                                Objective:  System                                           Hip Evaluation  HIP AROM:    Flexion: Left: 90    Right:  110    Extension: Left: 15    Right:  30              Hip PROM:        Abduction: Left: 45   Right:  Adduction: Left: 0   Right:  Internal Rotation: Left: min rest   Right:                Hip Strength:    Flexion:   Left: 5/5   Pain:  Right: 5/5   Pain:                    Extension:   Left: 4/5  Pain:Right: 5/5    Pain:    Abduction:  Left: 1+/5     Pain:Right: 5/5    Pain:      External Rotation:  Left: 3+/5   Pain:  Right: 5/5   Pain:                Functional Testing:          Quad:      Bilateral leg squat:  Moderate loss of control and excessive anterior knee excursion                  General     ROS   Poor control with stand to sit transition   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    Patient has the following significant findings with corresponding treatment plan.                Diagnosis 1:  S/p L DOTTIE; ; hip subluxation present probably due to significant hip abd weakness   Pain -  hot/cold therapy, manual therapy, self management, education, directional preference exercise and home program  Decreased ROM/flexibility - manual therapy, therapeutic exercise and home program  Decreased strength - therapeutic exercise, therapeutic activities and home program  Impaired balance - neuro re-education and therapeutic activities  Decreased proprioception - neuro re-education and therapeutic activities  Impaired muscle performance - neuro re-education  Decreased function - therapeutic activities    Therapy Evaluation Codes:   1) History comprised of:   Personal factors that impact the plan of care:      Overall behavior pattern.    Comorbidity factors that impact the plan of care are:      Cancer.     Medications impacting care: High blood pressure.  2) Examination of Body Systems comprised of:   Body structures and functions that impact the plan of care:      Hip.   Activity limitations that impact the plan of care are:      Bending, Squatting/kneeling and Walking.  3) Clinical presentation characteristics are:   Stable/Uncomplicated.  4) Decision-Making    Low complexity using standardized patient assessment instrument and/or measureable assessment of functional outcome.  Cumulative Therapy Evaluation is: Low complexity.    Previous  and current functional limitations:  (See Goal Flow Sheet for this information)    Short term and Long term goals: (See Goal Flow Sheet for this information)     Communication ability:  Patient appears to be able to clearly communicate and understand verbal and written communication and follow directions correctly.  Treatment Explanation - The following has been discussed with the patient:   RX ordered/plan of care  Anticipated outcomes  Possible risks and side effects  This patient would benefit from PT intervention to resume normal activities.   Rehab potential is excellent.    Frequency:  2 X a month, once daily  Duration:  for 3 months  Discharge Plan:  Achieve all LTG.  Independent in home treatment program.  Return to previous functional level by discharge.  Reach maximal therapeutic benefit.    Please refer to the daily flowsheet for treatment today, total treatment time and time spent performing 1:1 timed codes.

## 2017-12-28 NOTE — MR AVS SNAPSHOT
After Visit Summary   12/28/2017    Greyson Haas    MRN: [id number]           Patient Information     Date Of Birth          1940        Visit Information        Provider Department      12/28/2017 9:00 AM Tana Benjamin PT [facility]        Today's Diagnoses     Aftercare following left hip joint replacement surgery    -  1    Hip pain, left           Follow-ups after your visit        Your next 10 appointments already scheduled     Jan 18, 2018  9:00 AM CST   PHYSICAL with Neville Nick MD   [facility] ([facility])    [address] [address] [phone]              Who to contact     If you have questions or need follow up information about today's clinic visit or your schedule please contact [facility] directly at [phone].  Normal or non-critical lab and imaging results will be communicated to you by MyChart, letter or phone within 4 business days after the clinic has received the results. If you do not hear from us within 7 days, please contact the clinic through [employer] or phone. If you have a critical or abnormal lab result, we will notify you by phone as soon as possible.  Submit refill requests through [employer] or call your pharmacy and they will forward the refill request to us. Please allow 3 business days for your refill to be completed.          Additional Information About Your Visit        MyChart Information     [employer] gives you secure access to your electronic health record. If you see a primary care provider, you can also send messages to your care team and make appointments. If you have questions, please call your primary care clinic.  If you do not have a primary care provider, please call [phone] and they will assist you.        Care EveryWhere ID     This is your Care EveryWhere  ID. This could be used by other organizations to access your [facility] medical records  [id number]         Blood Pressure from Last 3 Encounters:   09/22/17 116/77   09/01/17 104/68   07/20/17 132/82    Weight from Last 3 Encounters:   09/01/17 89 kg (196 lb 3.2 oz)   07/20/17 86.4 kg (190 lb 6.4 oz)   05/18/17 86.3 kg (190 lb 3.2 oz)              We Performed the Following     HC PT EVAL, LOW COMPLEXITY     MED INITIAL EVAL REPORT     NEUROMUSCULAR RE-EDUCATION     THERAPEUTIC EXERCISES        Primary Care Provider Office Phone # Fax #    Neville Nilson Nick [phone]       [address] [place] [phone]        Equal Access to Services     LETY SIFUENTES : Hadii aad ku hadasho Soomaali, waaxda luqadaha, qaybta kaalmada adeegyada, og bolanos. So [facility] [phone].    ATENCIÓN: Si habla español, tiene a [place] disposición servicios gratuitos de asistencia lingüística. Llame al [phone].    We comply with applicable federal civil rights laws and [place] laws. We do not discriminate on the basis of race, color, national origin, age, disability, sex, sexual orientation, or gender identity.            Thank you!     Thank you for choosing [facility]  for your care. Our goal is always to provide you with excellent care. Hearing back from our patients is one way we can continue to improve our services. Please take a few minutes to complete the written survey that you may receive in the mail after your visit with us. Thank you!             Your Updated Medication List - Protect others around you: Learn how to safely use, store and throw away your medicines at www.disposemymeds.org.          This list is accurate as of: 12/28/17 10:17 AM.  Always use your most recent med list.                   Brand Name Dispense Instructions for use Diagnosis    ACETAMINOPHEN PO      Take 1,000 mg by mouth daily as needed for pain  Reported on 5/18/2017        DORZOLAMIDE HCL-TIMOLOL MAL OP      Place 1 drop into both eyes 2 times daily        ferrous gluconate 324 (38 FE) MG tablet    FERGON    60 tablet    Take 1 tablet (324 mg) by mouth daily    H/O total hip arthroplasty, left       FLOMAX PO      Take 0.4 mg by mouth every morning        HYDROcodone-acetaminophen 5-325 MG per tablet    NORCO    80 tablet    Take 1-2 tablets by mouth every 6 hours as needed for moderate to severe pain    H/O total hip arthroplasty, left       hydrocortisone 1 % cream    CORTAID     Apply topically daily as needed for rash or itching (on legs.)        PRINIVIL PO      Take 5 mg by mouth every morning (patient takes 0.25 X 20 mg = 5 mg dose)        REFRESH OP      Apply 1 drop to eye daily as needed        senna-docusate 8.6-50 MG per tablet    SENOKOT-S;PERICOLACE    100 tablet    Take 1-2 tablets by mouth 2 times daily    H/O total hip arthroplasty, left       ULTRAM PO      Take  mg by mouth every 8 hours as needed for moderate to severe pain        VIAGRA PO      Take 50 mg by mouth daily as needed        VIBRAMYCIN PO      Take 100 mg by mouth every other day (for eyes)        VITAMIN B-12 SL      Place 500 mg under the tongue every morning        Warfarin Therapy Reminder     1 each    Take as  Directed  Dose  Will change   Stop  After  10 days    H/O total hip arthroplasty, left       ZOCOR PO      Take 20 mg by mouth every morning        ZYLOPRIM PO      Take 300 mg by mouth every morning

## 2017-12-28 NOTE — LETTER
DEPARTMENT OF HEALTH AND HUMAN SERVICES  CENTERS FOR MEDICARE & MEDICAID SERVICES    PLAN/UPDATED PLAN OF PROGRESS FOR OUTPATIENT REHABILITATION    PATIENTS NAME:  Greyson Haas   : 1940  PROVIDER NUMBER:    [id number]  [id number]  PROVIDER NAME: [facility]  MEDICAL RECORD NUMBER: [id number]   START OF CARE DATE:  SOC Date: 17   TYPE:  PT  PRIMARY/TREATMENT DIAGNOSIS: (Pertinent Medical Diagnosis)     Aftercare following left hip joint replacement surgery  Hip pain, left    VISITS FROM START OF CARE:  Rxs Used: 1     [facility] Initial Evaluation  Subjective:  Greyson Haas is a 77 year old male with a left hip condition.  Condition occurred with:  Degenerative joint disease.    This is a new condition  Pt S/p L DOTTIE   DOS - 17.  Pt reports 4 episodes of hip subluxation since then; pt was progressing really well for 5 weeks post op after which he started to get these episodes; hip subluxes with over stretching, rolling in bed; twisting while opening the door, and while he played golf; pt has been performing a lot of exercises at home; walks on the treadmill for 30 mins and bikes for 30 mins everyday; Pt is a little frustrated about his situation and is unsure if PT is gonna help; MD recommended PT 1-10 visits.  H/o lumbar fusion - L hip abd weakness and R foot drop since then.  Patient reports pain:  Lateral and groin  and is intermittent Pain Scale: 0/10 at rest.  Associated symptoms:  Loss of motion/stiffness. Pain is worse during the day.  Exacerbated by: stretching. and relieved by bracing/immobilizing.  Since onset symptoms are unchanged.    Previous treatment: 3 home PT sessions post op.  There was significant improvement following previous treatment.  General health as reported by patient is fair.  Pertinent medical history includes:  Cancer (colon).   Current medications:  High blood pressure medication.   Current occupation is Retired.  Plays golf.  Employment tasks: household chores.    Objective:  System  Hip Evaluation  HIP AROM:    Flexion: Left: 90    Right:  110  Extension: Left: 15    Right:  30  Hip PROM:    Abduction: Left: 45   Right:  Adduction: Left: 0   Right:  Internal Rotation: Left: min rest   Right:  Hip Strength:    Flexion:   Left: 5/5   Pain:  Right: 5/5   Pain:  Extension:  Left: 4/5  Pain:Right: 5/5    Pain:    Abduction:  Left: 1+/5     Pain:Right: 5/5    Pain:  External Rotation:  Left: 3+/5   Pain:  Right: 5/5   Pain:  Functional Testing:    Quad:    Bilateral leg squat:  Moderate loss of control and excessive anterior knee excursion     ROS   Poor control with stand to sit transition   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    Patient has the following significant findings with corresponding treatment plan.                Diagnosis 1:  S/p L DOTTIE; ; hip subluxation present probably due to significant hip abd weakness   Pain -  hot/cold therapy, manual therapy, self management, education, directional preference exercise and home program  Decreased ROM/flexibility - manual therapy, therapeutic exercise and home program  Decreased strength - therapeutic exercise, therapeutic activities and home program  Impaired balance - neuro re-education and therapeutic activities  Decreased proprioception - neuro re-education and therapeutic activities  Impaired muscle performance - neuro re-education  Decreased function - therapeutic activities    Therapy Evaluation Codes:   1) History comprised of:   Personal factors that impact the plan of care:      Overall behavior pattern.    Comorbidity factors that impact the plan of care are:      Cancer.     Medications impacting care: High blood pressure.  2) Examination of Body Systems comprised of:   Body structures and functions that impact the plan of care:      Hip.   Activity limitations that  "impact the plan of care are:      Bending, Squatting/kneeling and Walking.  3) Clinical presentation characteristics are:   Stable/Uncomplicated.  4) Decision-Making    Low complexity using standardized patient assessment instrument and/or   measureable assessment of functional outcome.  Cumulative Therapy Evaluation is: Low complexity.    Previous and current functional limitations:  (See Goal Flow Sheet for this information)    Short term and Long term goals: (See Goal Flow Sheet for this information)     Communication ability:  Patient appears to be able to clearly communicate and understand verbal and written communication and follow directions correctly.  Treatment Explanation - The following has been discussed with the patient:   RX ordered/plan of care  Anticipated outcomes  Possible risks and side effects  This patient would benefit from PT intervention to resume normal activities.   Rehab potential is excellent.      Frequency:  2 X a month, once daily  Duration:  for 3 months  Discharge Plan:  Achieve all LTG.  Independent in home treatment program.  Return to previous functional level by discharge.  Reach maximal therapeutic benefit.    Caregiver Signature/Credentials _____________________________ Date ________       Treating Provider: Tana Benjamin, PT, OCS   I have reviewed and certified the need for these services and plan of treatment while under my care.        PHYSICIAN'S SIGNATURE:   ___________________________________ Date___________                         Aurelio Hood MD    Certification period:  Beginning of Cert date period: 12/28/17 to  End of Cert period date: 03/01/18     Functional Level Progress Report: Please see attached \"Goal Flow sheet for Functional level.\"    ____X____ Continue Services or       ________ DC Services                Service dates: From  SOC Date: 12/28/17 date to present                         "

## 2018-01-09 DIAGNOSIS — I10 ESSENTIAL HYPERTENSION: ICD-10-CM

## 2018-01-09 RX ORDER — LISINOPRIL 20 MG/1
TABLET ORAL
Qty: 45 TABLET | Refills: 0 | Status: SHIPPED | OUTPATIENT
Start: 2018-01-09 | End: 2018-01-18

## 2018-01-09 NOTE — TELEPHONE ENCOUNTER
"Requested Prescriptions   Pending Prescriptions Disp Refills     lisinopril (PRINIVIL/ZESTRIL) 20 MG tablet [Pharmacy Med Name: LISINOPRIL 20MG     TAB]      Last Written Prescription Date:  n/a  Last Fill Quantity: n/a,   # refills: n/a  Last Office Visit: 09/01/2017  Future Office visit:    Next 5 appointments (look out 90 days)     Jan 18, 2018  9:00 AM CST   PHYSICAL with Neville Nick MD   [facility]    [address] [address] [phone]                   Routing refill request to provider for review/approval because:  Medication is reported/historical   45 tablet 0     Sig: TAKE ONE-HALF TABLET BY MOUTH ONCE DAILY    ACE Inhibitors (Including Combos) Protocol Failed    1/9/2018  8:52 AM       Failed - Normal serum creatinine on file in past 12 months    Recent Labs   Lab Test  09/21/17   0603   CR  1.43*            Passed - Blood pressure under 140/90    BP Readings from Last 3 Encounters:   09/22/17 116/77   09/01/17 104/68   07/20/17 132/82                Passed - Recent or future visit with authorizing provider's specialty    Patient had office visit in the last year or has a visit in the next 30 days with authorizing provider.  See \"Patient Info\" tab in inbasket, or \"Choose Columns\" in Meds & Orders section of the refill encounter.              Passed - Patient is age 18 or older       Passed - Normal serum potassium on file in past 12 months    Recent Labs   Lab Test  09/21/17   0603   POTASSIUM  3.7               "

## 2018-01-18 ENCOUNTER — ANTICOAGULATION THERAPY VISIT (OUTPATIENT)
Dept: ANTICOAGULATION | Facility: CLINIC | Age: 78
End: 2018-01-18

## 2018-01-18 ENCOUNTER — OFFICE VISIT (OUTPATIENT)
Dept: INTERNAL MEDICINE | Facility: CLINIC | Age: 78
End: 2018-01-18
Payer: COMMERCIAL

## 2018-01-18 VITALS
DIASTOLIC BLOOD PRESSURE: 72 MMHG | WEIGHT: 192.4 LBS | HEIGHT: 67 IN | OXYGEN SATURATION: 96 % | TEMPERATURE: 97.5 F | BODY MASS INDEX: 30.2 KG/M2 | SYSTOLIC BLOOD PRESSURE: 124 MMHG | HEART RATE: 71 BPM

## 2018-01-18 DIAGNOSIS — M21.371 RIGHT FOOT DROP: ICD-10-CM

## 2018-01-18 DIAGNOSIS — R26.89 BALANCE PROBLEMS: ICD-10-CM

## 2018-01-18 DIAGNOSIS — Z79.01 LONG-TERM (CURRENT) USE OF ANTICOAGULANTS: ICD-10-CM

## 2018-01-18 DIAGNOSIS — M10.9 GOUT OF MULTIPLE SITES, UNSPECIFIED CAUSE, UNSPECIFIED CHRONICITY: ICD-10-CM

## 2018-01-18 DIAGNOSIS — E78.5 HYPERLIPIDEMIA LDL GOAL <130: ICD-10-CM

## 2018-01-18 DIAGNOSIS — G60.9 IDIOPATHIC PERIPHERAL NEUROPATHY: ICD-10-CM

## 2018-01-18 DIAGNOSIS — Z96.642 H/O TOTAL HIP ARTHROPLASTY, LEFT: ICD-10-CM

## 2018-01-18 DIAGNOSIS — Z00.00 MEDICARE ANNUAL WELLNESS VISIT, SUBSEQUENT: Primary | ICD-10-CM

## 2018-01-18 DIAGNOSIS — I10 ESSENTIAL HYPERTENSION: ICD-10-CM

## 2018-01-18 DIAGNOSIS — Z91.81 AT RISK FOR FALLING: ICD-10-CM

## 2018-01-18 LAB
ALT SERPL W P-5'-P-CCNC: 23 U/L (ref 0–70)
ANION GAP SERPL CALCULATED.3IONS-SCNC: 10 MMOL/L (ref 3–14)
BUN SERPL-MCNC: 22 MG/DL (ref 7–30)
CALCIUM SERPL-MCNC: 9.3 MG/DL (ref 8.5–10.1)
CHLORIDE SERPL-SCNC: 107 MMOL/L (ref 94–109)
CHOLEST SERPL-MCNC: 189 MG/DL
CO2 SERPL-SCNC: 21 MMOL/L (ref 20–32)
CREAT SERPL-MCNC: 1.27 MG/DL (ref 0.66–1.25)
CREAT UR-MCNC: 166 MG/DL
GFR SERPL CREATININE-BSD FRML MDRD: 55 ML/MIN/1.7M2
GLUCOSE SERPL-MCNC: 98 MG/DL (ref 70–99)
HDLC SERPL-MCNC: 70 MG/DL
LDLC SERPL CALC-MCNC: 92 MG/DL
MICROALBUMIN UR-MCNC: 49 MG/L
MICROALBUMIN/CREAT UR: 29.52 MG/G CR (ref 0–17)
NONHDLC SERPL-MCNC: 119 MG/DL
POTASSIUM SERPL-SCNC: 4.5 MMOL/L (ref 3.4–5.3)
SODIUM SERPL-SCNC: 138 MMOL/L (ref 133–144)
TRIGL SERPL-MCNC: 137 MG/DL
VIT B12 SERPL-MCNC: 1477 PG/ML (ref 193–986)

## 2018-01-18 PROCEDURE — 99214 OFFICE O/P EST MOD 30 MIN: CPT | Mod: 25 | Performed by: INTERNAL MEDICINE

## 2018-01-18 PROCEDURE — 36415 COLL VENOUS BLD VENIPUNCTURE: CPT | Performed by: INTERNAL MEDICINE

## 2018-01-18 PROCEDURE — 99397 PER PM REEVAL EST PAT 65+ YR: CPT | Performed by: INTERNAL MEDICINE

## 2018-01-18 PROCEDURE — 84460 ALANINE AMINO (ALT) (SGPT): CPT | Performed by: INTERNAL MEDICINE

## 2018-01-18 PROCEDURE — 82607 VITAMIN B-12: CPT | Performed by: INTERNAL MEDICINE

## 2018-01-18 PROCEDURE — 83921 ORGANIC ACID SINGLE QUANT: CPT | Mod: 90 | Performed by: INTERNAL MEDICINE

## 2018-01-18 PROCEDURE — 00000402 ZZHCL STATISTIC TOTAL PROTEIN: Performed by: INTERNAL MEDICINE

## 2018-01-18 PROCEDURE — 80048 BASIC METABOLIC PNL TOTAL CA: CPT | Performed by: INTERNAL MEDICINE

## 2018-01-18 PROCEDURE — 80061 LIPID PANEL: CPT | Performed by: INTERNAL MEDICINE

## 2018-01-18 PROCEDURE — 99000 SPECIMEN HANDLING OFFICE-LAB: CPT | Performed by: INTERNAL MEDICINE

## 2018-01-18 PROCEDURE — 84165 PROTEIN E-PHORESIS SERUM: CPT | Performed by: INTERNAL MEDICINE

## 2018-01-18 PROCEDURE — 82043 UR ALBUMIN QUANTITATIVE: CPT | Performed by: INTERNAL MEDICINE

## 2018-01-18 RX ORDER — ALLOPURINOL 100 MG/1
100 TABLET ORAL EVERY MORNING
Qty: 90 TABLET | Refills: 3 | Status: SHIPPED | OUTPATIENT
Start: 2018-01-18 | End: 2018-06-21

## 2018-01-18 RX ORDER — SIMVASTATIN 20 MG
20 TABLET ORAL EVERY MORNING
Qty: 90 TABLET | Refills: 3 | Status: SHIPPED | OUTPATIENT
Start: 2018-01-18 | End: 2019-01-21

## 2018-01-18 RX ORDER — LISINOPRIL 10 MG/1
10 TABLET ORAL DAILY
Qty: 90 TABLET | Refills: 3 | Status: SHIPPED | OUTPATIENT
Start: 2018-01-18 | End: 2019-01-21

## 2018-01-18 NOTE — PATIENT INSTRUCTIONS
Preventive Health Recommendations:       Male Ages 65 and over    Yearly exam:             See your health care provider every year in order to  o   Review health changes.   o   Discuss preventive care.    o   Review your medicines if your doctor has prescribed any.    Talk with your health care provider about whether you should have a test to screen for prostate cancer (PSA).    Every 3 years, have a diabetes test (fasting glucose). If you are at risk for diabetes, you should have this test more often.    Every 5 years, have a cholesterol test. Have this test more often if you are at risk for high cholesterol or heart disease.     Every 10 years, have a colonoscopy. Or, have a yearly FIT test (stool test). These exams will check for colon cancer.    Talk to with your health care provider about screening for Abdominal Aortic Aneurysm if you have a family history of AAA or have a history of smoking.  Shots:     Get a flu shot each year.     Get a tetanus shot every 10 years.     Talk to your doctor about your pneumonia vaccines. There are now two you should receive - Pneumovax (PPSV 23) and Prevnar (PCV 13).    Talk to your doctor about a shingles vaccine.     Talk to your doctor about the hepatitis B vaccine.  Nutrition:     Eat at least 5 servings of fruits and vegetables each day.     Eat whole-grain bread, whole-wheat pasta and brown rice instead of white grains and rice.     Talk to your doctor about Calcium and Vitamin D.   Lifestyle    Exercise for at least 150 minutes a week (30 minutes a day, 5 days a week). This will help you control your weight and prevent disease.     Limit alcohol to one drink per day.     No smoking.     Wear sunscreen to prevent skin cancer.     See your dentist every six months for an exam and cleaning.     See your eye doctor every 1 to 2 years to screen for conditions such as glaucoma, macular degeneration and cataracts.    PLAN:  1.  Check labs today   2.  Decrease allopurinol to  100 mg daily - notify MD for any gout problems  3.  Decrease lisinopril to 10 mg daily  4.  Bring in medications (including eye medication) or exact medication list to next office visit please    5.  Neurology consultation

## 2018-01-18 NOTE — MR AVS SNAPSHOT
Greyson Hasa   1/18/2018   Anticoagulation Therapy Visit    Description:  77 year old male   Provider:  Neville Nick MD   Department:  [facility]           INR as of 1/18/2018     Today's INR       Anticoagulation Summary as of 1/18/2018     INR goal 2.0-3.0   Today's INR    Full instructions 2.5 mg on Tue, Thu, Sat; 5 mg all other days   Next INR check     Indications   Long-term (current) use of anticoagulants [Z79.01] [Z79.01]  H/O total hip arthroplasty  left [Z96.642]         Anticoagulation Episode Summary     Resolved date 1/18/2018    Resolved reason Therapy  Complete      Contact Numbers     [facility]  Please call  [phone] to cancel and/or reschedule your appointment   Please call  [phone] with any problems or questions regarding your therapy.

## 2018-01-18 NOTE — MR AVS SNAPSHOT
After Visit Summary   1/18/2018    Greyson Haas    MRN: [id number]           Patient Information     Date Of Birth          1940        Visit Information        Provider Department      1/18/2018 9:00 AM Neville Nick MD [facility]        Today's Diagnoses     Medicare annual wellness visit, subsequent    -  1    Essential hypertension        Hyperlipidemia LDL goal <130        At risk for falling        Gout        Idiopathic peripheral neuropathy        Right foot drop        Balance problems          Care Instructions      Preventive Health Recommendations:       Male Ages 65 and over    Yearly exam:             See your health care provider every year in order to  o   Review health changes.   o   Discuss preventive care.    o   Review your medicines if your doctor has prescribed any.    Talk with your health care provider about whether you should have a test to screen for prostate cancer (PSA).    Every 3 years, have a diabetes test (fasting glucose). If you are at risk for diabetes, you should have this test more often.    Every 5 years, have a cholesterol test. Have this test more often if you are at risk for high cholesterol or heart disease.     Every 10 years, have a colonoscopy. Or, have a yearly FIT test (stool test). These exams will check for colon cancer.    Talk to with your health care provider about screening for Abdominal Aortic Aneurysm if you have a family history of AAA or have a history of smoking.  Shots:     Get a flu shot each year.     Get a tetanus shot every 10 years.     Talk to your doctor about your pneumonia vaccines. There are now two you should receive - Pneumovax (PPSV 23) and Prevnar (PCV 13).    Talk to your doctor about a shingles vaccine.     Talk to your doctor about the hepatitis B vaccine.  Nutrition:     Eat at least 5 servings of fruits and vegetables each day.     Eat whole-grain bread, whole-wheat pasta and brown rice  instead of white grains and rice.     Talk to your doctor about Calcium and Vitamin D.   Lifestyle    Exercise for at least 150 minutes a week (30 minutes a day, 5 days a week). This will help you control your weight and prevent disease.     Limit alcohol to one drink per day.     No smoking.     Wear sunscreen to prevent skin cancer.     See your dentist every six months for an exam and cleaning.     See your eye doctor every 1 to 2 years to screen for conditions such as glaucoma, macular degeneration and cataracts.    PLAN:  1.  Check labs today   2.  Decrease allopurinol to 100 mg daily - notify MD for any gout problems  3.  Decrease lisinopril to 10 mg daily  4.  Bring in medications (including eye medication) or exact medication list to next office visit please    5.  Neurology consultation          Follow-ups after your visit        Additional Services     NEUROLOGY ADULT REFERRAL       Your provider has referred you for the following:   Consult at [facility]: [facility] [phone]   [email or web address]    Please be aware that coverage of these services is subject to the terms and limitations of your health insurance plan.  Call member services at your health plan with any benefit or coverage questions.      Please bring the following with you to your appointment:    (1) Any X-Rays, CTs or MRIs which have been performed.  Contact the facility where they were done to arrange for  prior to your scheduled appointment.    (2) List of current medications  (3) This referral request   (4) Any documents/labs given to you for this referral                  Who to contact     If you have questions or need follow up information about today's clinic visit or your schedule please contact [facility] directly at [phone].  Normal or non-critical lab and imaging results will be communicated to you by MyChart, letter or phone  "within 4 business days after the clinic has received the results. If you do not hear from us within 7 days, please contact the clinic through [employer] or phone. If you have a critical or abnormal lab result, we will notify you by phone as soon as possible.  Submit refill requests through [employer] or call your pharmacy and they will forward the refill request to us. Please allow 3 business days for your refill to be completed.          Additional Information About Your Visit        [employer] Information     [employer] gives you secure access to your electronic health record. If you see a primary care provider, you can also send messages to your care team and make appointments. If you have questions, please call your primary care clinic.  If you do not have a primary care provider, please call [phone] and they will assist you.        Care EveryWhere ID     This is your Care EveryWhere ID. This could be used by other organizations to access your [facility] medical records  [id number]        Your Vitals Were     Pulse Temperature Height Pulse Oximetry BMI (Body Mass Index)       71 97.5  F (36.4  C) (Oral) 5' 7\" (1.702 m) 96% 30.13 kg/m2        Blood Pressure from Last 3 Encounters:   01/18/18 124/72   09/22/17 116/77   09/01/17 104/68    Weight from Last 3 Encounters:   01/18/18 192 lb 6.4 oz (87.3 kg)   09/01/17 196 lb 3.2 oz (89 kg)   07/20/17 190 lb 6.4 oz (86.4 kg)              We Performed the Following     Albumin Random Urine Quantitative with Creat Ratio     ALT     Basic metabolic panel     Lipid panel reflex to direct LDL Fasting     Methylmalonic acid     NEUROLOGY ADULT REFERRAL     OFFICE/OUTPT VISIT,EST,LEVL IV     Protein electrophoresis     Vitamin B12          Today's Medication Changes          These changes are accurate as of: 1/18/18 10:16 AM.  If you have any questions, ask your nurse or doctor.               These medicines have changed or have updated prescriptions.        Dose/Directions    " allopurinol 100 MG tablet   Commonly known as:  ZYLOPRIM   This may have changed:  how much to take   Used for:  Gout of multiple sites, unspecified cause, unspecified chronicity   Changed by:  Neville Nick MD        Dose:  100 mg   Take 1 tablet (100 mg) by mouth every morning   Quantity:  90 tablet   Refills:  3       lisinopril 10 MG tablet   Commonly known as:  PRINIVIL/ZESTRIL   This may have changed:  See the new instructions.   Used for:  Essential hypertension   Changed by:  Neville Nick MD        Dose:  10 mg   Take 1 tablet (10 mg) by mouth daily   Quantity:  90 tablet   Refills:  3       simvastatin 20 MG tablet   Commonly known as:  ZOCOR   This may have changed:  medication strength   Used for:  Hyperlipidemia LDL goal <130   Changed by:  Neville Nick MD        Dose:  20 mg   Take 1 tablet (20 mg) by mouth every morning   Quantity:  90 tablet   Refills:  3         Stop taking these medicines if you haven't already. Please contact your care team if you have questions.     HYDROcodone-acetaminophen 5-325 MG per tablet   Commonly known as:  NORCO   Stopped by:  Neville Nick MD           Warfarin Therapy Reminder   Stopped by:  Neville Nick MD                Where to get your medicines      These medications were sent to [facility] [address] [place] [id number]     Phone:  [phone]     allopurinol 100 MG tablet    lisinopril 10 MG tablet    simvastatin 20 MG tablet                Primary Care Provider Office Phone # Fax #    Neville Nick [phone]       [address] [facility] [address]        Equal Access to Services     [facility]: Hadii lulú kelly Soporfirio, waaxda luqadaha, qaybta kaalmada adeaiyana, og bolanos. So [facility] [phone].    ATENCIÓN: Si habla español, tiene a [place] disposición servicios gratuitos de asistencia  lingüística. Antionette al [phone].    We comply with applicable federal civil rights laws and [place] laws. We do not discriminate on the basis of race, color, national origin, age, disability, sex, sexual orientation, or gender identity.            Thank you!     Thank you for choosing [facility]  for your care. Our goal is always to provide you with excellent care. Hearing back from our patients is one way we can continue to improve our services. Please take a few minutes to complete the written survey that you may receive in the mail after your visit with us. Thank you!             Your Updated Medication List - Protect others around you: Learn how to safely use, store and throw away your medicines at www.disposemymeds.org.          This list is accurate as of: 1/18/18 10:16 AM.  Always use your most recent med list.                   Brand Name Dispense Instructions for use Diagnosis    ACETAMINOPHEN PO      Take 1,000 mg by mouth daily as needed for pain Reported on 5/18/2017        allopurinol 100 MG tablet    ZYLOPRIM    90 tablet    Take 1 tablet (100 mg) by mouth every morning    Gout of multiple sites, unspecified cause, unspecified chronicity       aspirin 81 MG tablet      Take 1 tablet (81 mg) by mouth daily        DORZOLAMIDE HCL-TIMOLOL MAL OP      Place 1 drop into both eyes 2 times daily        FLOMAX PO      Take 0.4 mg by mouth every morning        hydrocortisone 1 % cream    CORTAID     Apply topically daily as needed for rash or itching (on legs.)        lisinopril 10 MG tablet    PRINIVIL/ZESTRIL    90 tablet    Take 1 tablet (10 mg) by mouth daily    Essential hypertension       REFRESH OP      Apply 1 drop to eye daily as needed        simvastatin 20 MG tablet    ZOCOR    90 tablet    Take 1 tablet (20 mg) by mouth every morning    Hyperlipidemia LDL goal <130       VIAGRA PO      Take 50 mg by mouth daily as needed        VITAMIN B-12 SL      Place 500 mg under  the tongue every morning

## 2018-01-18 NOTE — PROGRESS NOTES
SUBJECTIVE:   Greyson Haas is a 77 year old male who presents for Preventive Visit.    Are you in the first 12 months of your Medicare Part B coverage?  No    Healthy Habits:    Do you get at least three servings of calcium containing foods daily (dairy, green leafy vegetables, etc.)? yes    Amount of exercise or daily activities, outside of work: 7 day(s) per week    Problems taking medications regularly No    Medication side effects: No    Have you had an eye exam in the past two years? yes    Do you see a dentist twice per year? yes    Do you have sleep apnea, excessive snoring or daytime drowsiness?no      Ability to successfully perform activities of daily living: Yes, no assistance needed    Home safety:  none identified     Hearing impairment: Yes, Feel that people are mumbling or not speaking clearly.    Fall risk:  Fallen 2 or more times in the past year?: No  Any fall with injury in the past year?: No        COGNITIVE SCREEN  1) Repeat 3 items (Banana, Sunrise, Chair)    2) Clock draw: NORMAL  3) 3 item recall: Recalls 3 objects  Results: 3 items recalled: COGNITIVE IMPAIRMENT LESS LIKELY    Mini-CogTM Copyright S Alvaro. Licensed by the author for use in [facility]; reprinted with permission (tiffany@[email or web address]). All rights reserved.        Additional issues to address:  1.  Now 4 months after DOTTIE, states that he has had hip pop out a few times.   Dr. Hood has ordered Physical Therapy, doing this.   Last popped out in November.  2.  Feeling very unsteady, even on a flat surface.   Distinct problems going up stairs.  Holds onto railing.   Cannot lead with the left foot going up.   Feels like he doesn't have the strength to push himself up - not really a significant pain. Dropfoot a problem.   Feels numb and unsteady in both legs.   Has to hang on to do treadmill.   Bikes 30 minutes per day without problems.   - feels unsafe, past year, worse off celebrex.    3.  Feels very stiff in his legs.    Tylenol not helpful.   Used to take NSAID for this, stopped due to CKD.  4.  Follow-up of chronic hyperlipidemia, on Simvastatin 20 mg.  Diet rating: good.  He reports no side effects of medications, including no significant myalgias or other side effects.   5.  Follow-up HTN.  Patient is checking outpatient blood pressures, at home.  Results are good, will sometimes cut in half.  He reports no side effects from BP medications.   6.  No gout flares, also has cut down allopurinol to 150 mg daily x few months.      Reviewed and updated as needed this visit by clinical staffTobacco  Allergies         Reviewed and updated as needed this visit by Provider        Social History   Substance Use Topics     Smoking status: Former Smoker     Packs/day: 1.00     Years: 27.00     Quit date: 1/1/1983     Smokeless tobacco: Never Used     Alcohol use Yes      Comment: Ave 2 daily or more.  Heavy days 5-6, some days none.       If you drink alcohol do you typically have >3 drinks per day or >7 drinks per week? No                        Today's PHQ-2 Score:   PHQ-2 ( 1999 [employer]) 9/1/2017 7/20/2017   Q1: Little interest or pleasure in doing things 0 0   Q2: Feeling down, depressed or hopeless 0 0   PHQ-2 Score 0 0   Q1: Little interest or pleasure in doing things - -   Q2: Feeling down, depressed or hopeless - -   PHQ-2 Score - -         Do you feel safe in your environment - Yes    Do you have a Health Care Directive?: Yes: Advance Directive has been received and scanned.    Current providers sharing in care for this patient include: Patient Care Team:  Neville Nick MD as PCP - General    The following health maintenance items are reviewed in Epic and correct as of today:  Health Maintenance   Topic Date Due     LIPID MONITORING Q1 YEAR  12/30/2017     FALL RISK ASSESSMENT  12/30/2017     MICROALBUMIN Q1 YEAR  01/24/2018     BMP Q1 YR  09/21/2018     HEMOGLOBIN Q1 YR  09/22/2018     ADVANCE DIRECTIVE PLANNING Q5 YRS   "03/31/2020     COLONOSCOPY Q5 YR  03/31/2021     TETANUS IMMUNIZATION (SYSTEM ASSIGNED)  12/09/2023     INFLUENZA VACCINE (SYSTEM ASSIGNED)  Completed     PNEUMOCOCCAL  Completed         ROS:  C: NEGATIVE for fever, chills, change in weight  I: NEGATIVE for worrisome rashes, moles or lesions  E: NEGATIVE for vision changes or irritation  E/M: some chronic congestion, otherwise NEGATIVE for ear, mouth and throat problems  R: NEGATIVE for significant cough or SOB  B: NEGATIVE for masses, tenderness or discharge  CV: NEGATIVE for chest pain, palpitations or peripheral edema  GI: NEGATIVE for nausea, abdominal pain, heartburn, or change in bowel habits  : NEGATIVE for frequency, dysuria, or hematuria  M: NEGATIVE for significant arthralgias or myalgia aside from above, and rotator cuff problems.  N: NEGATIVE for weakness, dizziness or paresthesias  E: NEGATIVE for temperature intolerance, skin/hair changes  H: NEGATIVE for bleeding problems  P: NEGATIVE for changes in mood or affect    OBJECTIVE:   /72  Pulse 71  Temp 97.5  F (36.4  C) (Oral)  Ht 5' 7\" (1.702 m)  Wt 192 lb 6.4 oz (87.3 kg)  SpO2 96%  BMI 30.13 kg/m2 Estimated body mass index is 30.73 kg/(m^2) as calculated from the following:    Height as of 9/1/17: 5' 7\" (1.702 m).    Weight as of 9/1/17: 196 lb 3.2 oz (89 kg).  EXAM:   GENERAL: healthy, alert and no distress  NECK: no adenopathy, no asymmetry, masses, or scars and thyroid normal to palpation  RESP: lungs clear to auscultation - no rales, rhonchi or wheezes  CV: regular rate and rhythm, normal S1 S2, no S3 or S4, no murmur, click or rub, no peripheral edema and peripheral pulses strong  MS: extremities normal- no gross deformities noted.   Normal range of motion hips (rotation not performed), muscle tone, no active joint issues.   Some DJD, modest of knees.    NEURO:   Mild weakness R foot dorsiflexion, otherwise normal strength all groups.     Mild decrease sensation both soles to light " "touch, and on R sole to pinprick.  Gait is slightly uneven, with more of an appearance of a musculoskeletal reason for limping.    ASSESSMENT / PLAN:     ASSESSMENT:   1.  Annual Wellness Visit  2.  HTN, controlled   3.  Follow-up hyperlipidemia  4.  Gout, doing well    5.  Balance problems, felt due to musculoskeletal/orthopedic issues.   - history of dropfoot for > 30 years  6.  Sensation of left leg weakness, without demonstrable findings.   Mild dropfoot on right.     7.  Decreased sensation both soles, R>L   - patient taking B12 but unclear dose  8.  DJD, stiffness of lower extremities.   Patient has failed a trial of prednisone for this.    PLAN:  1.  Check labs today --> stable, B12 high.  2.  Decrease allopurinol to 100 mg daily - notify MD for any gout problems  3.  Decrease lisinopril to 10 mg daily  4.  Bring in medications (including eye medication) or exact medication list to next office visit please    5.  Neurology consultation    Consider Physical Therapy again    End of Life Planning:  Patient currently has an advanced directive: Yes.  Practitioner is supportive of decision.    COUNSELING:  Reviewed preventive health counseling, as reflected in patient instructions      Estimated body mass index is 30.73 kg/(m^2) as calculated from the following:    Height as of 9/1/17: 5' 7\" (1.702 m).    Weight as of 9/1/17: 196 lb 3.2 oz (89 kg).  Weight management plan: Discussed healthy diet and exercise guidelines and patient will follow up in 12 months in clinic to re-evaluate.     reports that he quit smoking about 35 years ago. He has a 27.00 pack-year smoking history. He has never used smokeless tobacco.        Appropriate preventive services were discussed with this patient, including applicable screening as appropriate for cardiovascular disease, diabetes, osteopenia/osteoporosis, and glaucoma.  As appropriate for age/gender, discussed screening for colorectal cancer, prostate cancer, breast cancer, and " cervical cancer. Checklist reviewing preventive services available has been given to the patient.    Reviewed patients plan of care and provided an AVS. The Basic Care Plan (routine screening as documented in Health Maintenance) for Greyson meets the Care Plan requirement. This Care Plan has been established and reviewed with the Patient.    Counseling Resources:  ATP IV Guidelines  Pooled Cohorts Equation Calculator  Breast Cancer Risk Calculator  FRAX Risk Assessment  ICSI Preventive Guidelines  Dietary Guidelines for Americans, 2010  USDA's MyPlate  ASA Prophylaxis  Lung CA Screening    Neville Nick MD  [facility]

## 2018-01-19 LAB
ALBUMIN SERPL ELPH-MCNC: 4.2 G/DL (ref 3.7–5.1)
ALPHA1 GLOB SERPL ELPH-MCNC: 0.3 G/DL (ref 0.2–0.4)
ALPHA2 GLOB SERPL ELPH-MCNC: 0.7 G/DL (ref 0.5–0.9)
B-GLOBULIN SERPL ELPH-MCNC: 0.7 G/DL (ref 0.6–1)
GAMMA GLOB SERPL ELPH-MCNC: 0.9 G/DL (ref 0.7–1.6)
M PROTEIN SERPL ELPH-MCNC: 0 G/DL
PROT PATTERN SERPL ELPH-IMP: NORMAL

## 2018-01-20 LAB — METHYLMALONATE SERPL-SCNC: 0.18 UMOL/L (ref 0–0.4)

## 2018-01-24 DIAGNOSIS — M10.9 GOUT OF MULTIPLE SITES, UNSPECIFIED CAUSE, UNSPECIFIED CHRONICITY: Primary | ICD-10-CM

## 2018-01-25 ENCOUNTER — THERAPY VISIT (OUTPATIENT)
Dept: PHYSICAL THERAPY | Facility: CLINIC | Age: 78
End: 2018-01-25
Payer: MEDICARE

## 2018-01-25 DIAGNOSIS — M25.552 HIP PAIN, LEFT: ICD-10-CM

## 2018-01-25 DIAGNOSIS — Z47.1 AFTERCARE FOLLOWING LEFT HIP JOINT REPLACEMENT SURGERY: ICD-10-CM

## 2018-01-25 DIAGNOSIS — Z96.642 AFTERCARE FOLLOWING LEFT HIP JOINT REPLACEMENT SURGERY: ICD-10-CM

## 2018-01-25 PROCEDURE — 97112 NEUROMUSCULAR REEDUCATION: CPT | Mod: GP | Performed by: PHYSICAL THERAPIST

## 2018-01-25 PROCEDURE — 97110 THERAPEUTIC EXERCISES: CPT | Mod: GP | Performed by: PHYSICAL THERAPIST

## 2018-01-25 RX ORDER — ALLOPURINOL 300 MG/1
TABLET ORAL
Qty: 90 TABLET | Refills: 3 | Status: SHIPPED | OUTPATIENT
Start: 2018-01-25 | End: 2018-06-21

## 2018-01-25 NOTE — MR AVS SNAPSHOT
After Visit Summary   1/25/2018    Greyson Haas    MRN: [id number]           Patient Information     Date Of Birth          1940        Visit Information        Provider Department      1/25/2018 10:20 AM Matt Fraga, PT [facility] Physical Therapy        Today's Diagnoses     Aftercare following left hip joint replacement surgery        Hip pain, left           Follow-ups after your visit        Who to contact     If you have questions or need follow up information about today's clinic visit or your schedule please contact [facility] PHYSICAL THERAP[facility] directly at [phone].  Normal or non-critical lab and imaging results will be communicated to you by MyChart, letter or phone within 4 business days after the clinic has received the results. If you do not hear from us within 7 days, please contact the clinic through [employer] or phone. If you have a critical or abnormal lab result, we will notify you by phone as soon as possible.  Submit refill requests through "[employer] or call your pharmacy and they will forward the refill request to us. Please allow 3 business days for your refill to be completed.          Additional Information About Your Visit        MyChart Information     "[employer] gives you secure access to your electronic health record. If you see a primary care provider, you can also send messages to your care team and make appointments. If you have questions, please call your primary care clinic.  If you do not have a primary care provider, please call [phone] and they will assist you.        Care EveryWhere ID     This is your Care EveryWhere ID. This could be used by other organizations to access your [place] medical records  [id number]         Blood Pressure from Last 3 Encounters:   01/18/18 124/72   09/22/17 116/77   09/01/17 104/68    Weight from Last 3 Encounters:   01/18/18 87.3 kg (192 lb 6.4  oz)   09/01/17 89 kg (196 lb 3.2 oz)   07/20/17 86.4 kg (190 lb 6.4 oz)              We Performed the Following     NEUROMUSCULAR RE-EDUCATION     THERAPEUTIC EXERCISES        Primary Care Provider Office Phone # Fax #    Neville Nick [phone]       [address] [facility] [address]        Equal Access to Services     LETY SIFUENTES : Hadii aad ku hadasho Soomaali, waaxda luqadaha, qaybta kaalmada adeegyada, waxay idiin hayaan adeeg kharash la'aan ah. So [facility] [phone].    ATENCIÓN: Si ramon edouard, tiene a [place] disposición servicios gratuitos de asistencia lingüística. Llame al [phone].    We comply with applicable federal civil rights laws and [place] laws. We do not discriminate on the basis of race, color, national origin, age, disability, sex, sexual orientation, or gender identity.            Thank you!     Thank you for choosing [facility] PH[facility]  for your care. Our goal is always to provide you with excellent care. Hearing back from our patients is one way we can continue to improve our services. Please take a few minutes to complete the written survey that you may receive in the mail after your visit with us. Thank you!             Your Updated Medication List - Protect others around you: Learn how to safely use, store and throw away your medicines at www.disposemymeds.org.          This list is accurate as of 1/25/18 11:14 AM.  Always use your most recent med list.                   Brand Name Dispense Instructions for use Diagnosis    ACETAMINOPHEN PO      Take 1,000 mg by mouth daily as needed for pain Reported on 5/18/2017        allopurinol 100 MG tablet    ZYLOPRIM    90 tablet    Take 1 tablet (100 mg) by mouth every morning    Gout of multiple sites, unspecified cause, unspecified chronicity       aspirin 81 MG tablet      Take 1 tablet (81 mg) by mouth daily        DORZOLAMIDE HCL-TIMOLOL MAL OP      Place 1 drop  into both eyes 2 times daily        FLOMAX PO      Take 0.4 mg by mouth every morning        hydrocortisone 1 % cream    CORTAID     Apply topically daily as needed for rash or itching (on legs.)        lisinopril 10 MG tablet    PRINIVIL/ZESTRIL    90 tablet    Take 1 tablet (10 mg) by mouth daily    Essential hypertension       REFRESH OP      Apply 1 drop to eye daily as needed        simvastatin 20 MG tablet    ZOCOR    90 tablet    Take 1 tablet (20 mg) by mouth every morning    Hyperlipidemia LDL goal <130       VIAGRA PO      Take 50 mg by mouth daily as needed        VITAMIN B-12 SL      Place 500 mg under the tongue every morning

## 2018-03-20 ENCOUNTER — OFFICE VISIT (OUTPATIENT)
Dept: NEUROLOGY | Facility: CLINIC | Age: 78
End: 2018-03-20
Payer: COMMERCIAL

## 2018-03-20 VITALS
SYSTOLIC BLOOD PRESSURE: 120 MMHG | OXYGEN SATURATION: 96 % | BODY MASS INDEX: 30.38 KG/M2 | TEMPERATURE: 97.6 F | WEIGHT: 194 LBS | DIASTOLIC BLOOD PRESSURE: 72 MMHG | HEART RATE: 65 BPM | RESPIRATION RATE: 16 BRPM

## 2018-03-20 DIAGNOSIS — R26.89 BALANCE PROBLEM: Primary | ICD-10-CM

## 2018-03-20 DIAGNOSIS — R26.2 DIFFICULTY WALKING: ICD-10-CM

## 2018-03-20 DIAGNOSIS — M25.552 HIP PAIN, LEFT: ICD-10-CM

## 2018-03-20 PROCEDURE — 99205 OFFICE O/P NEW HI 60 MIN: CPT | Performed by: PSYCHIATRY & NEUROLOGY

## 2018-03-20 NOTE — PATIENT INSTRUCTIONS
AFTER VISIT SUMMARY (AVS):    At today's visit we discussed various diagnostic possibilities for your symptoms and the reasons for work-up, which includes:  Orders Placed This Encounter   Procedures     MR Cervical Spine w/o Contrast     No new medications were ordered.    Additional recommendations after the work-up.    Next follow-up appointment is in the next 4 weeks or earlier if needed.    Please do not hesitate to call me with any questions or concerns.    Thanks.

## 2018-03-20 NOTE — NURSING NOTE
COGNITIVE SCREEN  1) Repeat 3 items (Banana, Sunrise, Chair)    2) Clock draw: NORMAL  3) 3 item recall: Recalls 3 objects  Results: 3 items recalled: COGNITIVE IMPAIRMENT LESS LIKELY    Mini-CogTM Copyright S Alvaro. Licensed by the author for use in [facility]; reprinted with permission (tiffany@[email or web address]). All rights reserved.        Mariano Myers MA

## 2018-03-20 NOTE — TELEPHONE ENCOUNTER
Medication is not on med list. Left message for pt to call back with more information regarding refill.

## 2018-03-20 NOTE — TELEPHONE ENCOUNTER
"Pt says that Dr. Nick took pt off celebrex and ibuprofen, and he was started on prednisone.  Pt says that prednisone \"didn't do much for him\" so he stopped taking it. He was off of it for about 6 months. Pt got tired of dealing with the daily leg and back pain, so he started taking prednisone again about 1-2 weeks ago (had about 12 pills left from last RX).  Pt says he ran out of pills about 3 days ago. And he was getting relief once he re-started this medication, so he is looking for another refill--to give it another try, and hope for good results.    Pt saw Neurology today--and has MRI of neck scheduled for tomorrow.  "

## 2018-03-20 NOTE — TELEPHONE ENCOUNTER
predniSONE (DELTASONE) 10 MG tablet      Last Written Prescription Date:  05/18/2017  Last Fill Quantity: 30,   # refills: 1  Last Office Visit: 01/18/2018  Future Office visit:    Next 5 appointments (look out 90 days)     Mar 27, 2018  1:00 PM CDT   Return Visit with Mendoza Jones MD   [facility] ([facility])    [facility] [address] [place] [id number] [phone]                   Routing refill request to provider for review/approval because:  Drug not active on patient's medication list

## 2018-03-20 NOTE — LETTER
"    3/20/2018         RE: Greyson Haas  [address] [place] [id number]        Dear Colleague,    Thank you for referring your patient, Greyson Haas, to the [facility][note 2]. Please see a copy of my visit note below.    INITIAL NEUROLOGY CONSULTATION    DATE OF VISIT: 3/20/2018  CLINIC LOCATION: [facility][note 2]  MRN: [id number]  PATIENT NAME: Greyson Haas  YOB: 1940    PRIMARY CARE PROVIDER: Neville Nick MD.     REASON FOR VISIT:   Chief Complaint   Patient presents with     Consult     balance issues-muscle stiffness      HISTORY OF PRESENT ILLNESS:                                                    Mr. Greyson Haas is 77 year old right handed male patient with history of lumbar radiculopathy with associated right foot drop and status post lumbar fusion (2015), hypertension, hyperlipidemia, chronic kidney disease stage III, and gout, who was seen in consultation today requested by Neville Nick MD, for balance problems.    Per patient's report, he was in his usual state of health until approximately 5-7 years ago, when he gradually developed bilateral lower extremity stiffness and difficulty with balance along with chronic pain.  It feels that both of his legs are like \"rubber bands\".  He also has a sensation of bilateral lower extremity weakness, even though the formal strength testing in the past was within normal limits.  The patient experiences numbness in both upper and lower extremities and has chronic cervical pain.  He did not notice any aggravating or alleviating factors.  He did not try any additional to lumbar surgeries and PT treatments.    His recent laboratory evaluation (January 2018) includes BMP, remarkable for creatinine of 1.27 and reduced GFR.  His vitamin B 12 level was 1477, his LDL is 92.    His previous pertinent imaging includes MRI of the cervical spine from 02/12/2014, which demonstrated diffuse degenerative changes throughout " the cervical spine with resultant borderline cord compression at C2-3 and C6-7 without actual myelopathic changes in the spinal cord.  In addition, there was moderate to severe bilateral neuroforaminal stenosis at C6-7 with mild stenosis elsewhere.    Lumbar spine MRI from 05/15/2015 demonstrated relatively advanced multilevel degenerative disc disease changes throughout the lumbar spine with multilevel central stenosis, greatest at L2-3 and L1-2, possibly slightly progressed since 2014 and multilevel foraminal stenosis which is greatest and severe bilateral at L5-S1, and minimal grade 1 anterior spondylolisthesis at L5-S1.  EMG study from 2015 demonstrated electrodiagnostic changes that could be best explained by chronic left L5 radiculopathy.  Following these studies, the patient underwent lumbar fusion that did not help his symptoms much.    The patient denies a history of recent head injury. Prior neurological history: negative for migraine, stroke, brain neoplasms, seizure disorders, multiple sclerosis, meningitis, encephalitis, and major head injuries.    Neurologic Review of Systems - no amaurosis, diplopia, abnormal speech, unilateral numbness or weakness. He endorses bilateral hearing loss, sinus problems, tinnitus, blepharitis, and arthritis.  These problems have been discussed with other medical providers.  Otherwise, he denies any other complaints on 14-point comprehensive review of systems.    PAST MEDICAL/SURGICAL HISTORY:                                                    I personally reviewed patient's past medical and surgical history with the patient at today's visit.  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 Rubin     Nocturia     x 2-3     Pulmonary Nodule, stable      Tubular Adenoma 3/07     Unspecified glaucoma(365.9)     Followed by Dr. Monique     Past Surgical History:   Procedure Laterality Date     ARTHROPLASTY HIP Left 9/18/2017    LEFT TOTAL HIP ARTHROPLASTY(DEPUY)^;  Surgeon: Aurelio Hood MD;  Location: [facility] OR     C NONSPECIFIC PROCEDURE  6/04    cysto, R ureteral stent, ESWL     C NONSPECIFIC PROCEDURE  CHILD    TONSILLECTOMY     C NONSPECIFIC PROCEDURE  AGE 4    L heel osteomyelitis with \"bone scraping\"     OPTICAL TRACKING SYSTEM FUSION SPINE POSTERIOR LUMBAR TWO LEVELS N/A 11/5/2015    Procedure: OPTICAL TRACKING SYSTEM FUSION SPINE POSTERIOR LUMBAR TWO LEVELS;  Surgeon: Mina Grove MD;  Location:  OR     SURGICAL HISTORY OF -   1991    B Shoulder surgeries     SURGICAL HISTORY OF -   1992    lumbar laminectomy     SURGICAL HISTORY OF -   1998    lumbar decompression    Dr. Pop     SURGICAL HISTORY OF -   9/06    L shoulder    Dr. Foley     SURGICAL HISTORY OF -   11/07    partial amputation R 3rd toe ([facility])  Dr. Neal     SURGICAL HISTORY OF -   2010    bilateral cataracts       SURGICAL HISTORY OF -   1/11    Mohs L chest, Dr. Anthony     SURGICAL HISTORY OF -   1/11    L TKA   Dr. Thomson     MEDICATIONS:                                                    I personally reviewed patient's medications and allergies with the patient at today's visit.  Current Outpatient Prescriptions on File Prior to Visit:  allopurinol (ZYLOPRIM) 300 MG tablet TAKE ONE TABLET BY MOUTH ONCE DAILY   lisinopril (PRINIVIL/ZESTRIL) 10 MG tablet Take 1 tablet (10 mg) by mouth daily   simvastatin (ZOCOR) 20 MG tablet Take 1 tablet (20 mg) by mouth every morning   aspirin 81 " MG tablet Take 1 tablet (81 mg) by mouth daily   allopurinol (ZYLOPRIM) 100 MG tablet Take 1 tablet (100 mg) by mouth every morning   Tamsulosin HCl (FLOMAX PO) Take 0.4 mg by mouth every morning   DORZOLAMIDE HCL-TIMOLOL MAL OP Place 1 drop into both eyes 2 times daily   Polyvinyl Alcohol-Povidone (REFRESH OP) Apply 1 drop to eye daily as needed   Cyanocobalamin (VITAMIN B-12 SL) Place 500 mg under the tongue every morning   hydrocortisone (CORTAID) 1 % cream Apply topically daily as needed for rash or itching (on legs.)   ACETAMINOPHEN PO Take 1,000 mg by mouth daily as needed for pain Reported on 2017   Sildenafil Citrate (VIAGRA PO) Take 50 mg by mouth daily as needed     ALLERGIES:                                                    No Known Allergies  FAMILY/SOCIAL HISTORY:                                                    Family and social history was reviewed with the patient at today's visit.  Family history is positive for stroke, migraine, and headache.   Problem (# of Occurrences) Relation (Name,Age of Onset)    Alcohol/Drug (1) Brother (d:age 67): chronic alcoholism    Arthritis (1) Brother (b 1938)    CANCER (2) Maternal Grandfather:  age 80, Colon or prostate Ca., Maternal Grandmother:  62, ? type    CEREBROVASCULAR DISEASE (1) Brother (d:age 73)    Cardiovascular (1) Father (d: age 76): CHF    Lipids (1) Mother (b 1914)        , lives with his wife.  Quit smoking 30-40 years ago.  Consumes 1-2 alcoholic drinks several times per week.  Retired.  Social History   Substance Use Topics     Smoking status: Former Smoker     Packs/day: 1.00     Years: 27.00     Quit date: 1983     Smokeless tobacco: Never Used     Alcohol use Yes      Comment: Ave 2 daily or more.  Heavy days 5-6, some days none.     REVIEW OF SYSTEMS:                                                    Patient has completed a [facility] Patient Health History, including a 14-system review, which was  personally reviewed, and pertinent positives are listed in HPI. He denies any additional problems on the further questioning.    EXAM:                                                    VITAL SIGNS:   /72 (BP Location: Left arm, Patient Position: Sitting, Cuff Size: Adult Regular)  Pulse 65  Temp 97.6  F (36.4  C) (Oral)  Resp 16  Wt 88 kg (194 lb)  SpO2 96%  BMI 30.38 kg/m2    General: pt is in NAD, cooperative.  Skin: normal turgor, moist mucous membranes, no lesions/rashes noticed.  HEENT: ATNC, EOMI, PERRL, white sclera, normal conjunctiva, no nystagmus or ptosis. No carotid bruits bilaterally.  Respiratory: lung sounds clear to auscultation bilaterally, no crackles, wheezes, rhonchi. Symmetric lung excursion, no accessory respiratory muscle use.  Cardiovascular: normal S1/S2, no murmurs/rubs/gallops.   Abdomen: Not distended.  : deferred.    Neurological:  Mental: awake, follows commands, mini-cog is 5/5 with 3/3 on memory recall, no aphasia or dysarthria. Fund of knowledge is appropriate for age.  Cranial Nerves:  CN II: visual acuity - able to accurately count fingers with each eye. Visual fields intact, fundi: discs sharp, no papilledema and normal vessels bilaterally.  CN III, IV, VI: EOM intact, pupils equal and reactive  CN V: facial sensation nl  CN VII: face symmetric, no facial droop  CN VIII: hearing reduced bilaterally  CN IX: palate elevation symmetric, uvula at midline  CN XI SCM normal, shoulder shrug nl  CN XII: tongue midline  Motor: Strength: 5/5 in all major groups of all extremities, except right dorsiflexion, where it is reduced to 4/5. Normal tone. No abnormal movements. No pronator drift b/l.  Reflexes: Triceps, biceps, brachioradialis, and patellar reflexes normal and symmetric, achilles - absent on the right, reduced on the left. No clonus noted. Toes are down-going b/l.   Sensory: temperature, light touch, pinprick, and vibration intact, except reduced pinprick and light  touch sensation over the right L5 dermatome. Romberg: negative.  Coordination: FNF and heel-shin tests intact b/l. No dysdiadochokinesia with rapid alternating movements.  Gait:  Normal, except mild difficulty with tandem walk.    DATA:     LABS: I personally reviewed the following labs:  Office Visit on 01/18/2018   Component Date Value Ref Range Status     Cholesterol 01/18/2018 189  <200 mg/dL Final     Triglycerides 01/18/2018 137  <150 mg/dL Final     HDL Cholesterol 01/18/2018 70  >39 mg/dL Final     LDL Cholesterol Calculated 01/18/2018 92  <100 mg/dL Final    Desirable:       <100 mg/dl     Non HDL Cholesterol 01/18/2018 119  <130 mg/dL Final     Creatinine Urine 01/18/2018 166  mg/dL Final     Albumin Urine mg/L 01/18/2018 49  mg/L Final     Albumin Urine mg/g Cr 01/18/2018 29.52* 0 - 17 mg/g Cr Final     ALT 01/18/2018 23  0 - 70 U/L Final     Sodium 01/18/2018 138  133 - 144 mmol/L Final     Potassium 01/18/2018 4.5  3.4 - 5.3 mmol/L Final     Chloride 01/18/2018 107  94 - 109 mmol/L Final     Carbon Dioxide 01/18/2018 21  20 - 32 mmol/L Final     Anion Gap 01/18/2018 10  3 - 14 mmol/L Final     Glucose 01/18/2018 98  70 - 99 mg/dL Final     Urea Nitrogen 01/18/2018 22  7 - 30 mg/dL Final     Creatinine 01/18/2018 1.27* 0.66 - 1.25 mg/dL Final     GFR Estimate 01/18/2018 55* >60 mL/min/1.7m2 Final    Non  GFR Calc     GFR Estimate If Black 01/18/2018 66  >60 mL/min/1.7m2 Final    African American GFR Calc     Calcium 01/18/2018 9.3  8.5 - 10.1 mg/dL Final     Vitamin B12 01/18/2018 1477* 193 - 986 pg/mL Final     Methylmalonic Acid 01/18/2018 0.18  0.00 - 0.40 umol/L Final    Comment: (Note)  INTERPRETIVE INFORMATION: MMA Serum/Plasma,                            Vitamin B12 Status  Test developed and characteristics determined by [facility]. See Compliance Statement B: [email or web address]  Performed by [facility],  [address] [place] [address] [phone]  [email or web address],  Yg Meehan MD, Lab. Director       Albumin Fraction 01/18/2018 4.2  3.7 - 5.1 g/dL Final     Alpha 1 Fraction 01/18/2018 0.3  0.2 - 0.4 g/dL Final     Alpha 2 Fraction 01/18/2018 0.7  0.5 - 0.9 g/dL Final     Beta Fraction 01/18/2018 0.7  0.6 - 1.0 g/dL Final     Gamma Fraction 01/18/2018 0.9  0.7 - 1.6 g/dL Final     Monoclonal Peak 01/18/2018 0.0  0.0 g/dL Final     ELP Interpretation: 01/18/2018    Final                    Value:Essentially normal electrophoretic pattern. No monoclonal protein seen. Pathologic   significance requires clinical correlation.  JAVIER Bergeron M.D., Pathologist.      Anticoagulation Therapy Visit on 10/02/2017   Component Date Value Ref Range Status     INR Protime 10/02/2017 2.2* 0.86 - 1.14 Final   Anticoagulation Therapy Visit on 09/29/2017   Component Date Value Ref Range Status     INR Protime 09/29/2017 3.2* 0.86 - 1.14 Final   Anticoagulation Therapy Visit on 09/25/2017   Component Date Value Ref Range Status     INR Protime 09/25/2017 2.5* 0.86 - 1.14 Final     EMG/IMAGING/OTHER STUDIES: I reviewed pertinent medical records, including scanned MRI reports, personal review of tabulated data and EMG report, and Care Everywhere, as detailed in the history of present illness.    ASSESSMENT and PLAN:      ASSESSMENT: Greyson Haas is a 77 year old male patient with history of lumbar radiculopathy with associated right foot drop and status post lumbar fusion (2015), hypertension, hyperlipidemia, chronic kidney disease stage III, and gout, who presents with balance/gait difficulty that might suggest cervical spinal stenosis/cervical myelopathy.  Most likely his symptoms have multifactorial etiology with additional contribution from bilateral multilevel lumbosacral radiculopathy and possibly lumbar spinal stenosis.  Peripheral polyneuropathy is less likely, but not totally excluded.  I ordered cervical spine MRI for further evaluation as the initial step.  I might consider EMG based  on MRI results.    We had a prolonged discussion with the patient regarding his symptoms, needed workup, and available treatment options, including surgical.  The rest of our discussion and the plan are summarized below.    DIAGNOSES:    ICD-10-CM    1. Balance problem R26.89 MR Cervical Spine w/o Contrast   2. Difficulty walking R26.2 MR Cervical Spine w/o Contrast     PLAN: At today's visit we thoroughly discussed various diagnostic possibilities for patient's symptoms and the reasons for work-up, which includes:  Orders Placed This Encounter   Procedures     MR Cervical Spine w/o Contrast     No new medications were ordered.    Additional recommendations after the work-up.    Next follow-up appointment is in the next 4 weeks or earlier if needed.    I advised the patient to call me with any questions or concerns.    Total Time: 81 minutes with > 50% spent counseling the patient on stated above assessment and recommendations, including nature of the diagnosis, needed w/u, proposed plan, and prognosis.  Additional time was used to answer patient's questions regarding his symptoms and the plan.    Mendoza Jones MD  / Neurology  [place]  (Chart documentation was completed in part with Dragon voice-recognition software. Even though reviewed, some grammatical, spelling, and word errors may remain.)              Again, thank you for allowing me to participate in the care of your patient.        Sincerely,        Mendoza Jones MD

## 2018-03-20 NOTE — MR AVS SNAPSHOT
After Visit Summary   3/20/2018    Greyson Haas    MRN: [id number]           Patient Information     Date Of Birth          1940        Visit Information        Provider Department      3/20/2018 10:30 AM Mendoza Jones MD [facility]        Today's Diagnoses     Balance problem    -  1    Difficulty walking          Care Instructions    AFTER VISIT SUMMARY (AVS):    At today's visit we discussed various diagnostic possibilities for your symptoms and the reasons for work-up, which includes:  Orders Placed This Encounter   Procedures     MR Cervical Spine w/o Contrast     No new medications were ordered.    Additional recommendations after the work-up.    Next follow-up appointment is in the next 4 week(s) or earlier if needed.    Please do not hesitate to call me with any questions or concerns.    Thanks.            Follow-ups after your visit        Follow-up notes from your care team     Return in about 4 weeks (around 4/17/2018).      Your next 10 appointments already scheduled     Mar 21, 2018  1:15 PM CDT   (Arrive by 1:00 PM)   MR CERVICAL SPINE W/O CONTRAST with [id number]   [facility] ([facility])    [address] [place] [id number] [phone]           Take your medicines as usual, unless your doctor tells you not to. Bring a list of your current medicines to your exam (including vitamins, minerals and over-the-counter drugs). Also bring the results of similar scans you may have had.  Please remove any body piercings and hair extensions before you arrive.  Follow your doctor s orders. If you do not, we may have to postpone your exam.  You may or may not receive IV contrast for this exam pending the discretion of the Radiologist.  You do not need to do anything special to prepare.  The MRI machine uses a strong magnet. Please wear clothes without metal (snaps, zippers). A sweatsuit works well, or we may give you  a [facility] gown.   **IMPORTANT** THE INSTRUCTIONS BELOW ARE ONLY FOR THOSE PATIENTS WHO HAVE BEEN PRESCRIBED SEDATION OR GENERAL ANESTHESIA DURING THEIR MRI PROCEDURE:  IF YOUR DOCTOR PRESCRIBED ORAL SEDATION (take medicine to help you relax during your exam):   You must get the medicine from your doctor (oral medication) before you arrive. Bring the medicine to the exam. Do not take it at home. You ll be told when to take it upon arriving for your exam.   Arrive one hour early. Bring someone who can take you home after the test. Your medicine will make you sleepy. After the exam, you may not drive, take a bus or take a taxi by yourself.  IF YOUR DOCTOR PRESCRIBED IV SEDATION:   Arrive one hour early. Bring someone who can take you home after the test. Your medicine will make you sleepy. After the exam, you may not drive, take a bus or take a taxi by yourself.   No eating 6 hours before your exam. You may have clear liquids up until 4 hours before your exam. (Clear liquids include water, clear tea, black coffee and fruit juice without pulp.)  IF YOUR DOCTOR PRESCRIBED ANESTHESIA (be asleep for your exam):   Arrive 1 1/2 hours early. Bring someone who can take you home after the test. You may not drive, take a bus or take a taxi by yourself.   No eating 8 hours before your exam. You may have clear liquids up until 4 hours before your exam. (Clear liquids include water, clear tea, black coffee and fruit juice without pulp.)   You will spend four to five hours in the recovery room.  Please call the Imaging Department at your exam site with any questions.            Mar 27, 2018  1:00 PM CDT   Return Visit with Mendoza Jones MD   [facility] ([facility])    [address] [place] [id number] [phone]              Future tests that were ordered for you today     Open Future Orders        Priority Expected Expires Ordered    MR Cervical Spine w/o  Contrast Routine  3/20/2019 3/20/2018            Who to contact     If you have questions or need follow up information about today's clinic visit or your schedule please contact [facility] directly at [phone].  Normal or non-critical lab and imaging results will be communicated to you by MyChart, letter or phone within 4 business days after the clinic has received the results. If you do not hear from us within 7 days, please contact the clinic through [employer]licks[employer] or phone. If you have a critical or abnormal lab result, we will notify you by phone as soon as possible.  Submit refill requests through vidIQ or call your pharmacy and they will forward the refill request to us. Please allow 3 business days for your refill to be completed.          Additional Information About Your Visit        Tap[employer]licks[employer] Information     vidIQ gives you secure access to your electronic health record. If you see a primary care provider, you can also send messages to your care team and make appointments. If you have questions, please call your primary care clinic.  If you do not have a primary care provider, please call [phone] and they will assist you.        Care EveryWhere ID     This is your Care EveryWhere ID. This could be used by other organizations to access your [place] medical records  [id number]        Your Vitals Were     Pulse Temperature Respirations Pulse Oximetry BMI (Body Mass Index)       65 97.6  F (36.4  C) (Oral) 16 96% 30.38 kg/m2        Blood Pressure from Last 3 Encounters:   03/20/18 120/72   01/18/18 124/72   09/22/17 116/77    Weight from Last 3 Encounters:   03/20/18 88 kg (194 lb)   01/18/18 87.3 kg (192 lb 6.4 oz)   09/01/17 89 kg (196 lb 3.2 oz)               Primary Care Provider Office Phone # Fax #    Neville Nick [phone] [address] [place] [phone]        Equal Access to Services     LETY SIFUENTES AH: Hadii kellee Roldan  kjjose sylvester waxay idiin hayaan adeeg kharash la'aan ah. So [facility] [phone].    ATENCIÓN: Si ramon edouard, tiene a [place] disposición servicios gratuitos de asistencia lingüística. Antionette al [phone].    We comply with applicable federal civil rights laws and [place] laws. We do not discriminate on the basis of race, color, national origin, age, disability, sex, sexual orientation, or gender identity.            Thank you!     Thank you for choosing [facility]  for your care. Our goal is always to provide you with excellent care. Hearing back from our patients is one way we can continue to improve our services. Please take a few minutes to complete the written survey that you may receive in the mail after your visit with us. Thank you!             Your Updated Medication List - Protect others around you: Learn how to safely use, store and throw away your medicines at www.disposemymeds.org.          This list is accurate as of 3/20/18 11:50 AM.  Always use your most recent med list.                   Brand Name Dispense Instructions for use Diagnosis    ACETAMINOPHEN PO      Take 1,000 mg by mouth daily as needed for pain Reported on 5/18/2017        * allopurinol 100 MG tablet    ZYLOPRIM    90 tablet    Take 1 tablet (100 mg) by mouth every morning    Gout of multiple sites, unspecified cause, unspecified chronicity       * allopurinol 300 MG tablet    ZYLOPRIM    90 tablet    TAKE ONE TABLET BY MOUTH ONCE DAILY    Gout of multiple sites, unspecified cause, unspecified chronicity       aspirin 81 MG tablet      Take 1 tablet (81 mg) by mouth daily        DORZOLAMIDE HCL-TIMOLOL MAL OP      Place 1 drop into both eyes 2 times daily        FLOMAX PO      Take 0.4 mg by mouth every morning        hydrocortisone 1 % cream    CORTAID     Apply topically daily as needed for rash or itching (on legs.)        lisinopril 10 MG tablet    PRINIVIL/ZESTRIL    90 tablet    Take 1 tablet  (10 mg) by mouth daily    Essential hypertension       REFRESH OP      Apply 1 drop to eye daily as needed        simvastatin 20 MG tablet    ZOCOR    90 tablet    Take 1 tablet (20 mg) by mouth every morning    Hyperlipidemia LDL goal <130       VIAGRA PO      Take 50 mg by mouth daily as needed        VITAMIN B-12 SL      Place 500 mg under the tongue every morning        * Notice:  This list has 2 medication(s) that are the same as other medications prescribed for you. Read the directions carefully, and ask your doctor or other care provider to review them with you.

## 2018-03-21 ENCOUNTER — HOSPITAL ENCOUNTER (OUTPATIENT)
Dept: MRI IMAGING | Facility: CLINIC | Age: 78
Discharge: HOME | End: 2018-03-21
Attending: PSYCHIATRY & NEUROLOGY | Admitting: PSYCHIATRY & NEUROLOGY
Payer: MEDICARE

## 2018-03-21 DIAGNOSIS — R26.89 BALANCE PROBLEM: ICD-10-CM

## 2018-03-21 DIAGNOSIS — M48.02 CERVICAL STENOSIS OF SPINAL CANAL: Primary | ICD-10-CM

## 2018-03-21 DIAGNOSIS — R26.2 DIFFICULTY WALKING: ICD-10-CM

## 2018-03-21 PROCEDURE — 72141 MRI NECK SPINE W/O DYE: CPT

## 2018-03-21 NOTE — PROGRESS NOTES
"INITIAL NEUROLOGY CONSULTATION    DATE OF VISIT: 3/20/2018  CLINIC LOCATION: [facility]  MRN: [id number]  PATIENT NAME: Greyson Haas  YOB: 1940    PRIMARY CARE PROVIDER: Neville Nick MD.     REASON FOR VISIT:   Chief Complaint   Patient presents with     Consult     balance issues-muscle stiffness      HISTORY OF PRESENT ILLNESS:                                                    Mr. Greyson Haas is 77 year old right handed male patient with history of lumbar radiculopathy with associated right foot drop and status post lumbar fusion (2015), hypertension, hyperlipidemia, chronic kidney disease stage III, and gout, who was seen in consultation today requested by Neville Nick MD, for balance problems.    Per patient's report, he was in his usual state of health until approximately 5-7 years ago, when he gradually developed bilateral lower extremity stiffness and difficulty with balance along with chronic pain.  It feels that both of his legs are like \"rubber bands\".  He also has a sensation of bilateral lower extremity weakness, even though the formal strength testing in the past was within normal limits.  The patient experiences numbness in both upper and lower extremities and has chronic cervical pain.  He did not notice any aggravating or alleviating factors.  He did not try any additional to lumbar surgeries and PT treatments.    His recent laboratory evaluation (January 2018) includes BMP, remarkable for creatinine of 1.27 and reduced GFR.  His vitamin B 12 level was 1477, his LDL is 92.    His previous pertinent imaging includes MRI of the cervical spine from 02/12/2014, which demonstrated diffuse degenerative changes throughout the cervical spine with resultant borderline cord compression at C2-3 and C6-7 without actual myelopathic changes in the spinal cord.  In addition, there was moderate to severe bilateral neuroforaminal stenosis at C6-7 with mild stenosis " elsewhere.    Lumbar spine MRI from 05/15/2015 demonstrated relatively advanced multilevel degenerative disc disease changes throughout the lumbar spine with multilevel central stenosis, greatest at L2-3 and L1-2, possibly slightly progressed since 2014 and multilevel foraminal stenosis which is greatest and severe bilateral at L5-S1, and minimal grade 1 anterior spondylolisthesis at L5-S1.  EMG study from 2015 demonstrated electrodiagnostic changes that could be best explained by chronic left L5 radiculopathy.  Following these studies, the patient underwent lumbar fusion that did not help his symptoms much.    The patient denies a history of recent head injury. Prior neurological history: negative for migraine, stroke, brain neoplasms, seizure disorders, multiple sclerosis, meningitis, encephalitis, and major head injuries.    Neurologic Review of Systems - no amaurosis, diplopia, abnormal speech, unilateral numbness or weakness. He endorses bilateral hearing loss, sinus problems, tinnitus, blepharitis, and arthritis.  These problems have been discussed with other medical providers.  Otherwise, he denies any other complaints on 14-point comprehensive review of systems.    PAST MEDICAL/SURGICAL HISTORY:                                                    I personally reviewed patient's past medical and surgical history with the patient at today's visit.  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 Rubin  "    Nocturia     x 2-3     Pulmonary Nodule, stable      Tubular Adenoma 3/07     Unspecified glaucoma(365.9)     Followed by Dr. Monique     Past Surgical History:   Procedure Laterality Date     ARTHROPLASTY HIP Left 9/18/2017    LEFT TOTAL HIP ARTHROPLASTY(DEPUY)^;  Surgeon: Aurelio Hood MD;  Location: [facility] OR     C NONSPECIFIC PROCEDURE  6/04    cysto, R ureteral stent, ESWL     C NONSPECIFIC PROCEDURE  CHILD    TONSILLECTOMY     C NONSPECIFIC PROCEDURE  AGE 4    L heel osteomyelitis with \"bone scraping\"     OPTICAL TRACKING SYSTEM FUSION SPINE POSTERIOR LUMBAR TWO LEVELS N/A 11/5/2015    Procedure: OPTICAL TRACKING SYSTEM FUSION SPINE POSTERIOR LUMBAR TWO LEVELS;  Surgeon: Mina Grove MD;  Location: [facility] OR     SURGICAL HISTORY OF -   1991    B Shoulder surgeries     SURGICAL HISTORY OF -   1992    lumbar laminectomy     SURGICAL HISTORY OF -   1998    lumbar decompression    Dr. Pop     SURGICAL HISTORY OF -   9/06    L shoulder    Dr. Foley     SURGICAL HISTORY OF -   11/07    partial amputation R 3rd toe (tophus)  Dr. Neal     SURGICAL HISTORY OF -   2010    bilateral cataracts       SURGICAL HISTORY OF -   1/11    Mohs L chest, Dr. Anthony     SURGICAL HISTORY OF - 1/11    L TKA   Dr. Thomson     MEDICATIONS:                                                    I personally reviewed patient's medications and allergies with the patient at today's visit.  Current Outpatient Prescriptions on File Prior to Visit:  allopurinol (ZYLOPRIM) 300 MG tablet TAKE ONE TABLET BY MOUTH ONCE DAILY   lisinopril (PRINIVIL/ZESTRIL) 10 MG tablet Take 1 tablet (10 mg) by mouth daily   simvastatin (ZOCOR) 20 MG tablet Take 1 tablet (20 mg) by mouth every morning   aspirin 81 MG tablet Take 1 tablet (81 mg) by mouth daily   allopurinol (ZYLOPRIM) 100 MG tablet Take 1 tablet (100 mg) by mouth every morning   Tamsulosin HCl (FLOMAX PO) Take 0.4 mg by mouth every morning   DORZOLAMIDE HCL-TIMOLOL MAL OP Place 1 " drop into both eyes 2 times daily   Polyvinyl Alcohol-Povidone (REFRESH OP) Apply 1 drop to eye daily as needed   Cyanocobalamin (VITAMIN B-12 SL) Place 500 mg under the tongue every morning   hydrocortisone (CORTAID) 1 % cream Apply topically daily as needed for rash or itching (on legs.)   ACETAMINOPHEN PO Take 1,000 mg by mouth daily as needed for pain Reported on 2017   Sildenafil Citrate (VIAGRA PO) Take 50 mg by mouth daily as needed     ALLERGIES:                                                    No Known Allergies  FAMILY/SOCIAL HISTORY:                                                    Family and social history was reviewed with the patient at today's visit.  Family history is positive for stroke, migraine, and headache.   Problem (# of Occurrences) Relation (Name,Age of Onset)    Alcohol/Drug (1) Brother (d:age 67): chronic alcoholism    Arthritis (1) Brother (b 1938)    CANCER (2) Maternal Grandfather:  age 80, Colon or prostate Ca., Maternal Grandmother:  62, ? type    CEREBROVASCULAR DISEASE (1) Brother (d:age 73)    Cardiovascular (1) Father (d: age 76): CHF    Lipids (1) Mother (b )        , lives with his wife.  Quit smoking 30-40 years ago.  Consumes 1-2 alcoholic drinks several times per week.  Retired.  Social History   Substance Use Topics     Smoking status: Former Smoker     Packs/day: 1.00     Years: 27.00     Quit date: 1983     Smokeless tobacco: Never Used     Alcohol use Yes      Comment: Ave 2 daily or more.  Heavy days 5-6, some days none.     REVIEW OF SYSTEMS:                                                    Patient has completed a [facility] Patient Health History, including a 14-system review, which was personally reviewed, and pertinent positives are listed in HPI. He denies any additional problems on the further questioning.    EXAM:                                                    VITAL SIGNS:   /72 (BP Location: Left arm, Patient  Position: Sitting, Cuff Size: Adult Regular)  Pulse 65  Temp 97.6  F (36.4  C) (Oral)  Resp 16  Wt 88 kg (194 lb)  SpO2 96%  BMI 30.38 kg/m2    General: pt is in NAD, cooperative.  Skin: normal turgor, moist mucous membranes, no lesions/rashes noticed.  HEENT: ATNC, EOMI, PERRL, white sclera, normal conjunctiva, no nystagmus or ptosis. No carotid bruits bilaterally.  Respiratory: lung sounds clear to auscultation bilaterally, no crackles, wheezes, rhonchi. Symmetric lung excursion, no accessory respiratory muscle use.  Cardiovascular: normal S1/S2, no murmurs/rubs/gallops.   Abdomen: Not distended.  : deferred.    Neurological:  Mental: awake, follows commands, mini-cog is 5/5 with 3/3 on memory recall, no aphasia or dysarthria. Fund of knowledge is appropriate for age.  Cranial Nerves:  CN II: visual acuity - able to accurately count fingers with each eye. Visual fields intact, fundi: discs sharp, no papilledema and normal vessels bilaterally.  CN III, IV, VI: EOM intact, pupils equal and reactive  CN V: facial sensation nl  CN VII: face symmetric, no facial droop  CN VIII: hearing reduced bilaterally  CN IX: palate elevation symmetric, uvula at midline  CN XI SCM normal, shoulder shrug nl  CN XII: tongue midline  Motor: Strength: 5/5 in all major groups of all extremities, except right dorsiflexion, where it is reduced to 4/5. Normal tone. No abnormal movements. No pronator drift b/l.  Reflexes: Triceps, biceps, brachioradialis, and patellar reflexes normal and symmetric, achilles - absent on the right, reduced on the left. No clonus noted. Toes are down-going b/l.   Sensory: temperature, light touch, pinprick, and vibration intact, except reduced pinprick and light touch sensation over the right L5 dermatome. Romberg: negative.  Coordination: FNF and heel-shin tests intact b/l. No dysdiadochokinesia with rapid alternating movements.  Gait:  Normal, except mild difficulty with tandem walk.    DATA:      LABS: I personally reviewed the following labs:  Office Visit on 01/18/2018   Component Date Value Ref Range Status     Cholesterol 01/18/2018 189  <200 mg/dL Final     Triglycerides 01/18/2018 137  <150 mg/dL Final     HDL Cholesterol 01/18/2018 70  >39 mg/dL Final     LDL Cholesterol Calculated 01/18/2018 92  <100 mg/dL Final    Desirable:       <100 mg/dl     Non HDL Cholesterol 01/18/2018 119  <130 mg/dL Final     Creatinine Urine 01/18/2018 166  mg/dL Final     Albumin Urine mg/L 01/18/2018 49  mg/L Final     Albumin Urine mg/g Cr 01/18/2018 29.52* 0 - 17 mg/g Cr Final     ALT 01/18/2018 23  0 - 70 U/L Final     Sodium 01/18/2018 138  133 - 144 mmol/L Final     Potassium 01/18/2018 4.5  3.4 - 5.3 mmol/L Final     Chloride 01/18/2018 107  94 - 109 mmol/L Final     Carbon Dioxide 01/18/2018 21  20 - 32 mmol/L Final     Anion Gap 01/18/2018 10  3 - 14 mmol/L Final     Glucose 01/18/2018 98  70 - 99 mg/dL Final     Urea Nitrogen 01/18/2018 22  7 - 30 mg/dL Final     Creatinine 01/18/2018 1.27* 0.66 - 1.25 mg/dL Final     GFR Estimate 01/18/2018 55* >60 mL/min/1.7m2 Final    Non  GFR Calc     GFR Estimate If Black 01/18/2018 66  >60 mL/min/1.7m2 Final    African American GFR Calc     Calcium 01/18/2018 9.3  8.5 - 10.1 mg/dL Final     Vitamin B12 01/18/2018 1477* 193 - 986 pg/mL Final     Methylmalonic Acid 01/18/2018 0.18  0.00 - 0.40 umol/L Final    Comment: (Note)  INTERPRETIVE INFORMATION: MMA Serum/Plasma,                            Vitamin B12 Status  Test developed and characteristics determined by [facility]. See Compliance Statement B: [employer]  Performed by [facility],  [address] [place] [id number] [phone]  [email or web address] [email or web address] Meehan MD, Lab. Director       Albumin Fraction 01/18/2018 4.2  3.7 - 5.1 g/dL Final     Alpha 1 Fraction 01/18/2018 0.3  0.2 - 0.4 g/dL Final     Alpha 2 Fraction 01/18/2018 0.7  0.5 - 0.9 g/dL Final     Beta Fraction 01/18/2018  0.7  0.6 - 1.0 g/dL Final     Gamma Fraction 01/18/2018 0.9  0.7 - 1.6 g/dL Final     Monoclonal Peak 01/18/2018 0.0  0.0 g/dL Final     ELP Interpretation: 01/18/2018    Final                    Value:Essentially normal electrophoretic pattern. No monoclonal protein seen. Pathologic   significance requires clinical correlation.  JAVIER Bergeron M.D., Pathologist.      Anticoagulation Therapy Visit on 10/02/2017   Component Date Value Ref Range Status     INR Protime 10/02/2017 2.2* 0.86 - 1.14 Final   Anticoagulation Therapy Visit on 09/29/2017   Component Date Value Ref Range Status     INR Protime 09/29/2017 3.2* 0.86 - 1.14 Final   Anticoagulation Therapy Visit on 09/25/2017   Component Date Value Ref Range Status     INR Protime 09/25/2017 2.5* 0.86 - 1.14 Final     EMG/IMAGING/OTHER STUDIES: I reviewed pertinent medical records, including scanned MRI reports, personal review of tabulated data and EMG report, and Care Everywhere, as detailed in the history of present illness.    ASSESSMENT and PLAN:      ASSESSMENT: Greyson Haas is a 77 year old male patient with history of lumbar radiculopathy with associated right foot drop and status post lumbar fusion (2015), hypertension, hyperlipidemia, chronic kidney disease stage III, and gout, who presents with balance/gait difficulty that might suggest cervical spinal stenosis/cervical myelopathy.  Most likely his symptoms have multifactorial etiology with additional contribution from bilateral multilevel lumbosacral radiculopathy and possibly lumbar spinal stenosis.  Peripheral polyneuropathy is less likely, but not totally excluded.  I ordered cervical spine MRI for further evaluation as the initial step.  I might consider EMG based on MRI results.    We had a prolonged discussion with the patient regarding his symptoms, needed workup, and available treatment options, including surgical.  The rest of our discussion and the plan are summarized below.    DIAGNOSES:     ICD-10-CM    1. Balance problem R26.89 MR Cervical Spine w/o Contrast   2. Difficulty walking R26.2 MR Cervical Spine w/o Contrast     PLAN: At today's visit we thoroughly discussed various diagnostic possibilities for patient's symptoms and the reasons for work-up, which includes:  Orders Placed This Encounter   Procedures     MR Cervical Spine w/o Contrast     No new medications were ordered.    Additional recommendations after the work-up.    Next follow-up appointment is in the next 4 weeks or earlier if needed.    I advised the patient to call me with any questions or concerns.    Total Time: 81 minutes with > 50% spent counseling the patient on stated above assessment and recommendations, including nature of the diagnosis, needed w/u, proposed plan, and prognosis.  Additional time was used to answer patient's questions regarding his symptoms and the plan.    Mendoza Jones MD  / Neurology  [place]  (Chart documentation was completed in part with Dragon voice-recognition software. Even though reviewed, some grammatical, spelling, and word errors may remain.)

## 2018-03-22 NOTE — TELEPHONE ENCOUNTER
Need a visit, consider E-visit or Phone visit, or I can add him on Friday afternoon.   Phone visit would need to be Thursday evening or Friday afternoon.

## 2018-03-23 RX ORDER — PREDNISONE 10 MG/1
10 TABLET ORAL DAILY
Qty: 90 TABLET | Refills: 1 | Status: CANCELLED | OUTPATIENT
Start: 2018-03-23

## 2018-03-23 NOTE — TELEPHONE ENCOUNTER
Patient called back, per Yudith SERRA(pt rep) Patient does not want telephone visit because insurance does not cover it.

## 2018-03-26 NOTE — TELEPHONE ENCOUNTER
Informed patient, stated understanding.  Offered to assist patient in scheduling an appointment.  He declined stating he would call back later to set something up.

## 2018-03-27 ENCOUNTER — OFFICE VISIT (OUTPATIENT)
Dept: NEUROLOGY | Facility: CLINIC | Age: 78
End: 2018-03-27
Payer: COMMERCIAL

## 2018-03-27 VITALS
SYSTOLIC BLOOD PRESSURE: 112 MMHG | HEART RATE: 80 BPM | OXYGEN SATURATION: 98 % | WEIGHT: 189 LBS | DIASTOLIC BLOOD PRESSURE: 74 MMHG | RESPIRATION RATE: 16 BRPM | TEMPERATURE: 98.6 F | BODY MASS INDEX: 29.6 KG/M2

## 2018-03-27 DIAGNOSIS — R26.2 DIFFICULTY WALKING: ICD-10-CM

## 2018-03-27 DIAGNOSIS — M48.02 CERVICAL STENOSIS OF SPINAL CANAL: Primary | ICD-10-CM

## 2018-03-27 PROCEDURE — 99215 OFFICE O/P EST HI 40 MIN: CPT | Performed by: PSYCHIATRY & NEUROLOGY

## 2018-03-27 NOTE — PROGRESS NOTES
ESTABLISHED PATIENT NEUROLOGY NOTE    DATE OF VISIT: 3/27/2018  CLINIC LOCATION: [facility]  MRN: [id number]  PATIENT NAME: Greyson Haas  YOB: 1940    PCP: Neville Nick MD    REASON FOR VISIT:   Chief Complaint   Patient presents with     Follow Up For     Neck MRI     SUBJECTIVE:                                                      HISTORY OF PRESENT ILLNESS: Patient is here for follow up regarding balance/gait difficulty. Please refer to my initial note from 03/20/2018 for further information.  The patient is accompanied by his wife, who participates in interview.    Since the last visit, the patient denies any noticeable changes in his symptoms.  He denies interval development of new focal neurological symptoms.  He completed cervical spine MRI that demonstrated multilevel degenerative disc and facet disease with multilevel central canal and foraminal stenoses.  Most significant findings are at C6-C7 where there is moderate to severe central canal stenosis and C2-3, C3-4, and C5-6 where the moderate central canal stenosis is seen.  Cord deformity is also present at all of these levels.    On review of systems, patient endorses no additional new active complaints. Medications, allergies, family and social history were also reviewed. There are no changes reported by patient.    CURRENT MEDICATIONS:   Current Outpatient Prescriptions   Medication     allopurinol (ZYLOPRIM) 300 MG tablet     lisinopril (PRINIVIL/ZESTRIL) 10 MG tablet     simvastatin (ZOCOR) 20 MG tablet     aspirin 81 MG tablet     allopurinol (ZYLOPRIM) 100 MG tablet     Tamsulosin HCl (FLOMAX PO)     DORZOLAMIDE HCL-TIMOLOL MAL OP     Polyvinyl Alcohol-Povidone (REFRESH OP)     Cyanocobalamin (VITAMIN B-12 SL)     hydrocortisone (CORTAID) 1 % cream     ACETAMINOPHEN PO     Sildenafil Citrate (VIAGRA PO)     REVIEW OF SYSTEMS:                                                    10-system review was completed.  Pertinent positives are included in HPI. The remainder of ROS is negative.  EXAM:                                                    Physical Exam:   Vitals: /74 (BP Location: Right arm, Patient Position: Sitting, Cuff Size: Adult Regular)  Pulse 80  Temp 98.6  F (37  C) (Oral)  Resp 16  Wt 85.7 kg (189 lb)  SpO2 98%  BMI 29.6 kg/m2    General: pt is in NAD, cooperative.  Skin: normal turgor, moist mucous membranes, no lesions/rashes noticed.  HEENT: ATNC, white sclera, normal conjunctiva.  Respiratory: Symmetric lung excursion, no accessory respiratory muscle use.  Abdomen: Non distended.  Neurological: awake, cooperative, follows commands, no aphasia or dysarthria noted, cranial nerves II-XII: no ptosis, extraocular motility is full, face is symmetric, tongue is midline, equally moves all extremities, 4/5 right dorsiflexion, reduced pinprick and light touch over the right L5 dermatome, no dysmetria bilaterally, casual gait is normal.    DATA:   Imaging: I personally reviewed cervical spine MRI images, as detailed in the history of present illness.  ASSESSMENT and PLAN:                                                    Assessment: 77-year-old male patient with history of lumbar radiculopathy with associated mild right foot drop, status post lumbar fusion (2015) presents for follow-up of balance/gait difficulty secondary to cervical spinal stenosis, as evidenced on his recent cervical spine MRI we had a detailed discussion with the patient and his wife regarding cervical spine MRI findings and reviewed images together.  I recommended the patient to proceed with already placed a neurosurgery referral.  We also discussed potential complications related to head injury or falls that might lead to paralysis if spinal canal stenosis is left untreated.    Diagnoses:    ICD-10-CM    1. Cervical stenosis of spinal canal M48.02    2. Difficulty walking R26.2      Plan: At today's visit we thoroughly reviewed  cervical spine MRI images and report.  We also discussed the plan, which includes a neurosurgery referral to discuss surgical treatment options.    No new medications were ordered.    Next follow-up appointment is on as needed basis.    I advised the patient to call me with any questions or concerns.    Total Time: 40 minutes with > 50% spent counseling the patient and his wife on stated above assessment and recommendations, including nature of the diagnosis, MRI results and, proposed plan.  Additional time was used to answer their questions regarding the plan and MRI findings.    Mendoza Jones MD  / Neurology

## 2018-03-27 NOTE — PATIENT INSTRUCTIONS
AFTER VISIT SUMMARY (AVS):    At today's visit we thoroughly reviewed cervical spine MRI images and report.  We also discussed the plan, which includes a neurosurgery referral to discuss surgical treatment options.    No new medications were ordered.    Next follow-up appointment is on as needed basis.    Please do not hesitate to call me with any questions or concerns.    Thanks.

## 2018-03-27 NOTE — LETTER
3/27/2018         RE: Greyson Haas  [address] [place] [id number]        Dear Colleague,    Thank you for referring your patient, Greyson Haas, to the [facility]. Please see a copy of my visit note below.    ESTABLISHED PATIENT NEUROLOGY NOTE    DATE OF VISIT: 3/27/2018  CLINIC LOCATION: [facility]  MRN: [id number]  PATIENT NAME: Greyson Haas  YOB: 1940    PCP: Neville Nick MD    REASON FOR VISIT:   Chief Complaint   Patient presents with     Follow Up For     Neck MRI     SUBJECTIVE:                                                      HISTORY OF PRESENT ILLNESS: Patient is here for follow up regarding balance/gait difficulty. Please refer to my initial note from 03/20/2018 for further information.  The patient is accompanied by his wife, who participates in interview.    Since the last visit, the patient denies any noticeable changes in his symptoms.  He denies interval development of new focal neurological symptoms.  He completed cervical spine MRI that demonstrated multilevel degenerative disc and facet disease with multilevel central canal and foraminal stenoses.  Most significant findings are at C6-C7 where there is moderate to severe central canal stenosis and C2-3, C3-4, and C5-6 where the moderate central canal stenosis is seen.  Cord deformity is also present at all of these levels.    On review of systems, patient endorses no additional new active complaints. Medications, allergies, family and social history were also reviewed. There are no changes reported by patient.    CURRENT MEDICATIONS:   Current Outpatient Prescriptions   Medication     allopurinol (ZYLOPRIM) 300 MG tablet     lisinopril (PRINIVIL/ZESTRIL) 10 MG tablet     simvastatin (ZOCOR) 20 MG tablet     aspirin 81 MG tablet     allopurinol (ZYLOPRIM) 100 MG tablet     Tamsulosin HCl (FLOMAX PO)     DORZOLAMIDE HCL-TIMOLOL MAL OP     Polyvinyl Alcohol-Povidone  (REFRESH OP)     Cyanocobalamin (VITAMIN B-12 SL)     hydrocortisone (CORTAID) 1 % cream     ACETAMINOPHEN PO     Sildenafil Citrate (VIAGRA PO)     REVIEW OF SYSTEMS:                                                    10-system review was completed. Pertinent positives are included in HPI. The remainder of ROS is negative.  EXAM:                                                    Physical Exam:   Vitals: /74 (BP Location: Right arm, Patient Position: Sitting, Cuff Size: Adult Regular)  Pulse 80  Temp 98.6  F (37  C) (Oral)  Resp 16  Wt 85.7 kg (189 lb)  SpO2 98%  BMI 29.6 kg/m2    General: pt is in NAD, cooperative.  Skin: normal turgor, moist mucous membranes, no lesions/rashes noticed.  HEENT: ATNC, white sclera, normal conjunctiva.  Respiratory: Symmetric lung excursion, no accessory respiratory muscle use.  Abdomen: Non distended.  Neurological: awake, cooperative, follows commands, no aphasia or dysarthria noted, cranial nerves II-XII: no ptosis, extraocular motility is full, face is symmetric, tongue is midline, equally moves all extremities, 4/5 right dorsiflexion, reduced pinprick and light touch over the right L5 dermatome, no dysmetria bilaterally, casual gait is normal.    DATA:   Imaging: I personally reviewed cervical spine MRI images, as detailed in the history of present illness.  ASSESSMENT and PLAN:                                                    Assessment: 77-year-old male patient with history of lumbar radiculopathy with associated mild right foot drop, status post lumbar fusion (2015) presents for follow-up of balance/gait difficulty secondary to cervical spinal stenosis, as evidenced on his recent cervical spine MRI we had a detailed discussion with the patient and his wife regarding cervical spine MRI findings and reviewed images together.  I recommended the patient to proceed with already placed a neurosurgery referral.  We also discussed potential complications related to  head injury or falls that might lead to paralysis if spinal canal stenosis is left untreated.    Diagnoses:    ICD-10-CM    1. Cervical stenosis of spinal canal M48.02    2. Difficulty walking R26.2      Plan: At today's visit we thoroughly reviewed cervical spine MRI images and report.  We also discussed the plan, which includes a neurosurgery referral to discuss surgical treatment options.    No new medications were ordered.    Next follow-up appointment is on as needed basis.    I advised the patient to call me with any questions or concerns.    Total Time: 40 minutes with > 50% spent counseling the patient and his wife on stated above assessment and recommendations, including nature of the diagnosis, MRI results and, proposed plan.  Additional time was used to answer their questions regarding the plan and MRI findings.    Mendoza Jones MD  / Neurology        Again, thank you for allowing me to participate in the care of your patient.        Sincerely,        Mendoza Jones MD

## 2018-03-27 NOTE — MR AVS SNAPSHOT
After Visit Summary   3/27/2018    Greyson Haas    MRN: [id number]           Patient Information     Date Of Birth          1940        Visit Information        Provider Department      3/27/2018 1:00 PM Mendoza Jones MD [facility]        Today's Diagnoses     Cervical stenosis of spinal canal    -  1    Difficulty walking          Care Instructions    AFTER VISIT SUMMARY (AVS):    At today's visit we thoroughly reviewed cervical spine MRI images and report.  We also discussed the plan, which includes a neurosurgery referral to discuss surgical treatment options.    No new medications were ordered.    Next follow-up appointment is on as needed basis.    Please do not hesitate to call me with any questions or concerns.    Thanks.            Follow-ups after your visit        Follow-up notes from your care team     Return if symptoms worsen or fail to improve.      Who to contact     If you have questions or need follow up information about today's clinic visit or your schedule please contact [facility] directly at [phone].  Normal or non-critical lab and imaging results will be communicated to you by [employer], letter or phone within 4 business days after the clinic has received the results. If you do not hear from us within 7 days, please contact the clinic through [employer] or phone. If you have a critical or abnormal lab result, we will notify you by phone as soon as possible.  Submit refill requests through [employer] or call your pharmacy and they will forward the refill request to us. Please allow 3 business days for your refill to be completed.          Additional Information About Your Visit        [employer] Information     [employer] gives you secure access to your electronic health record. If you see a primary care provider, you can also send messages to your care team and make appointments. If you have questions, please call your primary  care clinic.  If you do not have a primary care provider, please call [phone] and they will assist you.        Care EveryWhere ID     This is your Care EveryWhere ID. This could be used by other organizations to access your [place] medical records  [id number]        Your Vitals Were     Pulse Temperature Respirations Pulse Oximetry BMI (Body Mass Index)       80 98.6  F (37  C) (Oral) 16 98% 29.6 kg/m2        Blood Pressure from Last 3 Encounters:   03/27/18 112/74   03/20/18 120/72   01/18/18 124/72    Weight from Last 3 Encounters:   03/27/18 85.7 kg (189 lb)   03/20/18 88 kg (194 lb)   01/18/18 87.3 kg (192 lb 6.4 oz)              Today, you had the following     No orders found for display       Primary Care Provider Office Phone # Fax #    Neville Nilson Nick [phone]       [address] [place] [phone]        Equal Access to Services     [facility] : Hadii aad ku hadasho Soomaali, waaxda luqadaha, qaybta kaalmada adeegyada, og yee . So [facility] [phone].    ATENCIÓN: Si habla español, tiene a [place] disposición servicios gratuitos de asistencia lingüística. [facility] [phone].    We comply with applicable federal civil rights laws and [place] laws. We do not discriminate on the basis of race, color, national origin, age, disability, sex, sexual orientation, or gender identity.            Thank you!     Thank you for choosing [facility]  for your care. Our goal is always to provide you with excellent care. Hearing back from our patients is one way we can continue to improve our services. Please take a few minutes to complete the written survey that you may receive in the mail after your visit with us. Thank you!             Your Updated Medication List - Protect others around you: Learn how to safely use, store and throw away your medicines at www.disposemymeds.org.          This list is accurate as of 3/27/18  1:30 PM.   Always use your most recent med list.                   Brand Name Dispense Instructions for use Diagnosis    ACETAMINOPHEN PO      Take 1,000 mg by mouth daily as needed for pain Reported on 5/18/2017        * allopurinol 100 MG tablet    ZYLOPRIM    90 tablet    Take 1 tablet (100 mg) by mouth every morning    Gout of multiple sites, unspecified cause, unspecified chronicity       * allopurinol 300 MG tablet    ZYLOPRIM    90 tablet    TAKE ONE TABLET BY MOUTH ONCE DAILY    Gout of multiple sites, unspecified cause, unspecified chronicity       aspirin 81 MG tablet      Take 1 tablet (81 mg) by mouth daily        DORZOLAMIDE HCL-TIMOLOL MAL OP      Place 1 drop into both eyes 2 times daily        FLOMAX PO      Take 0.4 mg by mouth every morning        hydrocortisone 1 % cream    CORTAID     Apply topically daily as needed for rash or itching (on legs.)        lisinopril 10 MG tablet    PRINIVIL/ZESTRIL    90 tablet    Take 1 tablet (10 mg) by mouth daily    Essential hypertension       REFRESH OP      Apply 1 drop to eye daily as needed        simvastatin 20 MG tablet    ZOCOR    90 tablet    Take 1 tablet (20 mg) by mouth every morning    Hyperlipidemia LDL goal <130       VIAGRA PO      Take 50 mg by mouth daily as needed        VITAMIN B-12 SL      Place 500 mg under the tongue every morning        * Notice:  This list has 2 medication(s) that are the same as other medications prescribed for you. Read the directions carefully, and ask your doctor or other care provider to review them with you.

## 2018-03-27 NOTE — NURSING NOTE
"Chief Complaint   Patient presents with     Follow Up For     Neck MRI       Initial /74 (BP Location: Right arm, Patient Position: Sitting, Cuff Size: Adult Regular)  Pulse 80  Temp 98.6  F (37  C) (Oral)  Resp 16  Wt 85.7 kg (189 lb)  SpO2 98%  BMI 29.6 kg/m2 Estimated body mass index is 29.6 kg/(m^2) as calculated from the following:    Height as of 1/18/18: 1.702 m (5' 7\").    Weight as of this encounter: 85.7 kg (189 lb).  Medication Reconciliation: complete     Mariano Myers MA      "

## 2018-03-31 ENCOUNTER — TELEPHONE (OUTPATIENT)
Dept: INTERNAL MEDICINE | Facility: CLINIC | Age: 78
End: 2018-03-31

## 2018-04-02 NOTE — TELEPHONE ENCOUNTER
Spoke with patient who states that he no longer needs prescription.  He will call clinic back later to set up an appointment with Dr. Nick.

## 2018-04-10 ENCOUNTER — TRANSFERRED RECORDS (OUTPATIENT)
Dept: HEALTH INFORMATION MANAGEMENT | Facility: CLINIC | Age: 78
End: 2018-04-10

## 2018-04-16 ENCOUNTER — TRANSFERRED RECORDS (OUTPATIENT)
Dept: HEALTH INFORMATION MANAGEMENT | Facility: CLINIC | Age: 78
End: 2018-04-16

## 2018-04-17 ENCOUNTER — TRANSFERRED RECORDS (OUTPATIENT)
Dept: HEALTH INFORMATION MANAGEMENT | Facility: CLINIC | Age: 78
End: 2018-04-17

## 2018-05-15 ENCOUNTER — TELEPHONE (OUTPATIENT)
Dept: INTERNAL MEDICINE | Facility: CLINIC | Age: 78
End: 2018-05-15

## 2018-05-15 NOTE — TELEPHONE ENCOUNTER
Misty Haas is a 77 year old male who calls with ongoing shortness of breath going up stairs has been going on for weeks ..offered pt to be seen earlier than scheduled appt on 6/21.pt states he uses his exercise bike daily and does not have an issue.    NURSING ASSESSMENT:  Description: shortness of breath with climbing of stairs   Onset/duration:  Several weeks  Precip. factors:  HTN  Associated symptoms:  none  Improves/worsens symptoms:  none  Pain scale (0-10)   0/10  LMP/preg/breast feeding:  na  Last exam/Treatment:  1-  Allergies: No Known Allergies    MEDICATIONS:   Taking medication(s) as prescribed? Yes  Taking over the counter medication(s?) No  Any medication side effects? No significant side effects    Any barriers to taking medication(s) as prescribed?  No  Medication(s) improving/managing symptoms?  N/A  Medication reconciliation completed: Yes      NURSING PLAN: routed to provider    Val Barrera RN

## 2018-05-16 NOTE — TELEPHONE ENCOUNTER
Left message for patient to try and schedule for 4 pm same day tomorrow via my chart or call and or see a partner.Nury Barrera RN

## 2018-05-16 NOTE — TELEPHONE ENCOUNTER
Patient needs appointment and exam.  If symptoms, worsen, would advise seeing Yudith Omer PA-C or another partner if cannot get in to a same day spot.

## 2018-06-20 NOTE — PROGRESS NOTES
Discharge Note    Progress reporting period is from initial eval to Jan 25, 2018.     Greyson failed to return for next follow up visit and current status is unknown.  Please see information below for last relevant information on current status.  Patient seen for 2 visits.  SUBJECTIVE  Subjective changes noted by patient:  Patient reports no subluxation episodes since last visit.  He has been careful to avoid  activities which cause issues.  Patient says his overall status is unchanged.  Patient's treatment goal is to walk for distance without a cane.  He is not sure if this is realistic d/t preexisting hip/thigh weak because of a lumbar issue.  Previous pain level was  0/10.   Changes in function:  Yes (See Goal flowsheet attached for changes in current functional level)  Adverse reaction to treatment or activity: None    OBJECTIVE  Changes noted in objective findings: Gait: Poor left hip control.  MMT: Glute Med 2/5.  SLS: <2 seconds.     ASSESSMENT/PLAN  Diagnosis: s/p L DOTTIE; hip subluxation    DIAGP:  Diagnoses of Aftercare following left hip joint replacement surgery and Hip pain, left were pertinent to this visit.  Updated problem list and treatment plan:   Decreased function - HEP  Decreased strength - HEP  Instability - HEP  STG/LTGs have been met or progress has been made towards goals:  Yes, please see goal flowsheet for most current information  Assessment of Progress: current status is unknown.    Last current status: Pt is progressing as expected   Self Management Plans:  HEP  I have re-evaluated this patient and find that the nature, scope, duration and intensity of the therapy is appropriate for the medical condition of the patient.  Greyson continues to require the following intervention to meet STG and LTG's:  HEP.    Recommendations:  Discharge with current home program.  Patient to follow up with MD as needed.    Please refer to the daily flowsheet for treatment today, total treatment time and time  spent performing 1:1 timed codes.

## 2018-06-21 ENCOUNTER — OFFICE VISIT (OUTPATIENT)
Dept: INTERNAL MEDICINE | Facility: CLINIC | Age: 78
End: 2018-06-21
Payer: COMMERCIAL

## 2018-06-21 VITALS
DIASTOLIC BLOOD PRESSURE: 72 MMHG | BODY MASS INDEX: 29.44 KG/M2 | OXYGEN SATURATION: 98 % | TEMPERATURE: 98.2 F | HEART RATE: 84 BPM | SYSTOLIC BLOOD PRESSURE: 116 MMHG | WEIGHT: 188 LBS | RESPIRATION RATE: 16 BRPM

## 2018-06-21 DIAGNOSIS — R06.09 DOE (DYSPNEA ON EXERTION): ICD-10-CM

## 2018-06-21 DIAGNOSIS — M10.9 GOUT OF MULTIPLE SITES, UNSPECIFIED CAUSE, UNSPECIFIED CHRONICITY: ICD-10-CM

## 2018-06-21 DIAGNOSIS — R53.83 FATIGUE, UNSPECIFIED TYPE: Primary | ICD-10-CM

## 2018-06-21 DIAGNOSIS — R42 DIZZINESS: ICD-10-CM

## 2018-06-21 DIAGNOSIS — N18.30 CHRONIC KIDNEY DISEASE (CKD), STAGE III (MODERATE) (H): ICD-10-CM

## 2018-06-21 DIAGNOSIS — M48.02 CERVICAL STENOSIS OF SPINAL CANAL: ICD-10-CM

## 2018-06-21 LAB
BASOPHILS # BLD AUTO: 0 10E9/L (ref 0–0.2)
BASOPHILS NFR BLD AUTO: 0.3 %
DIFFERENTIAL METHOD BLD: NORMAL
EOSINOPHIL # BLD AUTO: 0.2 10E9/L (ref 0–0.7)
EOSINOPHIL NFR BLD AUTO: 2.4 %
ERYTHROCYTE [DISTWIDTH] IN BLOOD BY AUTOMATED COUNT: 14 % (ref 10–15)
ERYTHROCYTE [SEDIMENTATION RATE] IN BLOOD BY WESTERGREN METHOD: 6 MM/H (ref 0–20)
HCT VFR BLD AUTO: 43.6 % (ref 40–53)
HGB BLD-MCNC: 14.3 G/DL (ref 13.3–17.7)
LYMPHOCYTES # BLD AUTO: 2.3 10E9/L (ref 0.8–5.3)
LYMPHOCYTES NFR BLD AUTO: 32 %
MCH RBC QN AUTO: 30.3 PG (ref 26.5–33)
MCHC RBC AUTO-ENTMCNC: 32.8 G/DL (ref 31.5–36.5)
MCV RBC AUTO: 92 FL (ref 78–100)
MONOCYTES # BLD AUTO: 0.7 10E9/L (ref 0–1.3)
MONOCYTES NFR BLD AUTO: 9 %
NEUTROPHILS # BLD AUTO: 4.1 10E9/L (ref 1.6–8.3)
NEUTROPHILS NFR BLD AUTO: 56.3 %
PLATELET # BLD AUTO: 160 10E9/L (ref 150–450)
RBC # BLD AUTO: 4.72 10E12/L (ref 4.4–5.9)
WBC # BLD AUTO: 7.2 10E9/L (ref 4–11)

## 2018-06-21 PROCEDURE — 36415 COLL VENOUS BLD VENIPUNCTURE: CPT | Performed by: INTERNAL MEDICINE

## 2018-06-21 PROCEDURE — 80053 COMPREHEN METABOLIC PANEL: CPT | Performed by: INTERNAL MEDICINE

## 2018-06-21 PROCEDURE — 99214 OFFICE O/P EST MOD 30 MIN: CPT | Performed by: INTERNAL MEDICINE

## 2018-06-21 PROCEDURE — 84443 ASSAY THYROID STIM HORMONE: CPT | Performed by: INTERNAL MEDICINE

## 2018-06-21 PROCEDURE — 85025 COMPLETE CBC W/AUTO DIFF WBC: CPT | Performed by: INTERNAL MEDICINE

## 2018-06-21 PROCEDURE — 85652 RBC SED RATE AUTOMATED: CPT | Performed by: INTERNAL MEDICINE

## 2018-06-21 RX ORDER — ALLOPURINOL 300 MG/1
150 TABLET ORAL DAILY
Start: 2018-06-21 | End: 2020-01-02

## 2018-06-21 RX ORDER — TIMOLOL MALEATE 2.5 MG/ML
1 SOLUTION OPHTHALMIC DAILY
Status: ON HOLD | COMMUNITY
End: 2018-11-12

## 2018-06-21 NOTE — PROGRESS NOTES
"  SUBJECTIVE:   Greyson Haas is a 77 year old male who presents to clinic today for the following health issues:      Dizziness and dyspnea, feeling exhausted.      Duration: 3 to 6 months    Description   Feeling faint:  YES- happens mostly bending forwards, intermittent symptoms   Feeling like the surroundings are moving: no  Loss of consciousness or falls: no     Intensity:  mild, moderate    Accompanying signs and symptoms:   Nausea/vomitting: no   Palpitations: no   Weakness in arms or legs: YES- from cervical stenosis?  Vision or speech changes: no   Ringing in ears (Tinnitus): NO  Hearing loss related to dizziness: no   Other (fevers/chills/sweating/dyspnea): no     History (similar episodes/head trauma/previous evaluation/recent bleeding): None    Precipitating or alleviating factors (new meds/chemicals): None  Worse with activity/head movement: YES- CLIMBING STAIRS, BENDING OVER    Therapies tried and outcome: None      Reviewed and updated as needed this visit by clinical staff:  Medications  Allergies  Soc Hx   Additional history: as documented    Additional issues to address:  1.  Follow-up symptoms of dyspnea on exertion from 5/15 phone message.    Can ride a bike without problems.   Problems coming up stairs, bending over.   See above.   Can mow the yard slowly.   No pleurisy, dyspnea with talking.   No edema.    2.  Patient developed some tingling sensation from the 100 mg dose of allopurinol.  Went back to 1/2 of the 300 and feels this is working.   3.  Patient tried tramadol when he golfed (left over from hip pain), but caused his concentration to decrease.  Will not plan to take moving forward.     4.  Leg weakness and unsteadiness persists.   Patient found to have cervical stenosis.  Saw Dr. Stroud (ortho), who reviewed surgical options with patient.   He is resistent to the idea of surgery at this time.   Legs are a \"disaster\", don't work well.      ROS:    Constitutional, HEENT, " cardiovascular, pulmonary, gi and gu systems are negative, except as otherwise noted.    OBJECTIVE:                                                      /72  Pulse 84  Temp 98.2  F (36.8  C) (Oral)  Resp 16  Wt 188 lb (85.3 kg)  SpO2 98%  BMI 29.44 kg/m2  Body mass index is 29.44 kg/(m^2).  No apparent distress  Reg pulse, heart tones  No edema   Weakness L shoulder abduction  Weakness dorsi/plantarflex feet  Strength otherwise negative     ASSESSMENT:                                                      1.  Dizziness, positional and not likely orthostatic  2.  Dyspnea on exertion, somewhat positional  3.  Persistent leg weakness, unsteadiness.  Known cervical stenosis, rule out need for intervention  4.  Follow-up chronic kidney disease   5.  Fatigue   6.  HTN, controlled     PLAN:                                                      1.  MEDICATIONS:  Continue current medications without change, for now  2.  I would advise neurosurgery 2nd opinion regarding cervical stenosis, and need for surgery.    Dr. Grove  3.  Check labs today, schedule echocardiogram -->  Creat up some, recheck 2 months    If above noncontributory, consider cardiac stress test    Plan follow-up after above    Neville Nick MD  [facility]

## 2018-06-21 NOTE — PATIENT INSTRUCTIONS
PLAN:                                                      1.  MEDICATIONS:  Continue current medications without change, for now  2.  I would advise neurosurgery 2nd opinion regarding cervical stenosis, and need for surgery.    3.  Check labs today, schedule echocardiogram

## 2018-06-21 NOTE — MR AVS SNAPSHOT
After Visit Summary   6/21/2018    Greyson Haas    MRN: [id number]           Patient Information     Date Of Birth          1940        Visit Information        Provider Department      6/21/2018 3:30 PM Neville Nick MD [facility]        Today's Diagnoses     Fatigue, unspecified type    -  1    Dizziness        Cervical stenosis of spinal canal        CHA (dyspnea on exertion)        Gout of multiple sites, unspecified cause, unspecified chronicity          Care Instructions    PLAN:                                                      1.  MEDICATIONS:  Continue current medications without change, for now  2.  I would advise neurosurgery 2nd opinion regarding cervical stenosis, and need for surgery.    3.  Check labs today, schedule echocardiogram          Follow-ups after your visit        Future tests that were ordered for you today     Open Future Orders        Priority Expected Expires Ordered    Echocardiogram Routine  6/21/2019 6/21/2018            Who to contact     If you have questions or need follow up information about today's clinic visit or your schedule please contact [facility] directly at [phone].  Normal or non-critical lab and imaging results will be communicated to you by [employer], letter or phone within 4 business days after the clinic has received the results. If you do not hear from us within 7 days, please contact the clinic through [employer] or phone. If you have a critical or abnormal lab result, we will notify you by phone as soon as possible.  Submit refill requests through [employer] or call your pharmacy and they will forward the refill request to us. Please allow 3 business days for your refill to be completed.          Additional Information About Your Visit        [employer] Information     [employer] gives you secure access to your electronic health record. If you see a primary care provider, you can also send  messages to your care team and make appointments. If you have questions, please call your primary care clinic.  If you do not have a primary care provider, please call [phone] and they will assist you.        Care EveryWhere ID     This is your Care EveryWhere ID. This could be used by other organizations to access your [place] medical records  [id number]        Your Vitals Were     Pulse Temperature Respirations Pulse Oximetry BMI (Body Mass Index)       84 98.2  F (36.8  C) (Oral) 16 98% 29.44 kg/m2        Blood Pressure from Last 3 Encounters:   06/21/18 116/72   03/27/18 112/74   03/20/18 120/72    Weight from Last 3 Encounters:   06/21/18 188 lb (85.3 kg)   03/27/18 189 lb (85.7 kg)   03/20/18 194 lb (88 kg)              We Performed the Following     CBC with platelets differential     Comprehensive metabolic panel     Erythrocyte sedimentation rate auto     TSH with free T4 reflex          Today's Medication Changes          These changes are accurate as of 6/21/18  4:20 PM.  If you have any questions, ask your nurse or doctor.               These medicines have changed or have updated prescriptions.        Dose/Directions    allopurinol 300 MG tablet   Commonly known as:  ZYLOPRIM   This may have changed:    - See the new instructions.  - Another medication with the same name was removed. Continue taking this medication, and follow the directions you see here.   Used for:  Gout of multiple sites, unspecified cause, unspecified chronicity   Changed by:  Neville Nick MD        Dose:  150 mg   Take 0.5 tablets (150 mg) by mouth daily   Refills:  0            Where to get your medicines      Some of these will need a paper prescription and others can be bought over the counter.  Ask your nurse if you have questions.     You don't need a prescription for these medications     allopurinol 300 MG tablet                Primary Care Provider Office Phone # Fax #    Neville Nick [phone]  [phone]       [address] [facility] [address]        Equal Access to Services     LETY SIFUENTES : Hadii lulú ku leticia Bowers, wajulioda luqadaha, qaybta kaalmada mariusz, og magnoin hayaanegro castrejonbecky lizama joselito bolanos. So [facility] [phone].    ATENCIÓN: Si habla español, tiene a [place] disposición servicios gratuitos de asistencia lingüística. Llame al [phone].    We comply with applicable federal civil rights laws and [place] laws. We do not discriminate on the basis of race, color, national origin, age, disability, sex, sexual orientation, or gender identity.            Thank you!     Thank you for choosing [facility]  for your care. Our goal is always to provide you with excellent care. Hearing back from our patients is one way we can continue to improve our services. Please take a few minutes to complete the written survey that you may receive in the mail after your visit with us. Thank you!             Your Updated Medication List - Protect others around you: Learn how to safely use, store and throw away your medicines at www.disposemymeds.org.          This list is accurate as of 6/21/18  4:20 PM.  Always use your most recent med list.                   Brand Name Dispense Instructions for use Diagnosis    ACETAMINOPHEN PO      Take 1,000 mg by mouth daily as needed for pain Reported on 5/18/2017        allopurinol 300 MG tablet    ZYLOPRIM     Take 0.5 tablets (150 mg) by mouth daily    Gout of multiple sites, unspecified cause, unspecified chronicity       aspirin 81 MG tablet      Take 1 tablet (81 mg) by mouth daily        hydrocortisone 1 % cream    CORTAID     Apply topically daily as needed for rash or itching (on legs.)        lisinopril 10 MG tablet    PRINIVIL/ZESTRIL    90 tablet    Take 1 tablet (10 mg) by mouth daily    Essential hypertension       REFRESH OP      Apply 1 drop to eye daily as needed        simvastatin 20 MG tablet    ZOCOR    90 tablet    Take  1 tablet (20 mg) by mouth every morning    Hyperlipidemia LDL goal <130       timolol 0.25 % ophthalmic gel-form    TIMOPTIC-XE     1 drop daily        TIMOLOL MALEATE PO           VIAGRA PO      Take 50 mg by mouth daily as needed        VITAMIN B-12 SL      Place 500 mg under the tongue every morning

## 2018-06-22 ENCOUNTER — MYC MEDICAL ADVICE (OUTPATIENT)
Dept: INTERNAL MEDICINE | Facility: CLINIC | Age: 78
End: 2018-06-22

## 2018-06-22 LAB
ALBUMIN SERPL-MCNC: 3.9 G/DL (ref 3.4–5)
ALP SERPL-CCNC: 71 U/L (ref 40–150)
ALT SERPL W P-5'-P-CCNC: 26 U/L (ref 0–70)
ANION GAP SERPL CALCULATED.3IONS-SCNC: 10 MMOL/L (ref 3–14)
AST SERPL W P-5'-P-CCNC: 22 U/L (ref 0–45)
BILIRUB SERPL-MCNC: 0.6 MG/DL (ref 0.2–1.3)
BUN SERPL-MCNC: 36 MG/DL (ref 7–30)
CALCIUM SERPL-MCNC: 9.3 MG/DL (ref 8.5–10.1)
CHLORIDE SERPL-SCNC: 107 MMOL/L (ref 94–109)
CO2 SERPL-SCNC: 23 MMOL/L (ref 20–32)
CREAT SERPL-MCNC: 1.73 MG/DL (ref 0.66–1.25)
GFR SERPL CREATININE-BSD FRML MDRD: 38 ML/MIN/1.7M2
GLUCOSE SERPL-MCNC: 107 MG/DL (ref 70–99)
POTASSIUM SERPL-SCNC: 4.4 MMOL/L (ref 3.4–5.3)
PROT SERPL-MCNC: 7.2 G/DL (ref 6.8–8.8)
SODIUM SERPL-SCNC: 140 MMOL/L (ref 133–144)
TSH SERPL DL<=0.005 MIU/L-ACNC: 2.06 MU/L (ref 0.4–4)

## 2018-07-03 ENCOUNTER — HOSPITAL ENCOUNTER (OUTPATIENT)
Dept: CARDIOLOGY | Facility: CLINIC | Age: 78
Discharge: HOME | End: 2018-07-03
Attending: INTERNAL MEDICINE | Admitting: INTERNAL MEDICINE
Payer: MEDICARE

## 2018-07-03 DIAGNOSIS — R53.83 FATIGUE, UNSPECIFIED TYPE: ICD-10-CM

## 2018-07-03 DIAGNOSIS — R06.09 DOE (DYSPNEA ON EXERTION): ICD-10-CM

## 2018-07-03 PROCEDURE — 93306 TTE W/DOPPLER COMPLETE: CPT | Mod: 26 | Performed by: INTERNAL MEDICINE

## 2018-07-03 PROCEDURE — 93306 TTE W/DOPPLER COMPLETE: CPT

## 2018-07-10 PROBLEM — I77.810 ASCENDING AORTA DILATATION (H): Status: ACTIVE | Noted: 2018-07-10

## 2018-08-30 DIAGNOSIS — N40.1 BENIGN NON-NODULAR PROSTATIC HYPERPLASIA WITH LOWER URINARY TRACT SYMPTOMS: ICD-10-CM

## 2018-08-30 NOTE — TELEPHONE ENCOUNTER
"Requested Prescriptions   Pending Prescriptions Disp Refills     tamsulosin (FLOMAX) 0.4 MG capsule [Pharmacy Med Name: TAMSULOSIN 0.4MG    CAP]  Last Written Prescription Date:  1/4/16  Last Fill Quantity: 30,  # refills: 2   Last office visit: 6/21/2018    Future Office Visit:     90 capsule 0     Sig: TAKE ONE CAPSULE BY MOUTH ONCE DAILY    Alpha Blockers Failed    8/30/2018  5:41 PM       Failed - Patient does not have Tadalafil, Vardenafil, or Sildenafil on their medication list       Passed - Blood pressure under 140/90 in past 12 months    BP Readings from Last 3 Encounters:   06/21/18 116/72   03/27/18 112/74   03/20/18 120/72                Passed - Recent (12 mo) or future (30 days) visit within the authorizing provider's specialty    Patient had office visit in the last 12 months or has a visit in the next 30 days with authorizing provider or within the authorizing provider's specialty.  See \"Patient Info\" tab in inbasket, or \"Choose Columns\" in Meds & Orders section of the refill encounter.           Passed - Patient is 18 years of age or older          "

## 2018-09-04 NOTE — TELEPHONE ENCOUNTER
Routing refill request to provider for review/approval because:  Drug interaction warning  Viagra on med list

## 2018-09-05 RX ORDER — TAMSULOSIN HYDROCHLORIDE 0.4 MG/1
CAPSULE ORAL
Qty: 90 CAPSULE | Refills: 3 | Status: SHIPPED | OUTPATIENT
Start: 2018-09-05 | End: 2019-09-09

## 2018-09-12 ENCOUNTER — OFFICE VISIT (OUTPATIENT)
Dept: NEUROSURGERY | Facility: CLINIC | Age: 78
End: 2018-09-12
Attending: NEUROLOGICAL SURGERY
Payer: COMMERCIAL

## 2018-09-12 VITALS
DIASTOLIC BLOOD PRESSURE: 67 MMHG | BODY MASS INDEX: 29.51 KG/M2 | SYSTOLIC BLOOD PRESSURE: 105 MMHG | WEIGHT: 188 LBS | HEIGHT: 67 IN | OXYGEN SATURATION: 94 % | HEART RATE: 75 BPM

## 2018-09-12 DIAGNOSIS — M48.02 SPINAL STENOSIS IN CERVICAL REGION: Primary | ICD-10-CM

## 2018-09-12 PROCEDURE — 99204 OFFICE O/P NEW MOD 45 MIN: CPT | Performed by: NEUROLOGICAL SURGERY

## 2018-09-12 PROCEDURE — G0463 HOSPITAL OUTPT CLINIC VISIT: HCPCS

## 2018-09-12 ASSESSMENT — PAIN SCALES - GENERAL: PAINLEVEL: MILD PAIN (3)

## 2018-09-12 NOTE — MR AVS SNAPSHOT
"              After Visit Summary   9/12/2018    Greyson Haas    MRN: [id number]           Patient Information     Date Of Birth          1940        Visit Information        Provider Department      9/12/2018 2:30 PM Mina Grove MD [facility]        Today's Diagnoses     Spinal stenosis in cervical region    -  1       Follow-ups after your visit        Who to contact     If you have questions or need follow up information about today's clinic visit or your schedule please contact [facility] directly at [phone].  Normal or non-critical lab and imaging results will be communicated to you by [employer], letter or phone within 4 business days after the clinic has received the results. If you do not hear from us within 7 days, please contact the clinic through [employer] or phone. If you have a critical or abnormal lab result, we will notify you by phone as soon as possible.  Submit refill requests through [employer] or call your pharmacy and they will forward the refill request to us. Please allow 3 business days for your refill to be completed.          Additional Information About Your Visit        MyChart Information     [employer] gives you secure access to your electronic health record. If you see a primary care provider, you can also send messages to your care team and make appointments. If you have questions, please call your primary care clinic.  If you do not have a primary care provider, please call [phone] and they will assist you.        Care EveryWhere ID     This is your Care EveryWhere ID. This could be used by other organizations to access your [place] medical records  [id number]        Your Vitals Were     Pulse Height Pulse Oximetry BMI (Body Mass Index)          75 5' 7\" (1.702 m) 94% 29.44 kg/m2         Blood Pressure from Last 3 Encounters:   09/12/18 105/67   06/21/18 116/72   03/27/18 112/74    Weight from Last 3 Encounters: "   09/12/18 188 lb (85.3 kg)   06/21/18 188 lb (85.3 kg)   03/27/18 189 lb (85.7 kg)              Today, you had the following     No orders found for display       Primary Care Provider Office Phone # Fax #    Neville Nilson Nick [phone]       [address] [facility] [address]        Equal Access to Services     LETY SIFUENTES : Hadii aad ku hadasho Soomaali, waaxda luqadaha, qaybta kaalmada adeegyada, waxay idiin hayaan adeeg khwarnersh lakeren . So [facility] [phone].    ATENCIÓN: Si ramon edouard, tiene a [place] disposición servicios gratuitos de asistencia lingüística. Llame al [phone].    We comply with applicable federal civil rights laws and [place] laws. We do not discriminate on the basis of race, color, national origin, age, disability, sex, sexual orientation, or gender identity.            Thank you!     Thank you for choosing [facility]  for your care. Our goal is always to provide you with excellent care. Hearing back from our patients is one way we can continue to improve our services. Please take a few minutes to complete the written survey that you may receive in the mail after your visit with us. Thank you!             Your Updated Medication List - Protect others around you: Learn how to safely use, store and throw away your medicines at www.disposemymeds.org.          This list is accurate as of 9/12/18  3:28 PM.  Always use your most recent med list.                   Brand Name Dispense Instructions for use Diagnosis    ACETAMINOPHEN PO      Take 1,000 mg by mouth daily as needed for pain Reported on 5/18/2017        allopurinol 300 MG tablet    ZYLOPRIM     Take 0.5 tablets (150 mg) by mouth daily    Gout of multiple sites, unspecified cause, unspecified chronicity       aspirin 81 MG tablet      Take 1 tablet (81 mg) by mouth daily        hydrocortisone 1 % cream    CORTAID     Apply topically daily as needed for rash or itching (on legs.)         lisinopril 10 MG tablet    PRINIVIL/ZESTRIL    90 tablet    Take 1 tablet (10 mg) by mouth daily    Essential hypertension       REFRESH OP      Apply 1 drop to eye daily as needed        simvastatin 20 MG tablet    ZOCOR    90 tablet    Take 1 tablet (20 mg) by mouth every morning    Hyperlipidemia LDL goal <130       tamsulosin 0.4 MG capsule    FLOMAX    90 capsule    TAKE ONE CAPSULE BY MOUTH ONCE DAILY    Benign non-nodular prostatic hyperplasia with lower urinary tract symptoms       timolol 0.25 % ophthalmic gel-form    TIMOPTIC-XE     1 drop daily        TIMOLOL MALEATE PO           VIAGRA PO      Take 50 mg by mouth daily as needed        VITAMIN B-12 SL      Place 500 mg under the tongue every morning

## 2018-09-12 NOTE — LETTER
9/12/2018         RE: Greyson Haas  [address] [facility] [address]        Dear Colleague,    Thank you for referring your patient, Greyson Haas, to the [facility]. Please see a copy of my visit note below.    Neurosurgery Spine Consult [facility]      CC: Bilateral upper extremity numbness and tingling bilateral upper extremity numbness with tingling and some weakness in his hand    Primary care Provider: Neville Nick    I was asked to see the patient by:  Mendoza Jones MD  [address] [place] [address]      [place]: Greyson Haas is a 77 year old male that presents to clinic with a complaint of bilateral upper extremity numbness tingling in a sleepy sensation in his hands.  He is also noticed some mild weakness in his hands.  He has several orthopedic issues with his hand, however, he describes his arms as feeling numb and his gait feeling unsteady.  He also describes having fine motor function issues in his hands.  He had a L4-S1 posterior instrumented fusion by me on November 5, 2015 and is done well since that time.  He said he is beginning to notice unsteady gait and weakness in his legs and clumsiness with ambulation.  He does not describe any bowel or bladder issues.      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 Rubin     Nocturia     x 2-3     Pulmonary Nodule, stable   "    Tubular Adenoma 3/07     Unspecified glaucoma(365.9)     Followed by Dr. Monique       Past Surgical History:   Procedure Laterality Date     ARTHROPLASTY HIP Left 9/18/2017    LEFT TOTAL HIP ARTHROPLASTY(DEPUY)^;  Surgeon: Aurelio Hood MD;  Location: [facility] OR     C NONSPECIFIC PROCEDURE  6/04    cysto, R ureteral stent, ESWL     C NONSPECIFIC PROCEDURE  CHILD    TONSILLECTOMY     C NONSPECIFIC PROCEDURE  AGE 4    L heel osteomyelitis with \"bone scraping\"     OPTICAL TRACKING SYSTEM FUSION SPINE POSTERIOR LUMBAR TWO LEVELS N/A 11/5/2015    Procedure: OPTICAL TRACKING SYSTEM FUSION SPINE POSTERIOR LUMBAR TWO LEVELS;  Surgeon: Mina Grove MD;  Location:  OR     SURGICAL HISTORY OF -   1991    B Shoulder surgeries     SURGICAL HISTORY OF -   1992    lumbar laminectomy     SURGICAL HISTORY OF -   1998    lumbar decompression    Dr. Pop     SURGICAL HISTORY OF -   9/06    L shoulder    Dr. Foley     SURGICAL HISTORY OF -   11/07    partial amputation R 3rd toe (tophus)  Dr. Neal     SURGICAL HISTORY OF -   2010    bilateral cataracts       SURGICAL HISTORY OF - 1/11    Mohs L chest, Dr. Anthony     SURGICAL HISTORY OF - 1/11    L TKA   Dr. Thomson       Current Outpatient Prescriptions   Medication     ACETAMINOPHEN PO     allopurinol (ZYLOPRIM) 300 MG tablet     aspirin 81 MG tablet     Cyanocobalamin (VITAMIN B-12 SL)     hydrocortisone (CORTAID) 1 % cream     lisinopril (PRINIVIL/ZESTRIL) 10 MG tablet     Polyvinyl Alcohol-Povidone (REFRESH OP)     Sildenafil Citrate (VIAGRA PO)     simvastatin (ZOCOR) 20 MG tablet     tamsulosin (FLOMAX) 0.4 MG capsule     timolol (TIMOPTIC-XE) 0.25 % ophthalmic gel-form     TIMOLOL MALEATE PO     No current facility-administered medications for this visit.        No Known Allergies    Social History     Social History     Marital status:      Spouse name: N/A     Number of children: N/A     Years of education: N/A     Social History Main Topics "     Smoking status: Former Smoker     Packs/day: 1.00     Years: 27.00     Quit date: 1983     Smokeless tobacco: Never Used     Alcohol use Yes      Comment: Ave 2 daily or more.  Heavy days 5-6, some days none.     Drug use: No     Sexual activity: Yes     Partners: Female     Other Topics Concern     Caffeine Concern Yes     3 cups     Social History Narrative       Family History   Problem Relation Age of Onset     Cancer Maternal Grandfather       age 80, Colon or prostate Ca.     Cancer Maternal Grandmother       62, ? type     Cardiovascular Father      CHF     Lipids Mother      Arthritis Brother      Alcohol/Drug Brother      chronic alcoholism     Cerebrovascular Disease Brother          Review Of Systems  Skin: negative  Eyes: negative  Ears/Nose/Throat: negative  Respiratory: No shortness of breath, dyspnea on exertion, cough, or hemoptysis  Cardiovascular: negative  Gastrointestinal: negative  Genitourinary: negative  Musculoskeletal: as above and neck pain  Neurologic: as above  Psychiatric: negative  Hematologic/Lymphatic/Immunologic: negative  Endocrine: negative    B/P: 105/67, T: Data Unavailable, P: 75, R: Data Unavailable    Examination:  Normal affect and mood  No obvious labored respiration  No skin lesions noted   No obvious pitting edema  No abnormal psychiatric behavior  No obvious musculoskeletal abnormalities   Awake  Alert  Oriented x 3  Speech clear  Cranial nerves II - XII intact  Face symmetric  Tongue midline  Neck nontender  Limited ROM of neck  Motor exam    RUE - deltoid 5/5, biceps 5/5, triceps 5/5, wrist extension 5/5, wrist flexion 5/5,  4/5   LUE - deltoid 5/5, biceps 5/5, triceps 5/5, wrist extension 5/5, wrist flexion 5/5,  4+/5     Sensation decreased in the bilateral upper extremities  Clonus negative  DTR 1 + throughout   Vasques's sign negative  Spurling's sign negative  Ambulation with a limp secondary to dorsiflexion weakness on the right and his  gait appears slightly unsteady    Imaging:   MRI of the cervical spine reveals diffuse cervical spondylosis with severe stenosis at C6-7 moderately severe at C5-6 and mild stenosis at C2-3    CT scan of the cervical spine does not show any ossified posterior longitudinal ligament      Assessment/Plan:   I discussed with the patient options of surgical intervention which are C5-7 anterior cervical discectomy and fusion versus posterior decompression with possible instrumented fusion from C5-7.  I explained to the patient that I feel that his pathology is anterior to his cord therefore I recommend a anterior approach.I discussed with the patient and spouse the risk of surgery to include, but, not be limited to; dysphagia, hoarseness, paralyzed vocal cord, nerve/spinal cord injury, pseudoarthrosis, failure of hardware, failure of improvement of symptoms, CSF leak,  infection, post op hematoma, the need for recurrent surgery, paralysis, coma and death.  The patient will call if he like to proceed.        Mina Grove MD, MS, FAANS  Neurosurgeon  [facility]  [address], [address]  [place] [address]  [phone]      Again, thank you for allowing me to participate in the care of your patient.        Sincerely,        Mina Grove MD

## 2018-09-12 NOTE — NURSING NOTE
Patient is a will call for C5-7 ACDF  He left before education could be completed as surgeon dismissed patient to leave  uLz Reza, RN, BSN

## 2018-10-10 ENCOUNTER — TELEPHONE (OUTPATIENT)
Dept: NEUROSURGERY | Facility: CLINIC | Age: 78
End: 2018-10-10

## 2018-10-10 ENCOUNTER — HOSPITAL ENCOUNTER (INPATIENT)
Facility: CLINIC | Age: 78
Setting detail: SURGERY ADMIT
End: 2018-10-10
Attending: NEUROLOGICAL SURGERY | Admitting: NEUROLOGICAL SURGERY
Payer: OTHER MISCELLANEOUS

## 2018-10-10 NOTE — TELEPHONE ENCOUNTER
Type of surgery: C5-7 ACDF  Location of surgery: [facility]  Date and time of surgery: 10/29/2018 @ 2:20 pm   Surgeon: Dr. Grove   Pre-Op Appt Date: Discussed   Post-Op Appt Date: 12/10/2018    Packet sent out: Yes  Pre-cert/Authorization completed:  Yes  Date: 10/10/2018

## 2018-10-18 ENCOUNTER — OFFICE VISIT (OUTPATIENT)
Dept: INTERNAL MEDICINE | Facility: CLINIC | Age: 78
End: 2018-10-18
Payer: COMMERCIAL

## 2018-10-18 VITALS
HEART RATE: 75 BPM | WEIGHT: 188 LBS | TEMPERATURE: 98 F | SYSTOLIC BLOOD PRESSURE: 128 MMHG | DIASTOLIC BLOOD PRESSURE: 68 MMHG | OXYGEN SATURATION: 97 % | BODY MASS INDEX: 29.44 KG/M2

## 2018-10-18 DIAGNOSIS — M48.02 SPINAL STENOSIS IN CERVICAL REGION: ICD-10-CM

## 2018-10-18 DIAGNOSIS — I10 ESSENTIAL HYPERTENSION: ICD-10-CM

## 2018-10-18 DIAGNOSIS — E78.5 HYPERLIPIDEMIA LDL GOAL <130: ICD-10-CM

## 2018-10-18 DIAGNOSIS — Z01.818 PRE-OP EXAM: Primary | ICD-10-CM

## 2018-10-18 DIAGNOSIS — N18.30 CHRONIC KIDNEY DISEASE (CKD), STAGE III (MODERATE) (H): ICD-10-CM

## 2018-10-18 LAB
ALBUMIN SERPL-MCNC: 3.7 G/DL (ref 3.4–5)
ALP SERPL-CCNC: 72 U/L (ref 40–150)
ALT SERPL W P-5'-P-CCNC: 25 U/L (ref 0–70)
ANION GAP SERPL CALCULATED.3IONS-SCNC: 7 MMOL/L (ref 3–14)
AST SERPL W P-5'-P-CCNC: 24 U/L (ref 0–45)
BILIRUB SERPL-MCNC: 0.8 MG/DL (ref 0.2–1.3)
BUN SERPL-MCNC: 22 MG/DL (ref 7–30)
CALCIUM SERPL-MCNC: 9 MG/DL (ref 8.5–10.1)
CHLORIDE SERPL-SCNC: 109 MMOL/L (ref 94–109)
CO2 SERPL-SCNC: 25 MMOL/L (ref 20–32)
CREAT SERPL-MCNC: 1.39 MG/DL (ref 0.66–1.25)
ERYTHROCYTE [DISTWIDTH] IN BLOOD BY AUTOMATED COUNT: 14 % (ref 10–15)
GFR SERPL CREATININE-BSD FRML MDRD: 49 ML/MIN/1.7M2
GLUCOSE SERPL-MCNC: 111 MG/DL (ref 70–99)
HCT VFR BLD AUTO: 42.1 % (ref 40–53)
HGB BLD-MCNC: 14.1 G/DL (ref 13.3–17.7)
MCH RBC QN AUTO: 30.5 PG (ref 26.5–33)
MCHC RBC AUTO-ENTMCNC: 33.5 G/DL (ref 31.5–36.5)
MCV RBC AUTO: 91 FL (ref 78–100)
MRSA DNA SPEC QL NAA+PROBE: NEGATIVE
PLATELET # BLD AUTO: 159 10E9/L (ref 150–450)
POTASSIUM SERPL-SCNC: 3.7 MMOL/L (ref 3.4–5.3)
PROT SERPL-MCNC: 7.2 G/DL (ref 6.8–8.8)
RBC # BLD AUTO: 4.63 10E12/L (ref 4.4–5.9)
SODIUM SERPL-SCNC: 141 MMOL/L (ref 133–144)
SPECIMEN SOURCE: NORMAL
WBC # BLD AUTO: 6.3 10E9/L (ref 4–11)

## 2018-10-18 PROCEDURE — 99215 OFFICE O/P EST HI 40 MIN: CPT | Performed by: PHYSICIAN ASSISTANT

## 2018-10-18 PROCEDURE — 80053 COMPREHEN METABOLIC PANEL: CPT | Performed by: PHYSICIAN ASSISTANT

## 2018-10-18 PROCEDURE — 36415 COLL VENOUS BLD VENIPUNCTURE: CPT | Performed by: PHYSICIAN ASSISTANT

## 2018-10-18 PROCEDURE — 93000 ELECTROCARDIOGRAM COMPLETE: CPT | Performed by: PHYSICIAN ASSISTANT

## 2018-10-18 PROCEDURE — 87641 MR-STAPH DNA AMP PROBE: CPT | Performed by: PHYSICIAN ASSISTANT

## 2018-10-18 PROCEDURE — 85027 COMPLETE CBC AUTOMATED: CPT | Performed by: PHYSICIAN ASSISTANT

## 2018-10-18 NOTE — PATIENT INSTRUCTIONS
One week before surgery stop aspirin     Hold Lisinopril morning off surgery     Before Your Surgery      Call your surgeon if there is any change in your health. This includes signs of a cold or flu (such as a sore throat, runny nose, cough, rash or fever).    Do not smoke, drink alcohol or take over the counter medicine (unless your surgeon or primary care doctor tells you to) for the 24 hours before and after surgery.    If you take prescribed drugs: Follow your doctor s orders about which medicines to take and which to stop until after surgery.    Eating and drinking prior to surgery: follow the instructions from your surgeon    Take a shower or bath the night before surgery. Use the soap your surgeon gave you to gently clean your skin. If you do not have soap from your surgeon, use your regular soap. Do not shave or scrub the surgery site.  Wear clean pajamas and have clean sheets on your bed.

## 2018-10-18 NOTE — PROGRESS NOTES
[address] [address]  [phone]  Dept: [phone]    PRE-OP EVALUATION:  Today's date: 10/18/2018    Greyson Haas (: 1940) presents for pre-operative evaluation assessment as requested by Dr. Graf.  He requires evaluation and anesthesia risk assessment prior to undergoing surgery/procedure for treatment of Neck Fusion .    Primary Physician: Neville Nick  Type of Anesthesia Anticipated: General    Patient has a Health Care Directive or Living Will:  YES     Preop Questions 10/18/2018   Who is doing your surgery? Dr graf   What are you having done? spine   Date of Surgery/Procedure: 10 29   Facility or Hospital where procedure/surgery will be performed: -   1.  Do you have a history of Heart attack, stroke, stent, coronary bypass surgery, or other heart surgery? No   2.  Do you ever have any pain or discomfort in your chest? No   3.  Do you have a history of  Heart Failure? No   4.   Are you troubled by shortness of breath when:  walking on a level surface, or up a slight hill, or at night? No   5.  Do you currently have a cold, bronchitis or other respiratory infection? No   6.  Do you have a cough, shortness of breath, or wheezing? No   7.  Do you sometimes get pains in the calves of your legs when you walk? No   8. Do you or anyone in your family have previous history of blood clots? No   9.  Do you or does anyone in your family have a serious bleeding problem such as prolonged bleeding following surgeries or cuts? No   10. Have you ever had problems with anemia or been told to take iron pills? No   11. Have you had any abnormal blood loss such as black, tarry or bloody stools? No   12. Have you ever had a blood transfusion? No   13. Have you or any of your relatives ever had problems with anesthesia? No   14. Do you have sleep apnea, excessive snoring or daytime drowsiness? No   15. Do you have any prosthetic heart valves?  No   16. Do you have prosthetic joints? YES - knee and hip, pins in shoulder         HPI:     HPI related to upcoming procedure:     Patient is having C5-7 anterior cervical discectomy and fusion due to cervical spondylosis with severe stenosis.     HYPERLIPIDEMIA - Patient has a long history of significant Hyperlipidemia requiring medication for treatment with recent good control. Patient reports no problems or side effects with the medication.                                                                                                                                                       .  HYPERTENSION - Patient has longstanding history of HTN , currently denies any symptoms referable to elevated blood pressure. Specifically denies chest pain, palpitations, dyspnea, orthopnea, PND or peripheral edema. Blood pressure readings have been in normal range. Current medication regimen is as listed below. Patient denies any side effects of medication.                                                                                                                                                                                          .  RENAL INSUFFICIENCY - Patient has a longstanding history of moderate-severe chronic renal insufficiency. Last C1.73 pm 06/21/18r.     Pt does note continued chronic shortness of breath that is not new or changed in several years since previous work up.                                                                                                                                                                              .    MEDICAL HISTORY:     Patient Active Problem List    Diagnosis Date Noted     Ascending aorta dilatation (H) 07/10/2018     Priority: Medium     Cervical stenosis of spinal canal 06/21/2018     Priority: Medium     Right foot drop 01/18/2018     Priority: Medium     Present since 1980's       Aftercare following left hip joint replacement surgery 12/28/2017      Priority: Medium     Hip pain, left 12/28/2017     Priority: Medium     Long-term (current) use of anticoagulants [Z79.01] 09/25/2017     Priority: Medium     H/O total hip arthroplasty, left 09/18/2017     Priority: Medium     Benign non-nodular prostatic hyperplasia with lower urinary tract symptoms 05/18/2017     Priority: Medium     S/P lumbar fusion 11/05/2015     Priority: Medium     Lumbar stenosis 04/23/2015     Priority: Medium     Previous lumbar surgery Dr. Pop  S/P re-do L4-S1 decompression with fusion on 11/5/2015 for claudication and right foot drop Dr. Grove       Kidney stones 09/20/2012     Priority: Medium     First 2004, EWSL Dr. Underwood  Next 2012  Multiple calcium stones 9/2012       Advanced directives, counseling/discussion 01/19/2012     Priority: Medium     Discussed advance care planning with patient; however, patient declined at this time.   12/9/2013:  Full code, no extended support  Advance Care Planning:   Initial facilitation introduction:   Greyson Haas presented for initial session regarding ACP at the clinic. He was accompanied by wife Mellissa. [facility] [employer] information provided and resources reviewed. He currently wishes to complete an ACP document.  He currently has the following questions or concerns about Advance Care Planning: none.  Confirmed/documented designated decision maker(s). See permanent comments section of demographics in clinical tab. Confirmed code status reflects current choices .   Added by Irais Guerrero on 3/31/2015           Pain in joint, lower leg 02/08/2011     Priority: Medium     Abnormal gait 02/08/2011     Priority: Medium     KNEE JOINT REPLACEMENT 02/07/2011     Priority: Medium     Basal cell carcinoma 01/19/2011     Priority: Medium     Chest, Moh's 1/11, Dr. Raj Berg following       Erectile dysfunction 01/19/2011     Priority: Medium     Gouty arthropathy 04/29/2008     Priority: Medium              Anxiety state 01/15/2007      "Priority: Medium     Sprain of acromioclavicular ligament 10/17/2006     Priority: Medium     Problem list name updated by automated process. Provider to review       Glaucoma      Priority: Medium     Followed by Dr. Michel         Hyperlipidemia LDL goal <130 06/06/2005     Priority: Medium     Chronic Kidney Disease, Stage III 02/02/2005     Priority: Medium     Gout of multiple sites 09/23/2004     Priority: Medium     R third toe       Tinnitus 05/19/2004     Priority: Medium     LVH 05/22/2003     Priority: Medium     Diverticulosis of large intestine 05/12/2003     Priority: Medium     Problem list name updated by automated process. Provider to review       Essential hypertension 02/10/2003     Priority: Medium     History of rectal cancer 03/28/2002     Priority: Medium     Dr. Rubin        Past Medical History:   Diagnosis Date      RENAL INSUFFICIENCY      Arthritis      Calculus of kidney 9/02, 6/04     Chronic Kidney Disease, Stage III 2/2/2005     Chronic Tinnitus 1960's     Diverticulosis of colon (without mention of hemorrhage)      Essential hypertension, benign      Gouty arthropathy 4/29/2008     Gouty tophi of other sites 8/04    R third toe     HYPERLIPIDEMIA      Hyperplastic Polyps Colon 11/03     LVH 5/03     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 Rubin     Nocturia     x 2-3     Pulmonary Nodule, stable      Tubular Adenoma 3/07     Unspecified glaucoma(365.9)     Followed by Dr. Monique     Past Surgical History:   Procedure Laterality Date     ARTHROPLASTY HIP Left 9/18/2017    LEFT TOTAL HIP ARTHROPLASTY(DEPUY)^;  Surgeon: Aurelio Hood MD;  Location: [facility] OR     C NONSPECIFIC PROCEDURE  6/04    cysto, R ureteral stent, ESWL     C NONSPECIFIC PROCEDURE  CHILD    TONSILLECTOMY     C NONSPECIFIC PROCEDURE  AGE 4    L heel osteomyelitis with \"bone scraping\"     OPTICAL TRACKING SYSTEM FUSION SPINE " POSTERIOR LUMBAR TWO LEVELS N/A 11/5/2015    Procedure: OPTICAL TRACKING SYSTEM FUSION SPINE POSTERIOR LUMBAR TWO LEVELS;  Surgeon: Mina Grove MD;  Location: [facility] OR     SURGICAL HISTORY OF -   1991    B Shoulder surgeries     SURGICAL HISTORY OF -   1992    lumbar laminectomy     SURGICAL HISTORY OF -   1998    lumbar decompression    Dr. Pop     SURGICAL HISTORY OF -   9/06    L shoulder    Dr. Foley     SURGICAL HISTORY OF -   11/07    partial amputation R 3rd toe (tophus)  Dr. Neal     SURGICAL HISTORY OF -   2010    bilateral cataracts       SURGICAL HISTORY OF -   1/11    Mohs L chest, Dr. Anthony     SURGICAL HISTORY OF - 1/11    L TKA   Dr. Thomson     Current Outpatient Prescriptions   Medication Sig Dispense Refill     ACETAMINOPHEN PO Take 1,000 mg by mouth daily as needed for pain Reported on 5/18/2017       allopurinol (ZYLOPRIM) 300 MG tablet Take 0.5 tablets (150 mg) by mouth daily       aspirin 81 MG tablet Take 1 tablet (81 mg) by mouth daily       lisinopril (PRINIVIL/ZESTRIL) 10 MG tablet Take 1 tablet (10 mg) by mouth daily 90 tablet 3     Polyvinyl Alcohol-Povidone (REFRESH OP) Apply 1 drop to eye daily as needed       Sildenafil Citrate (VIAGRA PO) Take 50 mg by mouth daily as needed       tamsulosin (FLOMAX) 0.4 MG capsule TAKE ONE CAPSULE BY MOUTH ONCE DAILY 90 capsule 3     timolol (TIMOPTIC-XE) 0.25 % ophthalmic gel-form 1 drop daily       TIMOLOL MALEATE PO        Cyanocobalamin (VITAMIN B-12 SL) Place 500 mg under the tongue every morning       hydrocortisone (CORTAID) 1 % cream Apply topically daily as needed for rash or itching (on legs.)       simvastatin (ZOCOR) 20 MG tablet Take 1 tablet (20 mg) by mouth every morning (Patient not taking: Reported on 10/18/2018) 90 tablet 3     OTC products: None, except as noted above    No Known Allergies   Latex Allergy: NO    Social History   Substance Use Topics     Smoking status: Former Smoker     Packs/day: 1.00     Years:  27.00     Quit date: 1/1/1983     Smokeless tobacco: Never Used     Alcohol use Yes      Comment: Ave 2 daily or more.  Heavy days 5-6, some days none.     History   Drug Use No       REVIEW OF SYSTEMS:   CONSTITUTIONAL: NEGATIVE for fever, chills, change in weight  INTEGUMENTARY/SKIN: NEGATIVE for worrisome rashes, moles or lesions  EYES: NEGATIVE for vision changes or irritation  ENT/MOUTH: NEGATIVE for ear, mouth and throat problems  RESP: NEGATIVE for significant cough or SOB  CV: NEGATIVE for chest pain, palpitations or peripheral edema  GI: NEGATIVE for nausea, abdominal pain, heartburn, or change in bowel habits  : NEGATIVE for frequency, dysuria, or hematuria  MUSCULOSKELETAL: NEGATIVE for significant arthralgias or myalgia  POSITIVE: left hip and knee pain has been ongoing for years   NEURO: NEGATIVE for weakness, dizziness or paresthesias  ENDOCRINE: NEGATIVE for temperature intolerance, skin/hair changes  HEME: NEGATIVE for bleeding problems  PSYCHIATRIC: NEGATIVE for changes in mood or affect    EXAM:   /68  Pulse 75  Temp 98  F (36.7  C) (Oral)  Wt 188 lb (85.3 kg)  SpO2 97%  BMI 29.44 kg/m2    GENERAL APPEARANCE: healthy, alert and no distress     EYES: EOMI,  PERRL     HENT: ear canals and TM's normal and nose and mouth without ulcers or lesions     NECK: no adenopathy, no asymmetry, masses, or scars and thyroid normal to palpation     RESP: lungs clear to auscultation - no rales, rhonchi or wheezes     CV: regular rates and rhythm, normal S1 S2, no S3 or S4 and no murmur, click or rub     ABDOMEN:  soft, nontender, no HSM or masses and bowel sounds normal     MS: extremities normal- no gross deformities noted, no evidence of inflammation in joints, FROM in all extremities.     SKIN: no suspicious lesions or rashes     NEURO: Normal strength and tone, sensory exam grossly normal, mentation intact and speech normal     PSYCH: mentation appears normal. and affect normal/bright      LYMPHATICS: No cervical adenopathy    DIAGNOSTICS:     EKG:   Reviewed EKG with MD on staff noted new finding of Atrial  Rhythm   P:QRS - 1:1, Abnormal P axis, H Rate 73     Labs Resulted Today:   Results for orders placed or performed in visit on 10/18/18   CBC with platelets   Result Value Ref Range    WBC 6.3 4.0 - 11.0 10e9/L    RBC Count 4.63 4.4 - 5.9 10e12/L    Hemoglobin 14.1 13.3 - 17.7 g/dL    Hematocrit 42.1 40.0 - 53.0 %    MCV 91 78 - 100 fl    MCH 30.5 26.5 - 33.0 pg    MCHC 33.5 31.5 - 36.5 g/dL    RDW 14.0 10.0 - 15.0 %    Platelet Count 159 150 - 450 10e9/L   Comprehensive metabolic panel   Result Value Ref Range    Sodium 141 133 - 144 mmol/L    Potassium 3.7 3.4 - 5.3 mmol/L    Chloride 109 94 - 109 mmol/L    Carbon Dioxide 25 20 - 32 mmol/L    Anion Gap 7 3 - 14 mmol/L    Glucose 111 (H) 70 - 99 mg/dL    Urea Nitrogen 22 7 - 30 mg/dL    Creatinine 1.39 (H) 0.66 - 1.25 mg/dL    GFR Estimate 49 (L) >60 mL/min/1.7m2    GFR Estimate If Black 60 (L) >60 mL/min/1.7m2    Calcium 9.0 8.5 - 10.1 mg/dL    Bilirubin Total 0.8 0.2 - 1.3 mg/dL    Albumin 3.7 3.4 - 5.0 g/dL    Protein Total 7.2 6.8 - 8.8 g/dL    Alkaline Phosphatase 72 40 - 150 U/L    ALT 25 0 - 70 U/L    AST 24 0 - 45 U/L       Recent Labs   Lab Test  06/21/18   1629  01/18/18   1030 10/02/17 09/29/17   09/22/17   0625   HGB  14.3   --    --    --    --   9.2*   PLT  160   --    --    --    --   164   INR   --    --   2.2*  3.2*   < >  2.10*   NA  140  138   --    --    --    --    POTASSIUM  4.4  4.5   --    --    --    --    CR  1.73*  1.27*   --    --    --    --     < > = values in this interval not displayed.        IMPRESSION:   Reason for surgery/procedure: cervical stenosis   Diagnosis/reason for consult: pre-surgery consult     The proposed surgical procedure is considered INTERMEDIATE risk.    REVISED CARDIAC RISK INDEX  The patient has the following serious cardiovascular risks for perioperative complications such as (MI, PE, VFib  and 3  AV Block):  No serious cardiac risks  INTERPRETATION: 0 risks: Class I (very low risk - 0.4% complication rate)    The patient has the following additional risks for perioperative complications:  As well as noted chronic SOB.       ICD-10-CM    1. Pre-op exam Z01.818 EKG 12-lead complete w/read - Clinics     Methicillin Resistant Staph Aureus PCR     CBC with platelets     Comprehensive metabolic panel   2. Spinal stenosis in cervical region M48.02    3. Hyperlipidemia LDL goal <130 E78.5    4. Essential hypertension I10    5. Chronic Kidney Disease, Stage III N18.3        RECOMMENDATIONS:     --Consult hospital rounder / IM to assist post-op medical management    --Patient is to take all scheduled medications on the day of surgery EXCEPT for modifications listed below.   -- HOLD ASA x 7 days   -- HOLD lisinopril morning of surgery     APPROVAL GIVEN to proceed with proposed procedure, without further diagnostic evaluation       Signed Electronically by: Crystal Alegre PA-C    Copy of this evaluation report is provided to requesting physician.    [facility] Preop Guidelines    Revised Cardiac Risk Index

## 2018-10-18 NOTE — MR AVS SNAPSHOT
After Visit Summary   10/18/2018    Greyson Haas    MRN: [id number]           Patient Information     Date Of Birth          1940        Visit Information        Provider Department      10/18/2018 2:40 PM Crystal Guadarrama PA-C [facility]        Today's Diagnoses     Pre-op exam    -  1    Preop general physical exam          Care Instructions    One week before surgery stop aspirin     Hold Lisinopril morning off surgery     Before Your Surgery      Call your surgeon if there is any change in your health. This includes signs of a cold or flu (such as a sore throat, runny nose, cough, rash or fever).    Do not smoke, drink alcohol or take over the counter medicine (unless your surgeon or primary care doctor tells you to) for the 24 hours before and after surgery.    If you take prescribed drugs: Follow your doctor s orders about which medicines to take and which to stop until after surgery.    Eating and drinking prior to surgery: follow the instructions from your surgeon    Take a shower or bath the night before surgery. Use the soap your surgeon gave you to gently clean your skin. If you do not have soap from your surgeon, use your regular soap. Do not shave or scrub the surgery site.  Wear clean pajamas and have clean sheets on your bed.           Follow-ups after your visit        Your next 10 appointments already scheduled     Oct 29, 2018   Procedure with Mina Grove MD   [facility] (--)    [address]  [facility] [address]   [phone]            Dec 10, 2018 10:20 AM CST   Return Visit with Lisa Arroyo NP   [facility] ([facility])    [address]  [facility] [id number] [phone]              Who to contact     If you have questions or need follow up information about today's clinic visit or your schedule please  contact [facility] directly at [phone].  Normal or non-critical lab and imaging results will be communicated to you by MyChart, letter or phone within 4 business days after the clinic has received the results. If you do not hear from us within 7 days, please contact the clinic through MyChart or phone. If you have a critical or abnormal lab result, we will notify you by phone as soon as possible.  Submit refill requests through [employer] or call your pharmacy and they will forward the refill request to us. Please allow 3 business days for your refill to be completed.          Additional Information About Your Visit        [employer] Information     [employer] gives you secure access to your electronic health record. If you see a primary care provider, you can also send messages to your care team and make appointments. If you have questions, please call your primary care clinic.  If you do not have a primary care provider, please call [phone] and they will assist you.        Care EveryWhere ID     This is your Care EveryWhere ID. This could be used by other organizations to access your [facility] medical records  [id number]        Your Vitals Were     Pulse Temperature Pulse Oximetry BMI (Body Mass Index)          75 98  F (36.7  C) (Oral) 97% 29.44 kg/m2         Blood Pressure from Last 3 Encounters:   10/18/18 128/68   09/12/18 105/67   06/21/18 116/72    Weight from Last 3 Encounters:   10/18/18 188 lb (85.3 kg)   09/12/18 188 lb (85.3 kg)   06/21/18 188 lb (85.3 kg)              We Performed the Following     CBC with platelets     Comprehensive metabolic panel     EKG 12-lead complete w/read - Clinics     Methicillin Resistant Staph Aureus PCR        Primary Care Provider Office Phone # Fax #    Neville Nick [phone]       [address] [facility] [address]        Equal Access to Services     LETY SIFUENTES AH: kellee Puckett,  jose grijalva cashog bronson magnoin hayaan adeeg kharash la'aan ah. So [facility] [phone].    ATENCIÓN: Si ramon edouard, tiene a [place] disposición servicios gratuitos de asistencia lingüística. Antionette al [phone].    We comply with applicable federal civil rights laws and [place] laws. We do not discriminate on the basis of race, color, national origin, age, disability, sex, sexual orientation, or gender identity.            Thank you!     Thank you for choosing [facility]  for your care. Our goal is always to provide you with excellent care. Hearing back from our patients is one way we can continue to improve our services. Please take a few minutes to complete the written survey that you may receive in the mail after your visit with us. Thank you!             Your Updated Medication List - Protect others around you: Learn how to safely use, store and throw away your medicines at www.disposemymeds.org.          This list is accurate as of 10/18/18  3:18 PM.  Always use your most recent med list.                   Brand Name Dispense Instructions for use Diagnosis    ACETAMINOPHEN PO      Take 1,000 mg by mouth daily as needed for pain Reported on 5/18/2017        allopurinol 300 MG tablet    ZYLOPRIM     Take 0.5 tablets (150 mg) by mouth daily    Gout of multiple sites, unspecified cause, unspecified chronicity       aspirin 81 MG tablet      Take 1 tablet (81 mg) by mouth daily        hydrocortisone 1 % cream    CORTAID     Apply topically daily as needed for rash or itching (on legs.)        lisinopril 10 MG tablet    PRINIVIL/ZESTRIL    90 tablet    Take 1 tablet (10 mg) by mouth daily    Essential hypertension       REFRESH OP      Apply 1 drop to eye daily as needed        simvastatin 20 MG tablet    ZOCOR    90 tablet    Take 1 tablet (20 mg) by mouth every morning    Hyperlipidemia LDL goal <130       tamsulosin 0.4 MG capsule    FLOMAX    90 capsule    TAKE ONE CAPSULE BY MOUTH  ONCE DAILY    Benign non-nodular prostatic hyperplasia with lower urinary tract symptoms       timolol 0.25 % ophthalmic gel-form    TIMOPTIC-XE     1 drop daily        TIMOLOL MALEATE PO           VIAGRA PO      Take 50 mg by mouth daily as needed        VITAMIN B-12 SL      Place 500 mg under the tongue every morning

## 2018-10-24 ENCOUNTER — TRANSFERRED RECORDS (OUTPATIENT)
Dept: HEALTH INFORMATION MANAGEMENT | Facility: CLINIC | Age: 78
End: 2018-10-24

## 2018-10-27 ENCOUNTER — TELEPHONE (OUTPATIENT)
Dept: NURSING | Facility: CLINIC | Age: 78
End: 2018-10-27

## 2018-10-27 ENCOUNTER — NURSE TRIAGE (OUTPATIENT)
Dept: NURSING | Facility: CLINIC | Age: 78
End: 2018-10-27

## 2018-10-27 NOTE — TELEPHONE ENCOUNTER
Dr. Grove,  Thank you. I called the patient back and left a message letting him know that his surgery was denied by work comp and they should have gotten in touch with him.  Haydee Wade RN  [facility]

## 2018-10-27 NOTE — TELEPHONE ENCOUNTER
"Patient states he is having surgery Monday morning. He is supposed to be at [facility] at 5a. According to EMR the case was cancelled. No other information available. Patient states he knows nothing about the case being cancelled and he a bit unhappy and confused about it. I offered to try to contact the OR staff at [facility] to verify and I will call him back. I spoke with Jordan at the [facility] OR Control Desk and he said that this patient is NOT on the schedule for Monday. I called patient back with this information and advised him to contact Dr. Grove office on Monday.He said \"well, I'm supposed to be in surgery...how can I contact his office?\" I again advised that his surgery was cancelled, but I don't know the reason.  Haydee Wade RN  [facility]    "

## 2018-10-27 NOTE — TELEPHONE ENCOUNTER
"Patient states he is having surgery Monday morning. He is supposed to be at [facility] at 5a. According to EMR the case was cancelled. No other information available. Patient states he knows nothing about the case being cancelled and he is a bit unhappy and confused about it. I offered to try to contact the OR staff at [facility] to verify and I will call him back. I spoke with Jordan at the [facility] OR Control Desk and he said that this patient is NOT on the schedule for Monday. I called patient back with this information and advised him to contact Dr. Grove office on Monday.He said \"well, I'm supposed to be in surgery...how can I contact his office?\" I again advised that his surgery was cancelled, but I don't know the reason.  Haydee Wade RN  [facility]      "

## 2018-10-31 NOTE — PLAN OF CARE
Contacted patient. Discussed positive MSSA nasal screen and what needs to be done now. Rx for Mupirocin called to [facility] in [place]   [phone]. Mupirocin use and need for extra CHG showers discussed with patient. Verbalizes understanding. Written information about each was also sent to patient by mail.

## 2018-10-31 NOTE — PLAN OF CARE
Notified Joelle at Dr. Grove' office that patient did test positive for MSSA. As of this writing, patient has NOT been reached.

## 2018-11-05 ENCOUNTER — TELEPHONE (OUTPATIENT)
Dept: NEUROSURGERY | Facility: CLINIC | Age: 78
End: 2018-11-05

## 2018-11-05 NOTE — TELEPHONE ENCOUNTER
Type of surgery: C5-7 ACDF  Location of surgery: [facility]  Date and time of surgery: 11/12/2018 at 8:50 AM   Surgeon: Dr. Grove   Pre-Op Appt Date: Discussed   Post-Op Appt Date: 01/03/2019   Packet sent out: Yes  Pre-cert/Authorization completed:  Yes  Date: 11/05/2018

## 2018-11-08 ENCOUNTER — TELEPHONE (OUTPATIENT)
Dept: NEUROSURGERY | Facility: CLINIC | Age: 78
End: 2018-11-08

## 2018-11-08 NOTE — TELEPHONE ENCOUNTER
Pre-operative Education    Education included but not limited to:  - Pre-operative physical with primary care physician within 30 days of surgical date. Pre op completed 10/18/18 at [facility].   - Pre-operative clearance (cardiology, hematology, etc).   - Discontinue Aspirin, NSAIDs, Diclofenac x 7 days prior to surgical date.   -May try tylenol for pain 1000 mg three times per day for pain  -Do not begin taking Non-Steroidal Anti-Inflammatory Drugs or NSAIDs (Advil,Motrin, Ibuprofen,Nuprin, Diclofenac,Meloxicam, Aleve, Celebrex, Aspirin, etc.) until 12 weeks after surgery if you had a fusion. May cause bleeding and interfere with bone healing.  -Patient is not a smoker  -Forms to be completed: none per patient.  -Surgical risks: blood clots in the leg or lung, problems urinating, nerve damage, drainage from the incision, infection,stiffness  -Preparation timeline  - When to start NPO           -Special bathing procedure.Patient received Hibiclens and showering instruction sheet.   Hospital stay:  Checking in  The surgery itself   Recovery room  - Transition to the hospital room where you will begin your recovery  - Managing pain   - 1 night hospitalization  - Post operative incisional pain x 1-2 weeks which will require pain medications and muscle relaxants.   -Do NOT drive or drink alcohol while taking narcotic pain medication.  -Post operative incision care-Keep your incision clean and dry at all times. OK to remove dressing on postop day 2. OK to shower on postop day 3 and allow water to run over incision, pat dry after shower. No bathing, swimming or submerging in water. Call immediately or come to ED if any drainage occurs, or you develop new pain. Watch for signs of infection: redness, swelling, warmth, drainage, and fever of 101 degrees or higher. Notify clinic.   - Post operative activity limitations: 6-8 weeks, no lifting > 10 pounds, no bending, twisting, or overhead reaching.  -Ok to walk as  tolerated, avoid bed rest and prolonged sitting.  -No contact sports until after follow up visit  -No high impact activities such as; running/jogging, snowmobile or 4 tran riding or any other recreational vehicles.  -Orthotics will fit you for a brace in the hospital.Cervical fusion: wear soft collar for up to 2 weeks as needed for comfort.    - Post op follow up appointments: 6 weeks with Nurse Practitioner with X-ray prior. Please call to schedule follow up appointment at [phone].   - Spine Education book was also given to the patient for further review.      Patient verbalized understanding of above instructions. All questions were answered to the best of my ability and the patient's satisfaction. Patient advised to call with any additional questions or concerns.  Sunita Smith RN

## 2018-11-08 NOTE — PATIENT INSTRUCTIONS
Patient Instructions  Pre-Operative:  -Surgery scheduled at [facility] on 11/12/2018 for C5-7 ACDF (anterior cervical discectomy and fusion)  -Surgical risks: blood clots in the leg or lung, problems urinating, nerve damage, drainage from the incision, infection,stiffness  - Pre-operative physical with primary care physician within 30 days of surgical date. Pre op completed on 10/18/2018.  -1 night hospitalization.    -Shower procedure: please shower with antimicrobial soap the night before surgery and morning of surgery. Please refer to showering instruction sheet in folder.  -Stop all solid foods 8 hours before surgery.  -Keep drinking clear liquids until 4 hours before surgery. Clear liquids include water, clear juice, black coffee, or clear tea without milk, Gatorade, clear soda.   - Discontinue Aspirin, NSAIDs (Advil/Ibuprofen, Naproxen,Nuprin,Relafen/Nabumetone, Diclofenac,Meloxicam, Aleve, Celebrex) x 7 days prior to surgical date.    - May try tylenol for pain 1000 mg three times per day for pain        Post-Operative:  -Do not begin taking Non-Steroidal Anti-Inflammatory Drugs or NSAIDs (Advil/ibuprofen, Naproxen,Nuprin, Relafen/Nabumetone, Diclofenac,Meloxicam, Aleve, Celebrex, Aspirin, etc.) until 12 weeks after surgery. May cause bleeding and interfere with bone healing.   - Post operative incisional pain x 1-2 weeks which will require pain medications and muscle relaxants. You will receive medication upon discharge.  -Do NOT drive while taking narcotic pain medication.  -Do not drink alcohol while using any pain medication.  -Post operative incision care- Watch for signs of infection: redness, swelling, warmth, drainage, and fever of 101 degrees or higher. Notify clinic [phone].  -No submerging incision in water such as pools, hot tubs,baths for at least 8 weeks or until incision is healed. Showers are fine.   - Post operative activity limitations: 6-8 weeks, no lifting > 10 pounds,  no bending, twisting, or overhead reaching.  -Ok to walk as tolerated, avoid bed rest and prolonged sitting.  -No contact sports until after follow up visit  -No high impact activities such as; running/jogging, snowmobile or 4 tran riding or any other recreational vehicles.  -Orthotics will fit you for a brace in the hospital.Cervical fusion: wear soft collar for up to 2 weeks as needed for comfort.  - Post op follow up appointments: 6 week post op follow up visit with Nurse practitioner and x-ray prior to appointment. Please call to schedule follow up appointment at [phone].

## 2018-11-09 NOTE — H&P (VIEW-ONLY)
[address] [address]  [phone]  Dept: [phone]    PRE-OP EVALUATION:  Today's date: 10/18/2018    Greyson Haas (: 1940) presents for pre-operative evaluation assessment as requested by Dr. Graf.  He requires evaluation and anesthesia risk assessment prior to undergoing surgery/procedure for treatment of Neck Fusion .    Primary Physician: Neville Nick  Type of Anesthesia Anticipated: General    Patient has a Health Care Directive or Living Will:  YES     Preop Questions 10/18/2018   Who is doing your surgery? Dr graf   What are you having done? spine   Date of Surgery/Procedure: 10 29   Facility or Hospital where procedure/surgery will be performed: -   1.  Do you have a history of Heart attack, stroke, stent, coronary bypass surgery, or other heart surgery? No   2.  Do you ever have any pain or discomfort in your chest? No   3.  Do you have a history of  Heart Failure? No   4.   Are you troubled by shortness of breath when:  walking on a level surface, or up a slight hill, or at night? No   5.  Do you currently have a cold, bronchitis or other respiratory infection? No   6.  Do you have a cough, shortness of breath, or wheezing? No   7.  Do you sometimes get pains in the calves of your legs when you walk? No   8. Do you or anyone in your family have previous history of blood clots? No   9.  Do you or does anyone in your family have a serious bleeding problem such as prolonged bleeding following surgeries or cuts? No   10. Have you ever had problems with anemia or been told to take iron pills? No   11. Have you had any abnormal blood loss such as black, tarry or bloody stools? No   12. Have you ever had a blood transfusion? No   13. Have you or any of your relatives ever had problems with anesthesia? No   14. Do you have sleep apnea, excessive snoring or daytime drowsiness? No   15. Do you have any prosthetic heart valves?  No   16. Do you have prosthetic joints? YES - knee and hip, pins in shoulder         HPI:     HPI related to upcoming procedure:     Patient is having C5-7 anterior cervical discectomy and fusion due to cervical spondylosis with severe stenosis.     HYPERLIPIDEMIA - Patient has a long history of significant Hyperlipidemia requiring medication for treatment with recent good control. Patient reports no problems or side effects with the medication.                                                                                                                                                       .  HYPERTENSION - Patient has longstanding history of HTN , currently denies any symptoms referable to elevated blood pressure. Specifically denies chest pain, palpitations, dyspnea, orthopnea, PND or peripheral edema. Blood pressure readings have been in normal range. Current medication regimen is as listed below. Patient denies any side effects of medication.                                                                                                                                                                                          .  RENAL INSUFFICIENCY - Patient has a longstanding history of moderate-severe chronic renal insufficiency. Last C1.73 pm 06/21/18r.     Pt does note continued chronic shortness of breath that is not new or changed in several years since previous work up.                                                                                                                                                                              .    MEDICAL HISTORY:     Patient Active Problem List    Diagnosis Date Noted     Ascending aorta dilatation (H) 07/10/2018     Priority: Medium     Cervical stenosis of spinal canal 06/21/2018     Priority: Medium     Right foot drop 01/18/2018     Priority: Medium     Present since 1980's       Aftercare following left hip joint replacement surgery 12/28/2017      Priority: Medium     Hip pain, left 12/28/2017     Priority: Medium     Long-term (current) use of anticoagulants [Z79.01] 09/25/2017     Priority: Medium     H/O total hip arthroplasty, left 09/18/2017     Priority: Medium     Benign non-nodular prostatic hyperplasia with lower urinary tract symptoms 05/18/2017     Priority: Medium     S/P lumbar fusion 11/05/2015     Priority: Medium     Lumbar stenosis 04/23/2015     Priority: Medium     Previous lumbar surgery Dr. Pop  S/P re-do L4-S1 decompression with fusion on 11/5/2015 for claudication and right foot drop Dr. Grove       Kidney stones 09/20/2012     Priority: Medium     First 2004, [facility] Dr. Underwood  Next 2012  Multiple calcium stones 9/2012       Advanced directives, counseling/discussion 01/19/2012     Priority: Medium     Discussed advance care planning with patient; however, patient declined at this time.   12/9/2013:  Full code, no extended support  Advance Care Planning:   Initial facilitation introduction:   Greyson Haas presented for initial session regarding ACP at the clinic. He was accompanied by wife Mellissa. [facility] information provided and resources reviewed. He currently wishes to complete an ACP document.  He currently has the following questions or concerns about Advance Care Planning: none.  Confirmed/documented designated decision maker(s). See permanent comments section of demographics in clinical tab. Confirmed code status reflects current choices .   Added by Irais Guerrero on 3/31/2015           Pain in joint, lower leg 02/08/2011     Priority: Medium     Abnormal gait 02/08/2011     Priority: Medium     KNEE JOINT REPLACEMENT 02/07/2011     Priority: Medium     Basal cell carcinoma 01/19/2011     Priority: Medium     Chest, Moh's 1/11, Dr. Raj Berg following       Erectile dysfunction 01/19/2011     Priority: Medium     Gouty arthropathy 04/29/2008     Priority: Medium              Anxiety state 01/15/2007      "Priority: Medium     Sprain of acromioclavicular ligament 10/17/2006     Priority: Medium     Problem list name updated by automated process. Provider to review       Glaucoma      Priority: Medium     Followed by Dr. Michel         Hyperlipidemia LDL goal <130 06/06/2005     Priority: Medium     Chronic Kidney Disease, Stage III 02/02/2005     Priority: Medium     Gout of multiple sites 09/23/2004     Priority: Medium     R third toe       Tinnitus 05/19/2004     Priority: Medium     LVH 05/22/2003     Priority: Medium     Diverticulosis of large intestine 05/12/2003     Priority: Medium     Problem list name updated by automated process. Provider to review       Essential hypertension 02/10/2003     Priority: Medium     History of rectal cancer 03/28/2002     Priority: Medium     Dr. Rubin        Past Medical History:   Diagnosis Date      RENAL INSUFFICIENCY      Arthritis      Calculus of kidney 9/02, 6/04     Chronic Kidney Disease, Stage III 2/2/2005     Chronic Tinnitus 1960's     Diverticulosis of colon (without mention of hemorrhage)      Essential hypertension, benign      Gouty arthropathy 4/29/2008     Gouty tophi of other sites 8/04    R third toe     HYPERLIPIDEMIA      Hyperplastic Polyps Colon 11/03     LVH 5/03     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 Rubin     Nocturia     x 2-3     Pulmonary Nodule, stable      Tubular Adenoma 3/07     Unspecified glaucoma(365.9)     Followed by Dr. Monique     Past Surgical History:   Procedure Laterality Date     ARTHROPLASTY HIP Left 9/18/2017    LEFT TOTAL HIP ARTHROPLASTY(DEPUY)^;  Surgeon: Aurelio Hood MD;  Location: [facility] OR     C NONSPECIFIC PROCEDURE  6/04    cysto, R ureteral stent, ESWL     C NONSPECIFIC PROCEDURE  CHILD    TONSILLECTOMY     C NONSPECIFIC PROCEDURE  AGE 4    L heel osteomyelitis with \"bone scraping\"     OPTICAL TRACKING SYSTEM FUSION SPINE " POSTERIOR LUMBAR TWO LEVELS N/A 11/5/2015    Procedure: OPTICAL TRACKING SYSTEM FUSION SPINE POSTERIOR LUMBAR TWO LEVELS;  Surgeon: Mina Grove MD;  Location: [facility] OR     SURGICAL HISTORY OF -   1991    B Shoulder surgeries     SURGICAL HISTORY OF -   1992    lumbar laminectomy     SURGICAL HISTORY OF -   1998    lumbar decompression    Dr. Pop     SURGICAL HISTORY OF -   9/06    L shoulder    Dr. Foley     SURGICAL HISTORY OF -   11/07    partial amputation R 3rd toe (tophus)  Dr. Neal     SURGICAL HISTORY OF -   2010    bilateral cataracts       SURGICAL HISTORY OF -   1/11    Mohs L chest, Dr. Anthony     SURGICAL HISTORY OF - 1/11    L TKA   Dr. Thomson     Current Outpatient Prescriptions   Medication Sig Dispense Refill     ACETAMINOPHEN PO Take 1,000 mg by mouth daily as needed for pain Reported on 5/18/2017       allopurinol (ZYLOPRIM) 300 MG tablet Take 0.5 tablets (150 mg) by mouth daily       aspirin 81 MG tablet Take 1 tablet (81 mg) by mouth daily       lisinopril (PRINIVIL/ZESTRIL) 10 MG tablet Take 1 tablet (10 mg) by mouth daily 90 tablet 3     Polyvinyl Alcohol-Povidone (REFRESH OP) Apply 1 drop to eye daily as needed       Sildenafil Citrate (VIAGRA PO) Take 50 mg by mouth daily as needed       tamsulosin (FLOMAX) 0.4 MG capsule TAKE ONE CAPSULE BY MOUTH ONCE DAILY 90 capsule 3     timolol (TIMOPTIC-XE) 0.25 % ophthalmic gel-form 1 drop daily       TIMOLOL MALEATE PO        Cyanocobalamin (VITAMIN B-12 SL) Place 500 mg under the tongue every morning       hydrocortisone (CORTAID) 1 % cream Apply topically daily as needed for rash or itching (on legs.)       simvastatin (ZOCOR) 20 MG tablet Take 1 tablet (20 mg) by mouth every morning (Patient not taking: Reported on 10/18/2018) 90 tablet 3     OTC products: None, except as noted above    No Known Allergies   Latex Allergy: NO    Social History   Substance Use Topics     Smoking status: Former Smoker     Packs/day: 1.00     Years:  27.00     Quit date: 1/1/1983     Smokeless tobacco: Never Used     Alcohol use Yes      Comment: Ave 2 daily or more.  Heavy days 5-6, some days none.     History   Drug Use No       REVIEW OF SYSTEMS:   CONSTITUTIONAL: NEGATIVE for fever, chills, change in weight  INTEGUMENTARY/SKIN: NEGATIVE for worrisome rashes, moles or lesions  EYES: NEGATIVE for vision changes or irritation  ENT/MOUTH: NEGATIVE for ear, mouth and throat problems  RESP: NEGATIVE for significant cough or SOB  CV: NEGATIVE for chest pain, palpitations or peripheral edema  GI: NEGATIVE for nausea, abdominal pain, heartburn, or change in bowel habits  : NEGATIVE for frequency, dysuria, or hematuria  MUSCULOSKELETAL: NEGATIVE for significant arthralgias or myalgia  POSITIVE: left hip and knee pain has been ongoing for years   NEURO: NEGATIVE for weakness, dizziness or paresthesias  ENDOCRINE: NEGATIVE for temperature intolerance, skin/hair changes  HEME: NEGATIVE for bleeding problems  PSYCHIATRIC: NEGATIVE for changes in mood or affect    EXAM:   /68  Pulse 75  Temp 98  F (36.7  C) (Oral)  Wt 188 lb (85.3 kg)  SpO2 97%  BMI 29.44 kg/m2    GENERAL APPEARANCE: healthy, alert and no distress     EYES: EOMI,  PERRL     HENT: ear canals and TM's normal and nose and mouth without ulcers or lesions     NECK: no adenopathy, no asymmetry, masses, or scars and thyroid normal to palpation     RESP: lungs clear to auscultation - no rales, rhonchi or wheezes     CV: regular rates and rhythm, normal S1 S2, no S3 or S4 and no murmur, click or rub     ABDOMEN:  soft, nontender, no HSM or masses and bowel sounds normal     MS: extremities normal- no gross deformities noted, no evidence of inflammation in joints, FROM in all extremities.     SKIN: no suspicious lesions or rashes     NEURO: Normal strength and tone, sensory exam grossly normal, mentation intact and speech normal     PSYCH: mentation appears normal. and affect normal/bright      LYMPHATICS: No cervical adenopathy    DIAGNOSTICS:     EKG:   Reviewed EKG with MD on staff noted new finding of Atrial  Rhythm   P:QRS - 1:1, Abnormal P axis, H Rate 73     Labs Resulted Today:   Results for orders placed or performed in visit on 10/18/18   CBC with platelets   Result Value Ref Range    WBC 6.3 4.0 - 11.0 10e9/L    RBC Count 4.63 4.4 - 5.9 10e12/L    Hemoglobin 14.1 13.3 - 17.7 g/dL    Hematocrit 42.1 40.0 - 53.0 %    MCV 91 78 - 100 fl    MCH 30.5 26.5 - 33.0 pg    MCHC 33.5 31.5 - 36.5 g/dL    RDW 14.0 10.0 - 15.0 %    Platelet Count 159 150 - 450 10e9/L   Comprehensive metabolic panel   Result Value Ref Range    Sodium 141 133 - 144 mmol/L    Potassium 3.7 3.4 - 5.3 mmol/L    Chloride 109 94 - 109 mmol/L    Carbon Dioxide 25 20 - 32 mmol/L    Anion Gap 7 3 - 14 mmol/L    Glucose 111 (H) 70 - 99 mg/dL    Urea Nitrogen 22 7 - 30 mg/dL    Creatinine 1.39 (H) 0.66 - 1.25 mg/dL    GFR Estimate 49 (L) >60 mL/min/1.7m2    GFR Estimate If Black 60 (L) >60 mL/min/1.7m2    Calcium 9.0 8.5 - 10.1 mg/dL    Bilirubin Total 0.8 0.2 - 1.3 mg/dL    Albumin 3.7 3.4 - 5.0 g/dL    Protein Total 7.2 6.8 - 8.8 g/dL    Alkaline Phosphatase 72 40 - 150 U/L    ALT 25 0 - 70 U/L    AST 24 0 - 45 U/L       Recent Labs   Lab Test  06/21/18   1629  01/18/18   1030 10/02/17 09/29/17   09/22/17   0625   HGB  14.3   --    --    --    --   9.2*   PLT  160   --    --    --    --   164   INR   --    --   2.2*  3.2*   < >  2.10*   NA  140  138   --    --    --    --    POTASSIUM  4.4  4.5   --    --    --    --    CR  1.73*  1.27*   --    --    --    --     < > = values in this interval not displayed.        IMPRESSION:   Reason for surgery/procedure: cervical stenosis   Diagnosis/reason for consult: pre-surgery consult     The proposed surgical procedure is considered INTERMEDIATE risk.    REVISED CARDIAC RISK INDEX  The patient has the following serious cardiovascular risks for perioperative complications such as (MI, PE, VFib  and 3  AV Block):  No serious cardiac risks  INTERPRETATION: 0 risks: Class I (very low risk - 0.4% complication rate)    The patient has the following additional risks for perioperative complications:  As well as noted chronic SOB.       ICD-10-CM    1. Pre-op exam Z01.818 EKG 12-lead complete w/read - Clinics     Methicillin Resistant Staph Aureus PCR     CBC with platelets     Comprehensive metabolic panel   2. Spinal stenosis in cervical region M48.02    3. Hyperlipidemia LDL goal <130 E78.5    4. Essential hypertension I10    5. Chronic Kidney Disease, Stage III N18.3        RECOMMENDATIONS:     --Consult hospital rounder / IM to assist post-op medical management    --Patient is to take all scheduled medications on the day of surgery EXCEPT for modifications listed below.   -- HOLD ASA x 7 days   -- HOLD lisinopril morning of surgery     APPROVAL GIVEN to proceed with proposed procedure, without further diagnostic evaluation       Signed Electronically by: Crystal Alegre PA-C    Copy of this evaluation report is provided to requesting physician.    [facility] Preop Guidelines    Revised Cardiac Risk Index

## 2018-11-12 ENCOUNTER — ANESTHESIA (OUTPATIENT)
Dept: SURGERY | Facility: CLINIC | Age: 78
End: 2018-11-12
Payer: MEDICARE

## 2018-11-12 ENCOUNTER — SURGERY (OUTPATIENT)
Age: 78
End: 2018-11-12

## 2018-11-12 ENCOUNTER — APPOINTMENT (OUTPATIENT)
Dept: GENERAL RADIOLOGY | Facility: CLINIC | Age: 78
End: 2018-11-12
Attending: NEUROLOGICAL SURGERY
Payer: MEDICARE

## 2018-11-12 ENCOUNTER — HOSPITAL ENCOUNTER (OUTPATIENT)
Facility: CLINIC | Age: 78
Setting detail: OBSERVATION
Discharge: HOME | End: 2018-11-13
Attending: NEUROLOGICAL SURGERY | Admitting: NEUROLOGICAL SURGERY
Payer: MEDICARE

## 2018-11-12 ENCOUNTER — ANESTHESIA EVENT (OUTPATIENT)
Dept: SURGERY | Facility: CLINIC | Age: 78
End: 2018-11-12
Payer: MEDICARE

## 2018-11-12 DIAGNOSIS — Z98.1 S/P CERVICAL SPINAL FUSION: Primary | ICD-10-CM

## 2018-11-12 LAB
ABO + RH BLD: NORMAL
ABO + RH BLD: NORMAL
BLD GP AB SCN SERPL QL: NORMAL
BLOOD BANK CMNT PATIENT-IMP: NORMAL
SPECIMEN EXP DATE BLD: NORMAL

## 2018-11-12 PROCEDURE — 95940 IONM IN OPERATNG ROOM 15 MIN: CPT | Mod: XU | Performed by: NEUROLOGICAL SURGERY

## 2018-11-12 PROCEDURE — 71000012 ZZH RECOVERY PHASE 1 LEVEL 1 FIRST HR: Performed by: NEUROLOGICAL SURGERY

## 2018-11-12 PROCEDURE — 25000300 ZZH OR RX SURGIFLO HEMOSTATIC MATRIX 10ML 199102S OPNP: Performed by: NEUROLOGICAL SURGERY

## 2018-11-12 PROCEDURE — C1713 ANCHOR/SCREW BN/BN,TIS/BN: HCPCS | Performed by: NEUROLOGICAL SURGERY

## 2018-11-12 PROCEDURE — 25000132 ZZH RX MED GY IP 250 OP 250 PS 637: Mod: GY | Performed by: NEUROLOGICAL SURGERY

## 2018-11-12 PROCEDURE — 25000125 ZZHC RX 250: Performed by: NEUROLOGICAL SURGERY

## 2018-11-12 PROCEDURE — G0378 HOSPITAL OBSERVATION PER HR: HCPCS

## 2018-11-12 PROCEDURE — 25000128 H RX IP 250 OP 636: Performed by: NURSE ANESTHETIST, CERTIFIED REGISTERED

## 2018-11-12 PROCEDURE — 36000069 ZZH SURGERY LEVEL 5 EA 15 ADDTL MIN: Performed by: NEUROLOGICAL SURGERY

## 2018-11-12 PROCEDURE — 25000128 H RX IP 250 OP 636: Performed by: NEUROLOGICAL SURGERY

## 2018-11-12 PROCEDURE — 25000128 H RX IP 250 OP 636: Performed by: ANESTHESIOLOGY

## 2018-11-12 PROCEDURE — 40000277 XR SURGERY CARM FLUORO LESS THAN 5 MIN W STILLS: Mod: TC

## 2018-11-12 PROCEDURE — 86850 RBC ANTIBODY SCREEN: CPT | Performed by: ANESTHESIOLOGY

## 2018-11-12 PROCEDURE — 37000009 ZZH ANESTHESIA TECHNICAL FEE, EACH ADDTL 15 MIN: Performed by: NEUROLOGICAL SURGERY

## 2018-11-12 PROCEDURE — 22845 INSERT SPINE FIXATION DEVICE: CPT | Mod: XU | Performed by: NEUROLOGICAL SURGERY

## 2018-11-12 PROCEDURE — 25000125 ZZHC RX 250: Performed by: NURSE ANESTHETIST, CERTIFIED REGISTERED

## 2018-11-12 PROCEDURE — 27210794 ZZH OR GENERAL SUPPLY STERILE: Performed by: NEUROLOGICAL SURGERY

## 2018-11-12 PROCEDURE — 27210995 ZZH RX 272: Performed by: NEUROLOGICAL SURGERY

## 2018-11-12 PROCEDURE — 86900 BLOOD TYPING SEROLOGIC ABO: CPT | Performed by: ANESTHESIOLOGY

## 2018-11-12 PROCEDURE — A9270 NON-COVERED ITEM OR SERVICE: HCPCS | Mod: GY | Performed by: NEUROLOGICAL SURGERY

## 2018-11-12 PROCEDURE — 25000566 ZZH SEVOFLURANE, EA 15 MIN: Performed by: NEUROLOGICAL SURGERY

## 2018-11-12 PROCEDURE — 86901 BLOOD TYPING SEROLOGIC RH(D): CPT | Performed by: ANESTHESIOLOGY

## 2018-11-12 PROCEDURE — C1763 CONN TISS, NON-HUMAN: HCPCS | Performed by: NEUROLOGICAL SURGERY

## 2018-11-12 PROCEDURE — 25800025 ZZH RX 258: Performed by: NEUROLOGICAL SURGERY

## 2018-11-12 PROCEDURE — 37000008 ZZH ANESTHESIA TECHNICAL FEE, 1ST 30 MIN: Performed by: NEUROLOGICAL SURGERY

## 2018-11-12 PROCEDURE — 22853 INSJ BIOMECHANICAL DEVICE: CPT | Performed by: NEUROLOGICAL SURGERY

## 2018-11-12 PROCEDURE — 27211022 ZZHC OR IOM SUPPLIES OPNP: Performed by: NEUROLOGICAL SURGERY

## 2018-11-12 PROCEDURE — 36415 COLL VENOUS BLD VENIPUNCTURE: CPT | Performed by: ANESTHESIOLOGY

## 2018-11-12 PROCEDURE — 36000071 ZZH SURGERY LEVEL 5 W FLUORO 1ST 30 MIN: Performed by: NEUROLOGICAL SURGERY

## 2018-11-12 PROCEDURE — 40000306 ZZH STATISTIC PRE PROC ASSESS II: Performed by: NEUROLOGICAL SURGERY

## 2018-11-12 PROCEDURE — 22552 ARTHRD ANT NTRBD CERVICAL EA: CPT | Performed by: NEUROLOGICAL SURGERY

## 2018-11-12 PROCEDURE — 22551 ARTHRD ANT NTRBDY CERVICAL: CPT | Performed by: NEUROLOGICAL SURGERY

## 2018-11-12 DEVICE — IMPLANTABLE DEVICE: Type: IMPLANTABLE DEVICE | Site: SPINE CERVICAL | Status: FUNCTIONAL

## 2018-11-12 DEVICE — IMP SCR GLOBUS PROV VAR ANG SELF DRILL 4.2X16MM: Type: IMPLANTABLE DEVICE | Site: SPINE CERVICAL | Status: FUNCTIONAL

## 2018-11-12 DEVICE — IMP SCR GLOBUS PROV FIXED ANG SELF DRILL 4.2X16MM: Type: IMPLANTABLE DEVICE | Site: SPINE CERVICAL | Status: FUNCTIONAL

## 2018-11-12 RX ORDER — KETAMINE HYDROCHLORIDE 10 MG/ML
INJECTION INTRAMUSCULAR; INTRAVENOUS PRN
Status: DISCONTINUED | OUTPATIENT
Start: 2018-11-12 | End: 2018-11-12

## 2018-11-12 RX ORDER — DIPHENHYDRAMINE HYDROCHLORIDE 50 MG/ML
25 INJECTION INTRAMUSCULAR; INTRAVENOUS EVERY 6 HOURS PRN
Status: DISCONTINUED | OUTPATIENT
Start: 2018-11-12 | End: 2018-11-13 | Stop reason: HOSPADM

## 2018-11-12 RX ORDER — HYDROMORPHONE HYDROCHLORIDE 1 MG/ML
.3-.5 INJECTION, SOLUTION INTRAMUSCULAR; INTRAVENOUS; SUBCUTANEOUS
Status: DISCONTINUED | OUTPATIENT
Start: 2018-11-12 | End: 2018-11-13 | Stop reason: HOSPADM

## 2018-11-12 RX ORDER — SIMVASTATIN 20 MG
20 TABLET ORAL EVERY MORNING
Status: DISCONTINUED | OUTPATIENT
Start: 2018-11-12 | End: 2018-11-13 | Stop reason: HOSPADM

## 2018-11-12 RX ORDER — DEXAMETHASONE SODIUM PHOSPHATE 4 MG/ML
INJECTION, SOLUTION INTRA-ARTICULAR; INTRALESIONAL; INTRAMUSCULAR; INTRAVENOUS; SOFT TISSUE PRN
Status: DISCONTINUED | OUTPATIENT
Start: 2018-11-12 | End: 2018-11-12

## 2018-11-12 RX ORDER — CEFAZOLIN SODIUM 1 G/3ML
1 INJECTION, POWDER, FOR SOLUTION INTRAMUSCULAR; INTRAVENOUS SEE ADMIN INSTRUCTIONS
Status: DISCONTINUED | OUTPATIENT
Start: 2018-11-12 | End: 2018-11-12 | Stop reason: HOSPADM

## 2018-11-12 RX ORDER — LABETALOL HYDROCHLORIDE 5 MG/ML
10 INJECTION, SOLUTION INTRAVENOUS
Status: DISCONTINUED | OUTPATIENT
Start: 2018-11-12 | End: 2018-11-12 | Stop reason: HOSPADM

## 2018-11-12 RX ORDER — LIDOCAINE 40 MG/G
CREAM TOPICAL
Status: DISCONTINUED | OUTPATIENT
Start: 2018-11-12 | End: 2018-11-12 | Stop reason: HOSPADM

## 2018-11-12 RX ORDER — NALOXONE HYDROCHLORIDE 0.4 MG/ML
.1-.4 INJECTION, SOLUTION INTRAMUSCULAR; INTRAVENOUS; SUBCUTANEOUS
Status: DISCONTINUED | OUTPATIENT
Start: 2018-11-12 | End: 2018-11-13 | Stop reason: HOSPADM

## 2018-11-12 RX ORDER — FENTANYL CITRATE 50 UG/ML
25-50 INJECTION, SOLUTION INTRAMUSCULAR; INTRAVENOUS
Status: DISCONTINUED | OUTPATIENT
Start: 2018-11-12 | End: 2018-11-12 | Stop reason: HOSPADM

## 2018-11-12 RX ORDER — FENTANYL CITRATE 50 UG/ML
INJECTION, SOLUTION INTRAMUSCULAR; INTRAVENOUS PRN
Status: DISCONTINUED | OUTPATIENT
Start: 2018-11-12 | End: 2018-11-12

## 2018-11-12 RX ORDER — METHOCARBAMOL 750 MG/1
750 TABLET, FILM COATED ORAL EVERY 6 HOURS PRN
Status: DISCONTINUED | OUTPATIENT
Start: 2018-11-12 | End: 2018-11-13 | Stop reason: HOSPADM

## 2018-11-12 RX ORDER — ONDANSETRON 2 MG/ML
4 INJECTION INTRAMUSCULAR; INTRAVENOUS EVERY 6 HOURS PRN
Status: DISCONTINUED | OUTPATIENT
Start: 2018-11-12 | End: 2018-11-13 | Stop reason: HOSPADM

## 2018-11-12 RX ORDER — TIMOLOL MALEATE 5 MG/ML
1 SOLUTION/ DROPS OPHTHALMIC 2 TIMES DAILY
COMMUNITY
End: 2022-11-14

## 2018-11-12 RX ORDER — DEXAMETHASONE SODIUM PHOSPHATE 4 MG/ML
4 INJECTION, SOLUTION INTRA-ARTICULAR; INTRALESIONAL; INTRAMUSCULAR; INTRAVENOUS; SOFT TISSUE EVERY 6 HOURS
Status: COMPLETED | OUTPATIENT
Start: 2018-11-12 | End: 2018-11-12

## 2018-11-12 RX ORDER — HYDRALAZINE HYDROCHLORIDE 20 MG/ML
2.5-5 INJECTION INTRAMUSCULAR; INTRAVENOUS EVERY 10 MIN PRN
Status: DISCONTINUED | OUTPATIENT
Start: 2018-11-12 | End: 2018-11-12 | Stop reason: HOSPADM

## 2018-11-12 RX ORDER — ONDANSETRON 2 MG/ML
INJECTION INTRAMUSCULAR; INTRAVENOUS PRN
Status: DISCONTINUED | OUTPATIENT
Start: 2018-11-12 | End: 2018-11-12

## 2018-11-12 RX ORDER — GLYCOPYRROLATE 0.2 MG/ML
INJECTION, SOLUTION INTRAMUSCULAR; INTRAVENOUS PRN
Status: DISCONTINUED | OUTPATIENT
Start: 2018-11-12 | End: 2018-11-12

## 2018-11-12 RX ORDER — SODIUM CHLORIDE, SODIUM LACTATE, POTASSIUM CHLORIDE, CALCIUM CHLORIDE 600; 310; 30; 20 MG/100ML; MG/100ML; MG/100ML; MG/100ML
INJECTION, SOLUTION INTRAVENOUS CONTINUOUS
Status: DISCONTINUED | OUTPATIENT
Start: 2018-11-12 | End: 2018-11-12 | Stop reason: HOSPADM

## 2018-11-12 RX ORDER — CEFAZOLIN SODIUM 2 G/100ML
2 INJECTION, SOLUTION INTRAVENOUS
Status: COMPLETED | OUTPATIENT
Start: 2018-11-12 | End: 2018-11-12

## 2018-11-12 RX ORDER — DIMENHYDRINATE 50 MG/ML
25 INJECTION, SOLUTION INTRAMUSCULAR; INTRAVENOUS
Status: DISCONTINUED | OUTPATIENT
Start: 2018-11-12 | End: 2018-11-12 | Stop reason: HOSPADM

## 2018-11-12 RX ORDER — LIDOCAINE HYDROCHLORIDE 10 MG/ML
INJECTION, SOLUTION INFILTRATION; PERINEURAL PRN
Status: DISCONTINUED | OUTPATIENT
Start: 2018-11-12 | End: 2018-11-12

## 2018-11-12 RX ORDER — HYDROMORPHONE HYDROCHLORIDE 1 MG/ML
.3-.5 INJECTION, SOLUTION INTRAMUSCULAR; INTRAVENOUS; SUBCUTANEOUS EVERY 5 MIN PRN
Status: DISCONTINUED | OUTPATIENT
Start: 2018-11-12 | End: 2018-11-12 | Stop reason: HOSPADM

## 2018-11-12 RX ORDER — HYDROCODONE BITARTRATE AND ACETAMINOPHEN 5; 325 MG/1; MG/1
1-2 TABLET ORAL EVERY 4 HOURS PRN
Status: DISCONTINUED | OUTPATIENT
Start: 2018-11-12 | End: 2018-11-13 | Stop reason: HOSPADM

## 2018-11-12 RX ORDER — TAMSULOSIN HYDROCHLORIDE 0.4 MG/1
0.4 CAPSULE ORAL DAILY
Status: DISCONTINUED | OUTPATIENT
Start: 2018-11-13 | End: 2018-11-13 | Stop reason: HOSPADM

## 2018-11-12 RX ORDER — LISINOPRIL 10 MG/1
10 TABLET ORAL DAILY
Status: DISCONTINUED | OUTPATIENT
Start: 2018-11-12 | End: 2018-11-13 | Stop reason: HOSPADM

## 2018-11-12 RX ORDER — TIMOLOL MALEATE 2.5 MG/ML
1 SOLUTION OPHTHALMIC DAILY
Status: DISCONTINUED | OUTPATIENT
Start: 2018-11-13 | End: 2018-11-12

## 2018-11-12 RX ORDER — LIDOCAINE 40 MG/G
CREAM TOPICAL
Status: DISCONTINUED | OUTPATIENT
Start: 2018-11-12 | End: 2018-11-13 | Stop reason: HOSPADM

## 2018-11-12 RX ORDER — BUPIVACAINE HYDROCHLORIDE AND EPINEPHRINE 5; 5 MG/ML; UG/ML
INJECTION, SOLUTION PERINEURAL PRN
Status: DISCONTINUED | OUTPATIENT
Start: 2018-11-12 | End: 2018-11-12 | Stop reason: HOSPADM

## 2018-11-12 RX ORDER — CEFAZOLIN SODIUM 2 G/100ML
2 INJECTION, SOLUTION INTRAVENOUS EVERY 8 HOURS
Status: COMPLETED | OUTPATIENT
Start: 2018-11-12 | End: 2018-11-12

## 2018-11-12 RX ORDER — ONDANSETRON 2 MG/ML
4 INJECTION INTRAMUSCULAR; INTRAVENOUS EVERY 30 MIN PRN
Status: DISCONTINUED | OUTPATIENT
Start: 2018-11-12 | End: 2018-11-12 | Stop reason: HOSPADM

## 2018-11-12 RX ORDER — DIPHENHYDRAMINE HCL 25 MG
25 CAPSULE ORAL EVERY 6 HOURS PRN
Status: DISCONTINUED | OUTPATIENT
Start: 2018-11-12 | End: 2018-11-13 | Stop reason: HOSPADM

## 2018-11-12 RX ORDER — NEOSTIGMINE METHYLSULFATE 1 MG/ML
VIAL (ML) INJECTION PRN
Status: DISCONTINUED | OUTPATIENT
Start: 2018-11-12 | End: 2018-11-12

## 2018-11-12 RX ORDER — EPHEDRINE SULFATE 50 MG/ML
INJECTION, SOLUTION INTRAMUSCULAR; INTRAVENOUS; SUBCUTANEOUS PRN
Status: DISCONTINUED | OUTPATIENT
Start: 2018-11-12 | End: 2018-11-12

## 2018-11-12 RX ORDER — ONDANSETRON 4 MG/1
4 TABLET, ORALLY DISINTEGRATING ORAL EVERY 30 MIN PRN
Status: DISCONTINUED | OUTPATIENT
Start: 2018-11-12 | End: 2018-11-12 | Stop reason: HOSPADM

## 2018-11-12 RX ORDER — PROPOFOL 10 MG/ML
INJECTION, EMULSION INTRAVENOUS PRN
Status: DISCONTINUED | OUTPATIENT
Start: 2018-11-12 | End: 2018-11-12

## 2018-11-12 RX ORDER — TIMOLOL MALEATE 5 MG/ML
1 SOLUTION/ DROPS OPHTHALMIC DAILY
Status: DISCONTINUED | OUTPATIENT
Start: 2018-11-12 | End: 2018-11-13 | Stop reason: HOSPADM

## 2018-11-12 RX ORDER — SODIUM CHLORIDE AND POTASSIUM CHLORIDE 150; 900 MG/100ML; MG/100ML
INJECTION, SOLUTION INTRAVENOUS CONTINUOUS
Status: DISCONTINUED | OUTPATIENT
Start: 2018-11-12 | End: 2018-11-13 | Stop reason: HOSPADM

## 2018-11-12 RX ADMIN — POTASSIUM CHLORIDE AND SODIUM CHLORIDE: 900; 150 INJECTION, SOLUTION INTRAVENOUS at 12:00

## 2018-11-12 RX ADMIN — GLYCOPYRROLATE 0.8 MG: 0.2 INJECTION, SOLUTION INTRAMUSCULAR; INTRAVENOUS at 09:36

## 2018-11-12 RX ADMIN — PHENYLEPHRINE HYDROCHLORIDE 50 MCG: 10 INJECTION, SOLUTION INTRAMUSCULAR; INTRAVENOUS; SUBCUTANEOUS at 09:06

## 2018-11-12 RX ADMIN — CEFAZOLIN SODIUM 2 G: 2 INJECTION, SOLUTION INTRAVENOUS at 23:28

## 2018-11-12 RX ADMIN — SODIUM CHLORIDE, POTASSIUM CHLORIDE, SODIUM LACTATE AND CALCIUM CHLORIDE: 600; 310; 30; 20 INJECTION, SOLUTION INTRAVENOUS at 08:40

## 2018-11-12 RX ADMIN — Medication 5 MG: at 07:45

## 2018-11-12 RX ADMIN — Medication 10 MG: at 09:27

## 2018-11-12 RX ADMIN — PHENYLEPHRINE HYDROCHLORIDE 100 MCG: 10 INJECTION, SOLUTION INTRAMUSCULAR; INTRAVENOUS; SUBCUTANEOUS at 07:45

## 2018-11-12 RX ADMIN — GLYCOPYRROLATE 0.2 MG: 0.2 INJECTION, SOLUTION INTRAMUSCULAR; INTRAVENOUS at 07:36

## 2018-11-12 RX ADMIN — HYDROCODONE BITARTRATE AND ACETAMINOPHEN 1 TABLET: 5; 325 TABLET ORAL at 13:08

## 2018-11-12 RX ADMIN — CEFAZOLIN SODIUM 2 G: 2 INJECTION, SOLUTION INTRAVENOUS at 14:38

## 2018-11-12 RX ADMIN — DEXMEDETOMIDINE HYDROCHLORIDE 0.5 MCG/KG/HR: 100 INJECTION, SOLUTION INTRAVENOUS at 07:35

## 2018-11-12 RX ADMIN — HYDROCODONE BITARTRATE AND ACETAMINOPHEN 1 TABLET: 5; 325 TABLET ORAL at 17:09

## 2018-11-12 RX ADMIN — SODIUM CHLORIDE, POTASSIUM CHLORIDE, SODIUM LACTATE AND CALCIUM CHLORIDE: 600; 310; 30; 20 INJECTION, SOLUTION INTRAVENOUS at 07:29

## 2018-11-12 RX ADMIN — BUPIVACAINE HYDROCHLORIDE AND EPINEPHRINE BITARTRATE 3 ML: 5; .005 INJECTION, SOLUTION EPIDURAL; INTRACAUDAL; PERINEURAL at 09:28

## 2018-11-12 RX ADMIN — LIDOCAINE HYDROCHLORIDE 50 MG: 10 INJECTION, SOLUTION INFILTRATION; PERINEURAL at 07:36

## 2018-11-12 RX ADMIN — Medication 5 MG: at 09:06

## 2018-11-12 RX ADMIN — ONDANSETRON 4 MG: 2 INJECTION INTRAMUSCULAR; INTRAVENOUS at 07:36

## 2018-11-12 RX ADMIN — FENTANYL CITRATE 100 MCG: 50 INJECTION, SOLUTION INTRAMUSCULAR; INTRAVENOUS at 07:35

## 2018-11-12 RX ADMIN — HYDROMORPHONE HYDROCHLORIDE 0.5 MG: 1 INJECTION, SOLUTION INTRAMUSCULAR; INTRAVENOUS; SUBCUTANEOUS at 08:10

## 2018-11-12 RX ADMIN — LISINOPRIL 10 MG: 10 TABLET ORAL at 13:04

## 2018-11-12 RX ADMIN — CEFAZOLIN SODIUM 2 G: 2 INJECTION, SOLUTION INTRAVENOUS at 07:29

## 2018-11-12 RX ADMIN — THROMBIN HUMAN 1 KIT: 2000 POWDER, FOR SOLUTION TOPICAL at 08:56

## 2018-11-12 RX ADMIN — ROCURONIUM BROMIDE 5 MG: 10 INJECTION INTRAVENOUS at 07:36

## 2018-11-12 RX ADMIN — PROPOFOL 150 MG: 10 INJECTION, EMULSION INTRAVENOUS at 07:36

## 2018-11-12 RX ADMIN — POTASSIUM CHLORIDE AND SODIUM CHLORIDE: 900; 150 INJECTION, SOLUTION INTRAVENOUS at 15:50

## 2018-11-12 RX ADMIN — Medication 4 MG: at 09:36

## 2018-11-12 RX ADMIN — KETAMINE HCL-NACL SOLN PREF SY 50 MG/5ML-0.9% (10MG/ML) 50 MG: 10 SOLUTION PREFILLED SYRINGE at 08:16

## 2018-11-12 RX ADMIN — HYDROCODONE BITARTRATE AND ACETAMINOPHEN 1 TABLET: 5; 325 TABLET ORAL at 23:28

## 2018-11-12 RX ADMIN — ROCURONIUM BROMIDE 40 MG: 10 INJECTION INTRAVENOUS at 07:37

## 2018-11-12 RX ADMIN — DEXAMETHASONE SODIUM PHOSPHATE 4 MG: 4 INJECTION, SOLUTION INTRAMUSCULAR; INTRAVENOUS at 17:09

## 2018-11-12 RX ADMIN — THROMBIN, TOPICAL (BOVINE) 5000 UNITS: KIT at 08:57

## 2018-11-12 RX ADMIN — GENTAMICIN SULFATE 500 ML: 40 INJECTION, SOLUTION INTRAMUSCULAR; INTRAVENOUS at 08:56

## 2018-11-12 RX ADMIN — DEXAMETHASONE SODIUM PHOSPHATE 4 MG: 4 INJECTION, SOLUTION INTRAMUSCULAR; INTRAVENOUS at 13:05

## 2018-11-12 RX ADMIN — FENTANYL CITRATE 50 MCG: 50 INJECTION, SOLUTION INTRAMUSCULAR; INTRAVENOUS at 10:25

## 2018-11-12 RX ADMIN — DEXAMETHASONE SODIUM PHOSPHATE 8 MG: 4 INJECTION, SOLUTION INTRA-ARTICULAR; INTRALESIONAL; INTRAMUSCULAR; INTRAVENOUS; SOFT TISSUE at 07:36

## 2018-11-12 ASSESSMENT — ENCOUNTER SYMPTOMS
DYSRHYTHMIAS: 0
STRIDOR: 0
SEIZURES: 0

## 2018-11-12 ASSESSMENT — COPD QUESTIONNAIRES
CAT_SEVERITY: MILD
COPD: 1

## 2018-11-12 ASSESSMENT — LIFESTYLE VARIABLES: TOBACCO_USE: 1

## 2018-11-12 NOTE — PHARMACY-ADMISSION MEDICATION HISTORY
Medication reconciliation completed by pre-admitting.  Clarified Timolol eye drops - 0.5% solution 1gtt in each eye daily(prescribed BID) and clarified that aspirin was stopped by MD.    Prior to Admission medications    Medication Sig Last Dose Taking? Auth Provider   ACETAMINOPHEN PO Take 1,000 mg by mouth daily as needed for pain Reported on 5/18/2017 Past Month at Unknown time Yes Reported, Patient   allopurinol (ZYLOPRIM) 300 MG tablet Take 0.5 tablets (150 mg) by mouth daily 11/12/2018 at Unknown time Yes Neville Nick MD   lisinopril (PRINIVIL/ZESTRIL) 10 MG tablet Take 1 tablet (10 mg) by mouth daily 11/11/2018 at Unknown time Yes Neville Nick MD   mupirocin (BACTROBAN) 2 % nasal ointment Apply into each nare 2 times daily 11/12/2018 at Unknown time Yes Reported, Patient   Polyvinyl Alcohol-Povidone (REFRESH OP) Apply 1 drop to eye daily as needed 11/12/2018 at Unknown time Yes Reported, Patient   Sildenafil Citrate (VIAGRA PO) Take 50 mg by mouth daily as needed Past Week at Unknown time Yes Reported, Patient   simvastatin (ZOCOR) 20 MG tablet Take 1 tablet (20 mg) by mouth every morning 11/12/2018 at Unknown time Yes Neville Nick MD   tamsulosin (FLOMAX) 0.4 MG capsule TAKE ONE CAPSULE BY MOUTH ONCE DAILY 11/12/2018 at Unknown time Yes Neville Nick MD   timolol (TIMOPTIC) 0.5 % ophthalmic solution Place 1 drop into both eyes daily 11/12/2018 at Unknown time Yes Unknown, Entered By History   Cyanocobalamin (VITAMIN B-12 SL) Place 500 mg under the tongue every morning Unknown at Unknown time  Reported, Patient   hydrocortisone (CORTAID) 1 % cream Apply topically daily as needed for rash or itching (on legs.) More than a month at Unknown time  Reported, Patient

## 2018-11-12 NOTE — ANESTHESIA CARE TRANSFER NOTE
Patient: Greyson Haas    Procedure(s):  C5-7 anterior cervical discectomy and fusion    Diagnosis: Stenosis, rediculopathy  Diagnosis Additional Information: No value filed.    Anesthesia Type:   General, ETT     Note:  Airway :Face Mask  Patient transferred to:PACU  Handoff Report: Identifed the Patient, Identified the Reponsible Provider, Reviewed the pertinent medical history, Discussed the surgical course, Reviewed Intra-OP anesthesia mangement and issues during anesthesia, Set expectations for post-procedure period and Allowed opportunity for questions and acknowledgement of understanding      Vitals: (Last set prior to Anesthesia Care Transfer)    CRNA VITALS  11/12/2018 0919 - 11/12/2018 0959      11/12/2018             Pulse: 79    SpO2: 97 %                Electronically Signed By: KE Appiah CRNA  November 12, 2018  9:59 AM

## 2018-11-12 NOTE — ANESTHESIA PREPROCEDURE EVALUATION
PAC NOTE:       ANESTHESIA PRE EVALUATION:  Anesthesia Evaluation     . Pt has had prior anesthetic. Type: General    No history of anesthetic complications          ROS/MED HX    ENT/Pulmonary:     (+)BRIDGET risk factors hypertension, tobacco use, Past use mild COPD, , . .   (-) asthma   Neurologic:  - neg neurologic ROS    (-) seizures and CVA   Cardiovascular:     (+) Dyslipidemia, hypertension----. : . . . :. . Previous cardiac testing Echodate:7/18results:1. The left ventricle is normal in structure, function and size. The visual  ejection fraction is estimated at 60%.  2. The right ventricle is normal in structure, function and size.  3. No valve disease.  4. The ascending aorta is Mildly dilated (3.9cm).   date: results:ECG reviewed date:10/18 results:NSR date: results:         (-) CAD, arrhythmias and valvular problems/murmurs   METS/Exercise Tolerance:     Hematologic: Comments: Lab Test        10/18/18     06/21/18     10/02/17     09/29/17     09/25/17     09/22/17                       1524          1629                                                                0625          WBC          6.3          7.2           --           --           --          4.3           HGB          14.1         14.3          --           --           --          9.2*          MCV          91           92            --           --           --          88            PLT          159          160           --           --           --          164           INR           --           --          2.2*         3.2*         2.5*         2.10*          Lab Test        10/18/18     06/21/18     01/18/18                       1524          1629          1030          NA           141          140          138           POTASSIUM    3.7          4.4          4.5           CHLORIDE     109          107          107           CO2          25           23           21            BUN          22           36*          22            CR            1.39*        1.73*        1.27*         ANIONGAP     7            10           10            MIREYA          9.0          9.3          9.3           GLC          111*         107*         98           - neg hematologic  ROS       Musculoskeletal:   (+) arthritis, , , other musculoskeletal- DDD      GI/Hepatic:  - neg GI/hepatic ROS      (-) GERD   Renal/Genitourinary:     (+) chronic renal disease, type: CRI, Nephrolithiasis , Pt does not require dialysis, Pt has no history of transplant,       Endo:  - neg endo ROS    (-) Type I DM, Type II DM, thyroid disease, chronic steroid usage and obesity   Psychiatric:  - neg psychiatric ROS       Infectious Disease:  - neg infectious disease ROS       Malignancy:   (+) Malignancy History of GI          Other:    - neg other ROS                 Physical Exam  Normal systems: cardiovascular, pulmonary and dental    Airway   Mallampati: II  TM distance: >3 FB  Neck ROM: full    Dental     Cardiovascular   Rhythm and rate: regular and normal  (-) no friction rub, no systolic click and no murmur    Pulmonary    breath sounds clear to auscultation(-) no rhonchi, no decreased breath sounds, no wheezes, no rales and no stridor             Anesthesia Plan      History & Physical Review  History and physical reviewed and following examination; no interval change.    ASA Status:  3 .    NPO Status:  > 8 hours    Plan for General and ETT with Intravenous induction. Maintenance will be Balanced.    PONV prophylaxis:  Ondansetron (or other 5HT-3) and Dexamethasone or Solumedrol  Additional equipment: Videolaryngoscope      Postoperative Care  Postoperative pain management:  IV analgesics.      Consents  Anesthetic plan, risks, benefits and alternatives discussed with:  Patient or representative, Patient and Spouse..                            .

## 2018-11-12 NOTE — IP AVS SNAPSHOT
[facility]    [address]    [facility] [address]    Phone:  [phone]    Fax:  [phone]                                       After Visit Summary   11/12/2018    Greyson Haas    MRN: [id number]           After Visit Summary Signature Page     I have received my discharge instructions, and my questions have been answered. I have discussed any challenges I see with this plan with the nurse or doctor.    ..........................................................................................................................................  Patient/Patient Representative Signature      ..........................................................................................................................................  Patient Representative Print Name and Relationship to Patient    ..................................................               ................................................  Date                                   Time    ..........................................................................................................................................  Reviewed by Signature/Title    ...................................................              ..............................................  Date                                               Time          22EPIC Rev 08/18

## 2018-11-12 NOTE — PLAN OF CARE
"Problem: Patient Care Overview  Goal: Plan of Care/Patient Progress Review  Outcome: Improving  Minimal pain.  Took one Norco for HA.  Incision CDI under soft collar.  Strong bilateral hand .  States his right arm \"feels jumpy\".  IPI 8-10.  Prinivil restarted.  Bladder scanned for 431 ml at 1300.  Straight cath'd for 500 ml.  Plan of care reviewed with patient. Plans to discharge to home tomorrow accompanied by wife.      "

## 2018-11-12 NOTE — PROGRESS NOTES
SPIRITUAL HEALTH SERVICES Progress Note  [facility] Obs     Introduced pt/family to Spiritual Health Services.   Pt declined [facility] at this time.    Plan: Spiritual Health Services remains available for additional emotional/spiritual support.    Uche Rivera MA  Staff   Pager: [phone]  Phone: [phone]

## 2018-11-12 NOTE — OP NOTE
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 C5-6 anterior cervical discectomy with arthrodesis and placement of a 6 mm Globus Colonial PEEK interbody graft with Globus Signify Putty placed centrally  2. C6-7 anterior cervical discectomy with arthrodesis and placement of a 6 mm Globus Colonial PEEK interbody graft with Globus Signify Putty placed centrally  3. Placement of a 30 mm Globus [place] anterior cervical plate with 6 16 mm screws  5. Use of C-arm and Microscope      Surgeon: Mina Grove MD      Assistant: Andrae Payton      Indication for procedure: The patient is a 77 year old male that presented with cervical radiculopathy After reviewing the patient's imaging studies and examination, the decision was made to proceed with the above procedure. The patient understood the risks and benefits of surgery and wanted to proceed.      Description of Procedure: The patient was seen in the pre op area and the procedure was discussed with the patient and family once again and all questions were answered. The consent was then signed and the patient's neck was marked with a marker. The patient was then transferred to the operating suite on a stretcher and received general endotracheal anesthesia. she was then placed on the operating table in the supine position with all pressure points padded. A small roll was placed under her shoulders for slight extension. Next, the C-arm fluoroscopy was brought in the operating room for localization of the C5 to C7 disk spaces. Next, the patient's neck was then prepped and draped in a normal sterile fashion and a transvers incision was marked along the C5-7 interspaces. Next, local anesthesia was placed along the planned incision and a 10 blade scalpel was used to make the incision. The platysma muscle was then identified, undermined and incised with the  Metzenbaum scissors. The Weitlaner retractors were used to retract the platysma muscle and the skin edges. A dissection plane was then made with both sharp and blunt dissection medical to the sternocliedomastoid muscle and the carotid artery down to the prevertebral fascia. The esophagus and trachea were then retracted laterally with the Cloward retractor and the prevertebral fascia was clean with Kitner's. A spinal need was placed in the C5-6 interspace and verified with C-arm fluoroscopy. The longus coli muscles were then elevated with the bovie cautery and the  retractor was placed under the muscle. The C5-6 and C6-7 interspaces were incised with the bovie cautery and the disk were then removed with the Midas Ramez drill down to the posterior longitudinal ligament at each level. The ligament was then penetrated with a microball hook and the Kerrison rongeur was used to remove the posterior longitudinal ligament. All foramen were decompressed and the exiting nerve roots were decompressed and verified by with the microball hook from C5-7. Two 6 mm interbody trials were placed in the C5-6 and C6-7 interspaces, respectively. They were noted to be the appropriate size, therefore, one 6 mm PEEK interbody graft was placed in the C5-6 interspace and one 6 mm PEEK interbody graft was placed in C6-7 interspace all with DBX putty placed centrally under fluoroscopic guidance and noted to have an appropriate fit. Next, the size 30 mm anterior cervical plate was secured from C5 to C7 with six 16 mm screws which were locked in placed with the hand held locking bit. The wound was then copiously irrigated and hemostasis was obtained with bipolar cautery, gelfoam and Surgiflo. A ALMA drain was placed in the operative bed and the retractors were removed and there was no bleeding or injury to the carotid artery. The wound was irrigated once again and the platysma muscle was closed with 2-0 vicryl and the skin edges were closed  with 3-0 vicryl and Deremabond. The wound was dressed appropriately and the patient was then extubated and transferred to recovery.      At the end of the case all counts were correct      Complications: none      Estimated blood loss: 5 ml      IV Fluid: See Anesthesia      The patient received Ancef preoperatively        Mina Grove MD, MS, FAANS

## 2018-11-12 NOTE — ANESTHESIA POSTPROCEDURE EVALUATION
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    Anesthesia Type:  General, ETT    Note:  Anesthesia Post Evaluation    Patient location during evaluation: PACU  Patient participation: Able to fully participate in evaluation  Level of consciousness: awake  Pain management: adequate  Airway patency: patent  Cardiovascular status: acceptable  Respiratory status: acceptable  Hydration status: acceptable  PONV: controlled     Anesthetic complications: None          Last vitals:  Vitals:    11/12/18 1045 11/12/18 1100 11/12/18 1115   BP: (!) 133/93  150/77   Pulse:      Resp: 18 12 16   Temp: 97  F (36.1  C)  96  F (35.6  C)   SpO2:  93% 97%         Electronically Signed By: Anirudh Vogel MD  November 12, 2018  11:34 AM

## 2018-11-12 NOTE — IP AVS SNAPSHOT
MRN:[id number]                      After Visit Summary   11/12/2018    Greyson Haas    MRN: [id number]           Thank you!     Thank you for choosing [facility] for your care. Our goal is always to provide you with excellent care. Hearing back from our patients is one way we can continue to improve our services. Please take a few minutes to complete the written survey that you may receive in the mail after you visit. If you would like to speak to someone directly about your visit please contact Patient Relations at [phone]. Thank you!          Patient Information     Date Of Birth          1940        Designated Caregiver       Most Recent Value    Caregiver    Name of designated caregiver Mellissa Haas    Phone number of caregiver       About your hospital stay     You were admitted on:  November 12, 2018 You last received care in the:  [facility]    You were discharged on:  November 13, 2018        Reason for your hospital stay       You were in the hospital for a C5-7 ACDF                  Who to Call     For medical emergencies, please call 911.  For non-urgent questions about your medical care, please call your primary care provider or clinic, [phone]  For questions related to your surgery, please call your surgery clinic        Attending Provider     Provider Specialty    Mina Grove MD Neurosurgery       Primary Care Provider Office Phone # Fax #    Neville Nick [phone]      After Care Instructions     Activity       Your activity upon discharge: Discharge instructions:  No lifting of more than 10 pounds, no bending, no twisting until follow up visit.  Wear brace when out of bed for 2 weeks post surgery     Ok to shampoo hair, shower or bathe, but, do not scrub or submerge incision under water until evaluated post-operatively in clinic.    Ok to walk as tolerated, avoid bed rest and  prolonged sitting.    No contact sports until after follow up visit  No high impact activities such as; running/jogging, snowmobile or 4 tran riding or any other recreational vehicles.    Do not take NSAIDS (ibuprofen, advil, aleve, naproxen, etc) for pain control.    Do NOT drive while taking narcotic pain medication.    Call the [facility] at [phone] for increasing redness, swelling or pus draining from the incision, increased pain or any other questions and concerns.            Diet       Follow this diet upon discharge: Orders Placed This Encounter      Regular Diet Adult            Wound care and dressings       Instructions to care for your wound at home: Keep your incision clean and dry at all times.  OK to remove dressing on postop day 2.  OK to shower on postop day 3 and allow water to run over incision, pat dry after shower.  No bathing swimming or submerging in water.  Call immediately or come to ED if any drainage occurs, or you develop new pain, redness, or swelling.                  Follow-up Appointments     Follow-up and recommended labs and tests        Please follow up at the [facility] in 6 weeksdays for follow-up with a xray prior.  Please call the clinic at [phone] to schedule your appointment with Ginette Ortiz CNP or Lisa Arroyo CNP                  Your next 10 appointments already scheduled     Jan 03, 2019 10:00 AM CST   Return Visit with KE Ruiz CNP   [facility] ([facility])    [address] [address] [address] [phone]              Future tests that were ordered for you     X-ray Cervical spine 2-3 vws                 Further instructions from your care team       Diet- Soft foods that are easy to chew and swallow.  Continue this until you see your surgeon at your post operative check.    -Always sit upright for any dietary intake  -Do not use  "straws  -Take small bites and sips  -Alternate solids and liquids    Call your surgeon if you experience any difficulty with swallowing or breathing such as:    -Pain when you swallow  -Choking or coughing when swallowing   -Vomit after eating or drinking  hoarseness or voice  -Aspiration (inhale into the lungs) foods or liquids when you swallow.      Pending Results     No orders found for last 3 day(s).            Statement of Approval     Ordered          11/13/18 0915  I have reviewed and agree with all the recommendations and orders detailed in this document.  EFFECTIVE NOW     Approved and electronically signed by:  Ginette Ortiz APRN CNP             Admission Information     Date & Time Provider Department Dept. Phone    11/12/2018 Mina Grove MD [facility] [phone]      Your Vitals Were     Blood Pressure Pulse Temperature Respirations Height Weight    92/59 67 97  F (36.1  C) 16 1.727 m (5' 8\") 85.3 kg (188 lb)    Pulse Oximetry BMI (Body Mass Index)                97% 28.59 kg/m2          [employer] Information     [employer] gives you secure access to your electronic health record. If you see a primary care provider, you can also send messages to your care team and make appointments. If you have questions, please call your primary care clinic.  If you do not have a primary care provider, please call [phone] and they will assist you.        Care EveryWhere ID     This is your Care EveryWhere ID. This could be used by other organizations to access your [place] [facility] medical records  [id number]        Equal Access to Services     LETY SIFUENTES AH: Hadii lulú kelly Soporfirio, waaxda luqadaha, qaybta kaalmada og vee. So [facility] [phone].    ATENCIÓN: Si habla español, tiene a [place] disposición servicios gratuitos de asistencia lingüística. Llame al [phone].    We comply with applicable federal civil rights laws and [place] " laws. We do not discriminate on the basis of race, color, national origin, age, disability, sex, sexual orientation, or gender identity.               Review of your medicines      CONTINUE these medicines which have NOT CHANGED        Dose / Directions    ACETAMINOPHEN PO        Dose:  1000 mg   Take 1,000 mg by mouth daily as needed for pain Reported on 5/18/2017   Refills:  0       allopurinol 300 MG tablet   Commonly known as:  ZYLOPRIM   Used for:  Gout of multiple sites, unspecified cause, unspecified chronicity   Notes to Patient:  May take when you get home        Dose:  150 mg   Take 0.5 tablets (150 mg) by mouth daily   Refills:  0       hydrocortisone 1 % cream   Commonly known as:  CORTAID        Apply topically daily as needed for rash or itching (on legs.)   Refills:  0       lisinopril 10 MG tablet   Commonly known as:  PRINIVIL/ZESTRIL   Used for:  Essential hypertension   Notes to Patient:  Resume home schedule        Dose:  10 mg   Take 1 tablet (10 mg) by mouth daily   Quantity:  90 tablet   Refills:  3       REFRESH OP        Dose:  1 drop   Apply 1 drop to eye daily as needed   Refills:  0       simvastatin 20 MG tablet   Commonly known as:  ZOCOR   Used for:  Hyperlipidemia LDL goal <130        Dose:  20 mg   Take 1 tablet (20 mg) by mouth every morning   Quantity:  90 tablet   Refills:  3       tamsulosin 0.4 MG capsule   Commonly known as:  FLOMAX   Used for:  Benign non-nodular prostatic hyperplasia with lower urinary tract symptoms        TAKE ONE CAPSULE BY MOUTH ONCE DAILY   Quantity:  90 capsule   Refills:  3       timolol 0.5 % ophthalmic solution   Commonly known as:  TIMOPTIC   Notes to Patient:  Take once home        Dose:  1 drop   Place 1 drop into both eyes daily   Refills:  0       VIAGRA PO        Dose:  50 mg   Take 50 mg by mouth daily as needed   Refills:  0       VITAMIN B-12 SL   Notes to Patient:  May take when you get home        Dose:  500 mg   Place 500 mg under the  tongue every morning   Refills:  0         STOP taking     mupirocin 2 % nasal ointment   Commonly known as:  BACTROBAN                    Protect others around you: Learn how to safely use, store and throw away your medicines at www.disposemymeds.org.             Medication List: This is a list of all your medications and when to take them. Check marks below indicate your daily home schedule. Keep this list as a reference.      Medications           Morning Afternoon Evening Bedtime As Needed    ACETAMINOPHEN PO   Take 1,000 mg by mouth daily as needed for pain Reported on 5/18/2017                                   allopurinol 300 MG tablet   Commonly known as:  ZYLOPRIM   Take 0.5 tablets (150 mg) by mouth daily   Notes to Patient:  May take when you get home                                   hydrocortisone 1 % cream   Commonly known as:  CORTAID   Apply topically daily as needed for rash or itching (on legs.)                                   lisinopril 10 MG tablet   Commonly known as:  PRINIVIL/ZESTRIL   Take 1 tablet (10 mg) by mouth daily   Last time this was given:  10 mg on 11/12/2018  1:04 PM   Last time this was given:  Held this morning due to low BPs   Notes to Patient:  Resume home schedule                                REFRESH OP   Apply 1 drop to eye daily as needed                                   simvastatin 20 MG tablet   Commonly known as:  ZOCOR   Take 1 tablet (20 mg) by mouth every morning   Last time this was given:  20 mg on 11/13/2018  8:09 AM   Next Dose Due:  wednesday                                tamsulosin 0.4 MG capsule   Commonly known as:  FLOMAX   TAKE ONE CAPSULE BY MOUTH ONCE DAILY   Last time this was given:  0.4 mg on 11/13/2018  8:09 AM   Next Dose Due:  wednesday                                timolol 0.5 % ophthalmic solution   Commonly known as:  TIMOPTIC   Place 1 drop into both eyes daily   Notes to Patient:  Take once home                                VIAGRA PO    Take 50 mg by mouth daily as needed                                   VITAMIN B-12 SL   Place 500 mg under the tongue every morning   Notes to Patient:  May take when you get home

## 2018-11-13 ENCOUNTER — APPOINTMENT (OUTPATIENT)
Dept: PHYSICAL THERAPY | Facility: CLINIC | Age: 78
End: 2018-11-13
Attending: NEUROLOGICAL SURGERY
Payer: MEDICARE

## 2018-11-13 VITALS
WEIGHT: 188 LBS | SYSTOLIC BLOOD PRESSURE: 92 MMHG | HEIGHT: 68 IN | BODY MASS INDEX: 28.49 KG/M2 | DIASTOLIC BLOOD PRESSURE: 59 MMHG | TEMPERATURE: 97 F | HEART RATE: 67 BPM | OXYGEN SATURATION: 97 % | RESPIRATION RATE: 16 BRPM

## 2018-11-13 LAB — GLUCOSE BLDC GLUCOMTR-MCNC: 123 MG/DL (ref 70–99)

## 2018-11-13 PROCEDURE — 97530 THERAPEUTIC ACTIVITIES: CPT | Mod: GP | Performed by: PHYSICAL THERAPIST

## 2018-11-13 PROCEDURE — G8980 MOBILITY D/C STATUS: HCPCS | Mod: GP,CI | Performed by: PHYSICAL THERAPIST

## 2018-11-13 PROCEDURE — G0378 HOSPITAL OBSERVATION PER HR: HCPCS

## 2018-11-13 PROCEDURE — 00000146 ZZHCL STATISTIC GLUCOSE BY METER IP

## 2018-11-13 PROCEDURE — G8979 MOBILITY GOAL STATUS: HCPCS | Mod: GP,CI | Performed by: PHYSICAL THERAPIST

## 2018-11-13 PROCEDURE — G8978 MOBILITY CURRENT STATUS: HCPCS | Mod: GP,CI | Performed by: PHYSICAL THERAPIST

## 2018-11-13 PROCEDURE — 97161 PT EVAL LOW COMPLEX 20 MIN: CPT | Mod: GP | Performed by: PHYSICAL THERAPIST

## 2018-11-13 PROCEDURE — 97116 GAIT TRAINING THERAPY: CPT | Mod: GP | Performed by: PHYSICAL THERAPIST

## 2018-11-13 PROCEDURE — 40000193 ZZH STATISTIC PT WARD VISIT: Performed by: PHYSICAL THERAPIST

## 2018-11-13 PROCEDURE — A9270 NON-COVERED ITEM OR SERVICE: HCPCS | Mod: GY | Performed by: NEUROLOGICAL SURGERY

## 2018-11-13 PROCEDURE — 25000132 ZZH RX MED GY IP 250 OP 250 PS 637: Mod: GY | Performed by: NEUROLOGICAL SURGERY

## 2018-11-13 RX ORDER — HYDROCODONE BITARTRATE AND ACETAMINOPHEN 5; 325 MG/1; MG/1
1-2 TABLET ORAL EVERY 4 HOURS PRN
Qty: 75 TABLET | Refills: 0 | Status: SHIPPED | OUTPATIENT
Start: 2018-11-13 | End: 2018-11-13

## 2018-11-13 RX ORDER — METHOCARBAMOL 750 MG/1
750 TABLET, FILM COATED ORAL EVERY 6 HOURS PRN
Qty: 90 TABLET | Refills: 0 | Status: SHIPPED | OUTPATIENT
Start: 2018-11-13 | End: 2018-11-13

## 2018-11-13 RX ADMIN — SIMVASTATIN 20 MG: 20 TABLET, FILM COATED ORAL at 08:09

## 2018-11-13 RX ADMIN — TAMSULOSIN HYDROCHLORIDE 0.4 MG: 0.4 CAPSULE ORAL at 08:09

## 2018-11-13 NOTE — PLAN OF CARE
Problem: Patient Care Overview  Goal: Plan of Care/Patient Progress Review  Outcome: Improving  A&O. VSS. SBA. Dressing is CDI.Pain is managed with Beauty. Voiding.

## 2018-11-13 NOTE — PLAN OF CARE
Problem: Patient Care Overview  Goal: Plan of Care/Patient Progress Review  Physical Therapy Discharge Summary    Reason for therapy discharge:    Discharged to home.    Progress towards therapy goal(s). See goals on Care Plan in [facility] electronic health record for goal details.  Goals met  Pt at S level with gait on stairs and level surfaces due to impulsivity and cues to slow down for safety.  Therapy recommendation(s):    No further therapy is recommended.  Continue home exercise program.   HOme walking program

## 2018-11-13 NOTE — PLAN OF CARE
Problem: Patient Care Overview  Goal: Plan of Care/Patient Progress Review  Discharge Planner OT   Patient plan for discharge: home  Current status: OT orders received, however per PT, patient is I with all transfers and cares and will be discharging home shortly.   Barriers to return to prior living situation: defer to PT  Recommendations for discharge: defer to PT  Rationale for recommendations: per PT, patient does not have IP OT needs, will discontinue OT orders, defer discharge recommendations to PT.       Entered by: Elyse Bray 11/13/2018 9:50 AM

## 2018-11-13 NOTE — PLAN OF CARE
Problem: Patient Care Overview  Goal: Plan of Care/Patient Progress Review  Outcome: Improving  A&O. Afebrile. SBA with a walker. Dressings to LLE are intact. Taking Tylenol as needed for pain.  On IV ancef. Voiding.

## 2018-11-13 NOTE — PROGRESS NOTES
" 11/13/18 0800   Quick Adds   Type of Visit Initial PT Evaluation   Living Environment   Lives With spouse   Living Arrangements house   Home Accessibility stairs to enter home;stairs within home   Number of Stairs to Enter Home 2   Number of Stairs Within Home 12   Stair Railings at Home inside, present on left side  (L rail ascending)   Transportation Available car;family or friend will provide   Living Environment Comment Pt lives with wife in [place] with basement.     Self-Care   Usual Activity Tolerance good   Current Activity Tolerance moderate   Regular Exercise yes   Activity/Exercise Type biking;strength training   Exercise Amount/Frequency daily   Equipment Currently Used at Home cane, straight   Functional Level Prior   Ambulation 0-->independent   Transferring 0-->independent   Toileting 0-->independent   Bathing 0-->independent   Dressing 0-->independent   Eating 0-->independent   Communication 0-->understands/communicates without difficulty   Swallowing 0-->swallows foods/liquids without difficulty   Cognition 0 - no cognition issues reported   Fall history within last six months no   Which of the above functional risks had a recent onset or change? none   Prior Functional Level Comment Pt reports amb without AD in home and uses a SEC for distances   General Information   Onset of Illness/Injury or Date of Surgery - Date 11/12/18   Referring Physician Dr. Mina Grove   Patient/Family Goals Statement \"My regular exercise.\"   Pertinent History of Current Problem (include personal factors and/or comorbidities that impact the POC) Patient is having C5-7 anterior cervical discectomy and fusion due to cervical spondylosis with severe stenosis and B UE radiculopathy and weakness.   Precautions/Limitations spinal precautions   General Observations Pt sitting up in chair with cervical soft collar   General Info Comments Vitals sitting in chair: O2 sats 95$, HR 51 bpm, BP 90/64   Cognitive Status " Examination   Orientation orientation to person, place and time   Level of Consciousness alert   Follows Commands and Answers Questions 100% of the time;able to follow multistep instructions   Personal Safety and Judgment impulsive   Memory intact   Pain Assessment   Patient Currently in Pain Yes, see Vital Sign flowsheet  (3/10 cerv pain)   Integumentary/Edema   Integumentary/Edema Comments Ant cervical fusion   Posture    Posture Forward head position;Protracted shoulders   Range of Motion (ROM)   ROM Comment B UE WFL within percautions, B LE WFL as observed with functional mobility   Strength   Strength Comments Pt demonstrates functional LE strength, completing all transfers without assist of UEs.   Bed Mobility   Bed Mobility Comments Sup <> sit S with VCs for correct log roll technique without use of rails   Transfer Skills   Transfer Comments Sit <> stand I from chair, bed and toilet.   Gait   Gait Comments Pt amb 130' x 2 with SBA/S.  Pt amb both with SEC and without AD with rapid pace and slight deviation from the straight path but no LOB   Balance   Balance Comments Pt demonstrates good sitting balance and good (-) standing balance.  Pt reports having numbness in B feet impairing balance requiring use of SEC outdoors or uneven surfaces   Sensory Examination   Sensory Perception Comments Pt reports numbness in B feet   Coordination   Coordination no deficits were identified   Muscle Tone   Muscle Tone no deficits were identified   Modality Interventions   Planned Modality Interventions Cryotherapy   General Therapy Interventions   Planned Therapy Interventions bed mobility training;gait training;transfer training;risk factor education;home program guidelines;progressive activity/exercise   Clinical Impression   Criteria for Skilled Therapeutic Intervention yes, treatment indicated   PT Diagnosis Decreased functional mobility   Influenced by the following impairments cervical pain, decreased sensation B  "feet, impaired high level standing balance, impulsive, decreased knowledge of body mechanics and spinal precautions   Functional limitations due to impairments increased falls risk   Clinical Presentation Stable/Uncomplicated   Clinical Presentation Rationale Pt progressing as expected following cervical fusion   Clinical Decision Making (Complexity) Low complexity   Therapy Frequency` other (see comments)  (one time eval and treat)   Predicted Duration of Therapy Intervention (days/wks) one time eval and treat   Anticipated Discharge Disposition Home;Home with Assist   Risk & Benefits of therapy have been explained Yes   Patient, Family & other staff in agreement with plan of care Yes   [facility] [place] [employer] Mode-PAC TM \"6 Clicks\"   2016, Trustees of [facility], under license to [employer].  All rights reserved.   6 Clicks Short Forms Basic Mobility Inpatient Short Form   [facility] AM-PAC  \"6 Clicks\" V.2 Basic Mobility Inpatient Short Form   1. Turning from your back to your side while in a flat bed without using bedrails? 4 - None   2. Moving from lying on your back to sitting on the side of a flat bed without using bedrails? 4 - None   3. Moving to and from a bed to a chair (including a wheelchair)? 4 - None   4. Standing up from a chair using your arms (e.g., wheelchair, or bedside chair)? 4 - None   5. To walk in hospital room? 4 - None   6. Climbing 3-5 steps with a railing? 4 - None   Basic Mobility Raw Score (Score out of 24.Lower scores equate to lower levels of function) 24   Total Evaluation Time   Total Evaluation Time (Minutes) 10     "

## 2018-11-13 NOTE — PLAN OF CARE
Problem: Patient Care Overview  Goal: Plan of Care/Patient Progress Review  Discharge Planner PT   PT eval completed.  77 yr old male s/p C5-7 anterior discectomy and fusion secondary to cervical spondylosis with B UE radiculopathy and weakness.  [place] [facility] [place] with 2 steps to enter without rail.  Pt was I and active prior to surgery, using a SEC in community for longer distances and no assistive device in his home.  Patient plan for discharge: Home with wife  Current status: Pt able to transfer sup <> sit with S and cueing for proper body mechanics and to maintain spinal precautions.  Sit <> Stand I.  Pt amb 130' with SBA/S without a device and also with a SEC. Pt demonstrates a rapid gait with mild deviation from the straight path but no LOB. Pt reports having numbness in B feet and walks carefully outdoor and in the shower.  Pt amb up/down 4 steps x 2 with single rail and SBA and up/down curb step with SEC and SBA.  Pt educated in and issued handouts of postural principles and body mechanics and home walking program  Barriers to return to prior living situation: None  Recommendations for discharge: Home with wife and continued walking program  Rationale for recommendations: Pt able to continue with home walking program independently.  No further skilled PT necessary after discharge.       Entered by: Lala Garcia 11/13/2018 9:25 AM

## 2018-11-13 NOTE — PLAN OF CARE
Problem: Patient Care Overview  Goal: Plan of Care/Patient Progress Review  Outcome: Improving  Pt. up with assist of one using gait belt and walker. Reports baseline weakness to left leg in particular. ALMA patent, 15 mL out. Voiding appropriately with urinal at bedside. Active bowels, no gas. Tolerated a regular diet. Pain managed with PRN Norco and ice. On 2L with capnography. Dressing to anterior neck is clean, dry, and intact. Patient plans to discharge tomorrow.

## 2018-11-13 NOTE — DISCHARGE SUMMARY
[facility]    Discharge Summary  Neurosurgery    Date of Admission:  11/12/2018  Date of Discharge:  11/13/2018  Discharging Provider: Ginette Ortiz  Date of Service (when I saw the patient): 11/13/18    Discharge Diagnoses   Active Problems:    S/P cervical spinal fusion      History of Present Illness   Greyson Haas is an 77 year old male who presented with cervical radicular pain and weakness. The pt was found to have C5-7 stenosis.  He underwent a C5-7 ACDF with Dr. Mina Grove on 11-. Today he is sitting up in the chair. He denies any pain and refuses to take any medications home. He reports that he has over 80 tabs of Norco and has Valium at home.  We will cancel his take home meds.  He has been up ambulating without difficulty.      Hospital Course   Greyson Haas was admitted on 11/12/2018.  The following problems were addressed during his hospitalization:    Active Problems:    S/P cervical spinal fusion    Assessment: stable     Plan: discharge home         I have discussed the following assessment and plan with Dr. Mina Grove  who is in agreement with initial plan and will follow up with further consultation recommendations.    Ginette Ortiz [facility]  [facility]  [place] [address]    Tel [phone]  Pager [phone]        Significant Results and Procedures   Post C5-7 ACDF    Pending Results     Unresulted Labs Ordered in the Past 30 Days of this Admission     No orders found for last 61 day(s).          Code Status   Full Code    Primary Care Physician   Neville Nick    Physical Exam   Temp: 97  F (36.1  C) Temp src: Oral BP: 92/59   Heart Rate: 84 Resp: 16 SpO2: 97 % O2 Device: Nasal cannula Oxygen Delivery: 2 LPM  Vitals:    10/31/18 1542 11/12/18 0555   Weight: 188 lb (85.3 kg) 188 lb (85.3 kg)     Vital Signs with Ranges  Temp:  [96  F (35.6  C)-97.3  F (36.3  C)] 97  F (36.1   C)  Heart Rate:  [65-84] 84  Resp:  [8-18] 16  BP: ()/(59-99) 92/59  FiO2 (%):  [100 %] 100 %  SpO2:  [93 %-98 %] 97 %  I/O last 3 completed shifts:  In: 2040 [P.O.:240; I.V.:1800]  Out: 1185 [Urine:1150; Drains:30; Blood:5]    Constitutional: Awake, alert, cooperative, no apparent distress, and appears stated age.  Eyes: Lids and lashes normal, pupils equal, round and reactive to light, extra ocular muscles intact  ENT: Normocephalic, without obvious abnormality, atraumatic  Respiratory: No increased work of breathing  Skin: No bruising or bleeding, normal skin color, texture, turgor, no redness, warmth, or swelling.   Musculoskeletal: There is no redness, warmth, or swelling of the joints.  Full range of motion noted.  Motor strength is 5 out of 5 all extremities bilaterally.  Tone is normal.  Neurologic: Awake, alert, oriented to name, place and time.  Cranial nerves II-XII are grossly intact.  Motor is 5 out of 5 bilaterally.     Neuropsychiatric: Calm, normal eye contact, alert, normal affect, oriented to self, place, time and situation, memory for past and recent events intact and thought process normal.       Time Spent on this Encounter   I, Ginette Ortiz, personally saw the patient today and spent greater than 30 minutes discharging this patient.    Discharge Disposition   Discharged to home  Condition at discharge: Stable    Consultations This Hospital Stay   OCCUPATIONAL THERAPY ADULT IP CONSULT  PHYSICAL THERAPY ADULT IP CONSULT  PHYSICAL THERAPY ADULT IP CONSULT    Discharge Orders     X-ray Cervical spine 2-3 vws     Reason for your hospital stay   You were in the hospital for a C5-7 ACDF     Follow-up and recommended labs and tests    Please follow up at the [facility] in 6 weeksdays for follow-up with a xray prior.  Please call the clinic at [phone] to schedule your appointment with Ginette Ortiz CNP or Lisa Arroyo CNP     Activity   Your activity upon discharge:  Discharge instructions:  No lifting of more than 10 pounds, no bending, no twisting until follow up visit.  Wear brace when out of bed for 2 weeks post surgery     Ok to shampoo hair, shower or bathe, but, do not scrub or submerge incision under water until evaluated post-operatively in clinic.    Ok to walk as tolerated, avoid bed rest and prolonged sitting.    No contact sports until after follow up visit  No high impact activities such as; running/jogging, snowmobile or 4 tran riding or any other recreational vehicles.    Do not take NSAIDS (ibuprofen, advil, aleve, naproxen, etc) for pain control.    Do NOT drive while taking narcotic pain medication.    Call the [facility] at [phone] for increasing redness, swelling or pus draining from the incision, increased pain or any other questions and concerns.     Wound care and dressings   Instructions to care for your wound at home: Keep your incision clean and dry at all times.  OK to remove dressing on postop day 2.  OK to shower on postop day 3 and allow water to run over incision, pat dry after shower.  No bathing swimming or submerging in water.  Call immediately or come to ED if any drainage occurs, or you develop new pain, redness, or swelling.     Diet   Follow this diet upon discharge: Orders Placed This Encounter     Regular Diet Adult       Discharge Medications   Current Discharge Medication List      START taking these medications    Details   HYDROcodone-acetaminophen (NORCO) 5-325 MG per tablet Take 1-2 tablets by mouth every 4 hours as needed for moderate to severe pain  Qty: 75 tablet, Refills: 0    Associated Diagnoses: S/P cervical spinal fusion      methocarbamol (ROBAXIN) 750 MG tablet Take 1 tablet (750 mg) by mouth every 6 hours as needed for muscle spasms  Qty: 90 tablet, Refills: 0    Associated Diagnoses: S/P cervical spinal fusion         CONTINUE these medications which have NOT CHANGED    Details   ACETAMINOPHEN PO Take  1,000 mg by mouth daily as needed for pain Reported on 5/18/2017      allopurinol (ZYLOPRIM) 300 MG tablet Take 0.5 tablets (150 mg) by mouth daily    Associated Diagnoses: Gout of multiple sites, unspecified cause, unspecified chronicity      lisinopril (PRINIVIL/ZESTRIL) 10 MG tablet Take 1 tablet (10 mg) by mouth daily  Qty: 90 tablet, Refills: 3    Comments: Profile Rx: patient will call when needed  Associated Diagnoses: Essential hypertension      Polyvinyl Alcohol-Povidone (REFRESH OP) Apply 1 drop to eye daily as needed      Sildenafil Citrate (VIAGRA PO) Take 50 mg by mouth daily as needed      simvastatin (ZOCOR) 20 MG tablet Take 1 tablet (20 mg) by mouth every morning  Qty: 90 tablet, Refills: 3    Comments: Profile Rx: patient will call when needed  Associated Diagnoses: Hyperlipidemia LDL goal <130      tamsulosin (FLOMAX) 0.4 MG capsule TAKE ONE CAPSULE BY MOUTH ONCE DAILY  Qty: 90 capsule, Refills: 3    Associated Diagnoses: Benign non-nodular prostatic hyperplasia with lower urinary tract symptoms      timolol (TIMOPTIC) 0.5 % ophthalmic solution Place 1 drop into both eyes daily      Cyanocobalamin (VITAMIN B-12 SL) Place 500 mg under the tongue every morning      hydrocortisone (CORTAID) 1 % cream Apply topically daily as needed for rash or itching (on legs.)         STOP taking these medications       mupirocin (BACTROBAN) 2 % nasal ointment Comments:   Reason for Stopping:             Allergies   No Known Allergies

## 2018-11-13 NOTE — DISCHARGE INSTRUCTIONS
Diet- Soft foods that are easy to chew and swallow.  Continue this until you see your surgeon at your post operative check.    -Always sit upright for any dietary intake  -Do not use straws  -Take small bites and sips  -Alternate solids and liquids    Call your surgeon if you experience any difficulty with swallowing or breathing such as:    -Pain when you swallow  -Choking or coughing when swallowing   -Vomit after eating or drinking  hoarseness or voice  -Aspiration (inhale into the lungs) foods or liquids when you swallow.

## 2018-11-14 ENCOUNTER — TELEPHONE (OUTPATIENT)
Dept: INTERNAL MEDICINE | Facility: CLINIC | Age: 78
End: 2018-11-14

## 2018-11-14 NOTE — TELEPHONE ENCOUNTER
"  ED for acute condition Discharge Protocol    \"Hi, my name is Ericamarysol Zepeda, a registered nurse, and I am calling from [facility].  I am calling to follow up and see how things are going for you after your recent emergency visit.\"    Tell me how you are doing now that you are home?\" He is doing well. Everything went well. Pain I don't have any. My incision, I have no issues as of now; I believe I can take the bandage off soon.       Discharge Instructions    \"Let's review your discharge instructions.  What is/are the follow-up recommendations?  Pt. Response: f/u with spine and brain clinic   Follow-up and recommended labs and tests    Please follow up at the [facility] in 6 weeksdays for follow-up with a xray prior.  Please call the clinic at [phone] to schedule your appointment with Ginette Ortiz CNP or Lisa Arroyo CNP         \"Has an appointment with your primary care provider been scheduled?\"  No (not needed) Pt. Has f/u appointment already made with [facility]   Px due around 1/2019    Medications    \"Tell me what changed about your medicines when you discharged?\"    None, Pain medications were added    \"What questions do you have about your medications?\"   None        Call Summary    \"What questions or concerns do you have about your recent visit and your follow-up care?\"     none    \"If you have questions or things don't continue to improve, we encourage you contact us through the main clinic number (give number).  Even if the clinic is not open, triage nurses are available 24/7 to help you.     We would like you to know that our clinic has extended hours (provide information).  We also have urgent care (provide details on closest location and hours/contact info)\"    \"Thank you for your time and take care!\"                "

## 2018-12-05 NOTE — PROGRESS NOTES
[facility]      OUTPATIENT PHYSICAL THERAPY EVALUATION  PLAN OF TREATMENT FOR OUTPATIENT REHABILITATION  (COMPLETE FOR INITIAL CLAIMS ONLY)  Patient's Last Name, First Name, M.I.  YOB: 1940  Greyson Haas                        Provider's Name  [facility] Medical Record No.  [id number]                               Onset Date:  11/12/18   Start of Care Date:  11/13/18      Type:     _X_PT   ___OT   ___SLP Medical Diagnosis:  anterior cervical fusion                        PT Diagnosis:  Decreased functional mobility   Vis[facility] from [facility]  [facility]   _________________________________________________________________________________  Plan of Treatment/Functional Goals    Planned Interventions: Cryotherapy,    bed mobility training, gait training, transfer training, risk factor education, home program guidelines, progressive activity/exercise,       Goals: See Physical Therapy Goals on Care Plan in [facility] electronic health record.    Therapy Frequency: other (see comments) (one time eval and treat)  Predicted Duration of Therapy Intervention: one time eval and treat  _________________________________________________________________________________    I CERTIFY THE NEED FOR THESE SERVICES FURNISHED UNDER        THIS PLAN OF TREATMENT AND WHILE UNDER MY CARE     (Physician co-signature of this document indicates review and certification of the therapy plan).                Certification date from: 11/13/18, Certification date to: 11/13/18    Referring Physician: Dr. Mina Grove            Initial Assessment        See Physical Therapy evaluation dated 11/13/18 in Epic electronic health record.

## 2018-12-17 ENCOUNTER — ANCILLARY PROCEDURE (OUTPATIENT)
Dept: GENERAL RADIOLOGY | Facility: CLINIC | Age: 78
End: 2018-12-17
Attending: NURSE PRACTITIONER
Payer: COMMERCIAL

## 2018-12-17 ENCOUNTER — OFFICE VISIT (OUTPATIENT)
Dept: NEUROSURGERY | Facility: CLINIC | Age: 78
End: 2018-12-17
Attending: NURSE PRACTITIONER
Payer: MEDICARE

## 2018-12-17 VITALS
DIASTOLIC BLOOD PRESSURE: 88 MMHG | WEIGHT: 188 LBS | OXYGEN SATURATION: 96 % | SYSTOLIC BLOOD PRESSURE: 126 MMHG | HEART RATE: 67 BPM | HEIGHT: 68 IN | BODY MASS INDEX: 28.49 KG/M2

## 2018-12-17 DIAGNOSIS — Z98.1 STATUS POST CERVICAL SPINAL FUSION: Primary | ICD-10-CM

## 2018-12-17 DIAGNOSIS — Z98.1 S/P CERVICAL SPINAL FUSION: ICD-10-CM

## 2018-12-17 PROCEDURE — 72040 X-RAY EXAM NECK SPINE 2-3 VW: CPT

## 2018-12-17 PROCEDURE — 99024 POSTOP FOLLOW-UP VISIT: CPT | Performed by: NURSE PRACTITIONER

## 2018-12-17 PROCEDURE — G0463 HOSPITAL OUTPT CLINIC VISIT: HCPCS

## 2018-12-17 ASSESSMENT — PAIN SCALES - GENERAL: PAINLEVEL: NO PAIN (0)

## 2018-12-17 ASSESSMENT — MIFFLIN-ST. JEOR: SCORE: 1542.26

## 2018-12-17 NOTE — LETTER
12/17/2018         RE: Greyson Haas  [address] [place] [id number]        Dear Colleague,    Thank you for referring your patient, Greyson Haas, to the [facility]. Please see a copy of my visit note below.    [facility]  Neurosurgery followup:    HPI: Mr. Haas is a 78 year old male that returns post C5-6 and C6-7 ACDF with Dr. Mina Grove on 11-.  He is 5 weeks post surgery.  He presented with bilateral UE radiculopathy and weakness. He returns today noting his pain is 0/10. He states that he is frustrated because he continues to have numbness in his hands. He also had myelopathy and still notes LE weakness.  He had a previous redo L4-S1 TLIF with Dr. Mina Grove on 11-5-2015.      Exam:  Constitutional:  Alert, well nourished, NAD.  HEENT: Normocephalic, atraumatic.   Pulm:  Without shortness of breath   CV:  No pitting edema of BLE.     Neurological:  Awake  Alert  Oriented x 3  Motor exam:     Shoulder Abduction:  Right:  5/5    Left:  5/5  Biceps:                      Right:  5/5    Left:  5/5  Triceps:                     Right:  5/5    Left:  5/5  Wrist Extensors:       Right:  5/5    Left:  5/5  Wrist Flexors:           Right:  5/5    Left:  5/5  Intrinsics:                  Right:  5/5    Left:  5/5    Bilateral LE 5/5    No clonus noted      Able to spontaneously move U/E bilaterally  Sensation intact throughout all U/E dermatomes  Incisions:  Cervical incision healed   Imaging:  Cervical fusion intact per xray      A/P: Mr. Haas is a 78 year old male that returns post C5-6 and C6-7 ACDF with Dr. Mina Grove on 11-.  He is 5 weeks post surgery.  He presented with bilateral UE radiculopathy and weakness. He returns today noting his pain is 0/10. He states that he is frustrated because he continues to have numbness in his hands. He also had myelopathy and still notes LE weakness.  He had a previous redo L4-S1 TLIF  with Dr. Mina Grove on 11-5-2015.  He was educated that it can take up to 1 year for his symptoms to get better but they may not fully resolve.  He states that it is frustrating.  He is neurologically intact.  No clonus noted and he has full UE and LE strength. He was encouraged to continue activity and reviewed his restrictions.      Patient Instructions   - May increase lifting restriction to 20  pounds    - followup in 7 weeks  with xray prior     - Call the clinic at [phone] for increased pain or any other questions and concerns.         Ginette Ortiz CNP  [facility]  [address] [address]    Tel [phone]  Pager [phone]      Again, thank you for allowing me to participate in the care of your patient.        Sincerely,        KE Ruiz CNP

## 2018-12-17 NOTE — NURSING NOTE
"Greyson Haas is a 78 year old male who presents for:  Chief Complaint   Patient presents with     Neurologic Problem     6 week follow up status post cervical fusion DOS 11/12/18        Vitals:    Vitals:    12/17/18 1025   BP: 126/88   BP Location: Right arm   Patient Position: Sitting   Cuff Size: Adult Regular   Pulse: 67   SpO2: 96%   Weight: 188 lb (85.3 kg)   Height: 5' 8\" (1.727 m)       BMI:  Estimated body mass index is 28.59 kg/m  as calculated from the following:    Height as of this encounter: 5' 8\" (1.727 m).    Weight as of this encounter: 188 lb (85.3 kg).    Pain Score:  No Pain (0)      Do you feel safe in your environment?  Yes      Yaneli Sin"

## 2018-12-17 NOTE — PROGRESS NOTES
[facility]  Neurosurgery followup:    HPI: Mr. Haas is a 78 year old male that returns post C5-6 and C6-7 ACDF with Dr. Mina Grove on 11-.  He is 5 weeks post surgery.  He presented with bilateral UE radiculopathy and weakness. He returns today noting his pain is 0/10. He states that he is frustrated because he continues to have numbness in his hands. He also had myelopathy and still notes LE weakness.  He had a previous redo L4-S1 TLIF with Dr. Mina Grove on 11-5-2015.      Exam:  Constitutional:  Alert, well nourished, NAD.  HEENT: Normocephalic, atraumatic.   Pulm:  Without shortness of breath   CV:  No pitting edema of BLE.     Neurological:  Awake  Alert  Oriented x 3  Motor exam:     Shoulder Abduction:  Right:  5/5    Left:  5/5  Biceps:                      Right:  5/5    Left:  5/5  Triceps:                     Right:  5/5    Left:  5/5  Wrist Extensors:       Right:  5/5    Left:  5/5  Wrist Flexors:           Right:  5/5    Left:  5/5  Intrinsics:                  Right:  5/5    Left:  5/5    Bilateral LE 5/5    No clonus noted      Able to spontaneously move U/E bilaterally  Sensation intact throughout all U/E dermatomes  Incisions:  Cervical incision healed   Imaging:  Cervical fusion intact per xray      A/P: Mr. Haas is a 78 year old male that returns post C5-6 and C6-7 ACDF with Dr. Mina Grove on 11-.  He is 5 weeks post surgery.  He presented with bilateral UE radiculopathy and weakness. He returns today noting his pain is 0/10. He states that he is frustrated because he continues to have numbness in his hands. He also had myelopathy and still notes LE weakness.  He had a previous redo L4-S1 TLIF with Dr. Mina Grove on 11-5-2015.  He was educated that it can take up to 1 year for his symptoms to get better but they may not fully resolve.  He states that it is frustrating.  He is neurologically intact.  No clonus noted and he has full UE and LE  strength. He was encouraged to continue activity and reviewed his restrictions.      Patient Instructions   - May increase lifting restriction to 20  pounds    - followup in 7 weeks  with xray prior     - Call the clinic at [phone] for increased pain or any other questions and concerns.         Ginette Ortiz [facility]  [facility]  [address] [address]    Tel [phone]  Pager [phone]

## 2018-12-17 NOTE — PATIENT INSTRUCTIONS
- May increase lifting restriction to 20  pounds    - followup in 7 weeks  with xray prior     - Call the clinic at [phone] for increased pain or any other questions and concerns.

## 2019-01-21 ENCOUNTER — OFFICE VISIT (OUTPATIENT)
Dept: INTERNAL MEDICINE | Facility: CLINIC | Age: 79
End: 2019-01-21
Payer: COMMERCIAL

## 2019-01-21 VITALS
HEART RATE: 86 BPM | SYSTOLIC BLOOD PRESSURE: 102 MMHG | DIASTOLIC BLOOD PRESSURE: 68 MMHG | BODY MASS INDEX: 28.85 KG/M2 | OXYGEN SATURATION: 98 % | TEMPERATURE: 97.6 F | RESPIRATION RATE: 16 BRPM | WEIGHT: 190.4 LBS | HEIGHT: 68 IN

## 2019-01-21 DIAGNOSIS — I10 ESSENTIAL HYPERTENSION: ICD-10-CM

## 2019-01-21 DIAGNOSIS — Z00.00 MEDICARE ANNUAL WELLNESS VISIT, SUBSEQUENT: Primary | ICD-10-CM

## 2019-01-21 DIAGNOSIS — Z12.11 COLON CANCER SCREENING: ICD-10-CM

## 2019-01-21 DIAGNOSIS — N18.30 CHRONIC KIDNEY DISEASE (CKD), STAGE III (MODERATE) (H): ICD-10-CM

## 2019-01-21 DIAGNOSIS — Z12.5 SCREENING PSA (PROSTATE SPECIFIC ANTIGEN): ICD-10-CM

## 2019-01-21 DIAGNOSIS — E78.5 HYPERLIPIDEMIA LDL GOAL <130: ICD-10-CM

## 2019-01-21 LAB
ANION GAP SERPL CALCULATED.3IONS-SCNC: 4 MMOL/L (ref 3–14)
BUN SERPL-MCNC: 26 MG/DL (ref 7–30)
CALCIUM SERPL-MCNC: 9.2 MG/DL (ref 8.5–10.1)
CHLORIDE SERPL-SCNC: 108 MMOL/L (ref 94–109)
CHOLEST SERPL-MCNC: 179 MG/DL
CO2 SERPL-SCNC: 27 MMOL/L (ref 20–32)
CREAT SERPL-MCNC: 1.4 MG/DL (ref 0.66–1.25)
GFR SERPL CREATININE-BSD FRML MDRD: 47 ML/MIN/{1.73_M2}
GLUCOSE SERPL-MCNC: 114 MG/DL (ref 70–99)
HDLC SERPL-MCNC: 71 MG/DL
LDLC SERPL CALC-MCNC: 84 MG/DL
NONHDLC SERPL-MCNC: 108 MG/DL
POTASSIUM SERPL-SCNC: 4.5 MMOL/L (ref 3.4–5.3)
PSA SERPL-ACNC: 7.18 UG/L (ref 0–4)
SODIUM SERPL-SCNC: 139 MMOL/L (ref 133–144)
TRIGL SERPL-MCNC: 118 MG/DL

## 2019-01-21 PROCEDURE — 36415 COLL VENOUS BLD VENIPUNCTURE: CPT | Performed by: INTERNAL MEDICINE

## 2019-01-21 PROCEDURE — G0103 PSA SCREENING: HCPCS | Performed by: INTERNAL MEDICINE

## 2019-01-21 PROCEDURE — 80061 LIPID PANEL: CPT | Performed by: INTERNAL MEDICINE

## 2019-01-21 PROCEDURE — 99397 PER PM REEVAL EST PAT 65+ YR: CPT | Performed by: INTERNAL MEDICINE

## 2019-01-21 PROCEDURE — 80048 BASIC METABOLIC PNL TOTAL CA: CPT | Performed by: INTERNAL MEDICINE

## 2019-01-21 RX ORDER — LISINOPRIL 10 MG/1
10 TABLET ORAL DAILY
Qty: 90 TABLET | Refills: 3 | Status: SHIPPED | OUTPATIENT
Start: 2019-01-21 | End: 2019-12-11

## 2019-01-21 RX ORDER — SIMVASTATIN 20 MG
20 TABLET ORAL EVERY MORNING
Qty: 90 TABLET | Refills: 3 | Status: SHIPPED | OUTPATIENT
Start: 2019-01-21 | End: 2019-12-13

## 2019-01-21 ASSESSMENT — MIFFLIN-ST. JEOR: SCORE: 1553.15

## 2019-01-21 NOTE — PATIENT INSTRUCTIONS
Preventive Health Recommendations:     See your health care provider every year to    Review health changes.     Discuss preventive care.      Review your medicines if your doctor has prescribed any.    Talk with your health care provider about whether you should have a test to screen for prostate cancer (PSA).    Every 3 years, have a diabetes test (fasting glucose). If you are at risk for diabetes, you should have this test more often.    Every 5 years, have a cholesterol test. Have this test more often if you are at risk for high cholesterol or heart disease.     Every 10 years, have a colonoscopy. Or, have a yearly FIT test (stool test). These exams will check for colon cancer.    Talk to with your health care provider about screening for Abdominal Aortic Aneurysm if you have a family history of AAA or have a history of smoking.  Shots:     Get a flu shot each year.     Get a tetanus shot every 10 years.     Talk to your doctor about your pneumonia vaccines. There are now two you should receive - Pneumovax (PPSV 23) and Prevnar (PCV 13).    Talk to your pharmacist about a shingles vaccine.     Talk to your doctor about the hepatitis B vaccine.  Nutrition:     Eat at least 5 servings of fruits and vegetables each day.     Eat whole-grain bread, whole-wheat pasta and brown rice instead of white grains and rice.     Get adequate Calcium and Vitamin D.   Lifestyle    Exercise for at least 150 minutes a week (30 minutes a day, 5 days a week). This will help you control your weight and prevent disease.     Limit alcohol to one drink per day.     No smoking.     Wear sunscreen to prevent skin cancer.     See your dentist every six months for an exam and cleaning.     See your eye doctor every 1 to 2 years to screen for conditions such as glaucoma, macular degeneration and cataracts.    Personalized Prevention Plan  You are due for the preventive services outlined below.  Your care team is available to assist you in  scheduling these services.  If you have already completed any of these items, please share that information with your care team to update in your medical record.    Health Maintenance Due   Topic Date Due     Zoster (Chicken Pox) Vaccine (1 of 2) 12/06/1989     Cholesterol Lab - yearly  01/18/2019     Microalbumin Lab - yearly  01/18/2019     FALL RISK ASSESSMENT  01/18/2019

## 2019-01-21 NOTE — LETTER
[facility]  [address] [address]  [phone]      1/21/2019       Greyson Haas  [address] [place] [id number]        Dear Greyson,    Your cholesterol looks good and I would not change anything at this point but would repeat your labs in 12 months.    Your kidney function test remians slightly abnormal but stable and should be rechecked here in the clinic in 6 months with a follow-up visit with me.  I will look forward to seeing you at that time and please call to make an appointment.    Your blood sugar function test is also slightly abnormal and elevated and should be rechecked here in the clinic in 3 months with a follow-up visit, fasting.  I will look forward to seeing you at that time and please call to make an appointment.  In the meantime, please work on your diet limiting your carbohydrates and getting some good, regular exercise.    Your PSA test is elevated higher than what I would consider to be your normal baseline and I would ask you follow-up to discuss this further as it would be a good idea to talk to you about the options that are available to better evaluate this for you.      Sincerely,      Saran Martinez MD  Internal Medicine

## 2019-01-21 NOTE — PROGRESS NOTES
"  SUBJECTIVE:   Greyson Haas is a 78 year old male who presents for Preventive Visit.  Are you in the first 12 months of your Medicare Part B coverage?  No    Physical Health:    In general, how would you rate your overall physical health? good    Outside of work, how many days during the week do you exercise? 6-7 days/week    Outside of work, approximately how many minutes a day do you exercise?45-60 minutes    If you drink alcohol do you typically have >3 drinks per day or >7 drinks per week? No    Do you usually eat at least 4 servings of fruit and vegetables a day, include whole grains & fiber and avoid regularly eating high fat or \"junk\" foods? Yes    Do you have any problems taking medications regularly?  No    Do you have any side effects from medications? none    Needs assistance for the following daily activities: no assistance needed    Which of the following safety concerns are present in your home?  none identified     Hearing impairment: Yes    In the past 6 months, have you been bothered by leaking of urine? yes    Mental Health:    In general, how would you rate your overall mental or emotional health? good  PHQ-2 Score:      Do you feel safe in your environment? Yes    Do you have a Health Care Directive? Yes: Advance Directive has been received and scanned.    Additional concerns to address?  None     Fall risk:  Fall Risk Assessment not completed.    Cognitive Screening:     Do you have sleep apnea, excessive snoring or daytime drowsiness?: no    Reviewed and updated as needed this visit by clinical staff       Reviewed and updated as needed this visit by Provider        Social History     Tobacco Use     Smoking status: Former Smoker     Packs/day: 1.00     Years: 27.00     Pack years: 27.00     Last attempt to quit: 1983     Years since quittin.0     Smokeless tobacco: Never Used   Substance Use Topics     Alcohol use: Yes     Comment: Ave 2 daily or more.  Heavy days 5-6, some days " none.                           Current providers sharing in care for this patient include:   Patient Care Team:  Neville Nick MD as PCP - General  Neville Nick MD as PCP - Assigned PCP    The following health maintenance items are reviewed in Epic and correct as of today:  Health Maintenance   Topic Date Due     ZOSTER IMMUNIZATION (1 of 2) 12/06/1989     LIPID MONITORING Q1 YEAR  01/18/2019     MICROALBUMIN Q1 YEAR  01/18/2019     FALL RISK ASSESSMENT  01/18/2019     PHQ-2 Q1 YR  06/21/2019     BMP Q1 YR  10/18/2019     HEMOGLOBIN Q1 YR  10/18/2019     ADVANCE DIRECTIVE PLANNING Q5 YRS  03/31/2020     COLONOSCOPY Q5 YR  03/31/2021     DTAP/TDAP/TD IMMUNIZATION (2 - Td) 12/09/2023     INFLUENZA VACCINE  Completed     IPV IMMUNIZATION  Aged Out     MENINGITIS IMMUNIZATION  Aged Out       Immunization History   Administered Date(s) Administered     Influenza (High Dose) 3 valent vaccine 09/20/2012, 11/07/2013, 10/20/2015, 10/20/2016, 10/13/2017, 10/17/2018     Influenza (IIV3) PF 11/01/2011, 10/21/2014     Pneumo Conj 13-V (2010&after) 12/30/2016     Pneumococcal 23 valent 06/06/2005     TDAP Vaccine (Adacel) 12/09/2013     Tetanus 11/23/2002       ROS:  CONSTITUTIONAL: NEGATIVE for fever, chills, change in weight  INTEGUMENTARY/SKIN: NEGATIVE for worrisome rashes, moles or lesions  EYES: NEGATIVE for vision changes or irritation  ENT/MOUTH: NEGATIVE for ear, mouth and throat problems  RESP: NEGATIVE for significant cough or SOB  CV: NEGATIVE for chest pain, palpitations or peripheral edema  GI: NEGATIVE for nausea, abdominal pain, heartburn, or change in bowel habits  : NEGATIVE for frequency, dysuria, or hematuria  MUSCULOSKELETAL: NEGATIVE for significant arthralgias or myalgia  NEURO: NEGATIVE for weakness, dizziness or paresthesias  HEME: NEGATIVE for bleeding problems  PSYCHIATRIC: NEGATIVE for changes in mood or affect    OBJECTIVE:   /68   Pulse 86   Temp 97.6  F (36.4  C) (Oral)   Resp  "16   Ht 1.727 m (5' 8\")   Wt 86.4 kg (190 lb 6.4 oz)   SpO2 98%   BMI 28.95 kg/m   Estimated body mass index is 28.59 kg/m  as calculated from the following:    Height as of 12/17/18: 1.727 m (5' 8\").    Weight as of 12/17/18: 85.3 kg (188 lb).  EXAM:   GENERAL: alert and no distress  EYES: Eyes grossly normal to inspection, PERRL and conjunctivae and sclerae normal  HENT: ear canals and TM's normal, nose and mouth without ulcers or lesions  NECK: no adenopathy, no asymmetry, masses, or scars and thyroid normal to palpation  RESP: lungs clear to auscultation - no rales, rhonchi or wheezes  CV: regular rate and rhythm, normal S1 S2, no S3 or S4, no click or rub, no peripheral edema and peripheral pulses strong  ABDOMEN: soft, nontender, no hepatosplenomegaly, no masses and bowel sounds normal  Gait is slightly antalgic with mild difficulty getting up on the table but does so without assistance  Prostate:  Nontender mild BPH, no discrete nodules  MS: no gross musculoskeletal defects noted, no edema, scar noted over left hip  PSYCH: mentation appears normal, affect normal/bright    ASSESSMENT / PLAN:   1. Medicare annual wellness visit, subsequent  Advised shingles vaccination for patient    2. Essential hypertension  Stable on therapy continuing with medical management  - lisinopril (PRINIVIL/ZESTRIL) 10 MG tablet; Take 1 tablet (10 mg) by mouth daily  Dispense: 90 tablet; Refill: 3  - Basic metabolic panel    3. Hyperlipidemia LDL goal <130  Labs ordered as fasting per routine  - Lipid Profile  - simvastatin (ZOCOR) 20 MG tablet; Take 1 tablet (20 mg) by mouth every morning  Dispense: 90 tablet; Refill: 3    4. Chronic Kidney Disease, Stage III  Repeat basic metabolic panel although creatinine is been stable since 2011  - Basic metabolic panel    5. Screening PSA (prostate specific antigen)  Ordered as routine screening and offered per patient  - PSA, screen    6. Colon cancer screening  Suggest stool kit  - " "Fecal colorectal cancer screen (FIT); Future    End of Life Planning:  Patient currently has an advanced directive: Yes.  Practitioner is supportive of decision.    COUNSELING:  Reviewed preventive health counseling, as reflected in patient instructions       Regular exercise       Healthy diet/nutrition    BP Readings from Last 1 Encounters:   12/17/18 126/88     Estimated body mass index is 28.59 kg/m  as calculated from the following:    Height as of 12/17/18: 1.727 m (5' 8\").    Weight as of 12/17/18: 85.3 kg (188 lb).       reports that he quit smoking about 36 years ago. He has a 27.00 pack-year smoking history. he has never used smokeless tobacco.      Appropriate preventive services were discussed with this patient, including applicable screening as appropriate for cardiovascular disease, diabetes, osteopenia/osteoporosis, and glaucoma.  As appropriate for age/gender, discussed screening for colorectal cancer, prostate cancer, and cervical cancer. Checklist reviewing preventive services available has been given to the patient.    Reviewed patients plan of care and provided an AVS. The Basic Care Plan (routine screening as documented in Health Maintenance) for Greyson meets the Care Plan requirement. This Care Plan has been established and reviewed with the Patient.    Counseling Resources:  ATP IV Guidelines  Pooled Cohorts Equation Calculator  Breast Cancer Risk Calculator  FRAX Risk Assessment  ICSI Preventive Guidelines  Dietary Guidelines for Americans, 2010  USDA's MyPlate  ASA Prophylaxis  Lung CA Screening    Saran Martinez MD  [facility]    THE MEDICATION LIST HAS BEEN FULLY RECONCILED BY THE M.D. AND THE NURSING STAFF.    "

## 2019-01-22 DIAGNOSIS — Z12.11 COLON CANCER SCREENING: ICD-10-CM

## 2019-01-22 LAB — HEMOCCULT STL QL IA: NEGATIVE

## 2019-01-22 PROCEDURE — G0328 FECAL BLOOD SCRN IMMUNOASSAY: HCPCS | Performed by: INTERNAL MEDICINE

## 2019-01-28 ENCOUNTER — MYC MEDICAL ADVICE (OUTPATIENT)
Dept: INTERNAL MEDICINE | Facility: CLINIC | Age: 79
End: 2019-01-28

## 2019-04-02 NOTE — PROGRESS NOTES
ESTABLISHED PATIENT NEUROLOGY NOTE    DATE OF VISIT: 4/3/2019  CLINIC LOCATION: [facility]  MRN: [id number]  PATIENT NAME: Greyson Haas  YOB: 1940    PCP: Neville Nick MD    REASON FOR VISIT:   Chief Complaint   Patient presents with     Follow Up     weakness in both legs      SUBJECTIVE:                                                      HISTORY OF PRESENT ILLNESS: Patient with severe cervical spinal stenosis, previously seen for balance/gait difficulty, presents for follow-up.  He was last seen on 03/27/2018.  The patient was advised to follow-up with neurosurgery to discuss surgical options at that time.  Please refer to my initial/other prior notes for further information.    Since the last visit, the patient continues to have balance difficulty that affects his ability to walk.  He also feels that his legs are weak bilaterally and numb.  The patient underwent cervical fusion surgery in November 2018 (Dr. Grove), but does not feel that it helped his balance or other symptoms.  Underwent physical therapy for balance and leg strengthening without significant benefits.  Denies interval development of new focal neurological symptoms.    On review of systems, patient endorses no additional active complaints, except intermittent positional dizziness on hot days that was previously discussed with his primary care provider. Medications, allergies, family and social history were also reviewed. There are no changes reported by patient.  REVIEW OF SYSTEMS:                                                    10-system review was completed. Pertinent positives are included in HPI. The remainder of ROS is negative.  EXAM:                                                    Physical Exam:   Vitals: /78 (BP Location: Left arm, Patient Position: Sitting, Cuff Size: Adult Regular)   Pulse 61   Temp 98.5  F (36.9  C) (Oral)   Resp 16   Wt 87.1 kg (192 lb)   SpO2 95%   BMI 29.19 kg/m       General: pt is in NAD, cooperative.  Skin: normal turgor, moist mucous membranes, no lesions/rashes noticed.  HEENT: ATNC, white sclera, normal conjunctiva.  Respiratory: Symmetric lung excursion, no accessory respiratory muscle use.  Abdomen: Non distended.  Neurological: awake, cooperative, follows commands, no significant exam changes compared to prior visit, except diminished sensation to pinprick in both feet, right greater than left, right greater than left.  ASSESSMENT AND PLAN:                                                    Assessment: 78-year-old male patient with history of cervical spinal stenosis, lumbar radiculopathy and lumbar spinal stenosis, previously seen for balance/gait difficulty presents for follow-up.    We had a prolonged discussion with the patient regarding his symptoms and available treatment options.  He underwent cervical fusion in November 2018, but does not feel that it helped his symptoms (lower extremity numbness and balance/gait difficulty).  His neurological exam demonstrates preserved lower extremity reflexes and 5/5 strength without significant change from prior exam 1 year ago.  There is possible worsening of pinprick sensation in both feet, right greater than left.  I discussed with the patient that symptoms are likely multifactorial related to prior history of lumbar radiculopathy/stenosis and cervical spinal stenosis with possible additional contribution of peripheral polyneuropathy, though less likely given the preserved ankle reflexes and not classic sensory changes.  We discussed the utility of obtaining EMG, but decided to defer it at the present time.  I also offered the patient a referral to physical therapy for balance, but the patient does not feel that it will be beneficial.  He continues to do balance exercises at home regularly.  Diagnoses:    ICD-10-CM    1. Difficulty balancing R29.818    2. Difficulty walking R26.2    3. Spinal stenosis in cervical  region M48.02      Plan: At today's visit we thoroughly discussed current symptoms, additional evaluation (EMG), available treatment options, and the plan.  We decided to hold off on doing EMG at the present time.  I advised the patient to come back if he is symptoms worsen or he decides to proceed with EMG.    No new medications.     Additional recommendations after the work-up.    Next follow-up appointment is on as-needed basis.    Total Time: 45 minutes with > 50% spent counseling the patient on stated above assessment and recommendations.    Mendoza Jones MD  / Neurology

## 2019-04-03 ENCOUNTER — OFFICE VISIT (OUTPATIENT)
Dept: NEUROLOGY | Facility: CLINIC | Age: 79
End: 2019-04-03
Payer: MEDICARE

## 2019-04-03 VITALS
DIASTOLIC BLOOD PRESSURE: 78 MMHG | TEMPERATURE: 98.5 F | RESPIRATION RATE: 16 BRPM | WEIGHT: 192 LBS | BODY MASS INDEX: 29.19 KG/M2 | HEART RATE: 61 BPM | SYSTOLIC BLOOD PRESSURE: 106 MMHG | OXYGEN SATURATION: 95 %

## 2019-04-03 DIAGNOSIS — R29.818 DIFFICULTY BALANCING: Primary | ICD-10-CM

## 2019-04-03 DIAGNOSIS — R26.2 DIFFICULTY WALKING: ICD-10-CM

## 2019-04-03 DIAGNOSIS — M48.02 SPINAL STENOSIS IN CERVICAL REGION: ICD-10-CM

## 2019-04-03 PROCEDURE — 99215 OFFICE O/P EST HI 40 MIN: CPT | Performed by: PSYCHIATRY & NEUROLOGY

## 2019-04-03 NOTE — PATIENT INSTRUCTIONS
AFTER VISIT SUMMARY (AVS):    At today's visit we thoroughly discussed current symptoms, additional evaluation (EMG), available treatment options, and the plan.  We decided to hold off on doing EMG at the present time.  Please come back if your symptoms worsen or you decide to proceed with EMG.    No new medications.     Additional recommendations after the work-up.    Next follow-up appointment is on as-needed basis.    Please do not hesitate to call me with any questions or concerns.    Thanks.

## 2019-04-03 NOTE — LETTER
4/3/2019         RE: Greyson Haas  [address] [place] [phone]        Dear Colleague,    Thank you for referring your patient, Greyson Haas, to the [facility][note 3]. Please see a copy of my visit note below.    ESTABLISHED PATIENT NEUROLOGY NOTE    DATE OF VISIT: 4/3/2019  CLINIC LOCATION: [facility][note 3]  MRN: [id number]  PATIENT NAME: Greyson Haas  YOB: 1940    PCP: Neville Nick MD    REASON FOR VISIT:   Chief Complaint   Patient presents with     Follow Up     weakness in both legs      SUBJECTIVE:                                                      HISTORY OF PRESENT ILLNESS: Patient with severe cervical spinal stenosis, previously seen for balance/gait difficulty, presents for follow-up.  He was last seen on 03/27/2018.  The patient was advised to follow-up with neurosurgery to discuss surgical options at that time.  Please refer to my initial/other prior notes for further information.    Since the last visit, the patient continues to have balance difficulty that affects his ability to walk.  He also feels that his legs are weak bilaterally and numb.  The patient underwent cervical fusion surgery in November 2018 (Dr. Grove), but does not feel that it helped his balance or other symptoms.  Underwent physical therapy for balance and leg strengthening without significant benefits.  Denies interval development of new focal neurological symptoms.    On review of systems, patient endorses no additional active complaints, except intermittent positional dizziness on hot days that was previously discussed with his primary care provider. Medications, allergies, family and social history were also reviewed. There are no changes reported by patient.  REVIEW OF SYSTEMS:                                                    10-system review was completed. Pertinent positives are included in HPI. The remainder of ROS is negative.  EXAM:                                                     Physical Exam:   Vitals: /78 (BP Location: Left arm, Patient Position: Sitting, Cuff Size: Adult Regular)   Pulse 61   Temp 98.5  F (36.9  C) (Oral)   Resp 16   Wt 87.1 kg (192 lb)   SpO2 95%   BMI 29.19 kg/m       General: pt is in NAD, cooperative.  Skin: normal turgor, moist mucous membranes, no lesions/rashes noticed.  HEENT: ATNC, white sclera, normal conjunctiva.  Respiratory: Symmetric lung excursion, no accessory respiratory muscle use.  Abdomen: Non distended.  Neurological: awake, cooperative, follows commands, no significant exam changes compared to prior visit, except diminished sensation to pinprick in both feet, right greater than left, right greater than left.  ASSESSMENT AND PLAN:                                                    Assessment: 78-year-old male patient with history of cervical spinal stenosis, lumbar radiculopathy and lumbar spinal stenosis, previously seen for balance/gait difficulty presents for follow-up.    We had a prolonged discussion with the patient regarding his symptoms and available treatment options.  He underwent cervical fusion in November 2018, but does not feel that it helped his symptoms (lower extremity numbness and balance/gait difficulty).  His neurological exam demonstrates preserved lower extremity reflexes and 5/5 strength without significant change from prior exam 1 year ago.  There is possible worsening of pinprick sensation in both feet, right greater than left.  I discussed with the patient that symptoms are likely multifactorial related to prior history of lumbar radiculopathy/stenosis and cervical spinal stenosis with possible additional contribution of peripheral polyneuropathy, though less likely given the preserved ankle reflexes and not classic sensory changes.  We discussed the utility of obtaining EMG, but decided to defer it at the present time.  I also offered the patient a referral to physical therapy for  balance, but the patient does not feel that it will be beneficial.  He continues to do balance exercises at home regularly.  Diagnoses:    ICD-10-CM    1. Difficulty balancing R29.818    2. Difficulty walking R26.2    3. Spinal stenosis in cervical region M48.02      Plan: At today's visit we thoroughly discussed current symptoms, additional evaluation (EMG), available treatment options, and the plan.  We decided to hold off on doing EMG at the present time.  I advised the patient to come back if he is symptoms worsen or he decides to proceed with EMG.    No new medications.     Additional recommendations after the work-up.    Next follow-up appointment is on as-needed basis.    Total Time: 45 minutes with > 50% spent counseling the patient on stated above assessment and recommendations.    Mendoza Jones MD  / Neurology      Again, thank you for allowing me to participate in the care of your patient.        Sincerely,        Mendoza Jones MD

## 2019-04-10 ENCOUNTER — MYC MEDICAL ADVICE (OUTPATIENT)
Dept: INTERNAL MEDICINE | Facility: CLINIC | Age: 79
End: 2019-04-10

## 2019-04-10 NOTE — TELEPHONE ENCOUNTER
Patient called to clinic and he states that he has seen PCP for these symptoms and they have been ongoing. He is not worse, they just won't resolve.  Continues with SOB with exertion, he is dizzy when he lifts his head after lying down.  Denies any chest pain, numbness/tingling in extremities, no other c/o.    Please advise.

## 2019-04-11 ENCOUNTER — ANCILLARY PROCEDURE (OUTPATIENT)
Dept: GENERAL RADIOLOGY | Facility: CLINIC | Age: 79
End: 2019-04-11
Attending: INTERNAL MEDICINE
Payer: MEDICARE

## 2019-04-11 ENCOUNTER — OFFICE VISIT (OUTPATIENT)
Dept: INTERNAL MEDICINE | Facility: CLINIC | Age: 79
End: 2019-04-11
Payer: MEDICARE

## 2019-04-11 VITALS
WEIGHT: 193.9 LBS | HEART RATE: 69 BPM | RESPIRATION RATE: 15 BRPM | SYSTOLIC BLOOD PRESSURE: 108 MMHG | HEIGHT: 68 IN | OXYGEN SATURATION: 97 % | DIASTOLIC BLOOD PRESSURE: 68 MMHG | TEMPERATURE: 98.1 F | BODY MASS INDEX: 29.39 KG/M2

## 2019-04-11 DIAGNOSIS — R06.00 DYSPNEA, UNSPECIFIED TYPE: ICD-10-CM

## 2019-04-11 DIAGNOSIS — R06.00 DYSPNEA, UNSPECIFIED TYPE: Primary | ICD-10-CM

## 2019-04-11 PROCEDURE — 71046 X-RAY EXAM CHEST 2 VIEWS: CPT

## 2019-04-11 PROCEDURE — 99214 OFFICE O/P EST MOD 30 MIN: CPT | Performed by: INTERNAL MEDICINE

## 2019-04-11 ASSESSMENT — MIFFLIN-ST. JEOR: SCORE: 1569.02

## 2019-04-11 NOTE — PROGRESS NOTES
SUBJECTIVE:   Greyson Haas is a 78 year old male who presents to clinic today for the following   health issues:    This patient apparently received a message from Dr. Nick regarding an appointment in regards to some chronic shortness of breath.  Patient was advised to see him but was unable to be seen in a timely fashion thus was placed in my schedule for evaluation.    This patient was just seen in regards to a Medicare wellness exam and interestingly enough did not even mention the symptoms.  He states that the symptoms go back years and this is not a acute abnormality but states that may be over the last several years is gotten slightly worse.  Upon review of his chart he has had carotid ultrasounds echocardiograms and stress test dating back almost 15 years for similar complaints of nonspecific shortness of breath and intermittent dizziness.    His symptoms are similar now and he reports that he feels winded when he exerts himself and when he bends over to play with his grandkids he feels intermittently dizzy.  He is quite vague in his symptoms but is consistent with those 2 complaints.  He had symptoms that were defined as such the middle of last year at which time he had an echocardiogram which was essentially normal.    He does not report any wheezing he does not report any presyncope or syncopal episodes.  He denies exertional chest pain.  He denies any classic symptoms of vertebrobasilar insufficiency.  He denies numbness tingling paresthesias or alterations in his gait.  He denies that he is dehydrated and states that occasionally when he has been golfing over the past years he gets similar symptoms for which somebody told him that he is dehydrated but he states he drinks an aggressive amount of water daily.  He does not equate his symptoms in relationship to any of his medications.    Noted prior smoking quit 30 years ever been given a formal diagnosis of asthma or emphysema.    RESPIRATORY  "SYMPTOMS      Duration: Quite some time     Description  Shortness of breath with exertion, dizziness with bending over     Severity: moderate    Accompanying signs and symptoms: None    History (predisposing factors):  none    Precipitating or alleviating factors: None    Therapies tried and outcome:  None. [employer] message sent to PCP 4/10/19, who suggested Chest xray and possible lung function test     Additional history: as documented    Reviewed  and updated as needed this visit by clinical staff      Reviewed and updated as needed this visit by Provider         Patient Active Problem List   Diagnosis     Essential hypertension     Diverticulosis of large intestine     History of rectal cancer     LVH     Tinnitus     Gout of multiple sites     Chronic Kidney Disease, Stage III     Hyperlipidemia LDL goal <130     Glaucoma     Sprain of acromioclavicular ligament     Anxiety state     Gouty arthropathy     Basal cell carcinoma     Erectile dysfunction     KNEE JOINT REPLACEMENT     Pain in joint, lower leg     Abnormal gait     Advanced directives, counseling/discussion     Kidney stones     Lumbar stenosis     S/P lumbar fusion     Benign non-nodular prostatic hyperplasia with lower urinary tract symptoms     H/O total hip arthroplasty, left     Long-term (current) use of anticoagulants [Z79.01]     Aftercare following left hip joint replacement surgery     Hip pain, left     Right foot drop     Cervical stenosis of spinal canal     Ascending aorta dilatation (H)     S/P cervical spinal fusion     Past Surgical History:   Procedure Laterality Date     ARTHROPLASTY HIP Left 9/18/2017    LEFT TOTAL HIP ARTHROPLASTY(DEPUY)^;  Surgeon: Aurelio Hood MD;  Location: [facility] OR     C NONSPECIFIC PROCEDURE  6/04    cysto, R ureteral stent, ESWL     C NONSPECIFIC PROCEDURE  CHILD    TONSILLECTOMY     C NONSPECIFIC PROCEDURE  AGE 4    L heel osteomyelitis with \"bone scraping\"     DISCECTOMY, FUSION CERVICAL ANTERIOR TWO " LEVELS, COMBINED N/A 2018    Procedure: C5-7 anterior cervical discectomy and fusion;  Surgeon: Mina Grove MD;  Location:  OR     OPTICAL TRACKING SYSTEM FUSION SPINE POSTERIOR LUMBAR TWO LEVELS N/A 2015    Procedure: OPTICAL TRACKING SYSTEM FUSION SPINE POSTERIOR LUMBAR TWO LEVELS;  Surgeon: Mina Grvoe MD;  Location:  OR     SURGICAL HISTORY OF -       B Shoulder surgeries     SURGICAL HISTORY OF -       lumbar laminectomy     SURGICAL HISTORY OF -       lumbar decompression    Dr. Pop     SURGICAL HISTORY OF -       L shoulder    Dr. Foley     SURGICAL HISTORY OF -       partial amputation R 3rd toe ([facility])  Dr. Neal     SURGICAL HISTORY OF -       bilateral cataracts       SURGICAL HISTORY OF -       Mohs L chest, Dr. Anthony     SURGICAL HISTORY OF     L TKA   Dr. Thomson       Social History     Tobacco Use     Smoking status: Former Smoker     Packs/day: 1.00     Years: 27.00     Pack years: 27.00     Last attempt to quit: 1983     Years since quittin.2     Smokeless tobacco: Never Used   Substance Use Topics     Alcohol use: Yes     Comment: Ave 2 daily or more.  Heavy days 5-6, some days none.     Family History   Problem Relation Age of Onset     Cancer Maternal Grandfather          age 80, Colon or prostate Ca.     Cancer Maternal Grandmother          62, ? type     Cardiovascular Father         CHF     Lipids Mother      Arthritis Brother      Alcohol/Drug Brother         chronic alcoholism     Cerebrovascular Disease Brother          Current Outpatient Medications   Medication Sig Dispense Refill     allopurinol (ZYLOPRIM) 300 MG tablet Take 0.5 tablets (150 mg) by mouth daily       lisinopril (PRINIVIL/ZESTRIL) 10 MG tablet Take 1 tablet (10 mg) by mouth daily 90 tablet 3     Polyvinyl Alcohol-Povidone (REFRESH OP) Apply 1 drop to eye daily as needed       simvastatin (ZOCOR) 20 MG tablet Take 1  "tablet (20 mg) by mouth every morning 90 tablet 3     tamsulosin (FLOMAX) 0.4 MG capsule TAKE ONE CAPSULE BY MOUTH ONCE DAILY 90 capsule 3     timolol (TIMOPTIC) 0.5 % ophthalmic solution Place 1 drop into both eyes daily       ACETAMINOPHEN PO Take 1,000 mg by mouth daily as needed for pain Reported on 5/18/2017       Sildenafil Citrate (VIAGRA PO) Take 50 mg by mouth daily as needed       No Known Allergies  BP Readings from Last 3 Encounters:   04/11/19 108/68   04/03/19 106/78   01/21/19 102/68    Wt Readings from Last 3 Encounters:   04/11/19 88 kg (193 lb 14.4 oz)   04/03/19 87.1 kg (192 lb)   01/21/19 86.4 kg (190 lb 6.4 oz)            ROS:  CONSTITUTIONAL: NEGATIVE for fever, chills, no change in weight  ENT/MOUTH: NEGATIVE for ear, mouth and throat problems  CV: NEGATIVE for chest pain, palpitations or peripheral edema  GI: NEGATIVE for nausea, abdominal pain, heartburn, or change in bowel habits  : NEGATIVE for frequency, dysuria, or hematuria  MUSCULOSKELETAL: NEGATIVE for significant arthralgias or myalgia  HEME: NEGATIVE for bleeding problems  PSYCHIATRIC: NEGATIVE for changes in mood or affect    OBJECTIVE:                                                    /68   Pulse 69   Temp 98.1  F (36.7  C) (Oral)   Resp 15   Ht 1.727 m (5' 8\")   Wt 88 kg (193 lb 14.4 oz)   SpO2 97%   BMI 29.48 kg/m    Body mass index is 29.48 kg/m .  GENERAL:alert and no distress  EYES: Eyes grossly normal to inspection, extraocular movements - intact, and PERRL  HENT: ear canals- normal; TMs- normal; Nose- normal; Mouth- no ulcers, no lesions  NECK: no tenderness, no adenopathy, no asymmetry, no masses, no stiffness; thyroid- normal to palpation, no carotid bruits.  RESP: lungs clear to auscultation - no rales, no rhonchi, no wheezes  CV: regular rates and rhythm, normal S1 S2, no S3 or S4 and no click or rub -  MS: extremities- no gross deformities noted, no edema  NEURO: No focal changes are noted.  Annual " nerves II through XII are intact.  There is no nystagmus per of incidental note he dropped a piece of paper on the floor and did bend over to pick it up but did not complain of any issues at that time.  PSYCH: Alert and oriented times 3; speech- coherent , normal rate and volume; able to articulate logical thoughts, able to abstract reason, no tangential thoughts, no hallucinations or delusions, affect- flat     ASSESSMENT/PLAN:                                                      (R06.00) Dyspnea, unspecified type  (primary encounter diagnosis)  Comment: This patient's symptoms are quite vague and nonspecific and appear to be chronic and ongoing for years with essentially no obvious etiology and a negative prior workup.  At the present time I suggest that although it is of low yield we will obtain a chest x-ray as this is of Dr. Nick's recommendation.  I do not see any benefit from obtaining formal pulmonary function tests at this time as the patient's symptoms are not classic for any reactive airway or chronic obstructive lung symptoms.  Based on his age I suggest that we obtain a stress test for reassurance due to the fact that 1 of his complaints appears to be progressive dyspnea with exertion.  I informed him that the echocardiogram at baseline appears normal which is reassuring but it would be good to see what happens during a stress test and determine if any of his symptoms are reproducible during such.  Some of his symptoms appear to be almost more positionally related with exclusion of his dyspnea thus I recommended that he give a trial of meclizine to see if this does not solve some of his positionally related vertigo.  He was advised about meclizine using this over-the-counter and dosing on a regular basis for the next week or so to see how he responds.      Plan: Echocardiogram Exercise Stress, XR Chest 2         Views        I would suggest we see how he responds to the meclizine and obtain the stress  echo reports.  Pending these results he may benefit from a referral for a secondary opinion as the etiology of his symptoms are quite unclear.  I would suspect that given the duration of his symptoms that this in an of itself would suggest a relatively benign nature and thus would also expect that if something of significance were a source that a worsening of the symptoms would likely be expected.        See Patient Instructions    Saran Martinez MD  [facility]    ! Spent 25 minutes spent with this patient, face to face, discussing treatment options for listed problems above as well as side effects of appropriate medications.  Counseling time extended beyond 50% of the clinic visit.  Medication dosing, treatment plan and follow-up were discussed. Also reviewed need for primary care testing for patient.

## 2019-04-15 ENCOUNTER — MYC MEDICAL ADVICE (OUTPATIENT)
Dept: INTERNAL MEDICINE | Facility: CLINIC | Age: 79
End: 2019-04-15

## 2019-04-30 ENCOUNTER — HOSPITAL ENCOUNTER (OUTPATIENT)
Dept: CARDIOLOGY | Facility: CLINIC | Age: 79
Discharge: HOME | End: 2019-04-30
Attending: INTERNAL MEDICINE | Admitting: INTERNAL MEDICINE
Payer: MEDICARE

## 2019-04-30 DIAGNOSIS — R06.00 DYSPNEA, UNSPECIFIED TYPE: ICD-10-CM

## 2019-04-30 PROCEDURE — 93018 CV STRESS TEST I&R ONLY: CPT | Performed by: INTERNAL MEDICINE

## 2019-04-30 PROCEDURE — 93016 CV STRESS TEST SUPVJ ONLY: CPT | Performed by: INTERNAL MEDICINE

## 2019-04-30 PROCEDURE — 93321 DOPPLER ECHO F-UP/LMTD STD: CPT | Mod: 26 | Performed by: INTERNAL MEDICINE

## 2019-04-30 PROCEDURE — 93325 DOPPLER ECHO COLOR FLOW MAPG: CPT | Mod: 26 | Performed by: INTERNAL MEDICINE

## 2019-04-30 PROCEDURE — 93325 DOPPLER ECHO COLOR FLOW MAPG: CPT | Mod: TC

## 2019-04-30 PROCEDURE — 93350 STRESS TTE ONLY: CPT | Mod: 26 | Performed by: INTERNAL MEDICINE

## 2019-05-16 ENCOUNTER — TRANSFERRED RECORDS (OUTPATIENT)
Dept: HEALTH INFORMATION MANAGEMENT | Facility: CLINIC | Age: 79
End: 2019-05-16

## 2019-06-17 PROBLEM — Z79.01 LONG TERM CURRENT USE OF ANTICOAGULANT THERAPY: Status: ACTIVE | Noted: 2017-09-25

## 2019-06-27 ENCOUNTER — OFFICE VISIT (OUTPATIENT)
Dept: INTERNAL MEDICINE | Facility: CLINIC | Age: 79
End: 2019-06-27
Payer: MEDICARE

## 2019-06-27 VITALS
HEIGHT: 68 IN | TEMPERATURE: 99.1 F | BODY MASS INDEX: 29.21 KG/M2 | DIASTOLIC BLOOD PRESSURE: 78 MMHG | HEART RATE: 71 BPM | RESPIRATION RATE: 16 BRPM | SYSTOLIC BLOOD PRESSURE: 124 MMHG | WEIGHT: 192.7 LBS | OXYGEN SATURATION: 96 %

## 2019-06-27 DIAGNOSIS — I10 ESSENTIAL HYPERTENSION: ICD-10-CM

## 2019-06-27 DIAGNOSIS — R10.84 ABDOMINAL PAIN, GENERALIZED: Primary | ICD-10-CM

## 2019-06-27 LAB
ALBUMIN UR-MCNC: NEGATIVE MG/DL
APPEARANCE UR: CLEAR
BILIRUB UR QL STRIP: NEGATIVE
COLOR UR AUTO: YELLOW
GLUCOSE UR STRIP-MCNC: NEGATIVE MG/DL
HGB UR QL STRIP: NEGATIVE
KETONES UR STRIP-MCNC: NEGATIVE MG/DL
LEUKOCYTE ESTERASE UR QL STRIP: NEGATIVE
NITRATE UR QL: NEGATIVE
PH UR STRIP: 5.5 PH (ref 5–7)
SOURCE: NORMAL
SP GR UR STRIP: 1.02 (ref 1–1.03)
UROBILINOGEN UR STRIP-ACNC: 0.2 EU/DL (ref 0.2–1)

## 2019-06-27 PROCEDURE — 81003 URINALYSIS AUTO W/O SCOPE: CPT | Performed by: INTERNAL MEDICINE

## 2019-06-27 PROCEDURE — 99214 OFFICE O/P EST MOD 30 MIN: CPT | Performed by: INTERNAL MEDICINE

## 2019-06-27 ASSESSMENT — MIFFLIN-ST. JEOR: SCORE: 1563.58

## 2019-06-27 NOTE — PROGRESS NOTES
Subjective     Greyson Haas is a 78 year old male who presents to clinic today for the following health issues:    HPI   Abdominal pain    The patient reports a 3-week history of right lateral abdominal wall pain.  There is been no precipitating event.  There is been no skin rash.  He does not report any trauma.  There is been no associated nausea, vomiting, food intolerance, diarrhea, urinary symptoms.  He does note that occasionally when he bends forward a little bit its more bothersome.  He is still able to play golf.  He has had a kidney stone in the past but he does not feel that the symptoms mimic that.    He reports the following symptoms as listed below to the nursing staff:      Duration: 3 weeks     Description (location/character/radiation): RLQ/ constant ache that varies in intensity. Patients R hip also started bothering him a couple days ago, unsure if related       Associated flank pain: YES    Intensity:  moderate    Accompanying signs and symptoms:        Fever/Chills: no        Gas/Bloating: YES- Gag       Nausea/vomitting: no        Diarrhea: no        Dysuria or Hematuria: no     History (previous similar pain/trauma/previous testing): Hx of kidney stones, does not feel the same    Precipitating or alleviating factors:       Pain worse with eating/BM/urination: No. Worse with bending forward and bending low        Pain relieved by BM: no     Therapies tried and outcome: None    LMP:  not applicable    Patient Active Problem List   Diagnosis     Essential hypertension     Diverticulosis of large intestine     History of rectal cancer     LVH     Tinnitus     Gout of multiple sites     Chronic Kidney Disease, Stage III     Hyperlipidemia LDL goal <130     Glaucoma     Sprain of acromioclavicular ligament     Anxiety state     Gouty arthropathy     Basal cell carcinoma     Erectile dysfunction     KNEE JOINT REPLACEMENT     Pain in joint, lower leg     Abnormal gait     Advanced directives,  "counseling/discussion     Kidney stones     Lumbar stenosis     S/P lumbar fusion     Benign non-nodular prostatic hyperplasia with lower urinary tract symptoms     H/O total hip arthroplasty, left     Long-term (current) use of anticoagulants [Z79.01]     Aftercare following left hip joint replacement surgery     Hip pain, left     Right foot drop     Cervical stenosis of spinal canal     Ascending aorta dilatation (H)     S/P cervical spinal fusion     Past Surgical History:   Procedure Laterality Date     ARTHROPLASTY HIP Left 2017    LEFT TOTAL HIP ARTHROPLASTY(DEPUY)^;  Surgeon: Aurelio Hood MD;  Location: [facility] OR     C NONSPECIFIC PROCEDURE      cysto, R ureteral stent, ESWL     C NONSPECIFIC PROCEDURE  CHILD    TONSILLECTOMY     C NONSPECIFIC PROCEDURE  AGE 4    L heel osteomyelitis with \"bone scraping\"     DISCECTOMY, FUSION CERVICAL ANTERIOR TWO LEVELS, COMBINED N/A 2018    Procedure: C5-7 anterior cervical discectomy and fusion;  Surgeon: Mina Grove MD;  Location:  OR     OPTICAL TRACKING SYSTEM FUSION SPINE POSTERIOR LUMBAR TWO LEVELS N/A 2015    Procedure: OPTICAL TRACKING SYSTEM FUSION SPINE POSTERIOR LUMBAR TWO LEVELS;  Surgeon: Mina Grove MD;  Location:  OR     SURGICAL HISTORY OF -       B Shoulder surgeries     SURGICAL HISTORY OF -       lumbar laminectomy     SURGICAL HISTORY OF -       lumbar decompression    Dr. Pop     SURGICAL HISTORY OF -       L shoulder    Dr. Foley     SURGICAL HISTORY OF -       partial amputation R 3rd toe ([facility])  Dr. Neal     SURGICAL HISTORY OF -       bilateral cataracts       SURGICAL HISTORY OF -       Mohs L chest, Dr. Anthony     SURGICAL HISTORY OF -       L TKA   Dr. Thomson       Social History     Tobacco Use     Smoking status: Former Smoker     Packs/day: 1.00     Years: 27.00     Pack years: 27.00     Last attempt to quit: 1983     Years since quittin.5 "     Smokeless tobacco: Never Used   Substance Use Topics     Alcohol use: Yes     Comment: Ave 2 daily or more.  Heavy days 5-6, some days none.     Family History   Problem Relation Age of Onset     Cancer Maternal Grandfather          age 80, Colon or prostate Ca.     Cancer Maternal Grandmother          62, ? type     Cardiovascular Father         CHF     Lipids Mother      Arthritis Brother      Alcohol/Drug Brother         chronic alcoholism     Cerebrovascular Disease Brother          Current Outpatient Medications   Medication Sig Dispense Refill     allopurinol (ZYLOPRIM) 300 MG tablet Take 0.5 tablets (150 mg) by mouth daily       lisinopril (PRINIVIL/ZESTRIL) 10 MG tablet Take 1 tablet (10 mg) by mouth daily 90 tablet 3     Polyvinyl Alcohol-Povidone (REFRESH OP) Apply 1 drop to eye daily as needed       simvastatin (ZOCOR) 20 MG tablet Take 1 tablet (20 mg) by mouth every morning 90 tablet 3     tamsulosin (FLOMAX) 0.4 MG capsule TAKE ONE CAPSULE BY MOUTH ONCE DAILY 90 capsule 3     timolol (TIMOPTIC) 0.5 % ophthalmic solution Place 1 drop into both eyes daily       ACETAMINOPHEN PO Take 1,000 mg by mouth daily as needed for pain Reported on 2017       Sildenafil Citrate (VIAGRA PO) Take 50 mg by mouth daily as needed       No Known Allergies  BP Readings from Last 3 Encounters:   19 124/78   19 108/68   19 106/78    Wt Readings from Last 3 Encounters:   19 87.4 kg (192 lb 11.2 oz)   19 88 kg (193 lb 14.4 oz)   19 87.1 kg (192 lb)         Reviewed and updated as needed this visit by Provider       Review of Systems   ROS COMP: CONSTITUTIONAL: NEGATIVE for fever, chills, change in weight  ENT/MOUTH: NEGATIVE for ear, mouth and throat problems  RESP: NEGATIVE for significant cough or SOB  CV: NEGATIVE for chest pain, palpitations or peripheral edema  : NEGATIVE for frequency, dysuria, or hematuria  MUSCULOSKELETAL: NEGATIVE for significant arthralgias or  "myalgia  NEURO: NEGATIVE for weakness, dizziness or paresthesias  ENDOCRINE: NEGATIVE for temperature intolerance, skin/hair changes  HEME: NEGATIVE for bleeding problems  PSYCHIATRIC: NEGATIVE for changes in mood or affect      Objective    /78   Pulse 71   Temp 99.1  F (37.3  C) (Oral)   Resp 16   Ht 1.727 m (5' 8\")   Wt 87.4 kg (192 lb 11.2 oz)   SpO2 96%   BMI 29.30 kg/m    Body mass index is 29.3 kg/m .  Physical Exam   GENERAL: healthy, alert and no distress  RESP: lungs clear to auscultation - no rales, rhonchi or wheezes  CV: regular rate and rhythm, normal S1 S2, no S3 or S4, no click or rub, no peripheral edema and peripheral pulses strong  ABDOMEN: soft, nontender, no hepatosplenomegaly, no masses and bowel sounds normal.  There is no change to the chest wall or the abdominal wall laterally.  There is no skin change.  Patient is vague in his ability to points to where the symptoms are.  There is no rebound or guarding.  There is no obvious abdominal wall hernia.  MS: no gross musculoskeletal defects noted  SKIN: no suspicious lesions or rashes  NEURO: Normal strength and tone, mentation intact and speech normal  PSYCH: mentation appears normal, affect normal/bright       Assessment & Plan     1. Abdominal pain, generalized  The patient does not appear to be significantly functionally impaired based on his symptoms.  I suggested a urinalysis at this point to make sure we do not have an atypical presentation for a kidney stone or otherwise and imaging of his abdomen at this point is warranted due to his duration of symptoms.  - CT Abdomen w/o Contrast; Future  - *UA reflex to Microscopic and Culture ([facility] (except [place] and [place])    2. Essential hypertension  Stable on therapy continue with medical management.       BMI:   Estimated body mass index is 29.3 kg/m  as calculated from the following:    Height as of this encounter: 1.727 m (5' 8\").    Weight as of this " encounter: 87.4 kg (192 lb 11.2 oz).     See Patient Instructions    No follow-ups on file.    Saran Martinez MD  [facility]

## 2019-08-29 ENCOUNTER — DOCUMENTATION ONLY (OUTPATIENT)
Dept: LAB | Facility: CLINIC | Age: 79
End: 2019-08-29

## 2019-08-29 DIAGNOSIS — N18.30 CHRONIC KIDNEY DISEASE (CKD), STAGE III (MODERATE) (H): Primary | ICD-10-CM

## 2019-08-30 DIAGNOSIS — N18.30 CHRONIC KIDNEY DISEASE (CKD), STAGE III (MODERATE) (H): ICD-10-CM

## 2019-08-30 LAB
ANION GAP SERPL CALCULATED.3IONS-SCNC: 7 MMOL/L (ref 3–14)
BUN SERPL-MCNC: 15 MG/DL (ref 7–30)
CALCIUM SERPL-MCNC: 8.9 MG/DL (ref 8.5–10.1)
CHLORIDE SERPL-SCNC: 110 MMOL/L (ref 94–109)
CO2 SERPL-SCNC: 25 MMOL/L (ref 20–32)
CREAT SERPL-MCNC: 1.23 MG/DL (ref 0.66–1.25)
GFR SERPL CREATININE-BSD FRML MDRD: 55 ML/MIN/{1.73_M2}
GLUCOSE SERPL-MCNC: 109 MG/DL (ref 70–99)
POTASSIUM SERPL-SCNC: 4.6 MMOL/L (ref 3.4–5.3)
SODIUM SERPL-SCNC: 142 MMOL/L (ref 133–144)

## 2019-08-30 PROCEDURE — 80048 BASIC METABOLIC PNL TOTAL CA: CPT | Performed by: INTERNAL MEDICINE

## 2019-08-30 PROCEDURE — 36415 COLL VENOUS BLD VENIPUNCTURE: CPT | Performed by: INTERNAL MEDICINE

## 2019-09-09 DIAGNOSIS — M10.9 GOUT OF MULTIPLE SITES, UNSPECIFIED CAUSE, UNSPECIFIED CHRONICITY: ICD-10-CM

## 2019-09-09 DIAGNOSIS — N40.1 BENIGN NON-NODULAR PROSTATIC HYPERPLASIA WITH LOWER URINARY TRACT SYMPTOMS: ICD-10-CM

## 2019-09-10 RX ORDER — ALLOPURINOL 300 MG/1
TABLET ORAL
Qty: 90 TABLET | Refills: 3 | Status: SHIPPED | OUTPATIENT
Start: 2019-09-10 | End: 2019-12-17

## 2019-09-10 RX ORDER — TAMSULOSIN HYDROCHLORIDE 0.4 MG/1
CAPSULE ORAL
Qty: 90 CAPSULE | Refills: 3 | Status: SHIPPED | OUTPATIENT
Start: 2019-09-10 | End: 2019-12-17

## 2019-09-10 NOTE — TELEPHONE ENCOUNTER
"Requested Prescriptions   Pending Prescriptions Disp Refills     tamsulosin (FLOMAX) 0.4 MG capsule [Pharmacy Med Name: TAMSULOSIN 0.4MG    CAP] 90 capsule 3     Sig: TAKE 1 CAPSULE BY MOUTH ONCE DAILY       Alpha Blockers Failed - 9/9/2019 12:18 PM        Failed - Patient does not have Tadalafil, Vardenafil, or Sildenafil on their medication list        Passed - Blood pressure under 140/90 in past 12 months     BP Readings from Last 3 Encounters:   06/27/19 124/78   04/11/19 108/68   04/03/19 106/78                 Passed - Recent (12 mo) or future (30 days) visit within the authorizing provider's specialty     Patient had office visit in the last 12 months or has a visit in the next 30 days with authorizing provider or within the authorizing provider's specialty.  See \"Patient Info\" tab in inbasket, or \"Choose Columns\" in Meds & Orders section of the refill encounter.              Passed - Medication is active on med list        Passed - Patient is 18 years of age or older        allopurinol (ZYLOPRIM) 300 MG tablet [Pharmacy Med Name: ALLOPURINOL 300MG   TAB] 90 tablet 3     Sig: TAKE ONE TABLET BY MOUTH ONCE DAILY       Gout Agents Protocol Failed - 9/9/2019 12:18 PM        Failed - Has Uric Acid on file in past 12 months and value is less than 6     Recent Labs   Lab Test 04/01/13  1645   URIC 4.1     If level is 6mg/dL or greater, ok to refill one time and refer to provider.           Passed - CBC on file in past 12 months     Recent Labs   Lab Test 10/18/18  1524   WBC 6.3   RBC 4.63   HGB 14.1   HCT 42.1                    Passed - ALT on file in past 12 months     Recent Labs   Lab Test 10/18/18  1524   ALT 25             Passed - Recent (12 mo) or future (30 days) visit within the authorizing provider's specialty     Patient had office visit in the last 12 months or has a visit in the next 30 days with authorizing provider or within the authorizing provider's specialty.  See \"Patient Info\" tab in " "inbasket, or \"Choose Columns\" in Meds & Orders section of the refill encounter.              Passed - Medication is active on med list        Passed - Patient is age 18 or older        Passed - Normal serum creatinine on file in the past 12 months     Recent Labs   Lab Test 08/30/19  0846   CR 1.23             Routing refill request to provider for review/approval because:  Labs not current:  Uric acid  - Patient does have Tadalafil, Vardenafil, or Sildenafil on their medication list           "

## 2019-10-02 ENCOUNTER — HEALTH MAINTENANCE LETTER (OUTPATIENT)
Age: 79
End: 2019-10-02

## 2019-10-03 ENCOUNTER — TRANSFERRED RECORDS (OUTPATIENT)
Dept: HEALTH INFORMATION MANAGEMENT | Facility: CLINIC | Age: 79
End: 2019-10-03

## 2019-10-11 ENCOUNTER — HOSPITAL ENCOUNTER (OUTPATIENT)
Dept: NUCLEAR MEDICINE | Facility: CLINIC | Age: 79
Setting detail: NUCLEAR MEDICINE
Discharge: HOME | End: 2019-10-11
Attending: ORTHOPAEDIC SURGERY | Admitting: ORTHOPAEDIC SURGERY
Payer: MEDICARE

## 2019-10-11 ENCOUNTER — HOSPITAL ENCOUNTER (OUTPATIENT)
Dept: NUCLEAR MEDICINE | Facility: CLINIC | Age: 79
Setting detail: NUCLEAR MEDICINE
End: 2019-10-11
Attending: ORTHOPAEDIC SURGERY
Payer: MEDICARE

## 2019-10-11 DIAGNOSIS — Z47.1 AFTERCARE FOLLOWING JOINT REPLACEMENT: ICD-10-CM

## 2019-10-11 PROCEDURE — A9503 TC99M MEDRONATE: HCPCS | Performed by: ORTHOPAEDIC SURGERY

## 2019-10-11 PROCEDURE — 78315 BONE IMAGING 3 PHASE: CPT

## 2019-10-11 PROCEDURE — 34300033 ZZH RX 343: Performed by: ORTHOPAEDIC SURGERY

## 2019-10-11 RX ORDER — TC 99M MEDRONATE 20 MG/10ML
25 INJECTION, POWDER, LYOPHILIZED, FOR SOLUTION INTRAVENOUS ONCE
Status: COMPLETED | OUTPATIENT
Start: 2019-10-11 | End: 2019-10-11

## 2019-10-11 RX ADMIN — TC 99M MEDRONATE 26.1 MCI.: 20 INJECTION, POWDER, LYOPHILIZED, FOR SOLUTION INTRAVENOUS at 10:10

## 2019-10-21 ENCOUNTER — TRANSFERRED RECORDS (OUTPATIENT)
Dept: HEALTH INFORMATION MANAGEMENT | Facility: CLINIC | Age: 79
End: 2019-10-21

## 2019-10-22 ENCOUNTER — TRANSFERRED RECORDS (OUTPATIENT)
Dept: HEALTH INFORMATION MANAGEMENT | Facility: CLINIC | Age: 79
End: 2019-10-22

## 2019-10-29 ENCOUNTER — OFFICE VISIT (OUTPATIENT)
Dept: INTERNAL MEDICINE | Facility: CLINIC | Age: 79
End: 2019-10-29
Payer: MEDICARE

## 2019-10-29 VITALS
WEIGHT: 190 LBS | HEART RATE: 75 BPM | TEMPERATURE: 97.5 F | BODY MASS INDEX: 28.89 KG/M2 | SYSTOLIC BLOOD PRESSURE: 104 MMHG | OXYGEN SATURATION: 94 % | RESPIRATION RATE: 16 BRPM | DIASTOLIC BLOOD PRESSURE: 68 MMHG

## 2019-10-29 DIAGNOSIS — I10 ESSENTIAL HYPERTENSION: ICD-10-CM

## 2019-10-29 DIAGNOSIS — R03.0 ELEVATED BLOOD PRESSURE READING: Primary | ICD-10-CM

## 2019-10-29 DIAGNOSIS — I51.7 CARDIOMEGALY: ICD-10-CM

## 2019-10-29 LAB
ALBUMIN SERPL-MCNC: 3.7 G/DL (ref 3.4–5)
ALP SERPL-CCNC: 70 U/L (ref 40–150)
ALT SERPL W P-5'-P-CCNC: 25 U/L (ref 0–70)
ANION GAP SERPL CALCULATED.3IONS-SCNC: 7 MMOL/L (ref 3–14)
AST SERPL W P-5'-P-CCNC: 22 U/L (ref 0–45)
BASOPHILS # BLD AUTO: 0 10E9/L (ref 0–0.2)
BASOPHILS NFR BLD AUTO: 0.2 %
BILIRUB SERPL-MCNC: 0.8 MG/DL (ref 0.2–1.3)
BUN SERPL-MCNC: 18 MG/DL (ref 7–30)
CALCIUM SERPL-MCNC: 8.8 MG/DL (ref 8.5–10.1)
CHLORIDE SERPL-SCNC: 108 MMOL/L (ref 94–109)
CO2 SERPL-SCNC: 24 MMOL/L (ref 20–32)
CREAT SERPL-MCNC: 1.29 MG/DL (ref 0.66–1.25)
DIFFERENTIAL METHOD BLD: NORMAL
EOSINOPHIL # BLD AUTO: 0.1 10E9/L (ref 0–0.7)
EOSINOPHIL NFR BLD AUTO: 1.2 %
ERYTHROCYTE [DISTWIDTH] IN BLOOD BY AUTOMATED COUNT: 14.4 % (ref 10–15)
GFR SERPL CREATININE-BSD FRML MDRD: 52 ML/MIN/{1.73_M2}
GLUCOSE SERPL-MCNC: 90 MG/DL (ref 70–99)
HCT VFR BLD AUTO: 43.3 % (ref 40–53)
HGB BLD-MCNC: 14.3 G/DL (ref 13.3–17.7)
LYMPHOCYTES # BLD AUTO: 1.9 10E9/L (ref 0.8–5.3)
LYMPHOCYTES NFR BLD AUTO: 34.2 %
MCH RBC QN AUTO: 30.2 PG (ref 26.5–33)
MCHC RBC AUTO-ENTMCNC: 33 G/DL (ref 31.5–36.5)
MCV RBC AUTO: 91 FL (ref 78–100)
MONOCYTES # BLD AUTO: 0.6 10E9/L (ref 0–1.3)
MONOCYTES NFR BLD AUTO: 9.8 %
NEUTROPHILS # BLD AUTO: 3.1 10E9/L (ref 1.6–8.3)
NEUTROPHILS NFR BLD AUTO: 54.6 %
PLATELET # BLD AUTO: 166 10E9/L (ref 150–450)
POTASSIUM SERPL-SCNC: 4.1 MMOL/L (ref 3.4–5.3)
PROT SERPL-MCNC: 6.8 G/DL (ref 6.8–8.8)
RBC # BLD AUTO: 4.74 10E12/L (ref 4.4–5.9)
SODIUM SERPL-SCNC: 139 MMOL/L (ref 133–144)
TSH SERPL DL<=0.005 MIU/L-ACNC: 2.41 MU/L (ref 0.4–4)
WBC # BLD AUTO: 5.6 10E9/L (ref 4–11)

## 2019-10-29 PROCEDURE — 99214 OFFICE O/P EST MOD 30 MIN: CPT | Performed by: PHYSICIAN ASSISTANT

## 2019-10-29 PROCEDURE — 36415 COLL VENOUS BLD VENIPUNCTURE: CPT | Performed by: PHYSICIAN ASSISTANT

## 2019-10-29 PROCEDURE — 84443 ASSAY THYROID STIM HORMONE: CPT | Performed by: PHYSICIAN ASSISTANT

## 2019-10-29 PROCEDURE — 80053 COMPREHEN METABOLIC PANEL: CPT | Performed by: PHYSICIAN ASSISTANT

## 2019-10-29 PROCEDURE — 93000 ELECTROCARDIOGRAM COMPLETE: CPT | Performed by: PHYSICIAN ASSISTANT

## 2019-10-29 PROCEDURE — 85025 COMPLETE CBC W/AUTO DIFF WBC: CPT | Performed by: PHYSICIAN ASSISTANT

## 2019-10-29 NOTE — PROGRESS NOTES
Subjective     Greyson Haas is a 78 year old male who presents to clinic today for the following health issues:    HPI   Hypertension Follow-up      Do you check your blood pressure regularly outside of the clinic? Yes     Are you following a low salt diet? No    Are your blood pressures ever more than 140 on the top number (systolic) OR more   than 90 on the bottom number (diastolic), for example 140/90? Yes   Readings at home yesterday 200/100   Took lisinopril 10 mg and improved to 150's/90's  Then took lisinopril this am as BP was again 150's/90's  Later this am took an additional 5 mg of lisinopril for a total of 15 mg today prior to visit       How many servings of fruits and vegetables do you eat daily?  2-3    On average, how many sweetened beverages do you drink each day (soda, juice, sweet tea, etc)?   2  How many days per week do you miss taking your medication? 6    What makes it hard for you to take your medications?  did not think he needed to be taking them and was self regulating    Patient has been taking his blood pressure medication on an as needed bases- he states for years. If blood pressure is lower does not take medication.  States has not taken lisinopril for several months.  Patient also with CKD, - labs done this summer.    Patient denies any SOB or chest pain or pressure. Did have headaches yesterday and that is why he checked bp and took bp medication.   Patient did have stress echo done this spring - normal   Last year 2018 with regular echo    -------------------------------------      BP Readings from Last 3 Encounters:   10/29/19 104/68   06/27/19 124/78   04/11/19 108/68    Wt Readings from Last 3 Encounters:   10/29/19 86.2 kg (190 lb)   06/27/19 87.4 kg (192 lb 11.2 oz)   04/11/19 88 kg (193 lb 14.4 oz)                    -------------------------------------  Reviewed and updated as needed this visit by Provider  Allergies  Meds         Review of Systems   ROS COMP:  "Constitutional, HEENT, cardiovascular, pulmonary, gi and gu systems are negative, except as otherwise noted.      Objective    /68   Pulse 75   Temp 97.5  F (36.4  C) (Oral)   Resp 16   Wt 86.2 kg (190 lb)   SpO2 94%   BMI 28.89 kg/m    Body mass index is 28.89 kg/m .  Physical Exam   GENERAL: healthy, alert and no distress  NECK: no adenopathy, no asymmetry, masses, or scars and thyroid normal to palpation  RESP: lungs clear to auscultation - no rales, rhonchi or wheezes  CV: regular rates and rhythm and normal S1 S2, no S3 or S4  MS: no gross musculoskeletal defects noted, no edema  SKIN: no suspicious lesions or rashes    Diagnostic Test Results:  Pending   EKG- normal sinus rhythm         Assessment & Plan     1. Elevated blood pressure reading    - EKG 12-lead complete w/read - Clinics  - CBC with platelets differential  - Comprehensive metabolic panel  - TSH with free T4 reflex    2. Essential hypertension    - EKG 12-lead complete w/read - Clinics  - CBC with platelets differential  - Comprehensive metabolic panel  - TSH with free T4 reflex    3. LVH    Reviewed medication and that he should be taking daily not PRN.          BMI:   Estimated body mass index is 28.89 kg/m  as calculated from the following:    Height as of 6/27/19: 1.727 m (5' 8\").    Weight as of this encounter: 86.2 kg (190 lb).           Patient Instructions   Take your blood pressure medication daily until next week.  [facility] can check bp for you.         Return in about 1 week (around 11/5/2019) for BP Recheck, regular primary provider.    Yudith Omer PA-C  [facility]      "

## 2019-10-29 NOTE — PATIENT INSTRUCTIONS
Take your blood pressure medication daily until next week.  [facility] can check bp for you.

## 2019-10-30 ENCOUNTER — OFFICE VISIT (OUTPATIENT)
Dept: URGENT CARE | Facility: URGENT CARE | Age: 79
End: 2019-10-30
Payer: MEDICARE

## 2019-10-30 VITALS — HEART RATE: 72 BPM | SYSTOLIC BLOOD PRESSURE: 128 MMHG | DIASTOLIC BLOOD PRESSURE: 79 MMHG | TEMPERATURE: 97.5 F

## 2019-10-30 DIAGNOSIS — T25.221A PARTIAL THICKNESS BURN OF RIGHT FOOT, INITIAL ENCOUNTER: Primary | ICD-10-CM

## 2019-10-30 PROCEDURE — 16020 DRESS/DEBRID P-THICK BURN S: CPT | Performed by: FAMILY MEDICINE

## 2019-10-30 PROCEDURE — 90714 TD VACC NO PRESV 7 YRS+ IM: CPT | Performed by: FAMILY MEDICINE

## 2019-10-30 PROCEDURE — 90471 IMMUNIZATION ADMIN: CPT | Performed by: FAMILY MEDICINE

## 2019-10-30 PROCEDURE — 99203 OFFICE O/P NEW LOW 30 MIN: CPT | Mod: 25 | Performed by: FAMILY MEDICINE

## 2019-10-30 RX ORDER — SILVER SULFADIAZINE 10 MG/G
CREAM TOPICAL DAILY
Qty: 50 G | Refills: 1 | Status: SHIPPED | OUTPATIENT
Start: 2019-10-30 | End: 2019-11-05
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
## 2019-10-30 NOTE — PATIENT INSTRUCTIONS
Change the dressing once daily and apply antibiotic cream. See your primary care physician as soon as possible.

## 2019-10-31 NOTE — PROGRESS NOTES
Prior to immunization administration, verified patients identity using patient s name and date of birth. Please see Immunization Activity for additional information.     Screening Questionnaire for Adult Immunization    Are you sick today?   No   Do you have allergies to medications, food, a vaccine component or latex?   No   Have you ever had a serious reaction after receiving a vaccination?   No   Do you have a long-term health problem with heart disease, lung disease, asthma, kidney disease, metabolic disease (e.g. diabetes), anemia, or other blood disorder?   No   Do you have cancer, leukemia, HIV/AIDS, or any other immune system problem?  No- Had Cancer   In the past 3 months, have you taken medications that affect  your immune system, such as prednisone, other steroids, or anticancer drugs; drugs for the treatment of rheumatoid arthritis, Crohn s disease, or psoriasis; or have you had radiation treatments?   No   Have you had a seizure, or a brain or other nervous system problem?   No   During the past year, have you received a transfusion of blood or blood     products, or been given immune (gamma) globulin or antiviral drug?   No   For women: Are you pregnant or is there a chance you could become        pregnant during the next month?   No   Have you received any vaccinations in the past 4 weeks?   Yes - Flu shot     Immunization questionnaire was positive for at least one answer.  Notified Alex Palma MD.        Per orders of Dr. Alex Palma, injection of TD given by Mojgan Hatfield MA. Patient instructed to remain in clinic for 15 minutes afterwards, and to report any adverse reaction to me immediately.       Screening performed by Mojgan Hatfield MA on 10/30/2019 at 7:54 PM.

## 2019-10-31 NOTE — PROGRESS NOTES
Clinic Administered Medication Documentation    MEDICATION LIST:   Injectable Medication Documentation    Patient was given TD. Prior to medication administration, verified patients identity using patient s name and date of birth. Please see MAR and medication order for additional information. Patient instructed to remain in clinic for 15 minutes and report any adverse reaction to staff immediately .      Was entire vial of medication used? Yes  Vial/Syringe: Syringe  Expiration Date:  10/23/2020  Was this medication supplied by the patient? No

## 2019-11-01 ENCOUNTER — OFFICE VISIT (OUTPATIENT)
Dept: INTERNAL MEDICINE | Facility: CLINIC | Age: 79
End: 2019-11-01
Payer: MEDICARE

## 2019-11-01 VITALS
DIASTOLIC BLOOD PRESSURE: 70 MMHG | HEART RATE: 74 BPM | WEIGHT: 190 LBS | RESPIRATION RATE: 18 BRPM | OXYGEN SATURATION: 96 % | BODY MASS INDEX: 28.89 KG/M2 | SYSTOLIC BLOOD PRESSURE: 116 MMHG | TEMPERATURE: 97.6 F

## 2019-11-01 DIAGNOSIS — T25.221A PARTIAL THICKNESS BURN OF RIGHT FOOT, INITIAL ENCOUNTER: Primary | ICD-10-CM

## 2019-11-01 DIAGNOSIS — M21.371 RIGHT FOOT DROP: ICD-10-CM

## 2019-11-01 PROCEDURE — 99214 OFFICE O/P EST MOD 30 MIN: CPT | Performed by: PHYSICIAN ASSISTANT
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
## 2019-11-05 ENCOUNTER — OFFICE VISIT (OUTPATIENT)
Dept: INTERNAL MEDICINE | Facility: CLINIC | Age: 79
End: 2019-11-05
Payer: MEDICARE

## 2019-11-05 ENCOUNTER — TELEPHONE (OUTPATIENT)
Dept: INTERNAL MEDICINE | Facility: CLINIC | Age: 79
End: 2019-11-05

## 2019-11-05 VITALS
RESPIRATION RATE: 18 BRPM | DIASTOLIC BLOOD PRESSURE: 72 MMHG | BODY MASS INDEX: 28.89 KG/M2 | SYSTOLIC BLOOD PRESSURE: 120 MMHG | WEIGHT: 190 LBS | HEART RATE: 80 BPM | OXYGEN SATURATION: 90 % | TEMPERATURE: 97.7 F

## 2019-11-05 DIAGNOSIS — N18.30 CHRONIC KIDNEY DISEASE (CKD), STAGE III (MODERATE) (H): ICD-10-CM

## 2019-11-05 DIAGNOSIS — I77.810 ASCENDING AORTA DILATATION (H): ICD-10-CM

## 2019-11-05 DIAGNOSIS — T25.221D PARTIAL THICKNESS BURN OF RIGHT FOOT, SUBSEQUENT ENCOUNTER: Primary | ICD-10-CM

## 2019-11-05 DIAGNOSIS — L03.119 CELLULITIS OF FOOT: ICD-10-CM

## 2019-11-05 PROCEDURE — 99214 OFFICE O/P EST MOD 30 MIN: CPT | Performed by: INTERNAL MEDICINE

## 2019-11-05 RX ORDER — CEPHALEXIN 500 MG/1
500 CAPSULE ORAL 3 TIMES DAILY
Qty: 21 CAPSULE | Refills: 0 | Status: SHIPPED | OUTPATIENT
Start: 2019-11-05 | End: 2019-12-16

## 2019-11-05 RX ORDER — SILVER SULFADIAZINE 10 MG/G
CREAM TOPICAL DAILY
Qty: 50 G | Refills: 1 | Status: SHIPPED | OUTPATIENT
Start: 2019-11-05 | End: 2020-02-07

## 2019-11-05 NOTE — PATIENT INSTRUCTIONS
PLAN:                                                      1.  MEDICATIONS:        - Start taking antibiotics for cellulitis        - use old Norco as needed       - Continue other medications without change  2.  Elevate foot above the heart  3.  Follow-up for preop exam Jan 2 at 5 pm

## 2019-11-05 NOTE — TELEPHONE ENCOUNTER
Yes, shower and redress the wound before seeing Dr Nick this afternoon.   The topical ointment can look white to green at times as it reacts with the serous drainage as a burn heals. Best to look at this and decide if any change in care needed.
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
## 2019-12-11 DIAGNOSIS — I10 ESSENTIAL HYPERTENSION: ICD-10-CM

## 2019-12-11 RX ORDER — LISINOPRIL 10 MG/1
10 TABLET ORAL DAILY
Qty: 90 TABLET | Refills: 3 | Status: SHIPPED | OUTPATIENT
Start: 2019-12-11 | End: 2021-03-30

## 2019-12-11 NOTE — TELEPHONE ENCOUNTER
"Requested Prescriptions   Pending Prescriptions Disp Refills     lisinopril (PRINIVIL/ZESTRIL) 10 MG tablet 90 tablet 3     Sig: Take 1 tablet (10 mg) by mouth daily       ACE Inhibitors (Including Combos) Protocol Failed - 12/11/2019  2:34 PM        Failed - Normal serum creatinine on file in past 12 months     Recent Labs   Lab Test 10/29/19  1350   CR 1.29*             Passed - Blood pressure under 140/90 in past 12 months     BP Readings from Last 3 Encounters:   11/05/19 120/72   11/01/19 116/70   10/30/19 128/79                 Passed - Recent (12 mo) or future (30 days) visit within the authorizing provider's specialty     Patient has had an office visit with the authorizing provider or a provider within the authorizing providers department within the previous 12 mos or has a future within next 30 days. See \"Patient Info\" tab in inbasket, or \"Choose Columns\" in Meds & Orders section of the refill encounter.              Passed - Medication is active on med list        Passed - Patient is age 18 or older        Passed - Normal serum potassium on file in past 12 months     Recent Labs   Lab Test 10/29/19  1350   POTASSIUM 4.1             Routing refill request to provider for review/approval because:  Labs out of range:  creat        "

## 2019-12-12 DIAGNOSIS — M10.9 GOUT OF MULTIPLE SITES, UNSPECIFIED CAUSE, UNSPECIFIED CHRONICITY: ICD-10-CM

## 2019-12-12 DIAGNOSIS — N40.1 BENIGN NON-NODULAR PROSTATIC HYPERPLASIA WITH LOWER URINARY TRACT SYMPTOMS: ICD-10-CM

## 2019-12-12 DIAGNOSIS — E78.5 HYPERLIPIDEMIA LDL GOAL <130: ICD-10-CM

## 2019-12-12 NOTE — TELEPHONE ENCOUNTER
"Requested Prescriptions   Pending Prescriptions Disp Refills     tamsulosin (FLOMAX) 0.4 MG capsule  Last Written Prescription Date:  09/10/2019  Last Fill Quantity: 90,  # refills: 03   Last Office Visit: 11/5/2019   Future Office Visit:    Next 5 appointments (look out 90 days)    Dec 16, 2019 10:40 AM CST  [facility] [address] [place] [id number]  [phone]   Jan 23, 2020 10:30 AM CST  PHYSICAL [facility] Neville Nick MD  [facility] [address] [place] [address]  [phone]          90 capsule 3       Alpha Blockers Failed - 12/12/2019 10:17 AM        Failed - Patient does not have Tadalafil, Vardenafil, or Sildenafil on their medication list        Passed - Blood pressure under 140/90 in past 12 months     BP Readings from Last 3 Encounters:   11/05/19 120/72   11/01/19 116/70   10/30/19 128/79                 Passed - Recent (12 mo) or future (30 days) visit within the authorizing provider's specialty     Patient has had an office visit with the authorizing provider or a provider within the authorizing providers department within the previous 12 mos or has a future within next 30 days. See \"Patient Info\" tab in inbasket, or \"Choose Columns\" in Meds & Orders section of the refill encounter.              Passed - Medication is active on med list        Passed - Patient is 18 years of age or older        allopurinol (ZYLOPRIM) 300 MG tablet  Last Written Prescription Date:  09/10/2019  Last Fill Quantity: 90,  # refills: 03   Last Office Visit: 11/5/2019   Future Office Visit:    Next 5 appointments (look out 90 days)    Dec 16, 2019 10:40 AM CST  Pre-Op physical [facility]ud[facility]  [facility] [address] [place] [address] " "[place] [id number]  [phone]   Jan 23, 2020 10:30 AM CST  PHYSICAL with Neville Nick MD  [facility] [address] [address]  [phone]          90 tablet 3     Sig: Take 1 tablet (300 mg) by mouth daily       Gout Agents Protocol Failed - 12/12/2019 10:17 AM        Failed - Has Uric Acid on file in past 12 months and value is less than 6     Recent Labs   Lab Test 04/01/13  1645   URIC 4.1     If level is 6mg/dL or greater, ok to refill one time and refer to provider.           Failed - Normal serum creatinine on file in the past 12 months     Recent Labs   Lab Test 10/29/19  1350   CR 1.29*             Passed - CBC on file in past 12 months     Recent Labs   Lab Test 10/29/19  1350   WBC 5.6   RBC 4.74   HGB 14.3   HCT 43.3                    Passed - ALT on file in past 12 months     Recent Labs   Lab Test 10/29/19  1350   ALT 25             Passed - Recent (12 mo) or future (30 days) visit within the authorizing provider's specialty     Patient has had an office visit with the authorizing provider or a provider within the authorizing providers department within the previous 12 mos or has a future within next 30 days. See \"Patient Info\" tab in inbasket, or \"Choose Columns\" in Meds & Orders section of the refill encounter.              Passed - Medication is active on med list        Passed - Patient is age 18 or older        simvastatin (ZOCOR) 20 MG tablet  Last Written Prescription Date:  01/21/2019  Last Fill Quantity: 90,  # refills: 03   Last Office Visit: 11/5/2019   Future Office Visit:    Next 5 appointments (look out 90 days)    Dec 16, 2019 10:40 AM CST  Pre-Op physical with Yudith Omer PA-C  [facility] [address] [address]  [phone]   Jan 23, 2020 10:30 AM CST  PHYSICAL with Neville Nick, " "MD  [facility] [address] [address] [phone]          90 tablet 3     Sig: Take 1 tablet (20 mg) by mouth every morning       Statins Protocol Passed - 12/12/2019 10:17 AM        Passed - LDL on file in past 12 months     Recent Labs   Lab Test 01/21/19  1024   LDL 84             Passed - No abnormal creatine kinase in past 12 months     Recent Labs   Lab Test 04/23/15  1139                   Passed - Recent (12 mo) or future (30 days) visit within the authorizing provider's specialty     Patient has had an office visit with the authorizing provider or a provider within the authorizing providers department within the previous 12 mos or has a future within next 30 days. See \"Patient Info\" tab in inbasket, or \"Choose Columns\" in Meds & Orders section of the refill encounter.              Passed - Medication is active on med list        Passed - Patient is age 18 or older          "

## 2019-12-13 RX ORDER — SIMVASTATIN 20 MG
20 TABLET ORAL EVERY MORNING
Qty: 90 TABLET | Refills: 0 | Status: SHIPPED | OUTPATIENT
Start: 2019-12-13 | End: 2020-04-03

## 2019-12-13 NOTE — TELEPHONE ENCOUNTER
Tamsulosin    &     Allopurinol    Routing refill request to provider for review/approval because:  Labs not current:  Uric Acid  Labs out of range: elevated CR  Patient has Sildenafil on reported medication list

## 2019-12-13 NOTE — TELEPHONE ENCOUNTER
Simvastatin- Prescription approved per [facility] Refill Protocol.  Patient will be due for lipids Jan 2020      Erica LYNNEN, RN, PHN

## 2019-12-16 ENCOUNTER — OFFICE VISIT (OUTPATIENT)
Dept: INTERNAL MEDICINE | Facility: CLINIC | Age: 79
End: 2019-12-16
Payer: MEDICARE

## 2019-12-16 VITALS
SYSTOLIC BLOOD PRESSURE: 110 MMHG | RESPIRATION RATE: 16 BRPM | OXYGEN SATURATION: 96 % | BODY MASS INDEX: 28.89 KG/M2 | TEMPERATURE: 97.8 F | WEIGHT: 190 LBS | DIASTOLIC BLOOD PRESSURE: 72 MMHG | HEART RATE: 72 BPM

## 2019-12-16 DIAGNOSIS — M75.101 ROTATOR CUFF TEAR ARTHROPATHY OF RIGHT SHOULDER: ICD-10-CM

## 2019-12-16 DIAGNOSIS — I51.7 CARDIOMEGALY: ICD-10-CM

## 2019-12-16 DIAGNOSIS — E78.5 HYPERLIPIDEMIA LDL GOAL <130: ICD-10-CM

## 2019-12-16 DIAGNOSIS — N18.30 CHRONIC KIDNEY DISEASE (CKD), STAGE III (MODERATE) (H): ICD-10-CM

## 2019-12-16 DIAGNOSIS — Z01.818 PREOP GENERAL PHYSICAL EXAM: Primary | ICD-10-CM

## 2019-12-16 DIAGNOSIS — M19.011 OSTEOARTHRITIS OF RIGHT SHOULDER, UNSPECIFIED OSTEOARTHRITIS TYPE: ICD-10-CM

## 2019-12-16 DIAGNOSIS — I10 ESSENTIAL HYPERTENSION: ICD-10-CM

## 2019-12-16 DIAGNOSIS — M12.811 ROTATOR CUFF TEAR ARTHROPATHY OF RIGHT SHOULDER: ICD-10-CM

## 2019-12-16 DIAGNOSIS — I77.810 ASCENDING AORTA DILATATION (H): ICD-10-CM

## 2019-12-16 LAB
ALBUMIN SERPL-MCNC: 3.5 G/DL (ref 3.4–5)
ALP SERPL-CCNC: 67 U/L (ref 40–150)
ALT SERPL W P-5'-P-CCNC: 22 U/L (ref 0–70)
ANION GAP SERPL CALCULATED.3IONS-SCNC: 4 MMOL/L (ref 3–14)
AST SERPL W P-5'-P-CCNC: 17 U/L (ref 0–45)
BASOPHILS # BLD AUTO: 0 10E9/L (ref 0–0.2)
BASOPHILS NFR BLD AUTO: 0.3 %
BILIRUB SERPL-MCNC: 0.9 MG/DL (ref 0.2–1.3)
BUN SERPL-MCNC: 17 MG/DL (ref 7–30)
CALCIUM SERPL-MCNC: 8.9 MG/DL (ref 8.5–10.1)
CHLORIDE SERPL-SCNC: 108 MMOL/L (ref 94–109)
CO2 SERPL-SCNC: 28 MMOL/L (ref 20–32)
CREAT SERPL-MCNC: 1.22 MG/DL (ref 0.66–1.25)
DIFFERENTIAL METHOD BLD: NORMAL
EOSINOPHIL # BLD AUTO: 0.3 10E9/L (ref 0–0.7)
EOSINOPHIL NFR BLD AUTO: 3.7 %
ERYTHROCYTE [DISTWIDTH] IN BLOOD BY AUTOMATED COUNT: 14.6 % (ref 10–15)
GFR SERPL CREATININE-BSD FRML MDRD: 56 ML/MIN/{1.73_M2}
GLUCOSE SERPL-MCNC: 108 MG/DL (ref 70–99)
HCT VFR BLD AUTO: 41.6 % (ref 40–53)
HGB BLD-MCNC: 13.6 G/DL (ref 13.3–17.7)
LYMPHOCYTES # BLD AUTO: 1.6 10E9/L (ref 0.8–5.3)
LYMPHOCYTES NFR BLD AUTO: 22 %
MCH RBC QN AUTO: 30.1 PG (ref 26.5–33)
MCHC RBC AUTO-ENTMCNC: 32.7 G/DL (ref 31.5–36.5)
MCV RBC AUTO: 92 FL (ref 78–100)
MONOCYTES # BLD AUTO: 0.8 10E9/L (ref 0–1.3)
MONOCYTES NFR BLD AUTO: 10.6 %
NEUTROPHILS # BLD AUTO: 4.7 10E9/L (ref 1.6–8.3)
NEUTROPHILS NFR BLD AUTO: 63.4 %
PLATELET # BLD AUTO: 158 10E9/L (ref 150–450)
POTASSIUM SERPL-SCNC: 3.9 MMOL/L (ref 3.4–5.3)
PROT SERPL-MCNC: 6.9 G/DL (ref 6.8–8.8)
RBC # BLD AUTO: 4.52 10E12/L (ref 4.4–5.9)
SODIUM SERPL-SCNC: 140 MMOL/L (ref 133–144)
WBC # BLD AUTO: 7.4 10E9/L (ref 4–11)

## 2019-12-16 PROCEDURE — 80053 COMPREHEN METABOLIC PANEL: CPT | Performed by: PHYSICIAN ASSISTANT

## 2019-12-16 PROCEDURE — 99215 OFFICE O/P EST HI 40 MIN: CPT | Performed by: PHYSICIAN ASSISTANT

## 2019-12-16 PROCEDURE — 36415 COLL VENOUS BLD VENIPUNCTURE: CPT | Performed by: PHYSICIAN ASSISTANT

## 2019-12-16 PROCEDURE — 85025 COMPLETE CBC W/AUTO DIFF WBC: CPT | Performed by: PHYSICIAN ASSISTANT

## 2019-12-16 NOTE — PATIENT INSTRUCTIONS
Stop ibuprofen or aspirin one week ahead of surgery     No Lisinopril the morning of surgery       Before Your Surgery      Call your surgeon if there is any change in your health. This includes signs of a cold or flu (such as a sore throat, runny nose, cough, rash or fever).    Do not smoke, drink alcohol or take over the counter medicine (unless your surgeon or primary care doctor tells you to) for the 24 hours before and after surgery.    If you take prescribed drugs: Follow your doctor s orders about which medicines to take and which to stop until after surgery.    Eating and drinking prior to surgery: follow the instructions from your surgeon    Take a shower or bath the night before surgery. Use the soap your surgeon gave you to gently clean your skin. If you do not have soap from your surgeon, use your regular soap. Do not shave or scrub the surgery site.  Wear clean pajamas and have clean sheets on your bed.
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
## 2019-12-17 RX ORDER — TAMSULOSIN HYDROCHLORIDE 0.4 MG/1
0.4 CAPSULE ORAL DAILY
Qty: 90 CAPSULE | Refills: 3 | Status: SHIPPED | OUTPATIENT
Start: 2019-12-17 | End: 2020-11-05

## 2019-12-17 RX ORDER — ALLOPURINOL 300 MG/1
1 TABLET ORAL DAILY
Qty: 90 TABLET | Refills: 3 | Status: SHIPPED | OUTPATIENT
Start: 2019-12-17 | End: 2020-02-07

## 2020-01-03 NOTE — PHARMACY-ADMISSION MEDICATION HISTORY
Medication reconciliation interview completed by pre-admitting nurse Ofelia Rosario, reviewed by pharmacy. No further clarifications needed.       Prior to Admission medications    Medication Sig Last Dose Taking? Auth Provider   ACETAMINOPHEN PO Take 1,000 mg by mouth daily as needed for pain Reported on 5/18/2017   Reported, Patient   allopurinol (ZYLOPRIM) 300 MG tablet Take 1 tablet (300 mg) by mouth daily  Patient taking differently: Take 1 tablet by mouth daily Some days if sore   Neville Nick MD   lisinopril (PRINIVIL/ZESTRIL) 10 MG tablet Take 1 tablet (10 mg) by mouth daily   Neville Nick MD   order for DME Equipment being ordered: surgical shoe.   Yudith Omer PA-C   Polyvinyl Alcohol-Povidone (REFRESH OP) Apply 1 drop to eye daily as needed   Reported, Patient   Sildenafil Citrate (VIAGRA PO) Take 50 mg by mouth daily as needed   Reported, Patient   silver sulfADIAZINE (SILVADENE) 1 % external cream Apply topically daily Profile Rx: patient will call when needed   Neville Nick MD   simvastatin (ZOCOR) 20 MG tablet Take 1 tablet (20 mg) by mouth every morning   Neville Nick MD   tamsulosin (FLOMAX) 0.4 MG capsule Take 1 capsule (0.4 mg) by mouth daily   Neville Nick MD   timolol (TIMOPTIC) 0.5 % ophthalmic solution Place 1 drop into both eyes daily   Unknown, Entered By History

## 2020-01-06 ENCOUNTER — HOSPITAL ENCOUNTER (OUTPATIENT)
Dept: LAB | Facility: CLINIC | Age: 80
Discharge: HOME | DRG: 483 | End: 2020-01-06
Attending: ORTHOPAEDIC SURGERY | Admitting: ORTHOPAEDIC SURGERY
Payer: COMMERCIAL

## 2020-01-06 DIAGNOSIS — Z01.812 PRE-OPERATIVE LABORATORY EXAMINATION: ICD-10-CM

## 2020-01-06 PROCEDURE — 86850 RBC ANTIBODY SCREEN: CPT | Performed by: ORTHOPAEDIC SURGERY

## 2020-01-06 PROCEDURE — 36415 COLL VENOUS BLD VENIPUNCTURE: CPT | Performed by: ORTHOPAEDIC SURGERY

## 2020-01-06 PROCEDURE — 86901 BLOOD TYPING SEROLOGIC RH(D): CPT | Performed by: ORTHOPAEDIC SURGERY

## 2020-01-06 PROCEDURE — 86900 BLOOD TYPING SEROLOGIC ABO: CPT | Performed by: ORTHOPAEDIC SURGERY

## 2020-01-08 ENCOUNTER — APPOINTMENT (OUTPATIENT)
Dept: GENERAL RADIOLOGY | Facility: CLINIC | Age: 80
DRG: 483 | End: 2020-01-08
Attending: PHYSICIAN ASSISTANT
Payer: COMMERCIAL

## 2020-01-08 ENCOUNTER — ANESTHESIA (OUTPATIENT)
Dept: SURGERY | Facility: CLINIC | Age: 80
DRG: 483 | End: 2020-01-08
Payer: COMMERCIAL

## 2020-01-08 ENCOUNTER — ANESTHESIA EVENT (OUTPATIENT)
Dept: SURGERY | Facility: CLINIC | Age: 80
DRG: 483 | End: 2020-01-08
Payer: COMMERCIAL

## 2020-01-08 ENCOUNTER — HOSPITAL ENCOUNTER (INPATIENT)
Facility: CLINIC | Age: 80
LOS: 1 days | Discharge: HOME | DRG: 483 | End: 2020-01-09
Attending: ORTHOPAEDIC SURGERY | Admitting: ORTHOPAEDIC SURGERY
Payer: COMMERCIAL

## 2020-01-08 DIAGNOSIS — Z96.611 STATUS POST REVERSE TOTAL REPLACEMENT OF RIGHT SHOULDER: Primary | ICD-10-CM

## 2020-01-08 PROBLEM — Z96.619 S/P REVERSE TOTAL SHOULDER ARTHROPLASTY: Status: ACTIVE | Noted: 2020-01-08

## 2020-01-08 LAB
CREAT SERPL-MCNC: 1.18 MG/DL (ref 0.66–1.25)
GFR SERPL CREATININE-BSD FRML MDRD: 58 ML/MIN/{1.73_M2}
PLATELET # BLD AUTO: 144 10E9/L (ref 150–450)

## 2020-01-08 PROCEDURE — 40000986 XR SHOULDER RT PORT G/E 2 VW: Mod: RT

## 2020-01-08 PROCEDURE — 25000125 ZZHC RX 250: Performed by: ORTHOPAEDIC SURGERY

## 2020-01-08 PROCEDURE — 37000008 ZZH ANESTHESIA TECHNICAL FEE, 1ST 30 MIN: Performed by: ORTHOPAEDIC SURGERY

## 2020-01-08 PROCEDURE — 85049 AUTOMATED PLATELET COUNT: CPT | Performed by: PHYSICIAN ASSISTANT

## 2020-01-08 PROCEDURE — 25800025 ZZH RX 258: Performed by: ORTHOPAEDIC SURGERY

## 2020-01-08 PROCEDURE — 25800030 ZZH RX IP 258 OP 636: Performed by: NURSE ANESTHETIST, CERTIFIED REGISTERED

## 2020-01-08 PROCEDURE — 25000125 ZZHC RX 250: Performed by: NURSE ANESTHETIST, CERTIFIED REGISTERED

## 2020-01-08 PROCEDURE — C1713 ANCHOR/SCREW BN/BN,TIS/BN: HCPCS | Performed by: ORTHOPAEDIC SURGERY

## 2020-01-08 PROCEDURE — 25800030 ZZH RX IP 258 OP 636: Performed by: ORTHOPAEDIC SURGERY

## 2020-01-08 PROCEDURE — 36000063 ZZH SURGERY LEVEL 4 EA 15 ADDTL MIN: Performed by: ORTHOPAEDIC SURGERY

## 2020-01-08 PROCEDURE — 25000128 H RX IP 250 OP 636: Performed by: PHYSICIAN ASSISTANT

## 2020-01-08 PROCEDURE — 0RRJ00Z REPLACEMENT OF RIGHT SHOULDER JOINT WITH REVERSE BALL AND SOCKET SYNTHETIC SUBSTITUTE, OPEN APPROACH: ICD-10-PCS | Performed by: ORTHOPAEDIC SURGERY

## 2020-01-08 PROCEDURE — 25800030 ZZH RX IP 258 OP 636: Performed by: PHYSICIAN ASSISTANT

## 2020-01-08 PROCEDURE — 12000000 ZZH R&B MED SURG/OB

## 2020-01-08 PROCEDURE — 25000128 H RX IP 250 OP 636: Performed by: ANESTHESIOLOGY

## 2020-01-08 PROCEDURE — 25000128 H RX IP 250 OP 636: Performed by: ORTHOPAEDIC SURGERY

## 2020-01-08 PROCEDURE — C1776 JOINT DEVICE (IMPLANTABLE): HCPCS | Performed by: ORTHOPAEDIC SURGERY

## 2020-01-08 PROCEDURE — 25000128 H RX IP 250 OP 636: Performed by: NURSE ANESTHETIST, CERTIFIED REGISTERED

## 2020-01-08 PROCEDURE — L3670 SO ACRO/CLAV CAN WEB PRE OTS: HCPCS

## 2020-01-08 PROCEDURE — 37000009 ZZH ANESTHESIA TECHNICAL FEE, EACH ADDTL 15 MIN: Performed by: ORTHOPAEDIC SURGERY

## 2020-01-08 PROCEDURE — 25000132 ZZH RX MED GY IP 250 OP 250 PS 637: Performed by: PHYSICIAN ASSISTANT

## 2020-01-08 PROCEDURE — 71000012 ZZH RECOVERY PHASE 1 LEVEL 1 FIRST HR: Performed by: ORTHOPAEDIC SURGERY

## 2020-01-08 PROCEDURE — 82565 ASSAY OF CREATININE: CPT | Performed by: PHYSICIAN ASSISTANT

## 2020-01-08 PROCEDURE — 36415 COLL VENOUS BLD VENIPUNCTURE: CPT | Performed by: PHYSICIAN ASSISTANT

## 2020-01-08 PROCEDURE — 40000306 ZZH STATISTIC PRE PROC ASSESS II: Performed by: ORTHOPAEDIC SURGERY

## 2020-01-08 PROCEDURE — 36000065 ZZH SURGERY LEVEL 4 W FLUORO 1ST 30 MIN: Performed by: ORTHOPAEDIC SURGERY

## 2020-01-08 PROCEDURE — 25000125 ZZHC RX 250: Performed by: PHYSICIAN ASSISTANT

## 2020-01-08 PROCEDURE — 27210794 ZZH OR GENERAL SUPPLY STERILE: Performed by: ORTHOPAEDIC SURGERY

## 2020-01-08 DEVICE — IMPLANTABLE DEVICE: Type: IMPLANTABLE DEVICE | Site: SHOULDER | Status: FUNCTIONAL

## 2020-01-08 RX ORDER — LIDOCAINE HYDROCHLORIDE 10 MG/ML
INJECTION, SOLUTION INFILTRATION; PERINEURAL PRN
Status: DISCONTINUED | OUTPATIENT
Start: 2020-01-08 | End: 2020-01-08

## 2020-01-08 RX ORDER — GLYCOPYRROLATE 0.2 MG/ML
INJECTION, SOLUTION INTRAMUSCULAR; INTRAVENOUS PRN
Status: DISCONTINUED | OUTPATIENT
Start: 2020-01-08 | End: 2020-01-08

## 2020-01-08 RX ORDER — ONDANSETRON 4 MG/1
4 TABLET, ORALLY DISINTEGRATING ORAL EVERY 30 MIN PRN
Status: DISCONTINUED | OUTPATIENT
Start: 2020-01-08 | End: 2020-01-08 | Stop reason: HOSPADM

## 2020-01-08 RX ORDER — CEFAZOLIN SODIUM 1 G/3ML
1 INJECTION, POWDER, FOR SOLUTION INTRAMUSCULAR; INTRAVENOUS SEE ADMIN INSTRUCTIONS
Status: DISCONTINUED | OUTPATIENT
Start: 2020-01-08 | End: 2020-01-08 | Stop reason: HOSPADM

## 2020-01-08 RX ORDER — NALOXONE HYDROCHLORIDE 0.4 MG/ML
.1-.4 INJECTION, SOLUTION INTRAMUSCULAR; INTRAVENOUS; SUBCUTANEOUS
Status: DISCONTINUED | OUTPATIENT
Start: 2020-01-08 | End: 2020-01-08 | Stop reason: HOSPADM

## 2020-01-08 RX ORDER — CEFAZOLIN SODIUM 2 G/100ML
2 INJECTION, SOLUTION INTRAVENOUS
Status: COMPLETED | OUTPATIENT
Start: 2020-01-08 | End: 2020-01-08

## 2020-01-08 RX ORDER — OXYCODONE HYDROCHLORIDE 5 MG/1
5 TABLET ORAL EVERY 4 HOURS PRN
Status: DISCONTINUED | OUTPATIENT
Start: 2020-01-08 | End: 2020-01-08

## 2020-01-08 RX ORDER — SENNOSIDES A AND B 8.6 MG/1
1 TABLET, FILM COATED ORAL 2 TIMES DAILY PRN
Qty: 40 TABLET | Refills: 0 | Status: SHIPPED | OUTPATIENT
Start: 2020-01-08 | End: 2020-02-07

## 2020-01-08 RX ORDER — NEOSTIGMINE METHYLSULFATE 1 MG/ML
VIAL (ML) INJECTION PRN
Status: DISCONTINUED | OUTPATIENT
Start: 2020-01-08 | End: 2020-01-08

## 2020-01-08 RX ORDER — ONDANSETRON 2 MG/ML
INJECTION INTRAMUSCULAR; INTRAVENOUS PRN
Status: DISCONTINUED | OUTPATIENT
Start: 2020-01-08 | End: 2020-01-08

## 2020-01-08 RX ORDER — HYDROMORPHONE HYDROCHLORIDE 1 MG/ML
.3-.5 INJECTION, SOLUTION INTRAMUSCULAR; INTRAVENOUS; SUBCUTANEOUS
Status: DISCONTINUED | OUTPATIENT
Start: 2020-01-08 | End: 2020-01-09 | Stop reason: HOSPADM

## 2020-01-08 RX ORDER — ACETAMINOPHEN 500 MG
1000 TABLET ORAL EVERY 6 HOURS PRN
Qty: 1 BOTTLE | Refills: 0 | Status: SHIPPED | OUTPATIENT
Start: 2020-01-08 | End: 2020-02-07

## 2020-01-08 RX ORDER — NALOXONE HYDROCHLORIDE 0.4 MG/ML
.1-.4 INJECTION, SOLUTION INTRAMUSCULAR; INTRAVENOUS; SUBCUTANEOUS
Status: DISCONTINUED | OUTPATIENT
Start: 2020-01-08 | End: 2020-01-09 | Stop reason: HOSPADM

## 2020-01-08 RX ORDER — ACETAMINOPHEN 325 MG/1
975 TABLET ORAL EVERY 8 HOURS
Status: DISCONTINUED | OUTPATIENT
Start: 2020-01-08 | End: 2020-01-09 | Stop reason: HOSPADM

## 2020-01-08 RX ORDER — DEXAMETHASONE SODIUM PHOSPHATE 4 MG/ML
INJECTION, SOLUTION INTRA-ARTICULAR; INTRALESIONAL; INTRAMUSCULAR; INTRAVENOUS; SOFT TISSUE PRN
Status: DISCONTINUED | OUTPATIENT
Start: 2020-01-08 | End: 2020-01-08

## 2020-01-08 RX ORDER — EPHEDRINE SULFATE 50 MG/ML
INJECTION, SOLUTION INTRAMUSCULAR; INTRAVENOUS; SUBCUTANEOUS PRN
Status: DISCONTINUED | OUTPATIENT
Start: 2020-01-08 | End: 2020-01-08

## 2020-01-08 RX ORDER — SODIUM CHLORIDE, SODIUM LACTATE, POTASSIUM CHLORIDE, CALCIUM CHLORIDE 600; 310; 30; 20 MG/100ML; MG/100ML; MG/100ML; MG/100ML
INJECTION, SOLUTION INTRAVENOUS CONTINUOUS
Status: DISCONTINUED | OUTPATIENT
Start: 2020-01-08 | End: 2020-01-08 | Stop reason: HOSPADM

## 2020-01-08 RX ORDER — HYDRALAZINE HYDROCHLORIDE 20 MG/ML
2.5-5 INJECTION INTRAMUSCULAR; INTRAVENOUS EVERY 10 MIN PRN
Status: DISCONTINUED | OUTPATIENT
Start: 2020-01-08 | End: 2020-01-08 | Stop reason: HOSPADM

## 2020-01-08 RX ORDER — AMOXICILLIN 250 MG
1 CAPSULE ORAL 2 TIMES DAILY
Status: DISCONTINUED | OUTPATIENT
Start: 2020-01-08 | End: 2020-01-09 | Stop reason: HOSPADM

## 2020-01-08 RX ORDER — ONDANSETRON 4 MG/1
4 TABLET, ORALLY DISINTEGRATING ORAL EVERY 6 HOURS PRN
Status: DISCONTINUED | OUTPATIENT
Start: 2020-01-08 | End: 2020-01-09 | Stop reason: HOSPADM

## 2020-01-08 RX ORDER — TIMOLOL MALEATE 5 MG/ML
1 SOLUTION/ DROPS OPHTHALMIC DAILY
Status: DISCONTINUED | OUTPATIENT
Start: 2020-01-09 | End: 2020-01-09 | Stop reason: HOSPADM

## 2020-01-08 RX ORDER — FENTANYL CITRATE 50 UG/ML
25-50 INJECTION, SOLUTION INTRAMUSCULAR; INTRAVENOUS EVERY 5 MIN PRN
Status: DISCONTINUED | OUTPATIENT
Start: 2020-01-08 | End: 2020-01-08 | Stop reason: HOSPADM

## 2020-01-08 RX ORDER — METOPROLOL TARTRATE 1 MG/ML
1-2 INJECTION, SOLUTION INTRAVENOUS EVERY 5 MIN PRN
Status: DISCONTINUED | OUTPATIENT
Start: 2020-01-08 | End: 2020-01-08 | Stop reason: HOSPADM

## 2020-01-08 RX ORDER — CEFAZOLIN SODIUM 2 G/100ML
2 INJECTION, SOLUTION INTRAVENOUS EVERY 8 HOURS
Status: DISCONTINUED | OUTPATIENT
Start: 2020-01-08 | End: 2020-01-08

## 2020-01-08 RX ORDER — ACETAMINOPHEN 325 MG/1
650 TABLET ORAL EVERY 4 HOURS PRN
Status: DISCONTINUED | OUTPATIENT
Start: 2020-01-11 | End: 2020-01-09 | Stop reason: HOSPADM

## 2020-01-08 RX ORDER — PROPOFOL 10 MG/ML
INJECTION, EMULSION INTRAVENOUS PRN
Status: DISCONTINUED | OUTPATIENT
Start: 2020-01-08 | End: 2020-01-08

## 2020-01-08 RX ORDER — AMOXICILLIN 250 MG
2 CAPSULE ORAL 2 TIMES DAILY
Status: DISCONTINUED | OUTPATIENT
Start: 2020-01-08 | End: 2020-01-09 | Stop reason: HOSPADM

## 2020-01-08 RX ORDER — HYDROMORPHONE HYDROCHLORIDE 1 MG/ML
.3-.5 INJECTION, SOLUTION INTRAMUSCULAR; INTRAVENOUS; SUBCUTANEOUS EVERY 10 MIN PRN
Status: DISCONTINUED | OUTPATIENT
Start: 2020-01-08 | End: 2020-01-08 | Stop reason: HOSPADM

## 2020-01-08 RX ORDER — SODIUM CHLORIDE, SODIUM LACTATE, POTASSIUM CHLORIDE, CALCIUM CHLORIDE 600; 310; 30; 20 MG/100ML; MG/100ML; MG/100ML; MG/100ML
INJECTION, SOLUTION INTRAVENOUS CONTINUOUS
Status: DISCONTINUED | OUTPATIENT
Start: 2020-01-08 | End: 2020-01-09 | Stop reason: HOSPADM

## 2020-01-08 RX ORDER — SODIUM CHLORIDE, SODIUM LACTATE, POTASSIUM CHLORIDE, CALCIUM CHLORIDE 600; 310; 30; 20 MG/100ML; MG/100ML; MG/100ML; MG/100ML
INJECTION, SOLUTION INTRAVENOUS CONTINUOUS PRN
Status: DISCONTINUED | OUTPATIENT
Start: 2020-01-08 | End: 2020-01-08

## 2020-01-08 RX ORDER — ONDANSETRON 2 MG/ML
4 INJECTION INTRAMUSCULAR; INTRAVENOUS EVERY 30 MIN PRN
Status: DISCONTINUED | OUTPATIENT
Start: 2020-01-08 | End: 2020-01-08 | Stop reason: HOSPADM

## 2020-01-08 RX ORDER — BUPIVACAINE HYDROCHLORIDE AND EPINEPHRINE 2.5; 5 MG/ML; UG/ML
INJECTION, SOLUTION INFILTRATION; PERINEURAL PRN
Status: DISCONTINUED | OUTPATIENT
Start: 2020-01-08 | End: 2020-01-08 | Stop reason: HOSPADM

## 2020-01-08 RX ORDER — CEFAZOLIN SODIUM 1 G/50ML
2 SOLUTION INTRAVENOUS EVERY 8 HOURS
Status: COMPLETED | OUTPATIENT
Start: 2020-01-08 | End: 2020-01-09

## 2020-01-08 RX ORDER — OXYCODONE HYDROCHLORIDE 5 MG/1
5 TABLET ORAL EVERY 4 HOURS PRN
Qty: 30 TABLET | Refills: 0 | Status: SHIPPED | OUTPATIENT
Start: 2020-01-08 | End: 2020-02-07

## 2020-01-08 RX ORDER — SIMVASTATIN 20 MG
20 TABLET ORAL EVERY MORNING
Status: DISCONTINUED | OUTPATIENT
Start: 2020-01-09 | End: 2020-01-09 | Stop reason: HOSPADM

## 2020-01-08 RX ORDER — OXYCODONE HYDROCHLORIDE 5 MG/1
5-10 TABLET ORAL EVERY 4 HOURS PRN
Status: DISCONTINUED | OUTPATIENT
Start: 2020-01-08 | End: 2020-01-09 | Stop reason: HOSPADM

## 2020-01-08 RX ORDER — ALBUTEROL SULFATE 0.83 MG/ML
2.5 SOLUTION RESPIRATORY (INHALATION) EVERY 4 HOURS PRN
Status: DISCONTINUED | OUTPATIENT
Start: 2020-01-08 | End: 2020-01-08 | Stop reason: HOSPADM

## 2020-01-08 RX ORDER — LIDOCAINE 40 MG/G
CREAM TOPICAL
Status: DISCONTINUED | OUTPATIENT
Start: 2020-01-08 | End: 2020-01-09 | Stop reason: HOSPADM

## 2020-01-08 RX ORDER — FENTANYL CITRATE 50 UG/ML
INJECTION, SOLUTION INTRAMUSCULAR; INTRAVENOUS PRN
Status: DISCONTINUED | OUTPATIENT
Start: 2020-01-08 | End: 2020-01-08

## 2020-01-08 RX ORDER — LISINOPRIL 10 MG/1
10 TABLET ORAL DAILY
Status: DISCONTINUED | OUTPATIENT
Start: 2020-01-09 | End: 2020-01-09 | Stop reason: HOSPADM

## 2020-01-08 RX ORDER — ASPIRIN 325 MG
325 TABLET ORAL
Qty: 60 TABLET | Refills: 0 | Status: SHIPPED | OUTPATIENT
Start: 2020-01-08 | End: 2020-02-07

## 2020-01-08 RX ORDER — TAMSULOSIN HYDROCHLORIDE 0.4 MG/1
0.4 CAPSULE ORAL DAILY
Status: DISCONTINUED | OUTPATIENT
Start: 2020-01-09 | End: 2020-01-09 | Stop reason: HOSPADM

## 2020-01-08 RX ORDER — MEPERIDINE HYDROCHLORIDE 50 MG/ML
12.5 INJECTION INTRAMUSCULAR; INTRAVENOUS; SUBCUTANEOUS
Status: DISCONTINUED | OUTPATIENT
Start: 2020-01-08 | End: 2020-01-08 | Stop reason: HOSPADM

## 2020-01-08 RX ORDER — ONDANSETRON 2 MG/ML
4 INJECTION INTRAMUSCULAR; INTRAVENOUS EVERY 6 HOURS PRN
Status: DISCONTINUED | OUTPATIENT
Start: 2020-01-08 | End: 2020-01-09 | Stop reason: HOSPADM

## 2020-01-08 RX ORDER — FENTANYL CITRATE 50 UG/ML
25-50 INJECTION, SOLUTION INTRAMUSCULAR; INTRAVENOUS
Status: DISCONTINUED | OUTPATIENT
Start: 2020-01-08 | End: 2020-01-08 | Stop reason: HOSPADM

## 2020-01-08 RX ADMIN — ACETAMINOPHEN 975 MG: 325 TABLET, FILM COATED ORAL at 15:52

## 2020-01-08 RX ADMIN — GLYCOPYRROLATE 0.6 MG: 0.2 INJECTION, SOLUTION INTRAMUSCULAR; INTRAVENOUS at 12:57

## 2020-01-08 RX ADMIN — LIDOCAINE HYDROCHLORIDE 50 MG: 10 INJECTION, SOLUTION INFILTRATION; PERINEURAL at 11:25

## 2020-01-08 RX ADMIN — Medication 5 MG: at 11:44

## 2020-01-08 RX ADMIN — FENTANYL CITRATE 50 MCG: 50 INJECTION, SOLUTION INTRAMUSCULAR; INTRAVENOUS at 11:53

## 2020-01-08 RX ADMIN — Medication 10 MG: at 11:58

## 2020-01-08 RX ADMIN — MIDAZOLAM 2 MG: 1 INJECTION INTRAMUSCULAR; INTRAVENOUS at 10:27

## 2020-01-08 RX ADMIN — FENTANYL CITRATE 100 MCG: 50 INJECTION, SOLUTION INTRAMUSCULAR; INTRAVENOUS at 10:27

## 2020-01-08 RX ADMIN — ROCURONIUM BROMIDE 50 MG: 10 INJECTION INTRAVENOUS at 11:26

## 2020-01-08 RX ADMIN — SODIUM CHLORIDE, POTASSIUM CHLORIDE, SODIUM LACTATE AND CALCIUM CHLORIDE: 600; 310; 30; 20 INJECTION, SOLUTION INTRAVENOUS at 20:13

## 2020-01-08 RX ADMIN — Medication 1 G: at 11:34

## 2020-01-08 RX ADMIN — CEFAZOLIN SODIUM 2 G: 2 INJECTION, SOLUTION INTRAVENOUS at 11:17

## 2020-01-08 RX ADMIN — Medication 10 MG: at 11:42

## 2020-01-08 RX ADMIN — SODIUM CHLORIDE, POTASSIUM CHLORIDE, SODIUM LACTATE AND CALCIUM CHLORIDE: 600; 310; 30; 20 INJECTION, SOLUTION INTRAVENOUS at 11:33

## 2020-01-08 RX ADMIN — DEXAMETHASONE SODIUM PHOSPHATE 4 MG: 4 INJECTION, SOLUTION INTRA-ARTICULAR; INTRALESIONAL; INTRAMUSCULAR; INTRAVENOUS; SOFT TISSUE at 11:25

## 2020-01-08 RX ADMIN — Medication 3 MG: at 12:57

## 2020-01-08 RX ADMIN — Medication 1 G: at 12:52

## 2020-01-08 RX ADMIN — ONDANSETRON HYDROCHLORIDE 4 MG: 2 INJECTION, SOLUTION INTRAVENOUS at 12:52

## 2020-01-08 RX ADMIN — CEFAZOLIN SODIUM 2 G: 10 INJECTION, POWDER, FOR SOLUTION INTRAVENOUS at 19:09

## 2020-01-08 RX ADMIN — SODIUM CHLORIDE, POTASSIUM CHLORIDE, SODIUM LACTATE AND CALCIUM CHLORIDE: 600; 310; 30; 20 INJECTION, SOLUTION INTRAVENOUS at 09:52

## 2020-01-08 RX ADMIN — PROPOFOL 120 MG: 10 INJECTION, EMULSION INTRAVENOUS at 11:25

## 2020-01-08 RX ADMIN — FENTANYL CITRATE 50 MCG: 50 INJECTION, SOLUTION INTRAMUSCULAR; INTRAVENOUS at 11:25

## 2020-01-08 ASSESSMENT — ACTIVITIES OF DAILY LIVING (ADL)
RETIRED_EATING: 0-->INDEPENDENT
TRANSFERRING: 0-->INDEPENDENT
FALL_HISTORY_WITHIN_LAST_SIX_MONTHS: NO
DRESS: 0-->INDEPENDENT
ADLS_ACUITY_SCORE: 13
AMBULATION: 1-->ASSISTIVE EQUIPMENT
RETIRED_COMMUNICATION: 0-->UNDERSTANDS/COMMUNICATES WITHOUT DIFFICULTY
COGNITION: 0 - NO COGNITION ISSUES REPORTED
BATHING: 0-->INDEPENDENT
TOILETING: 0-->INDEPENDENT
ADLS_ACUITY_SCORE: 13
SWALLOWING: 0-->SWALLOWS FOODS/LIQUIDS WITHOUT DIFFICULTY

## 2020-01-08 ASSESSMENT — MIFFLIN-ST. JEOR: SCORE: 1555.41

## 2020-01-08 ASSESSMENT — LIFESTYLE VARIABLES: TOBACCO_USE: 1

## 2020-01-08 NOTE — PROGRESS NOTES
I followed with patient.  I demonstrated wear of the orthosis.  I explained wear, care.  I advised to call office if any questions.  Written instructions and contact information given.  Luiz EMMANUEL.

## 2020-01-08 NOTE — ANESTHESIA POSTPROCEDURE EVALUATION
Patient: Greyson Haas    Procedure(s):  Right reverse total shoulder arthroplasty    Diagnosis:Post-traumatic osteoarthritis of right shoulder [M19.111]  Nontraumatic complete tear of right rotator cuff [M75.121]  Diagnosis Additional Information: No value filed.    Anesthesia Type:  General, Peripheral Nerve Block, For Post-op pain in coordination with surgeon, ETT    Note:  Anesthesia Post Evaluation    Patient location during evaluation: PACU  Patient participation: Able to participate in evaluation but full recovery from regional anesthesia has not yet ocurrred but is anticipated to occur within 48 hours  Level of consciousness: awake and alert  Pain management: adequate  Airway patency: patent  Cardiovascular status: acceptable  Respiratory status: acceptable  Hydration status: acceptable  PONV: controlled             Last vitals:  Vitals:    01/08/20 1100 01/08/20 1320 01/08/20 1325   BP: (!) 149/101     Pulse: 67 65    Resp: 13 (!) 39 15   Temp:  96.5  F (35.8  C)    SpO2: 95% 98% 100%         Electronically Signed By: Florentino Kulkarni MD  January 8, 2020  1:35 PM

## 2020-01-08 NOTE — ANESTHESIA PROCEDURE NOTES
Peripheral nerve/Neuraxial procedure note : Brachial plexus and Interscalene  Pre-Procedure  Performed by Kavon Connor MD  Referred by MJ  Location: pre-op    Procedure Times:1/8/2020 10:27 AM and 1/8/2020 10:34 AM  Pre-Anesthestic Checklist: patient identified, IV checked, site marked, risks and benefits discussed, informed consent, monitors and equipment checked, pre-op evaluation, at physician/surgeon's request and post-op pain management    Timeout  Correct Patient: Yes   Correct Procedure: Yes   Correct Site: Yes   Correct Laterality: Yes   Correct Position: Yes   Site Marked: Yes   .   Procedure Documentation    Diagnosis:RIGHT SHOULDER DJD.    Procedure: Brachial plexus and Interscalene, right.   Patient Position:supine   Ultrasound used to identify targeted nerve, plexus, or vascular marker and placed a needle adjacent to it., Ultrasound was used to visualize the spread of the anesthetic in close proximity to the above stated nerve.   Patient Prep/Sterile Barriers; mask, sterile gloves, povidone-iodine 7.5% surgical scrub.  .  Needle: insulated, short bevel   Needle Gauge: 22.    Needle Length (Inches) 2   Insertion Method: Single Shot.        Assessment/Narrative  Paresthesias: No.  Injection made incrementally with aspirations every 5 mL..  The placement was negative for: blood aspirated, painful injection and site bleeding.  Bolus given via..   Secured via.   Complications: none. Comments:  The surgeon has given a verbal order transferring care of this patient to me for the performance of a regional analgesia block for post-op pain control. It is requested of me because I am uniquely trained and qualified to perform this block and the surgeon is neither trained nor qualified to perform this procedure.    25 ml 0.5% bupivacaine

## 2020-01-08 NOTE — BRIEF OP NOTE
[facility]    Brief Operative Note    Pre-operative diagnosis: Post-traumatic osteoarthritis of right shoulder [M19.111]  Nontraumatic complete tear of right rotator cuff [M75.121]  Post-operative diagnosis same    Procedure: Procedure(s):  Right reverse total shoulder arthroplasty  Surgeon: Surgeon(s) and Role:     * Manolo Short MD - Primary     * Kenna Parker PA-C - Assisting  Anesthesia: Combined General with Interscalene Block   Estimated blood loss: 100 mL  Drains: None  Specimens: * No specimens in log *  Findings:   None.  Complications: None.  Implants:   Implant Name Type Inv. Item Serial No.  Lot No. LRB No. Used   Aequalis  PerFORM Reversed Standard Baseplate, 29mm    TORNIER [id number] Right 1   Aequalis  PerFORM Reversed Lateralized [place], 39mm, + 3mm Lat.    TORNIER [id number] Right 1   Aequalis  PerFORM Reversed 6.5 x 40mm Central    TORNIER [id number] Right 1   Aequalis  PerFORM Reversed 5.0 x 22 Peripheral Screw    TORNIER 8004 [id number] Right 2   Aequalis  PerFORM Reversed 5.0 x 46 Peripheral Screw    TORNIER 8004 [id number] Right 1   Aequalis  PerFORM Reversed 5.0 x 42 Peripheral Screw    TORNIER 8004 [id number] Right 1   Flex Shoulder System Reversed Insert, 39mm, +6mm, 7.5     TORNIER [id number] Right 1   Flex Shoulder System Reversed Tray, +0mm,     TORNIER [id number] Right 1   Aequalis  Ascend  Flex Standard PTC Humeral Stem, Size 6A, 127.5 , 86mm    TORNIER [id number] Right 1     Plan:  NWB to RUE  Ultrasling x 4 weeks  DVT prophylaxis - SCDs & Lovenox qday in hospital, then Aspirin 325 mg PO BID x 4 weeks  Ancef x 24 hours  PACU XRs  PT/OT - Codman's and pendulum swing exercises. Okay to work on PROM; FF 0 - 90, ER 0 - 30.   Keep dressing C/D/I for 2 weeks; okay to shower    F/U at clinic in 2 weeks

## 2020-01-08 NOTE — PHARMACY-ADMISSION MEDICATION HISTORY
Changes made to PTA medication list:  Added: Lumigan    Prior to Admission medications    Medication Sig Last Dose Taking? Auth Provider   acetaminophen (TYLENOL) 500 MG tablet Take 2 tablets (1,000 mg) by mouth every 6 hours as needed for mild pain  Yes Kenna Parker PA-C   allopurinol (ZYLOPRIM) 300 MG tablet Take 1 tablet (300 mg) by mouth daily  Patient taking differently: Take 1 tablet by mouth daily Some days if sore 1/8/2020 at Unknown time Yes Neville Nick MD   aspirin (ASA) 325 MG tablet Take 1 tablet (325 mg) by mouth 2 times daily  Yes Kenna Parker PA-C   bimatoprost (LUMIGAN) 0.01 % SOLN Place 1 drop into both eyes At Bedtime  Yes Unknown, Entered By History   lisinopril (PRINIVIL/ZESTRIL) 10 MG tablet Take 1 tablet (10 mg) by mouth daily Past Week at Unknown time Yes Neville Nick MD   oxyCODONE (ROXICODONE) 5 MG tablet Take 1 tablet (5 mg) by mouth every 4 hours as needed for pain  Yes Kenna Parker PA-C   Polyvinyl Alcohol-Povidone (REFRESH OP) Apply 1 drop to eye daily as needed 1/8/2020 at Unknown time Yes Reported, Patient   senna (SENOKOT) 8.6 MG tablet Take 1 tablet by mouth 2 times daily as needed for constipation  Yes Kenna Parker PA-C   Sildenafil Citrate (VIAGRA PO) Take 50 mg by mouth daily as needed Past Week at Unknown time Yes Reported, Patient   silver sulfADIAZINE (SILVADENE) 1 % external cream Apply topically daily Profile Rx: patient will call when needed Past Week at Unknown time Yes Neville Nick MD   simvastatin (ZOCOR) 20 MG tablet Take 1 tablet (20 mg) by mouth every morning 1/8/2020 at Unknown time Yes Neville Nick MD   tamsulosin (FLOMAX) 0.4 MG capsule Take 1 capsule (0.4 mg) by mouth daily 1/8/2020 at Unknown time Yes Neville Nick MD   timolol (TIMOPTIC) 0.5 % ophthalmic solution Place 1 drop into both eyes daily 1/8/2020 at Unknown time Yes Unknown, Entered By History   order for DME Equipment being  ordered: surgical shoe.   Yudith Omer PA-C

## 2020-01-08 NOTE — OP NOTE
Procedure Date: 01/08/2020      PREOPERATIVE DIAGNOSIS:  Right shoulder rotator cuff tear arthropathy.      POSTOPERATIVE DIAGNOSIS:  Right shoulder rotator cuff tear arthropathy.      PROCEDURE:  Right reverse shoulder arthroplasty.      SURGEON:  Manolo Short MD      FIRST ASSISTANT:  AFSANEH Romo      A skilled first assistant was necessary for patient positioning, prepping and draping, help with arm positioning, help with reduction maneuvers, and help with closing and patient transfer.      ANESTHESIA:  General and block.      COMPLICATIONS:  None apparent.      ESTIMATED BLOOD LOSS:  100 mL.      IMPLANTS:   1.  Tornier Flex shoulder system reversed insert poly size 39, thickness +6.   2.  Tornier Flex shoulder system reversed tray thickness +0, eccentricity high.   3.  Tornier Aequalis Ascend Flex standard PTC humeral stem size 6A.   4.  Tornier Aequalis Perform reversed lateralized glenosphere, size 39 with a +3 lateralization.   5.  Tornier Aequalis Perform reversed standard baseplate size 29.      INDICATIONS:  The patient is an 79-year-old male who has been complaining of right shoulder pain for many years.  He does have a history of 3 rotator cuff repairs and was recently seen by one of my partners, Dr. Hood, who re-obtained an MRI that demonstrated full-thickness tearing of his rotator cuff including the subscap,  infra and supraspinatus.  After discussion of treatment options with the patient, we decided the best way to move forward would be a reverse shoulder arthroplasty given he failed nonoperative measures including injections.  He agreed to proceed.      DESCRIPTION OF THE PROCEDURE:  On the date of the procedure, the patient was met in the preoperative area by the surgery and anesthesia team.  The right shoulder was marked by the operative surgeon.  Informed consent was obtained.  Risks and benefits of the surgery were discussed with the patient including risk of bleeding, infection,  damage to neurovascular structures, stiffness, continued pain, fracture, dislocation and need for future revision surgery.  He understood and agreed to proceed.  Our anesthesia colleagues then performed an interscalene block.  He was then brought to the operating room, placed on the operating room table in supine position and general anesthesia was administered.  The right shoulder was then prepped and draped in the usual sterile fashion.  Preoperative antibiotics were given and preoperative timeout was performed.        We began the procedure by making a standard incision in the deltopectoral interval.  Sharp dissection was carried down through the skin and subcutaneous tissue.  The cephalic vein was mobilized laterally and protected throughout the case.  Unfortunately, it was nicked toward the end of the case and had to be tied off.  The deltopectoral interval was visualized and opened the superior aspect of the pectoralis major tendon was released for better visualization and the subacromial and subdeltoid adhesions were mobilized using a Christie elevator.  There was a significant amount of scar tissue and adhesions.  The lateral aspect of the conjoined tendon was mobilized and the clavipectoral fascia was removed.  The subscapularis was completely torn.  There was a full-thickness tear with retraction to the glenoid and it appeared irreparable.  The biceps was also already previously tenotomized and after peeling the capsule off the lesser tuberosity all the way to the 7 o'clock position, the patient did have a significant amount of arthritis as well as spurs in his inferior aspect of the humeral head.  These were removed using a combination of an osteotome as well as a rongeur.  Next, a Fukuda retractor was placed and we attempted to do a 4-sided subscapularis release, but again the subscapularis was just too torn up and too retracted in order for repair.  Therefore, the subscapularis was left alone.  We turned  our attention back to the humeral head.  The humeral head was bald. The supraspinatus and infraspinatus were completely torn.  The teres minor did appear intact.  He did have some remnant sutures in the humeral head.  These were Ethibond sutures from his prior rotator cuff repair.  We then performed an anatomic neck cut along the humeral neck and sounded as well as broached up to a size 6 in the patient's native version which was about 20 degrees.  A cut protector was then placed.  Osteophytes were removed about the humeral head and we turned our attention to the glenoid.  Appropriate retractors were placed.  The labrum was removed circumferentially around the glenoid and excess cartilage was removed using a Christie elevator.  We then utilized the 10 degree superior tilted guide in order to place a pin at the inferior center of the glenoid.  The pin was then overreamed and a drill hole was placed for the peg.  A size 29 standard baseplate was then screwed into position and 4 peripheral screws were placed without any issues.  A size 39 glenosphere with +3 lateralization was then impacted into position and screwed into position, we turned our attention back to the proximal humerus.  We evaluated a high offset tray and noted that a high offset tray would be appropriate.  This was screwed into position and the trial implant was removed and the true implants were opened and assembled on the back table.  The wound was copiously irrigated and we did perform a small greater tuberoplasty in order to remove any osteophytes and prevent any impingement.  True implants were impacted into position as well as the polyethylene.  The shoulder was reduced without any difficulty and had excellent stability, excellent range of motion and no signs of impingement.  We then turned our attention to closure.  After copiously irrigating out the wound and leaving the subscapularis as is, the deltopectoral interval was loosely approximated using  #1 Vicryl followed by 2-0 Vicryls for deep dermals and a running 4-0 Monocryl for skin.  Sterile dressings were applied.  The patient was then awakened from anesthesia, placed into an UltraSling and awakened  and brought to the PACU for recovery.        Postoperatively, he will be in an UltraSling for 3 weeks, and he will initiate physical therapy on postop day #1 in the hospital.         IVIS BARKER MD             D: 2020   T: 2020   MT: NTS      Name:     DOMINIC CHURCH   MRN:      [id number]        Account:        [id number]   :      1940           Procedure Date: 2020      Document: [id number]

## 2020-01-08 NOTE — ANESTHESIA PREPROCEDURE EVALUATION
"Anesthesia Pre-Procedure Evaluation    Patient: Greyson Haas   MRN: [id number] : 1940          Preoperative Diagnosis: Post-traumatic osteoarthritis of right shoulder [M19.111]  Nontraumatic complete tear of right rotator cuff [M75.121]    Procedure(s):  Right reverse total shoulder arthroplasty    Past Medical History:   Diagnosis Date      RENAL INSUFFICIENCY      Arthritis      Calculus of kidney ,      Chronic Kidney Disease, Stage III 2005     Chronic Tinnitus      Diverticulosis of colon (without mention of hemorrhage)      Essential hypertension, benign      Gastroesophageal reflux disease     minor     Gouty arthropathy 2008     Gouty tophi of other sites     R third toe     HYPERLIPIDEMIA      Hyperplastic Polyps Colon      LVH      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 Rubin     Nocturia     x 2-3     Pulmonary Nodule, stable      Tubular Adenoma 3/07     Unspecified glaucoma(365.9)     Followed by Dr. Monique     Past Surgical History:   Procedure Laterality Date     ARTHROPLASTY HIP Left 2017    LEFT TOTAL HIP ARTHROPLASTY(DEPUY)^;  Surgeon: Aurelio Hood MD;  Location:  OR     C NONSPECIFIC PROCEDURE      cysto, R ureteral stent, ESWL     C NONSPECIFIC PROCEDURE  CHILD    TONSILLECTOMY     C NONSPECIFIC PROCEDURE  AGE 4    L heel osteomyelitis with \"bone scraping\"     DISCECTOMY, FUSION CERVICAL ANTERIOR TWO LEVELS, COMBINED N/A 2018    Procedure: C5-7 anterior cervical discectomy and fusion;  Surgeon: Mina Grove MD;  Location:  OR     OPTICAL TRACKING SYSTEM FUSION SPINE POSTERIOR LUMBAR TWO LEVELS N/A 2015    Procedure: OPTICAL TRACKING SYSTEM FUSION SPINE POSTERIOR LUMBAR TWO LEVELS;  Surgeon: Mina Grove MD;  Location:  OR     SURGICAL HISTORY OF -       B Shoulder surgeries     SURGICAL HISTORY OF -  "  1992    lumbar laminectomy     SURGICAL HISTORY OF -   1998    lumbar decompression    Dr. Pop     SURGICAL HISTORY OF -   9/06    L shoulder    Dr. Foley     SURGICAL HISTORY OF -   11/07    partial amputation R 3rd toe (tophus)  Dr. Neal     SURGICAL HISTORY OF -   2010    bilateral cataracts       SURGICAL HISTORY OF -   1/11    Mohs L chest, Dr. Anthony     SURGICAL HISTORY OF -   1/11    L TKA   Dr. Thomson     Anesthesia Evaluation     . Pt has had prior anesthetic.            ROS/MED HX    ENT/Pulmonary:     (+)tobacco use, Past use , . .   (-) sleep apnea   Neurologic:     (+)other neuro s/p cervical fusion    Cardiovascular:     (+) Dyslipidemia, hypertension----. : . . . :. .       METS/Exercise Tolerance:     Hematologic:         Musculoskeletal:   (+) arthritis,  -       GI/Hepatic:         Renal/Genitourinary:     (+) chronic renal disease, type: CRI, Nephrolithiasis ,       Endo:         Psychiatric:         Infectious Disease:         Malignancy:   (+) Malignancy History of GI          Other:                          Physical Exam      Airway   Mallampati: II  TM distance: >3 FB  Neck ROM: limited    Dental     Cardiovascular   Rhythm and rate: regular and normal      Pulmonary    breath sounds clear to auscultation            Lab Results   Component Value Date    WBC 7.4 12/16/2019    HGB 13.6 12/16/2019    HCT 41.6 12/16/2019     12/16/2019    CRP 2.6 03/17/2010    SED 6 06/21/2018     12/16/2019    POTASSIUM 3.9 12/16/2019    CHLORIDE 108 12/16/2019    CO2 28 12/16/2019    BUN 17 12/16/2019    CR 1.22 12/16/2019     (H) 12/16/2019    MIREYA 8.9 12/16/2019    ALBUMIN 3.5 12/16/2019    PROTTOTAL 6.9 12/16/2019    ALT 22 12/16/2019    AST 17 12/16/2019    ALKPHOS 67 12/16/2019    BILITOTAL 0.9 12/16/2019    PTT 27 01/25/2011    INR 2.2 (A) 10/02/2017    TSH 2.41 10/29/2019       Preop Vitals  BP Readings from Last 3 Encounters:   12/16/19 110/72   11/05/19 120/72   11/01/19 116/70  "   Pulse Readings from Last 3 Encounters:   12/16/19 72   11/05/19 80   11/01/19 74      Resp Readings from Last 3 Encounters:   12/16/19 16   11/05/19 18   11/01/19 18    SpO2 Readings from Last 3 Encounters:   12/16/19 96%   11/05/19 90%   11/01/19 96%      Temp Readings from Last 1 Encounters:   12/16/19 97.8  F (36.6  C) (Oral)    Ht Readings from Last 1 Encounters:   06/27/19 1.727 m (5' 8\")      Wt Readings from Last 1 Encounters:   12/16/19 86.2 kg (190 lb)    Estimated body mass index is 28.89 kg/m  as calculated from the following:    Height as of 6/27/19: 1.727 m (5' 8\").    Weight as of 12/16/19: 86.2 kg (190 lb).       Anesthesia Plan      History & Physical Review  History and physical reviewed and following examination; no interval change.    ASA Status:  3 .    NPO Status:  > 8 hours    Plan for General, Peripheral Nerve Block, For Post-op pain in coordination with surgeon and ETT with Intravenous and Propofol induction. Maintenance will be Balanced.    PONV prophylaxis:  Ondansetron (or other 5HT-3) and Dexamethasone or Solumedrol       Postoperative Care  Postoperative pain management:  Multi-modal analgesia, Oral pain medications, IV analgesics and Peripheral nerve block (Single Shot).      Consents  Anesthetic plan, risks, benefits and alternatives discussed with:  Patient..                 Kavon Connor MD                    .  "

## 2020-01-08 NOTE — ANESTHESIA CARE TRANSFER NOTE
Patient: Greyson Haas    Procedure(s):  Right reverse total shoulder arthroplasty    Diagnosis: Post-traumatic osteoarthritis of right shoulder [M19.111]  Nontraumatic complete tear of right rotator cuff [M75.121]  Diagnosis Additional Information: No value filed.    Anesthesia Type:   General, Peripheral Nerve Block, For Post-op pain in coordination with surgeon, ETT     Note:  Airway :Face Mask  Patient transferred to:PACU  Handoff Report: Identifed the Patient, Identified the Reponsible Provider, Reviewed the pertinent medical history, Discussed the surgical course, Reviewed Intra-OP anesthesia mangement and issues during anesthesia, Set expectations for post-procedure period and Allowed opportunity for questions and acknowledgement of understanding      Vitals: (Last set prior to Anesthesia Care Transfer)    CRNA VITALS  1/8/2020 1244 - 1/8/2020 1323      1/8/2020             Pulse:  85    SpO2:  98 %                Electronically Signed By: KE Mercado CRNA  January 8, 2020  1:23 PM

## 2020-01-09 ENCOUNTER — APPOINTMENT (OUTPATIENT)
Dept: OCCUPATIONAL THERAPY | Facility: CLINIC | Age: 80
DRG: 483 | End: 2020-01-09
Attending: ORTHOPAEDIC SURGERY
Payer: COMMERCIAL

## 2020-01-09 VITALS
TEMPERATURE: 97.2 F | OXYGEN SATURATION: 96 % | SYSTOLIC BLOOD PRESSURE: 124 MMHG | RESPIRATION RATE: 12 BRPM | HEIGHT: 68 IN | WEIGHT: 192 LBS | BODY MASS INDEX: 29.1 KG/M2 | DIASTOLIC BLOOD PRESSURE: 83 MMHG | HEART RATE: 71 BPM

## 2020-01-09 LAB — HGB BLD-MCNC: 11.5 G/DL (ref 13.3–17.7)

## 2020-01-09 PROCEDURE — 25000132 ZZH RX MED GY IP 250 OP 250 PS 637: Performed by: PHYSICIAN ASSISTANT

## 2020-01-09 PROCEDURE — 97530 THERAPEUTIC ACTIVITIES: CPT | Mod: GO

## 2020-01-09 PROCEDURE — 25800030 ZZH RX IP 258 OP 636: Performed by: ORTHOPAEDIC SURGERY

## 2020-01-09 PROCEDURE — 36415 COLL VENOUS BLD VENIPUNCTURE: CPT | Performed by: PHYSICIAN ASSISTANT

## 2020-01-09 PROCEDURE — 85018 HEMOGLOBIN: CPT | Performed by: PHYSICIAN ASSISTANT

## 2020-01-09 PROCEDURE — 97535 SELF CARE MNGMENT TRAINING: CPT | Mod: GO

## 2020-01-09 PROCEDURE — 25000128 H RX IP 250 OP 636: Performed by: PHYSICIAN ASSISTANT

## 2020-01-09 PROCEDURE — 97165 OT EVAL LOW COMPLEX 30 MIN: CPT | Mod: GO

## 2020-01-09 PROCEDURE — 97110 THERAPEUTIC EXERCISES: CPT | Mod: GO

## 2020-01-09 PROCEDURE — 25000128 H RX IP 250 OP 636: Performed by: ORTHOPAEDIC SURGERY

## 2020-01-09 RX ADMIN — TIMOLOL MALEATE 1 DROP: 5 SOLUTION/ DROPS OPHTHALMIC at 08:11

## 2020-01-09 RX ADMIN — SENNOSIDES AND DOCUSATE SODIUM 1 TABLET: 8.6; 5 TABLET ORAL at 08:10

## 2020-01-09 RX ADMIN — OXYCODONE HYDROCHLORIDE 5 MG: 5 TABLET ORAL at 11:55

## 2020-01-09 RX ADMIN — TAMSULOSIN HYDROCHLORIDE 0.4 MG: 0.4 CAPSULE ORAL at 08:09

## 2020-01-09 RX ADMIN — ENOXAPARIN SODIUM 40 MG: 40 INJECTION SUBCUTANEOUS at 08:09

## 2020-01-09 RX ADMIN — SIMVASTATIN 20 MG: 20 TABLET, FILM COATED ORAL at 08:09

## 2020-01-09 RX ADMIN — CEFAZOLIN SODIUM 2 G: 10 INJECTION, POWDER, FOR SOLUTION INTRAVENOUS at 03:03

## 2020-01-09 RX ADMIN — ACETAMINOPHEN 975 MG: 325 TABLET, FILM COATED ORAL at 08:09

## 2020-01-09 RX ADMIN — ACETAMINOPHEN 975 MG: 325 TABLET, FILM COATED ORAL at 00:00

## 2020-01-09 ASSESSMENT — ACTIVITIES OF DAILY LIVING (ADL)
ADLS_ACUITY_SCORE: 13
ADLS_ACUITY_SCORE: 13
PREVIOUS_RESPONSIBILITIES: MEAL PREP;HOUSEKEEPING;LAUNDRY;SHOPPING;YARDWORK;MEDICATION MANAGEMENT;FINANCES;DRIVING
ADLS_ACUITY_SCORE: 13
ADLS_ACUITY_SCORE: 13

## 2020-01-09 NOTE — PROGRESS NOTES
01/09/20 0800   Quick Adds   Type of Visit Initial Occupational Therapy Evaluation   Living Environment   Lives With spouse   Living Arrangements house   Home Accessibility stairs to enter home   Number of Stairs, Main Entrance 2   Transportation Anticipated car, drives self;family or friend will provide   Living Environment Comment Pt lives with spouse in house, 2 stairs to enter, all needs met on main level, walk in shower, comfort height toilet   Home Main Entrance   Stairs Comment, Main Entrance 2   Self-Care   Usual Activity Tolerance good   Current Activity Tolerance moderate   Regular Exercise Yes   Activity/Exercise/Self-Care Comment Golf, biking, prior to surgery snow removal    Functional Level   Ambulation 1-->assistive equipment  (cane for long distances)   Transferring 0-->independent   Toileting 0-->independent   Bathing 0-->independent   Dressing 0-->independent   Eating 0-->independent   Communication 0-->understands/communicates without difficulty   Swallowing 0-->swallows foods/liquids without difficulty   Cognition 0 - no cognition issues reported   Fall history within last six months no   Which of the above functional risks had a recent onset or change? none   Prior Functional Level Comment Patient was ambulating with a cane in the community but not in the home, indep in all ADLs, IADLs. Pt is retired.        Present no   Language English   General Information   Onset of Illness/Injury or Date of Surgery - Date 01/08/19   Referring Physician Manolo Short MD    Patient/Family Goals Statement Pt's goal is to d/c home today   Additional Occupational Profile Info/Pertinent History of Current Problem Per chart: Pt is an 79 year old male s/p right reverse total shoulder arthroplasty. Prior history of 3 rotator cuff surgeries and pending possible hip replacement in April.   Precautions/Limitations other (see comments);fall precautions  (RUE precautions )   Weight-Bearing Status  - RUE nonweight-bearing   General Observations Patient was agreeable to OT services today    Cognitive Status Examination   Orientation orientation to person, place and time   Visual Perception   Visual Perception Wears glasses   Sensory Examination   Sensory Quick Adds Other (describe)   Sensory Comments Pt reporting RUE tingling remains present since surgery   Pain Assessment   Patient Currently in Pain No   Integumentary/Edema   Integumentary/Edema other (describe)   Integumentary/Edema Comments RUE swelling due to surgery    Posture   Posture not impaired   Range of Motion (ROM)   ROM Quick Adds Shoulder, Right   ROM Comment ROM decreased due to surgery and precautions    Strength   Manual Muscle Testing Quick Adds Other   Strength Comments RUE NT, LUE WFL   Hand Strength   Hand Strength Comments BUEs WFL   Bed Mobility Skill: Supine to Sit   Level of [place]: Supine/Sit stand-by assist   Physical Assist/Nonphysical Assist: Supine/Sit 1 person assist   Transfer Skill: Bed to Chair/Chair to Bed   Level of [place]: Bed to Chair contact guard   Physical Assist/Nonphysical Assist: Bed to Chair 1 person assist   Transfer Skill: Sit to Stand   Level of [place]: Sit/Stand stand-by assist   Physical Assist/Nonphysical Assist: Sit/Stand 1 person assist   Transfer Skill: Toilet Transfer   Level of [place]: Toilet contact guard   Physical Assist/Nonphysical Assist: Toilet 1 person assist   Balance   Balance Comments Pt with baseline balance deficits, declines use of cane, CGA provided while ambulating. Encouraged use of cane in home environment as needed   Upper Body Dressing   Level of [place]: Dress Upper Body moderate assist (50% patients effort)   Physical Assist/Nonphysical Assist: Dress Upper Body 1 person assist   Lower Body Dressing   Level of [place]: Dress Lower Body minimum assist (75% patients effort)   Physical Assist/Nonphysical Assist: Dress Lower Body 1 person assist  "  Toileting   Level of [place]: Toilet stand-by assist   Physical Assist/Nonphysical Assist: Toilet 1 person assist   Grooming   Level of [place]: Grooming stand-by assist   Physical Assist/Nonphysical Assist: Grooming 1 person assist   Instrumental Activities of Daily Living (IADL)   Previous Responsibilities meal prep;housekeeping;laundry;shopping;yardwork;medication management;finances;driving   IADL Comments Wife is able to assist with IADLs   Activities of Daily Living Analysis   Impairments Contributing to Impaired Activities of Daily Living post surgical precautions   General Therapy Interventions   Planned Therapy Interventions ADL retraining;IADL retraining;home program guidelines;transfer training;strengthening;progressive activity/exercise   Clinical Impression   Criteria for Skilled Therapeutic Interventions Met yes, treatment indicated   OT Diagnosis Impaired ADLs, IADLs and mobility tasks   Influenced by the following impairments Post-surgical precautions, decreased activity tolerance   Assessment of Occupational Performance 5 or more Performance Deficits   Identified Performance Deficits Bathing, dressing, grooming, toileting, homemaking, transfers   Clinical Decision Making (Complexity) Low complexity   Therapy Frequency Daily   Predicted Duration of Therapy Intervention (days/wks) eval and 1x treat   Risks and Benefits of Treatment have been explained. Yes   Patient, Family & other staff in agreement with plan of care Yes   [facility] AM-PAC  \"6 Clicks\" Daily Activity Inpatient Short Form   1. Putting on and taking off regular lower body clothing? 3 - A Little   2. Bathing (including washing, rinsing, drying)? 3 - A Little   3. Toileting, which includes using toilet, bedpan or urinal? 4 - None   4. Putting on and taking off regular upper body clothing? 2 - A Lot   5. Taking care of personal grooming such as brushing teeth? 4 - None   6. Eating meals? 4 - None   Daily Activity Raw " Score (Score out of 24.Lower scores equate to lower levels of function) 20   Total Evaluation Time   Total Evaluation Time (Minutes) 10

## 2020-01-09 NOTE — PROVIDER NOTIFICATION
10:15 earlier on the night shift, pt was voiding 100-200cc amounts. Pt voided 200cc, but st cathed for 580.   At 1000, pt wasn't able to void, or if he did it was drops, checked bladder scan and it was 245. flomax was given in the am. Called MD to how to proceed. Discharge orders for today holding for now, will monitor urine output    1200. Voided 300, pvr scanned for 271. Will update MD.

## 2020-01-09 NOTE — PLAN OF CARE
Pt is alert and oriented x4, VSS, and dressing CDI with ice applied. Pt reports numbness in R shoulder up to fingertips and denies pain. Sling in place. Taking scheduled tylenol. IV patent and infusing. Feet cool to touch and pulses +1. Voiding with urinal in small amounts. Pt walked in room with A-1, gait belt, and walker. Tolerating a clear liquid diet. Plan is to discharge home. Plan of care reviewed with patient.

## 2020-01-09 NOTE — PLAN OF CARE
OT: Order received, eval completed and treatment initiated. Pt is an 79 year old male s/p right reverse total shoulder arthroplasty. Prior history of 3 rotator cuff surgeries and pending possible hip replacement in April. Pt lives with spouse in house, 2 stairs to enter, all needs met on main level, walk in shower, comfort height toilet. Patient was ambulating with a cane in the community but not in the home, indep in all ADLs, IADLs. Pt is retired.     Patient plan: Home today  Current status: Pt completed bed mobility supine > sit EOB with HOB elevated with SBA, able to sit EOB with SBA, no LOB for session. Pt completed sit > stand from EOB with CGA, ambulated to/from BR with CGA, able to tolerate standing at sink with SBA for oral cares, face washing. Pt completed toilet transfer on/off with CGA. Pt ambulated ~200 ft with no assistive device and CGA, pt reports baseline balance deficit, declines utilizing cane. Pt ambulated up/down 4 stairs w/ use of handrail and CGA, safety cues provided for technique. Pt educated on compensatory technique for UB/LB dressing, required min A to thread shirt w/ cues, min A to don over head, min A to thread/don pants. Pt educated on sling mgmt, wearing schedule and positioning, required max A for donning/doffing and optimal positioning. Pt educated on home safety modifications/recommendations and safe activities post TSA, verbalized understanding. Pt educated on RUE HEP, able to perform R elbow/forearm/wrist/digit AROM 10 reps x1 set, R codmans in stance 5-8 reps x1 set, cues for technique provided. Pt with no further questions/concerns, declines PM session.   Anticipated status at discharge: Anticipate pt will require mod A for UB dressing/sling, min A for LB dressing, SBA for transfers/mobility and stairs.     Occupational Therapy Discharge Summary    Reason for therapy discharge:    Discharged to home.    Progress towards therapy goal(s). See goals on Care Plan in Epic  electronic health record for goal details.  Goals partially met.  Barriers to achieving goals:   discharge from facility.    Therapy recommendation(s):    Continue home exercise program.  Defer to ortho team

## 2020-01-09 NOTE — PLAN OF CARE
A&OX4, VSS: On Capno RA.  CMS: Numbness on R. Arm, fingertips.Sling on, drsg: Intact. Denies pain. Voiding small amounts, Hx of enlarged prostate. Voided 200 ml  ml, straight cathed for 580 ml. Nursing continue to monitor.

## 2020-01-09 NOTE — PROGRESS NOTES
Orthopedic Surgery  Greyson Haas  2020  Admit Date:  2020  POD: 1 Day Post-Op  Procedure(s):  Right reverse total shoulder arthroplasty    Patient resting comfortably in bed.    Patient reports he hasn't had any pain. He can still feel slight numbness in his right thumb.   Tolerating oral intake.    Denies nausea or vomiting.  Denies chest pain or shortness of breath.  No events overnight.      Alert and orient to person, place, and time.  Vital Sign Ranges  Temperature Temp  Av.3  F (35.7  C)  Min: 95.4  F (35.2  C)  Max: 97.9  F (36.6  C)   Blood pressure Systolic (24hrs), Av , Min:111 , Max:150        Diastolic (24hrs), Av, Min:73, Max:101      Pulse Pulse  Av.9  Min: 55  Max: 76   Respirations Resp  Av.1  Min: 7  Max: 39   Pulse oximetry SpO2  Av.9 %  Min: 88 %  Max: 100 %       Sling in place  Aquacel  Sensation intact in upper arm over biceps as well as in hand r/m/u nerve distribution  Able to abduct and oppose left thumb  +Radial pulse  +cap refill      Labs:  Recent Labs   Lab Test 19  1129 10/29/19  1350 19  0846   POTASSIUM 3.9 4.1 4.6     Recent Labs   Lab Test 20  0622 19  1129 10/29/19  1350   HGB 11.5* 13.6 14.3     Recent Labs   Lab Test 10/02/17 09/29/17 09/25/17   INR 2.2* 3.2* 2.5*     Recent Labs   Lab Test 20  1549 19  1129 10/29/19  1350   * 158 166       A/P  1. Plan   Continue SCDs & Lovenox for DVT prophylaxis, then ASA on discharge     Mobilize with PT/OT - okay for Codman's/pendulum swing exercises, gentle ROM at the elbow and wrist, and fist pumps.     Leave dressing intact    Continue current pain regiment.   Follow up with Babita Parker PA-C in clinic in 2 weeks.      2. Disposition   Anticipate d/c to home later today after therapy. Scripts signed and discharge paperwork complete.      Kenna Parker PA-C

## 2020-01-09 NOTE — PLAN OF CARE
VS-stable, afebrile.   Lung Sounds-clear, no cough, on room air. Using IS upto 2500  O2-on room air.   GI-+bs, +Flatus, lbm was yesterday prior to surgery. Tolerating reg diet, no nausea.   -voiding, see prior note. Has some retention. AFSANEH Cavanaugh gave ok to discharge home--gave reminder to pt to take Flomax  IVF-dc'd.   Dressings-aquacel drsg dry and intact.   CMS-has residual numbness in the thumb, some tingling in fingertips. Strong hand grasp, sling in place.   Drain-none.   Activity-up in room, walked in hallway, walked in bathroom.   Pain-rates pain level a 2. Gave oxycodone x 1 this shift.   D/C Plan-ready to discontinue home. Family on their way.

## 2020-01-09 NOTE — PLAN OF CARE
Discharge Planner PT   Patient plan for discharge: Home today  Current status: PT: Order received; per chart review and conversation with OT, patient currently mobilizing with CGA secondary to some impaired balance with mobility which is baseline; will have assist of wife for mobility and cares as needed; all mobility needs currently being addressed by OT this hospitalization. No PT needs indicated at this time; Will complete order  Barriers to return to prior living situation: see OT note  Recommendations for discharge: defer to OT  Rationale for recommendations: Patient needs are currently being met and addressed by OT during hospitalization. No IP PT needs identified at this time; Will have assist of spouse as needed.       Entered by: Ariadne Arreola 01/09/2020 4:45 PM

## 2020-01-09 NOTE — DISCHARGE INSTRUCTIONS
General instructions:  1. Ice shoulder for 20 minutes 3-4 times daily to reduce pain and swelling  2. Can shower with dressing inspect skin surrounding incision after shower for signs any symptoms of infection.  3. Wear sling when up until physician instructs you not to use it, no heavy lifting, support with pillow. Follow physical therapy instructions and exercises.  4. Take over the counter stool softener (Mirilax or Senna) to prevent constipation as directed to prevent constipation. Take OTC laxative (milk of magnesia) and/or a suppository if constipated as directed, call doctor if no BM in 2-3 days despite remedies.  5. Keep track when you take all medication especially pain medications-see bottles for directions and side effects.    Call your surgeon if you experience any of these issues or have questions:  1. Fever greater than 100 degrees with body chills or excessive sweating.  2. Increased/persistent redness, localized warmth, tenderness, bleeding, drainage, swelling at incision site or if incision site is pulling apart.   3. Pain not controlled with oral pain medications, ice and rest.   4.  Chest pain, shortness of breath, and/or calf pain with excessive swelling, warm or redness.  6. Generalized feeling of illness.  7. Numbness or tingling in surgical extremity, arm cool or pale.  8. Trouble voiding  9. Too sleepy, pain meds may need to be reduced (call surgeon for guidance)  11. Constipation despite taking an over the counter stool softener/laxative daily as instructed.   12. Any other questions or concerns related to your surgery/recovery.      Other instructions:  Walk as tolerated  Use incentive spirometry hourly to help reduce chance of pneumonia  Ice sore area 20 minutes 3-5 times a day to reduce pain and swelling.     Dressing instructions: Do not remove dressing from shoulder it will be changed at your follow up apt. Your dressing is waterproof   Can shower with dressing on, blot dressing dry. If  dressing loosens or if the dressing is 1/2 full of drainage call your surgeon       Diet:  Resume regular diet and drink plenty of fluids      Follow up information:  Call surgeon to schedule a follow up appointment for 10-14 days from your day of surgery. [phone]. Call the same number if you have questions or concerns.    Thank you for allowing [facility] to participate in your cares!!        Discharge instructions:  1. Ice shoulder for the next 24-48 hours as needed for comfort.  2. Keep dressing clean and dry until Physician has instructed you to remove it.  3. Wear sling when up until physician instructs otherwise  4. Excessive drainage or bleeding, or blue fingertips.    To prevent constipation:  1. Drink plenty of water daily   2. May take over the counter Senna or Colase (follow instructions on bottle)      Call your physician ([phone]) if:   1. Fever greater than 100 degrees with body chills or excessive sweating.  2. Increased redness, localized warmth, tenderness, drainage or swelling at incision site. Opening or pulling apart of incision site.   3. Pain not controlled with oral pain medications, ice and rest.   4. No bowel movement in 3 days (may use Milk of Magnesia or other over the counter remedy).  5. Chest pain, shortness of breath, and/or calf pain with excessive swelling.  6. Generalized feeling of illness.  7. Any other questions or concerns related to your surgery/recovery.      Thank you for allowing [facility] to participate in your cares!!

## 2020-01-10 ENCOUNTER — TELEPHONE (OUTPATIENT)
Dept: INTERNAL MEDICINE | Facility: CLINIC | Age: 80
End: 2020-01-10

## 2020-01-14 NOTE — DISCHARGE SUMMARY
Orthopedic Discharge Summary   Patient: Greyson Haas  Admission Date: 1/8/2020  Discharge Date: 1/9/20  Date of Service: 1/14/2020  Attending Provider: No att. providers found  Admission Diagnosis: Post-traumatic osteoarthritis of right shoulder [M19.111]  Nontraumatic complete tear of right rotator cuff [M75.121]  Discharge Diagnosis: same  Procedures Performed: right reverse total shoulder arthroplasty   Complications: None apparent     Past Medical History:   Past Medical History:   Diagnosis Date      RENAL INSUFFICIENCY      Arthritis      Calculus of kidney 9/02, 6/04     Chronic Kidney Disease, Stage III 2/2/2005     Chronic Tinnitus 1960's     Diverticulosis of colon (without mention of hemorrhage)      Essential hypertension, benign      Gastroesophageal reflux disease     minor     Gouty arthropathy 4/29/2008     Gouty tophi of other sites 8/04    R third toe     HYPERLIPIDEMIA      Hyperplastic Polyps Colon 11/03     LVH 5/03     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 Rubin     Nocturia     x 2-3     Pulmonary Nodule, stable      Tubular Adenoma 3/07     Unspecified glaucoma(365.9)     Followed by Dr. Monique     Patient Active Problem List   Diagnosis     Essential hypertension     Diverticulosis of large intestine     History of rectal cancer     LVH     Tinnitus     Gout of multiple sites     Chronic Kidney Disease, Stage III     Hyperlipidemia LDL goal <130     Glaucoma     Sprain of acromioclavicular ligament     Anxiety state     Gouty arthropathy     Basal cell carcinoma     Erectile dysfunction     KNEE JOINT REPLACEMENT     Pain in joint, lower leg     Abnormal gait     Advanced directives, counseling/discussion     Kidney stones     Lumbar stenosis     S/P lumbar fusion     Benign non-nodular prostatic hyperplasia with lower urinary tract symptoms     H/O total hip arthroplasty, left     Long-term  "(current) use of anticoagulants [Z79.01]     Aftercare following left hip joint replacement surgery     Hip pain, left     Right foot drop     Cervical stenosis of spinal canal     Ascending aorta dilatation (H)     S/P cervical spinal fusion     S/p reverse total shoulder arthroplasty     Past Surgical History:   Procedure Laterality Date     ARTHROPLASTY HIP Left 9/18/2017    LEFT TOTAL HIP ARTHROPLASTY(DEPUY)^;  Surgeon: Aurelio Hood MD;  Location: [facility] OR     C NONSPECIFIC PROCEDURE  6/04    cysto, R ureteral stent, ESWL     C NONSPECIFIC PROCEDURE  CHILD    TONSILLECTOMY     C NONSPECIFIC PROCEDURE  AGE 4    L heel osteomyelitis with \"bone scraping\"     DISCECTOMY, FUSION CERVICAL ANTERIOR TWO LEVELS, COMBINED N/A 11/12/2018    Procedure: C5-7 anterior cervical discectomy and fusion;  Surgeon: Mina Grove MD;  Location:  OR     OPTICAL TRACKING SYSTEM FUSION SPINE POSTERIOR LUMBAR TWO LEVELS N/A 11/5/2015    Procedure: OPTICAL TRACKING SYSTEM FUSION SPINE POSTERIOR LUMBAR TWO LEVELS;  Surgeon: Mina Grove MD;  Location: [facility] OR     REVERSE ARTHROPLASTY SHOULDER Right 1/8/2020    Procedure: Right reverse total shoulder arthroplasty;  Surgeon: Manolo Short MD;  Location:  OR     SURGICAL HISTORY OF -   1991    B Shoulder surgeries     SURGICAL HISTORY OF -   1992    lumbar laminectomy     SURGICAL HISTORY OF -   1998    lumbar decompression    Dr. Pop     SURGICAL HISTORY OF -   9/06    L shoulder    Dr. Foley     SURGICAL HISTORY OF -   11/07    partial amputation R 3rd toe ([facility])  Dr. Neal     SURGICAL HISTORY OF -   2010    bilateral cataracts       SURGICAL HISTORY OF -   1/11    Mohs L chest, Dr. Anthony     SURGICAL HISTORY OF -   1/11    L TKA   Dr. Thomson     Social History     Socioeconomic History     Marital status:      Spouse name: Not on file     Number of children: Not on file     Years of education: Not on file     Highest education level: Not on " file   Occupational History     Not on file   Social Needs     Financial resource strain: Not on file     Food insecurity:     Worry: Not on file     Inability: Not on file     Transportation needs:     Medical: Not on file     Non-medical: Not on file   Tobacco Use     Smoking status: Former Smoker     Packs/day: 1.00     Years: 27.00     Pack years: 27.00     Last attempt to quit: 1983     Years since quittin.0     Smokeless tobacco: Never Used   Substance and Sexual Activity     Alcohol use: Yes     Comment: Ave 2 daily or more.  Heavy days 5-6, some days none.     Drug use: No     Sexual activity: Yes     Partners: Female   Lifestyle     Physical activity:     Days per week: Not on file     Minutes per session: Not on file     Stress: Not on file   Relationships     Social connections:     Talks on phone: Not on file     Gets together: Not on file     Attends [employer] service: Not on file     Active member of club or organization: Not on file     Attends meetings of clubs or organizations: Not on file     Relationship status: Not on file     Intimate partner violence:     Fear of current or ex partner: Not on file     Emotionally abused: Not on file     Physically abused: Not on file     Forced sexual activity: Not on file   Other Topics Concern      Service Not Asked     Blood Transfusions Not Asked     Caffeine Concern Yes     Comment: 3 cups     Occupational Exposure Not Asked     Hobby Hazards Not Asked     Sleep Concern Not Asked     Stress Concern Not Asked     Weight Concern Not Asked     Special Diet Not Asked     Back Care Not Asked     Exercise Not Asked     Bike Helmet Not Asked     Seat Belt Not Asked     Self-Exams Not Asked     Parent/sibling w/ CABG, MI or angioplasty before 65F 55M? Not Asked   Social History Narrative     Not on file     Medications on admission:   No medications prior to admission.     Allergies:  No Known Allergies    Hospital Course: Patient was admitted to  Orthopedics and brought to the OR on 1/8/20 where he underwent the above named procedure. Postoperatively he did very well with no apparent complications, and he had an uneventful hospital stay. Ancef was given for 24 hours after surgery. He was given Lovenox for DVT prophylaxis and his pain was well controlled with IV Dilaudid, then transitioned to oral pain medications during his hospital stay. He progressed well with physical therapy and discharge to home was recommended. His chronic medical problems were followed by the medicine team during his hospital stay and there were no significant changes to conditions or treatment plans. No new medical problems presented during his hospital stay.   Consultations Obtained During Hospitalization:  1. Internal medicine for management of comorbid conditions and home medication management.  2. Physical Therapy for gait training, mobilization, range of motion and strengthening exercises  3. Occupational Therapy for activities of daily living.  4. Social Work for disposition planning.  Active Problems:    S/p reverse total shoulder arthroplasty    Discharge Disposition: home  Discharge Diet: regular  Discharge Medications:   Discharge Medication List as of 1/9/2020 10:07 AM      START taking these medications    Details   aspirin (ASA) 325 MG tablet Take 1 tablet (325 mg) by mouth 2 times daily, Disp-60 tablet, R-0, Local Print      oxyCODONE (ROXICODONE) 5 MG tablet Take 1 tablet (5 mg) by mouth every 4 hours as needed for pain, Disp-30 tablet, R-0, Local Print      senna (SENOKOT) 8.6 MG tablet Take 1 tablet by mouth 2 times daily as needed for constipation, Disp-40 tablet, R-0, Local Print         CONTINUE these medications which have CHANGED    Details   acetaminophen (TYLENOL) 500 MG tablet Take 2 tablets (1,000 mg) by mouth every 6 hours as needed for mild pain, Disp-1 Bottle, R-0, Local Print         CONTINUE these medications which have NOT CHANGED    Details    allopurinol (ZYLOPRIM) 300 MG tablet Take 1 tablet (300 mg) by mouth daily, Disp-90 tablet, R-3, E-Prescribe      bimatoprost (LUMIGAN) 0.01 % SOLN Place 1 drop into both eyes At Bedtime, Historical      lisinopril (PRINIVIL/ZESTRIL) 10 MG tablet Take 1 tablet (10 mg) by mouth daily, Disp-90 tablet, R-3, E-Prescribe      order for DME Equipment being ordered: surgical shoe.Disp-1 each, R-0, Local Print      Polyvinyl Alcohol-Povidone (REFRESH OP) Apply 1 drop to eye daily as needed, Historical      Sildenafil Citrate (VIAGRA PO) Take 50 mg by mouth daily as needed, Historical      silver sulfADIAZINE (SILVADENE) 1 % external cream Apply topically daily Profile Rx: patient will call when neededDisp-50 g, [id number]      simvastatin (ZOCOR) 20 MG tablet Take 1 tablet (20 mg) by mouth every morning, Disp-90 tablet, R-0, E-PrescribeDue for fasting bloodwork Jan 2020      tamsulosin (FLOMAX) 0.4 MG capsule Take 1 capsule (0.4 mg) by mouth daily, Disp-90 capsule, R-3, E-Prescribe      timolol (TIMOPTIC) 0.5 % ophthalmic solution Place 1 drop into both eyes daily, Historical           X-rays needed at the follow up visit: right shoulder, 4 views   Kenna Parker PA-C  1/14/2020 12:57 PM   [facility]   [phone]

## 2020-01-20 ENCOUNTER — TRANSFERRED RECORDS (OUTPATIENT)
Dept: HEALTH INFORMATION MANAGEMENT | Facility: CLINIC | Age: 80
End: 2020-01-20

## 2020-01-24 ENCOUNTER — ALLIED HEALTH/NURSE VISIT (OUTPATIENT)
Dept: INTERNAL MEDICINE | Facility: CLINIC | Age: 80
End: 2020-01-24
Payer: COMMERCIAL

## 2020-01-24 ENCOUNTER — THERAPY VISIT (OUTPATIENT)
Dept: PHYSICAL THERAPY | Facility: CLINIC | Age: 80
End: 2020-01-24
Payer: COMMERCIAL

## 2020-01-24 VITALS — DIASTOLIC BLOOD PRESSURE: 70 MMHG | SYSTOLIC BLOOD PRESSURE: 120 MMHG

## 2020-01-24 DIAGNOSIS — I10 ESSENTIAL HYPERTENSION: Primary | ICD-10-CM

## 2020-01-24 DIAGNOSIS — M25.511 ACUTE PAIN OF RIGHT SHOULDER: ICD-10-CM

## 2020-01-24 DIAGNOSIS — Z47.89 AFTERCARE FOLLOWING SURGERY OF THE MUSCULOSKELETAL SYSTEM: ICD-10-CM

## 2020-01-24 PROCEDURE — 99207 ZZC NO CHARGE NURSE ONLY: CPT | Performed by: INTERNAL MEDICINE

## 2020-01-24 PROCEDURE — 97161 PT EVAL LOW COMPLEX 20 MIN: CPT | Mod: GP | Performed by: PHYSICAL THERAPIST

## 2020-01-24 PROCEDURE — 97110 THERAPEUTIC EXERCISES: CPT | Mod: GP | Performed by: PHYSICAL THERAPIST

## 2020-01-24 NOTE — Clinical Note
Dr. Nick - patient's BP is at goal and pt states he hasn't been taking his lisinopril.  May want to continue to monitor BP readings and consider discontinuing medication if he remains at goal while off med.

## 2020-01-24 NOTE — PROGRESS NOTES
Greyson Haas was evaluated at [facility] on January 24, 2020 at which time his blood pressure was:    BP Readings from Last 3 Encounters:   01/24/20 120/70   01/09/20 124/83   12/16/19 110/72     Pulse Readings from Last 3 Encounters:   01/09/20 71   12/16/19 72   11/05/19 80       Reviewed lifestyle modifications for blood pressure control and reduction: including making healthy food choices, managing weight, getting regular exercise, smoking cessation, reducing alcohol consumption, monitoring blood pressure regularly.     Symptoms: None    BP Goal:< 140/90 mmHg    BP Assessment:  BP at goal    Potential Reasons for BP too high: NA - Not applicable    BP Follow-Up Plan: Recheck BP in 6 months at pharmacy    Recommendation to Provider: Patient reports sometimes not taking his lisinopril 10 mg because his blood pressure has been on the lower end lately. Please address.    Note completed by: Jasmina Lane, [employer] Pharmacy Student

## 2020-01-24 NOTE — PROGRESS NOTES
[facility] Initial Evaluation  Subjective:  The history is provided by the patient.   Greyson Haas being seen for right shoulder.   Problem began 1/8/2020. Where condition occurred: for unknown reasons.Problem occurred: degeneration/RCT  and reported as 0/10 on pain scale. General health as reported by patient is excellent. Pertinent medical history includes:  Cancer, incontinence and kidney disease.    Surgeries include:  Orthopedic surgery. Other surgery history details: 4 back surgeries, RCR B, L TKA, L DOTTIE.  Current medications:  High blood pressure medication.     Pain quality: pt denies pain. Pain frequency: na. Pain is the same all the time. Since onset symptoms are gradually improving.      Patient is retired.   Barriers include:  None as reported by patient.  Red flags:  Foot drop (old foot drop).  Type of problem:  Right shoulder   Condition occurred with:  Degenerative joint disease. This is a new condition   Problem details: Pt is s/p right reverse total shoulder arthroplasty from 1-8-20. He came to clinic without his sling and says he is to wear it 2 more days. When asked his restrictions he says he has none but then recalls not to pull anything toward his body. Patient states he has been doing pendulum exercise, wrist and elbow stretches, and wall climbs as well as standing isometrics and pt demonstrates horizontal abduction. Pt states the wall climbs he has done in the past and decided to try now. He denies any shoulder pain, only stiffness with reaching behind body. (On intake form he circles 5/10 pain with touching the back of his neck and pushing with involved arm and 4/10 pain with reaching for something on a high shelf.) He doesn't feel he needs to return to PT for the next 3 weeks. Discussed post-surgical protocol and restrictions with patient and cautioned him not to over-do.   Site of Pain: pt denies pain.   Symptoms are exacerbated by using arm behind back (on intake  form: washing hair and back, reaching OH, dressing.) and relieved by nothing.                      Objective:  Standing Alignment:    Cervical/Thoracic:  Forward head  Shoulder/UE:  Rounded shoulders                                       Shoulder Evaluation:  ROM:  AROM:    Flexion:  Left:  146    Right:  117    Abduction:  Left: 148   Right:  118                  Extension/Internal Rotation:  Left:  T9    Right:  R lateral hip    PROM:            Internal Rotation:  Right:  40  External Rotation:  Right:  10                    Strength:  not assessed                                                               General     ROS    50% loss of elbow supination on right.     Assessment/Plan:    Patient is a 79 year old male with right side shoulder complaints.  He is 2 weeks, 2 days post op from reverse total shoulder. Patient denies pain. (Intake form he circles pain with reaching OH, touching back of neck, and pushing with arm.) He has been doing exercises on his own that he has done in the past with rotator cuff repairs. He states he has bands he would like to use and PT cautioned him regarding protocol. MD orders to begin passive and active shoulder AROM. Pt states he will return in 3 weeks, doesn't see the need to be here sooner.    Patient has the following significant findings with corresponding treatment plan.                Diagnosis 1:  S/p R reverse TSA  Decreased ROM/flexibility - therapeutic exercise and home program  Decreased strength - therapeutic exercise, therapeutic activities and home program  Decreased function - therapeutic activities and home program  Impaired posture - neuro re-education and home program    Cumulative Therapy Evaluation is: Low complexity.    Previous and current functional limitations:  (See Goal Flow Sheet for this information)    Short term and Long term goals: (See Goal Flow Sheet for this information)     Communication ability:  Patient appears to be able to clearly  communicate and understand verbal and written communication and follow directions correctly.  Treatment Explanation - The following has been discussed with the patient:   RX ordered/plan of care  Anticipated outcomes  Possible risks and side effects  This patient would benefit from PT intervention to resume normal activities.   Rehab potential is good.    Frequency:  2 X a month, once daily  Duration:  for 12 weeks  Discharge Plan:  Achieve all LTG.  Independent in home treatment program.  Reach maximal therapeutic benefit.    Please refer to the daily flowsheet for treatment today, total treatment time and time spent performing 1:1 timed codes.

## 2020-01-24 NOTE — PROGRESS NOTES
I have reviewed and approve the note from Jasmina.    Alex Summers, PharmD  [facility]  [phone]

## 2020-02-07 ENCOUNTER — OFFICE VISIT (OUTPATIENT)
Dept: INTERNAL MEDICINE | Facility: CLINIC | Age: 80
End: 2020-02-07
Payer: COMMERCIAL

## 2020-02-07 VITALS
BODY MASS INDEX: 29.19 KG/M2 | WEIGHT: 192.6 LBS | TEMPERATURE: 97.5 F | SYSTOLIC BLOOD PRESSURE: 118 MMHG | OXYGEN SATURATION: 97 % | RESPIRATION RATE: 16 BRPM | HEART RATE: 68 BPM | DIASTOLIC BLOOD PRESSURE: 76 MMHG | HEIGHT: 68 IN

## 2020-02-07 DIAGNOSIS — I10 ESSENTIAL HYPERTENSION: ICD-10-CM

## 2020-02-07 DIAGNOSIS — E78.5 HYPERLIPIDEMIA LDL GOAL <130: ICD-10-CM

## 2020-02-07 DIAGNOSIS — N18.30 CHRONIC KIDNEY DISEASE (CKD), STAGE III (MODERATE) (H): ICD-10-CM

## 2020-02-07 DIAGNOSIS — I77.810 ASCENDING AORTA DILATATION (H): ICD-10-CM

## 2020-02-07 DIAGNOSIS — Z00.00 MEDICARE ANNUAL WELLNESS VISIT, SUBSEQUENT: Primary | ICD-10-CM

## 2020-02-07 DIAGNOSIS — M10.9 GOUT OF MULTIPLE SITES, UNSPECIFIED CAUSE, UNSPECIFIED CHRONICITY: ICD-10-CM

## 2020-02-07 PROBLEM — N40.1 BENIGN NON-NODULAR PROSTATIC HYPERPLASIA WITH LOWER URINARY TRACT SYMPTOMS: Status: ACTIVE | Noted: 2017-05-18

## 2020-02-07 LAB
ALT SERPL W P-5'-P-CCNC: 20 U/L (ref 0–70)
CHOLEST SERPL-MCNC: 217 MG/DL
CREAT UR-MCNC: 185 MG/DL
HDLC SERPL-MCNC: 62 MG/DL
LDLC SERPL CALC-MCNC: 133 MG/DL
MICROALBUMIN UR-MCNC: 121 MG/L
MICROALBUMIN/CREAT UR: 65.41 MG/G CR (ref 0–17)
NONHDLC SERPL-MCNC: 155 MG/DL
TRIGL SERPL-MCNC: 104 MG/DL

## 2020-02-07 PROCEDURE — 99397 PER PM REEVAL EST PAT 65+ YR: CPT | Performed by: INTERNAL MEDICINE

## 2020-02-07 PROCEDURE — 99214 OFFICE O/P EST MOD 30 MIN: CPT | Mod: 25 | Performed by: INTERNAL MEDICINE

## 2020-02-07 PROCEDURE — 80061 LIPID PANEL: CPT | Performed by: INTERNAL MEDICINE

## 2020-02-07 PROCEDURE — 84460 ALANINE AMINO (ALT) (SGPT): CPT | Performed by: INTERNAL MEDICINE

## 2020-02-07 PROCEDURE — 36415 COLL VENOUS BLD VENIPUNCTURE: CPT | Performed by: INTERNAL MEDICINE

## 2020-02-07 PROCEDURE — 82043 UR ALBUMIN QUANTITATIVE: CPT | Performed by: INTERNAL MEDICINE

## 2020-02-07 RX ORDER — ALLOPURINOL 300 MG/1
1 TABLET ORAL DAILY
Start: 2020-02-07 | End: 2020-08-25

## 2020-02-07 ASSESSMENT — ACTIVITIES OF DAILY LIVING (ADL): CURRENT_FUNCTION: NO ASSISTANCE NEEDED

## 2020-02-07 ASSESSMENT — MIFFLIN-ST. JEOR: SCORE: 1558.13

## 2020-02-07 NOTE — PROGRESS NOTES
"SUBJECTIVE:   Greyson Haas is a 79 year old male who presents for Preventive Visit.      Are you in the first 12 months of your Medicare coverage?  No    Healthy Habits:     In general, how would you rate your overall health?  Good    Frequency of exercise:  6-7 days/week    Duration of exercise:  15-30 minutes    Do you usually eat at least 4 servings of fruit and vegetables a day, include whole grains    & fiber and avoid regularly eating high fat or \"junk\" foods?  Yes    Taking medications regularly:  Yes    Medication side effects:  None    Ability to successfully perform activities of daily living:  No assistance needed    Home Safety:  No safety concerns identified    Hearing Impairment:  Difficulty following dialogue in the theater and no hearing concerns    In the past 6 months, have you been bothered by leaking of urine?  No    In general, how would you rate your overall mental or emotional health?  Good      PHQ-2 Total Score: 0    Additional concerns today:  No    Do you feel safe in your environment? Yes    Have you ever done Advance Care Planning? (For example, a Health Directive, POLST, or a discussion with a medical provider or your loved ones about your wishes): No, advance care planning information given to patient to review.  Patient declined advance care planning discussion at this time.      Fall risk  Fallen 2 or more times in the past year?: No  Any fall with injury in the past year?: No    Cognitive Screening   1) Repeat 3 items (Leader, Season, Table)    2) Clock draw: ABNORMAL 10:10  3) 3 item recall: Recalls 2 objects   Results: ABNORMAL clock, 1-2 items recalled: PROBABLE COGNITIVE IMPAIRMENT, **INFORM PROVIDER**    Mini-CogTM Copyright MACRINA John. Licensed by the author for use in [facility]; reprinted with permission ([email or web address]). All rights reserved.      Do you have sleep apnea, excessive snoring or daytime drowsiness?: no    Reviewed and updated as needed this visit " by clinical staff         Reviewed and updated as needed this visit by Provider        Social History     Tobacco Use     Smoking status: Former Smoker     Packs/day: 1.00     Years: 27.00     Pack years: 27.00     Last attempt to quit: 1983     Years since quittin.1     Smokeless tobacco: Never Used   Substance Use Topics     Alcohol use: Yes     Comment: Ave 2 daily or more.  Heavy days 5-6, some days none.     If you drink alcohol do you typically have >3 drinks per day or >7 drinks per week? No    Alcohol Use 2016   Prescreen: >3 drinks/day or >7 drinks/week? Says he doesn't count - drinks here and there   No flowsheet data found.          Current providers sharing in care for this patient include:   Patient Care Team:  Neville Nick MD as PCP - General  Saran Martinez MD as Assigned PCP    The following health maintenance items are reviewed in Epic and correct as of today:  Health Maintenance   Topic Date Due     ZOSTER IMMUNIZATION (1 of 2) 1989     MICROALBUMIN  2019     PHQ-2  2020     LIPID  2020     FALL RISK ASSESSMENT  2020     MEDICARE ANNUAL WELLNESS VISIT  2020     ADVANCE CARE PLANNING  2020     BMP  2020     COLONOSCOPY  2022     DTAP/TDAP/TD IMMUNIZATION (3 - Td) 10/30/2029     INFLUENZA VACCINE  Completed     PNEUMOCOCCAL IMMUNIZATION 65+ LOW/MEDIUM RISK  Completed     IPV IMMUNIZATION  Aged Out     MENINGITIS IMMUNIZATION  Aged Out       Review of Systems   R hip pain, locks up.   Scheduled for surgery , hopes to move up.  Doing well after R reverse shoulder surgery last month, range of motion good already, doing exercises.    Dry eyes persist, stopped doxy due to lack of benefit  Constitutional, HEENT, cardiovascular, pulmonary, GI, , musculoskeletal, neuro, skin, endocrine and psych systems are negative, except as otherwise noted.  Nocturia x 1-2, stream stable    OBJECTIVE:   /76   Pulse 68   Temp 97.5  F  "(36.4  C) (Oral)   Resp 16   Ht 1.727 m (5' 8\")   Wt 87.4 kg (192 lb 9.6 oz)   SpO2 97%   BMI 29.28 kg/m   Estimated body mass index is 29.19 kg/m  as calculated from the following:    Height as of 1/8/20: 1.727 m (5' 8\").    Weight as of 1/8/20: 87.1 kg (192 lb).  Physical Exam  GENERAL: healthy, alert and no distress  NECK: no adenopathy, no asymmetry, masses, or scars and thyroid normal to palpation  CV: regular rate and rhythm, normal S1 S2, no S3 or S4, no murmur, click or rub, no peripheral edema and peripheral pulses strong  MS: no gross musculoskeletal defects noted, no edema      ASSESSMENT / PLAN:     ASSESSMENT:   1.  Annual Wellness Visit  2.  HTN, controlled   3.  CKD3, stable    4.  Ascending aortic dilatation, stress echo 2019 did not fully address this  5.  Follow-up hyperlipidemia  6.  BPH, stable on medication    PLAN:  1.  Trial of fish oil for dry eyes  2.  Echocardiogram to check aorta this year  3.  Check lipids --> LDL worse       COUNSELING:  Reviewed preventive health counseling, as reflected in patient instructions    Estimated body mass index is 29.19 kg/m  as calculated from the following:    Height as of 1/8/20: 1.727 m (5' 8\").    Weight as of 1/8/20: 87.1 kg (192 lb).         reports that he quit smoking about 37 years ago. He has a 27.00 pack-year smoking history. He has never used smokeless tobacco.      Appropriate preventive services were discussed with this patient, including applicable screening as appropriate for cardiovascular disease, diabetes, osteopenia/osteoporosis, and glaucoma.  As appropriate for age/gender, discussed screening for colorectal cancer, prostate cancer, breast cancer, and cervical cancer. Checklist reviewing preventive services available has been given to the patient.    Reviewed patients plan of care and provided an AVS. The Basic Care Plan (routine screening as documented in Health Maintenance) for Greyson meets the Care Plan requirement. This Care " Plan has been established and reviewed with the Patient.    Neville iNck MD  [facility]

## 2020-02-07 NOTE — NURSING NOTE
"Vital signs:  Temp: 97.5  F (36.4  C) Temp src: Oral BP: 118/76 Pulse: 68   Resp: 16 SpO2: 97 %     Height: 172.7 cm (5' 8\") Weight: 87.4 kg (192 lb 9.6 oz)  Estimated body mass index is 29.28 kg/m  as calculated from the following:    Height as of this encounter: 1.727 m (5' 8\").    Weight as of this encounter: 87.4 kg (192 lb 9.6 oz).          "

## 2020-02-07 NOTE — PATIENT INSTRUCTIONS
PLAN:  1.  Trial of fish oil for dry eyes  2.  Echocardiogram to check aorta this year  3.  Check lipids today

## 2020-02-13 ENCOUNTER — THERAPY VISIT (OUTPATIENT)
Dept: PHYSICAL THERAPY | Facility: CLINIC | Age: 80
End: 2020-02-13
Payer: COMMERCIAL

## 2020-02-13 DIAGNOSIS — Z47.89 AFTERCARE FOLLOWING SURGERY OF THE MUSCULOSKELETAL SYSTEM: Primary | ICD-10-CM

## 2020-02-13 PROCEDURE — 97110 THERAPEUTIC EXERCISES: CPT | Mod: GP | Performed by: PHYSICAL THERAPIST

## 2020-02-14 ENCOUNTER — TELEPHONE (OUTPATIENT)
Dept: INTERNAL MEDICINE | Facility: CLINIC | Age: 80
End: 2020-02-14

## 2020-02-14 DIAGNOSIS — Z01.818 PREOP TESTING: Primary | ICD-10-CM

## 2020-02-14 NOTE — TELEPHONE ENCOUNTER
BE, pt stating that surgery is scheduled for 02/27/2020 and last phx was 02/07/2020, says this is within 30 days, so wondering if phx can be used.    Barby Lorenzo, CMA

## 2020-02-14 NOTE — TELEPHONE ENCOUNTER
"Reason for Call:  Other call back    Detailed comments: Patients Rt hip replacement surgery was moved up from April to 2/27/20. He had a annual physical on 2/7 and wants to know if he needs to come in for \"pre-op\" or if the annual physical appt can be used.  Please call him back    Phone Number Patient can be reached at: Cell number on file:    Telephone Information:   Mobile [phone]       Best Time: asap    Can we leave a detailed message on this number? YES    Call taken on 2/14/2020 at 10:22 AM by Tamica Stroud      "

## 2020-02-17 ENCOUNTER — TRANSFERRED RECORDS (OUTPATIENT)
Dept: HEALTH INFORMATION MANAGEMENT | Facility: CLINIC | Age: 80
End: 2020-02-17

## 2020-02-17 NOTE — TELEPHONE ENCOUNTER
Contact surgeon to see if willing to update interim history at time of surgery.    - If not, then will need preop a week or so before surgery.  - If so, have patient get labs done, and then I will update last office visit note.

## 2020-02-17 NOTE — TELEPHONE ENCOUNTER
Ok.   I misunderstood the dates.   The problem here is that office visit on 2/7 was for an annual wellness visit, which is not a physical exam.  Only partial exam done.   However, patient just went through shoulder replacement, without problems.  Patient's EKG up to date.   Labs are not.        Based on above, I'm comfortable updating the 2/7 visit, as long as surgeon is OK completing the interim update for the interval 3 weeks.   Patient should get labs done:  Creat and CBC

## 2020-02-18 DIAGNOSIS — Z01.818 PREOP TESTING: ICD-10-CM

## 2020-02-18 LAB
CREAT SERPL-MCNC: 1.6 MG/DL (ref 0.66–1.25)
ERYTHROCYTE [DISTWIDTH] IN BLOOD BY AUTOMATED COUNT: 14.4 % (ref 10–15)
GFR SERPL CREATININE-BSD FRML MDRD: 40 ML/MIN/{1.73_M2}
HCT VFR BLD AUTO: 42.6 % (ref 40–53)
HGB BLD-MCNC: 14 G/DL (ref 13.3–17.7)
MCH RBC QN AUTO: 29.3 PG (ref 26.5–33)
MCHC RBC AUTO-ENTMCNC: 32.9 G/DL (ref 31.5–36.5)
MCV RBC AUTO: 89 FL (ref 78–100)
PLATELET # BLD AUTO: 170 10E9/L (ref 150–450)
RBC # BLD AUTO: 4.78 10E12/L (ref 4.4–5.9)
WBC # BLD AUTO: 5.6 10E9/L (ref 4–11)

## 2020-02-18 PROCEDURE — 36415 COLL VENOUS BLD VENIPUNCTURE: CPT | Performed by: INTERNAL MEDICINE

## 2020-02-18 PROCEDURE — 85027 COMPLETE CBC AUTOMATED: CPT | Performed by: INTERNAL MEDICINE

## 2020-02-18 PROCEDURE — 82565 ASSAY OF CREATININE: CPT | Performed by: INTERNAL MEDICINE

## 2020-02-18 NOTE — TELEPHONE ENCOUNTER
TCO called back. I relayed message below, she said surgeon not willing to update history, he will need a pre-op this week. Surgery is Tuesday, 2/25.   Pt called and message left to call and schedule a pre-op this week.

## 2020-02-18 NOTE — TELEPHONE ENCOUNTER
Surgery Care Coordinator is Lee Ann [phone] on VM to call back. Please relay PCP note below, if they call back.    Triage spoke with patient he will come in for labs today.     Erica MINOR, RN, PHN

## 2020-02-19 ENCOUNTER — OFFICE VISIT (OUTPATIENT)
Dept: INTERNAL MEDICINE | Facility: CLINIC | Age: 80
End: 2020-02-19
Payer: COMMERCIAL

## 2020-02-19 VITALS
DIASTOLIC BLOOD PRESSURE: 80 MMHG | SYSTOLIC BLOOD PRESSURE: 126 MMHG | TEMPERATURE: 97.5 F | HEART RATE: 63 BPM | WEIGHT: 192 LBS | RESPIRATION RATE: 16 BRPM | BODY MASS INDEX: 29.19 KG/M2 | OXYGEN SATURATION: 97 %

## 2020-02-19 DIAGNOSIS — N18.30 CHRONIC KIDNEY DISEASE (CKD), STAGE III (MODERATE) (H): ICD-10-CM

## 2020-02-19 DIAGNOSIS — I10 ESSENTIAL HYPERTENSION: ICD-10-CM

## 2020-02-19 DIAGNOSIS — Z01.818 PREOP GENERAL PHYSICAL EXAM: Primary | ICD-10-CM

## 2020-02-19 DIAGNOSIS — M16.11 PRIMARY OSTEOARTHRITIS OF RIGHT HIP: ICD-10-CM

## 2020-02-19 DIAGNOSIS — R79.89 ELEVATED SERUM CREATININE: ICD-10-CM

## 2020-02-19 LAB
MRSA DNA SPEC QL NAA+PROBE: NEGATIVE
SPECIMEN SOURCE: NORMAL

## 2020-02-19 PROCEDURE — 87641 MR-STAPH DNA AMP PROBE: CPT | Performed by: PHYSICIAN ASSISTANT

## 2020-02-19 PROCEDURE — 87640 STAPH A DNA AMP PROBE: CPT | Mod: XU | Performed by: PHYSICIAN ASSISTANT

## 2020-02-19 PROCEDURE — 99215 OFFICE O/P EST HI 40 MIN: CPT | Performed by: PHYSICIAN ASSISTANT

## 2020-02-19 RX ORDER — CELECOXIB 200 MG/1
400 CAPSULE ORAL ONCE
Status: CANCELLED | OUTPATIENT
Start: 2020-02-19 | End: 2020-02-19

## 2020-02-19 NOTE — PROGRESS NOTES
[address] [address]  [phone]  Dept: [phone]    PRE-OP EVALUATION:  Today's date: 2020    Greyson Haas (: 1940) presents for pre-operative evaluation assessment as requested by Dr. Skinner.  He requires evaluation and anesthesia risk assessment prior to undergoing surgery/procedure for treatment of RIGHT TOTAL HIP ARTHROPLASTY DIRECT ANTERIOR APPROACH .    Proposed Surgery/ Procedure: RIGHT TOTAL HIP ARTHROPLASTY DIRECT ANTERIOR APPROACH  Date of Surgery/ Procedure: 20  Time of Surgery/ Procedure: 7:30AM  [facility]urgical [facility]  Primary Physician: Neville Nick  Type of Anesthesia Anticipated: General    Patient has a Health Care Directive or Living Will:  YES     1. NO - Do you have a history of heart attack, stroke, stent, bypass or surgery on an artery in the head, neck, heart or legs?  2. NO - Do you ever have any pain or discomfort in your chest?  3. NO - Do you have a history of  Heart Failure?  4. NO - Are you troubled by shortness of breath when: walking on the level, up a slight hill or at night?  5. NO- Do you currently have a cold, bronchitis or other respiratory infection?  6. NO - Do you have a cough, shortness of breath or wheezing?  7. NO - Do you sometimes get pains in the calves of your legs when you walk?  8. NO - Do you or anyone in your family have previous history of blood clots?  9. NO - Do you or does anyone in your family have a serious bleeding problem such as prolonged bleeding following surgeries or cuts?  10. NO - Have you ever had problems with anemia or been told to take iron pills?  11. NO - Have you had any abnormal blood loss such as black, tarry or bloody stools, or abnormal vaginal bleeding?  12. NO - Have you ever had a blood transfusion?  13. NO - Have you or any of your relatives ever had problems with anesthesia?  14. NO - Do you have sleep apnea, excessive  snoring or daytime drowsiness?  15. NO - Do you have any prosthetic heart valves?  16. YES - Do you have prosthetic joints? Right shoulder  17. NO - Is there any chance that you may be pregnant?      HPI:     HPI related to upcoming procedure: right hip osteoarthritis- severe, requiring replacement of joint       See problem list for active medical problems.  Problems all longstanding and stable, except as noted/documented.  See ROS for pertinent symptoms related to these conditions.    HYPERTENSION - Patient has longstanding history of HTN , currently denies any symptoms referable to elevated blood pressure. Specifically denies chest pain, palpitations, dyspnea, orthopnea, PND or peripheral edema. Blood pressure readings have been in normal range. Current medication regimen is as listed below. Patient denies any side effects of medication.     RENAL INSUFFICIENCY - Patient has a longstanding history of moderate-severe chronic renal insufficiency. Last Cr 1.6 yesterday. Increase from baseline and from earlier this month.  Patient did no eat yesterday until after labs in the afternoon. He did drink fluids though. Taking some tylenol for neck pain. Did take 400 mg of ibuprofen on Monday but not any since.   He is on lisinopril .       MEDICAL HISTORY:     Patient Active Problem List    Diagnosis Date Noted     S/p reverse total shoulder arthroplasty 01/08/2020     Priority: Medium     S/P cervical spinal fusion 11/12/2018     Priority: Medium     Ascending aorta dilatation (H) 07/10/2018     Priority: Medium     Cervical stenosis of spinal canal 06/21/2018     Priority: Medium     Right foot drop 01/18/2018     Priority: Medium     Present since 1980's       H/O total hip arthroplasty, left 09/18/2017     Priority: Medium     BPH with lower urinary tract symptoms 05/18/2017     Priority: Medium     S/P lumbar fusion 11/05/2015     Priority: Medium     Lumbar stenosis 04/23/2015     Priority: Medium     Previous lumbar  surgery Dr. Pop  S/P re-do L4-S1 decompression with fusion on 11/5/2015 for claudication and right foot drop Dr. Grove       Kidney stones 09/20/2012     Priority: Medium     First 2004, [facility] Dr. Underwood  Next 2012  Multiple calcium stones 9/2012       Advanced directives, counseling/discussion 01/19/2012     Priority: Medium     Discussed advance care planning with patient; however, patient declined at this time.   12/9/2013:  Full code, no extended support  Advance Care Planning:   Initial facilitation introduction:   Greyson Haas presented for initial session regarding ACP at the clinic. He was accompanied by wife Mellissa. [employer] information provided and resources reviewed. He currently wishes to complete an ACP document.  He currently has the following questions or concerns about Advance Care Planning: none.  Confirmed/documented designated decision maker(s). See permanent comments section of demographics in clinical tab. Confirmed code status reflects current choices .   Added by Irais Guerrero on 3/31/2015           Abnormal gait 02/08/2011     Priority: Medium     Basal cell carcinoma 01/19/2011     Priority: Medium     Chest, Moh's 1/11, Dr. Raj Berg following       Erectile dysfunction 01/19/2011     Priority: Medium     Gouty arthropathy 04/29/2008     Priority: Medium              Anxiety state 01/15/2007     Priority: Medium     Glaucoma of both eyes      Priority: Medium     Followed by Dr. Michel         Hyperlipidemia LDL goal <130 06/06/2005     Priority: Medium     Chronic Kidney Disease, Stage III 02/02/2005     Priority: Medium     Gout of multiple sites 09/23/2004     Priority: Medium     R third toe       Tinnitus 05/19/2004     Priority: Medium     LVH 05/22/2003     Priority: Medium     Diverticulosis of large intestine 05/12/2003     Priority: Medium     Essential hypertension 02/10/2003     Priority: Medium     History of rectal cancer 03/28/2002     Priority: Medium      "Dr. Rubin        Past Medical History:   Diagnosis Date      RENAL INSUFFICIENCY      Arthritis      Calculus of kidney 9/02, 6/04     Chronic Kidney Disease, Stage III 2/2/2005     Chronic Tinnitus 1960's     Diverticulosis of colon (without mention of hemorrhage)      Essential hypertension, benign      Gastroesophageal reflux disease     minor     Gouty arthropathy 4/29/2008     Gouty tophi of other sites 8/04    R third toe     HYPERLIPIDEMIA      Hyperplastic Polyps Colon 11/03     LVH 5/03     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 Rubin     Nocturia     x 2-3     Pulmonary Nodule, stable      Tubular Adenoma 3/07     Unspecified glaucoma(365.9)     Followed by Dr. Monique     Past Surgical History:   Procedure Laterality Date     ARTHROPLASTY HIP Left 9/18/2017    LEFT TOTAL HIP ARTHROPLASTY(DEPUY)^;  Surgeon: Aurelio Hood MD;  Location: [facility] OR     C NONSPECIFIC PROCEDURE  6/04    cysto, R ureteral stent, ESWL     C NONSPECIFIC PROCEDURE  CHILD    TONSILLECTOMY     C NONSPECIFIC PROCEDURE  AGE 4    L heel osteomyelitis with \"bone scraping\"     DISCECTOMY, FUSION CERVICAL ANTERIOR TWO LEVELS, COMBINED N/A 11/12/2018    Procedure: C5-7 anterior cervical discectomy and fusion;  Surgeon: Mina Grove MD;  Location:  OR     OPTICAL TRACKING SYSTEM FUSION SPINE POSTERIOR LUMBAR TWO LEVELS N/A 11/5/2015    Procedure: OPTICAL TRACKING SYSTEM FUSION SPINE POSTERIOR LUMBAR TWO LEVELS;  Surgeon: Mina Grove MD;  Location: [facility] OR     REVERSE ARTHROPLASTY SHOULDER Right 1/8/2020    Procedure: Right reverse total shoulder arthroplasty;  Surgeon: Manolo Short MD;  Location: [facility] OR     SURGICAL HISTORY OF -   1991    B Shoulder surgeries     SURGICAL HISTORY OF -   1992    lumbar laminectomy     SURGICAL HISTORY OF -   1998    lumbar decompression    Dr. Pop     SURGICAL HISTORY OF -   9/06    L " shoulder    Dr. Foley     SURGICAL HISTORY OF -       partial amputation R 3rd toe (tophus)  Dr. Neal     SURGICAL HISTORY OF -       bilateral cataracts       SURGICAL HISTORY OF -       Mohs L chest, Dr. Anthony     SURGICAL HISTORY OF     L TKA   Dr. Thomson     Current Outpatient Medications   Medication Sig Dispense Refill     allopurinol (ZYLOPRIM) 300 MG tablet Take 1 tablet (300 mg) by mouth daily       bimatoprost (LUMIGAN) 0.01 % SOLN Place 1 drop into both eyes At Bedtime       lisinopril (PRINIVIL/ZESTRIL) 10 MG tablet Take 1 tablet (10 mg) by mouth daily 90 tablet 3     Polyvinyl Alcohol-Povidone (REFRESH OP) Apply 1 drop to eye daily as needed       Sildenafil Citrate (VIAGRA PO) Take 50 mg by mouth daily as needed       simvastatin (ZOCOR) 20 MG tablet Take 1 tablet (20 mg) by mouth every morning 90 tablet 0     tamsulosin (FLOMAX) 0.4 MG capsule Take 1 capsule (0.4 mg) by mouth daily 90 capsule 3     timolol (TIMOPTIC) 0.5 % ophthalmic solution Place 1 drop into both eyes daily       OTC products: None, except as noted above    No Known Allergies   Latex Allergy: NO    Social History     Tobacco Use     Smoking status: Former Smoker     Packs/day: 1.00     Years: 27.00     Pack years: 27.00     Last attempt to quit: 1983     Years since quittin.1     Smokeless tobacco: Never Used   Substance Use Topics     Alcohol use: Yes     Comment: Ave 2 daily or more.  Heavy days 5-6, some days none.     History   Drug Use No       REVIEW OF SYSTEMS:   CONSTITUTIONAL: NEGATIVE for fever, chills, change in weight  INTEGUMENTARY/SKIN: NEGATIVE for worrisome rashes, moles or lesions  EYES: NEGATIVE for vision changes or irritation  ENT/MOUTH: NEGATIVE for ear, mouth and throat problems  RESP: NEGATIVE for significant cough or SOB  CV: NEGATIVE for chest pain, palpitations or peripheral edema  GI: NEGATIVE for nausea, abdominal pain, heartburn, or change in bowel  habits  MUSCULOSKELETAL: NEGATIVE for significant arthralgias or myalgia  ENDOCRINE: NEGATIVE for temperature intolerance, skin/hair changes  HEME/ALLERGY/IMMUNE: NEGATIVE for bleeding problems  PSYCHIATRIC: NEGATIVE for changes in mood or affect  ROS otherwise negative    EXAM:   /80 (BP Location: Left arm, Patient Position: Chair, Cuff Size: Adult Regular)   Pulse 63   Temp 97.5  F (36.4  C) (Oral)   Resp 16   Wt 87.1 kg (192 lb)   SpO2 97%   BMI 29.19 kg/m    GENERAL APPEARANCE: healthy, alert and no distress  EYES: Eyes grossly normal to inspection, PERRL and conjunctivae and sclerae normal  HENT: ear canals and TM's normal and nose and mouth without ulcers or lesions  RESP: lungs clear to auscultation - no rales, rhonchi or wheezes  CV: regular rate and rhythm, normal S1 S2, no S3 or S4 and no murmur, click or rub   ABDOMEN: soft, nontender, no HSM or masses and bowel sounds normal  MS: extremities normal- no gross deformities noted  SKIN: no suspicious lesions or rashes  NEURO: Normal strength and tone, sensory exam grossly normal, mentation intact and speech normal  PSYCH: mentation appears normal and affect normal/bright    DIAGNOSTICS:   EKG: appears normal, NSR, normal axis, normal intervals, no acute ST/T changes c/w ischemia, no LVH by voltage criteria, unchanged from previous tracings  Done 10/2019    Recent Labs   Lab Test 02/18/20  1454 01/09/20  0622 01/08/20  1549 12/16/19  1129 10/29/19  1350  10/02/17 09/29/17   HGB 14.0 11.5*  --  13.6 14.3   < >  --   --      --  144* 158 166   < >  --   --    INR  --   --   --   --   --   --  2.2* 3.2*   NA  --   --   --  140 139   < >  --   --    POTASSIUM  --   --   --  3.9 4.1   < >  --   --    CR 1.60*  --  1.18 1.22 1.29*   < >  --   --     < > = values in this interval not displayed.        IMPRESSION:   Reason for surgery/procedure: primary osteoarthritis right hip  Diagnosis/reason for consult: cardiopulmonary and clearance for  surgery, HTN, CKD stage III    The proposed surgical procedure is considered INTERMEDIATE risk.    REVISED CARDIAC RISK INDEX  The patient has the following serious cardiovascular risks for perioperative complications such as (MI, PE, VFib and 3  AV Block):  No serious cardiac risks  INTERPRETATION: 0 risks: Class I (very low risk - 0.4% complication rate)    The patient has the following additional risks for perioperative complications:  No identified additional risks      ICD-10-CM    1. Preop general physical exam Z01.818 Methicillin Resist/Sens S. aureus PCR     Basic metabolic panel   2. Primary osteoarthritis of right hip M16.11 Methicillin Resist/Sens S. aureus PCR     Basic metabolic panel   3. Elevated serum creatinine R79.89 Basic metabolic panel   4. Essential hypertension I10 Basic metabolic panel   5. Chronic Kidney Disease, Stage III N18.3 Basic metabolic panel       RECOMMENDATIONS:         --Patient is to take all scheduled medications on the day of surgery EXCEPT for modifications listed below.    ACE Inhibitor or Angiotensin Receptor Blocker (ARB) Use  Ace inhibitor or Angiotensin Receptor Blocker (ARB) and should HOLD this medication for the 24 hours prior to surgery.      CKD with elevated serum creatinine from baseline.  HOLD NSAID- only Tylenol or pain medication from ortho.   HOLD lisinopril now.   Fluids  Recheck BMP in two days -  2/21 results below, Back to normal range for patient.  Cleared for surgery   Last Comprehensive Metabolic Panel:  Sodium   Date Value Ref Range Status   02/21/2020 138 133 - 144 mmol/L Final     Potassium   Date Value Ref Range Status   02/21/2020 4.7 3.4 - 5.3 mmol/L Final     Chloride   Date Value Ref Range Status   02/21/2020 108 94 - 109 mmol/L Final     Carbon Dioxide   Date Value Ref Range Status   02/21/2020 27 20 - 32 mmol/L Final     Anion Gap   Date Value Ref Range Status   02/21/2020 3 3 - 14 mmol/L Final     Glucose   Date Value Ref Range Status    02/21/2020 107 (H) 70 - 99 mg/dL Final     Urea Nitrogen   Date Value Ref Range Status   02/21/2020 20 7 - 30 mg/dL Final     Creatinine   Date Value Ref Range Status   02/21/2020 1.23 0.66 - 1.25 mg/dL Final     GFR Estimate   Date Value Ref Range Status   02/21/2020 55 (L) >60 mL/min/[1.73_m2] Final     Comment:     Non  GFR Calc  Starting 12/18/2018, serum creatinine based estimated GFR (eGFR) will be   calculated using the Chronic Kidney Disease Epidemiology Collaboration   (CKD-EPI) equation.       Calcium   Date Value Ref Range Status   02/21/2020 9.3 8.5 - 10.1 mg/dL Final         APPROVAL GIVEN to proceed with proposed procedure, without further diagnostic evaluation       Signed Electronically by: Yudith Omer PA-C    Copy of this evaluation report is provided to requesting physician.    [facility] Preop Guidelines    Revised Cardiac Risk Index

## 2020-02-19 NOTE — PATIENT INSTRUCTIONS
Hold Lisinopril this week and push fluids   Recheck creatinine on Friday this week.    Hold Lisinopril until after surgery .             Before Your Surgery      Call your surgeon if there is any change in your health. This includes signs of a cold or flu (such as a sore throat, runny nose, cough, rash or fever).    Do not smoke, drink alcohol or take over the counter medicine (unless your surgeon or primary care doctor tells you to) for the 24 hours before and after surgery.    If you take prescribed drugs: Follow your doctor s orders about which medicines to take and which to stop until after surgery.    Eating and drinking prior to surgery: follow the instructions from your surgeon    Take a shower or bath the night before surgery. Use the soap your surgeon gave you to gently clean your skin. If you do not have soap from your surgeon, use your regular soap. Do not shave or scrub the surgery site.  Wear clean pajamas and have clean sheets on your bed.

## 2020-02-21 ENCOUNTER — THERAPY VISIT (OUTPATIENT)
Dept: PHYSICAL THERAPY | Facility: CLINIC | Age: 80
End: 2020-02-21
Payer: COMMERCIAL

## 2020-02-21 DIAGNOSIS — Z01.818 PREOP GENERAL PHYSICAL EXAM: ICD-10-CM

## 2020-02-21 DIAGNOSIS — Z47.89 AFTERCARE FOLLOWING SURGERY OF THE MUSCULOSKELETAL SYSTEM: ICD-10-CM

## 2020-02-21 DIAGNOSIS — M25.511 ACUTE PAIN OF RIGHT SHOULDER: Primary | ICD-10-CM

## 2020-02-21 DIAGNOSIS — M16.11 PRIMARY OSTEOARTHRITIS OF RIGHT HIP: ICD-10-CM

## 2020-02-21 DIAGNOSIS — N18.30 CHRONIC KIDNEY DISEASE (CKD), STAGE III (MODERATE) (H): ICD-10-CM

## 2020-02-21 DIAGNOSIS — I10 ESSENTIAL HYPERTENSION: ICD-10-CM

## 2020-02-21 DIAGNOSIS — R79.89 ELEVATED SERUM CREATININE: ICD-10-CM

## 2020-02-21 LAB
ANION GAP SERPL CALCULATED.3IONS-SCNC: 3 MMOL/L (ref 3–14)
BUN SERPL-MCNC: 20 MG/DL (ref 7–30)
CALCIUM SERPL-MCNC: 9.3 MG/DL (ref 8.5–10.1)
CHLORIDE SERPL-SCNC: 108 MMOL/L (ref 94–109)
CO2 SERPL-SCNC: 27 MMOL/L (ref 20–32)
CREAT SERPL-MCNC: 1.23 MG/DL (ref 0.66–1.25)
GFR SERPL CREATININE-BSD FRML MDRD: 55 ML/MIN/{1.73_M2}
GLUCOSE SERPL-MCNC: 107 MG/DL (ref 70–99)
POTASSIUM SERPL-SCNC: 4.7 MMOL/L (ref 3.4–5.3)
SODIUM SERPL-SCNC: 138 MMOL/L (ref 133–144)

## 2020-02-21 PROCEDURE — 80048 BASIC METABOLIC PNL TOTAL CA: CPT | Performed by: INTERNAL MEDICINE

## 2020-02-21 PROCEDURE — 97110 THERAPEUTIC EXERCISES: CPT | Mod: GP | Performed by: PHYSICAL THERAPIST

## 2020-02-21 PROCEDURE — 36415 COLL VENOUS BLD VENIPUNCTURE: CPT | Performed by: INTERNAL MEDICINE

## 2020-02-21 PROCEDURE — 97112 NEUROMUSCULAR REEDUCATION: CPT | Mod: GP | Performed by: PHYSICAL THERAPIST

## 2020-02-25 ENCOUNTER — APPOINTMENT (OUTPATIENT)
Dept: GENERAL RADIOLOGY | Facility: CLINIC | Age: 80
DRG: 470 | End: 2020-02-25
Attending: ORTHOPAEDIC SURGERY
Payer: COMMERCIAL

## 2020-02-25 ENCOUNTER — APPOINTMENT (OUTPATIENT)
Dept: PHYSICAL THERAPY | Facility: CLINIC | Age: 80
DRG: 470 | End: 2020-02-25
Attending: ORTHOPAEDIC SURGERY
Payer: COMMERCIAL

## 2020-02-25 ENCOUNTER — ANESTHESIA EVENT (OUTPATIENT)
Dept: SURGERY | Facility: CLINIC | Age: 80
DRG: 470 | End: 2020-02-25
Payer: COMMERCIAL

## 2020-02-25 ENCOUNTER — ANESTHESIA (OUTPATIENT)
Dept: SURGERY | Facility: CLINIC | Age: 80
DRG: 470 | End: 2020-02-25
Payer: COMMERCIAL

## 2020-02-25 ENCOUNTER — HOSPITAL ENCOUNTER (INPATIENT)
Facility: CLINIC | Age: 80
LOS: 1 days | Discharge: HOME | DRG: 470 | End: 2020-02-26
Attending: ORTHOPAEDIC SURGERY | Admitting: ORTHOPAEDIC SURGERY
Payer: COMMERCIAL

## 2020-02-25 DIAGNOSIS — Z96.641 STATUS POST RIGHT HIP REPLACEMENT: Primary | ICD-10-CM

## 2020-02-25 LAB
ABO + RH BLD: NORMAL
ABO + RH BLD: NORMAL
BLD GP AB SCN SERPL QL: NORMAL
BLOOD BANK CMNT PATIENT-IMP: NORMAL
BLOOD BANK CMNT PATIENT-IMP: NORMAL
CREAT SERPL-MCNC: 1.21 MG/DL (ref 0.66–1.25)
GFR SERPL CREATININE-BSD FRML MDRD: 56 ML/MIN/{1.73_M2}
HGB BLD-MCNC: 13.3 G/DL (ref 13.3–17.7)
POTASSIUM SERPL-SCNC: 4.1 MMOL/L (ref 3.4–5.3)
SPECIMEN EXP DATE BLD: NORMAL

## 2020-02-25 PROCEDURE — 40000985 XR PELVIS AD HIP PORTABLE RIGHT 1 VIEW

## 2020-02-25 PROCEDURE — 37000008 ZZH ANESTHESIA TECHNICAL FEE, 1ST 30 MIN: Performed by: ORTHOPAEDIC SURGERY

## 2020-02-25 PROCEDURE — P9041 ALBUMIN (HUMAN),5%, 50ML: HCPCS | Performed by: NURSE ANESTHETIST, CERTIFIED REGISTERED

## 2020-02-25 PROCEDURE — 71000012 ZZH RECOVERY PHASE 1 LEVEL 1 FIRST HR: Performed by: ORTHOPAEDIC SURGERY

## 2020-02-25 PROCEDURE — 25800030 ZZH RX IP 258 OP 636: Performed by: NURSE ANESTHETIST, CERTIFIED REGISTERED

## 2020-02-25 PROCEDURE — 97161 PT EVAL LOW COMPLEX 20 MIN: CPT | Mod: GP

## 2020-02-25 PROCEDURE — 37000009 ZZH ANESTHESIA TECHNICAL FEE, EACH ADDTL 15 MIN: Performed by: ORTHOPAEDIC SURGERY

## 2020-02-25 PROCEDURE — 36415 COLL VENOUS BLD VENIPUNCTURE: CPT | Performed by: PHYSICIAN ASSISTANT

## 2020-02-25 PROCEDURE — 36000063 ZZH SURGERY LEVEL 4 EA 15 ADDTL MIN: Performed by: ORTHOPAEDIC SURGERY

## 2020-02-25 PROCEDURE — 97116 GAIT TRAINING THERAPY: CPT | Mod: GP

## 2020-02-25 PROCEDURE — 25000125 ZZHC RX 250: Performed by: ORTHOPAEDIC SURGERY

## 2020-02-25 PROCEDURE — 25000128 H RX IP 250 OP 636: Performed by: NURSE ANESTHETIST, CERTIFIED REGISTERED

## 2020-02-25 PROCEDURE — 25000125 ZZHC RX 250: Performed by: NURSE ANESTHETIST, CERTIFIED REGISTERED

## 2020-02-25 PROCEDURE — C1713 ANCHOR/SCREW BN/BN,TIS/BN: HCPCS | Performed by: ORTHOPAEDIC SURGERY

## 2020-02-25 PROCEDURE — 25000125 ZZHC RX 250: Performed by: PHYSICIAN ASSISTANT

## 2020-02-25 PROCEDURE — 36415 COLL VENOUS BLD VENIPUNCTURE: CPT | Performed by: ORTHOPAEDIC SURGERY

## 2020-02-25 PROCEDURE — 40000170 ZZH STATISTIC PRE-PROCEDURE ASSESSMENT II: Performed by: ORTHOPAEDIC SURGERY

## 2020-02-25 PROCEDURE — 84132 ASSAY OF SERUM POTASSIUM: CPT | Performed by: PHYSICIAN ASSISTANT

## 2020-02-25 PROCEDURE — C1776 JOINT DEVICE (IMPLANTABLE): HCPCS | Performed by: ORTHOPAEDIC SURGERY

## 2020-02-25 PROCEDURE — 85018 HEMOGLOBIN: CPT | Performed by: PHYSICIAN ASSISTANT

## 2020-02-25 PROCEDURE — 40000277 XR SURGERY CARM FLUORO LESS THAN 5 MIN W STILLS

## 2020-02-25 PROCEDURE — 97530 THERAPEUTIC ACTIVITIES: CPT | Mod: GP

## 2020-02-25 PROCEDURE — 36000065 ZZH SURGERY LEVEL 4 W FLUORO 1ST 30 MIN: Performed by: ORTHOPAEDIC SURGERY

## 2020-02-25 PROCEDURE — 97110 THERAPEUTIC EXERCISES: CPT | Mod: GP

## 2020-02-25 PROCEDURE — 25000132 ZZH RX MED GY IP 250 OP 250 PS 637: Performed by: ORTHOPAEDIC SURGERY

## 2020-02-25 PROCEDURE — 12000000 ZZH R&B MED SURG/OB

## 2020-02-25 PROCEDURE — 27210794 ZZH OR GENERAL SUPPLY STERILE: Performed by: ORTHOPAEDIC SURGERY

## 2020-02-25 PROCEDURE — 25000128 H RX IP 250 OP 636: Performed by: ORTHOPAEDIC SURGERY

## 2020-02-25 PROCEDURE — 25000132 ZZH RX MED GY IP 250 OP 250 PS 637: Performed by: PHYSICIAN ASSISTANT

## 2020-02-25 PROCEDURE — 25800025 ZZH RX 258: Performed by: ORTHOPAEDIC SURGERY

## 2020-02-25 PROCEDURE — 25000128 H RX IP 250 OP 636: Performed by: PHYSICIAN ASSISTANT

## 2020-02-25 PROCEDURE — 25000128 H RX IP 250 OP 636: Performed by: ANESTHESIOLOGY

## 2020-02-25 PROCEDURE — 25800030 ZZH RX IP 258 OP 636: Performed by: ORTHOPAEDIC SURGERY

## 2020-02-25 PROCEDURE — 86850 RBC ANTIBODY SCREEN: CPT | Performed by: ORTHOPAEDIC SURGERY

## 2020-02-25 PROCEDURE — 86901 BLOOD TYPING SEROLOGIC RH(D): CPT | Performed by: ORTHOPAEDIC SURGERY

## 2020-02-25 PROCEDURE — 0SR904A REPLACEMENT OF RIGHT HIP JOINT WITH CERAMIC ON POLYETHYLENE SYNTHETIC SUBSTITUTE, UNCEMENTED, OPEN APPROACH: ICD-10-PCS | Performed by: ORTHOPAEDIC SURGERY

## 2020-02-25 PROCEDURE — 82565 ASSAY OF CREATININE: CPT | Performed by: PHYSICIAN ASSISTANT

## 2020-02-25 PROCEDURE — 25000566 ZZH SEVOFLURANE, EA 15 MIN: Performed by: ORTHOPAEDIC SURGERY

## 2020-02-25 PROCEDURE — 99207 ZZC NON-BILLABLE SERV PER CHARTING: CPT | Performed by: NURSE PRACTITIONER

## 2020-02-25 PROCEDURE — 86900 BLOOD TYPING SEROLOGIC ABO: CPT | Performed by: ORTHOPAEDIC SURGERY

## 2020-02-25 DEVICE — IMP CUP ACE PINNACLE 62MM 1217-22-062: Type: IMPLANTABLE DEVICE | Site: HIP | Status: FUNCTIONAL

## 2020-02-25 DEVICE — IMP STEM FEM DEPUY ACTIS STD COLLAR TPR SZ 8MM 1010-11-080: Type: IMPLANTABLE DEVICE | Site: FEMUR | Status: FUNCTIONAL

## 2020-02-25 DEVICE — IMPLANTABLE DEVICE: Type: IMPLANTABLE DEVICE | Site: HIP | Status: FUNCTIONAL

## 2020-02-25 DEVICE — IMP SCR BONE CAN ACE 6.5X35MM 1217-35-500: Type: IMPLANTABLE DEVICE | Site: HIP | Status: FUNCTIONAL

## 2020-02-25 DEVICE — IMP APEX HOLE ELIMINATOR HIP DEPUY DURALOC 1246-03-000: Type: IMPLANTABLE DEVICE | Site: HIP | Status: FUNCTIONAL

## 2020-02-25 DEVICE — IMP SCR DEPUY PINNACLE CANC 6.5X15MM 1217-15-500: Type: IMPLANTABLE DEVICE | Site: HIP | Status: FUNCTIONAL

## 2020-02-25 DEVICE — IMP HEAD FEMORAL DEPUY CERAMIC 36MM +1.5MM 1365-36-310: Type: IMPLANTABLE DEVICE | Site: HIP | Status: FUNCTIONAL

## 2020-02-25 DEVICE — IMP SCR BONE CAN ACE 6.5X25MM 1217-25-500: Type: IMPLANTABLE DEVICE | Site: HIP | Status: FUNCTIONAL

## 2020-02-25 RX ORDER — ALLOPURINOL 300 MG/1
300 TABLET ORAL DAILY
Status: DISCONTINUED | OUTPATIENT
Start: 2020-02-26 | End: 2020-02-26 | Stop reason: HOSPADM

## 2020-02-25 RX ORDER — ONDANSETRON 4 MG/1
4 TABLET, ORALLY DISINTEGRATING ORAL EVERY 6 HOURS PRN
Status: DISCONTINUED | OUTPATIENT
Start: 2020-02-25 | End: 2020-02-26 | Stop reason: HOSPADM

## 2020-02-25 RX ORDER — LIDOCAINE 40 MG/G
CREAM TOPICAL
Status: DISCONTINUED | OUTPATIENT
Start: 2020-02-25 | End: 2020-02-26 | Stop reason: HOSPADM

## 2020-02-25 RX ORDER — LISINOPRIL 10 MG/1
10 TABLET ORAL DAILY
Status: DISCONTINUED | OUTPATIENT
Start: 2020-02-26 | End: 2020-02-26 | Stop reason: HOSPADM

## 2020-02-25 RX ORDER — HYDROXYZINE HYDROCHLORIDE 25 MG/1
25 TABLET, FILM COATED ORAL EVERY 6 HOURS PRN
Status: DISCONTINUED | OUTPATIENT
Start: 2020-02-25 | End: 2020-02-26 | Stop reason: HOSPADM

## 2020-02-25 RX ORDER — HYDROMORPHONE HYDROCHLORIDE 1 MG/ML
0.2 INJECTION, SOLUTION INTRAMUSCULAR; INTRAVENOUS; SUBCUTANEOUS
Status: DISCONTINUED | OUTPATIENT
Start: 2020-02-25 | End: 2020-02-26 | Stop reason: HOSPADM

## 2020-02-25 RX ORDER — LIDOCAINE HYDROCHLORIDE 20 MG/ML
INJECTION, SOLUTION INFILTRATION; PERINEURAL PRN
Status: DISCONTINUED | OUTPATIENT
Start: 2020-02-25 | End: 2020-02-25

## 2020-02-25 RX ORDER — TIMOLOL MALEATE 5 MG/ML
1 SOLUTION/ DROPS OPHTHALMIC 2 TIMES DAILY
Status: DISCONTINUED | OUTPATIENT
Start: 2020-02-25 | End: 2020-02-26 | Stop reason: HOSPADM

## 2020-02-25 RX ORDER — ACETAMINOPHEN 500 MG
1000 TABLET ORAL DAILY PRN
COMMUNITY

## 2020-02-25 RX ORDER — ACETAMINOPHEN 325 MG/1
650 TABLET ORAL EVERY 4 HOURS PRN
Status: DISCONTINUED | OUTPATIENT
Start: 2020-02-28 | End: 2020-02-26 | Stop reason: HOSPADM

## 2020-02-25 RX ORDER — ALBUMIN, HUMAN INJ 5% 5 %
SOLUTION INTRAVENOUS CONTINUOUS PRN
Status: DISCONTINUED | OUTPATIENT
Start: 2020-02-25 | End: 2020-02-25

## 2020-02-25 RX ORDER — AMOXICILLIN 250 MG
2 CAPSULE ORAL 2 TIMES DAILY
Status: DISCONTINUED | OUTPATIENT
Start: 2020-02-25 | End: 2020-02-26 | Stop reason: HOSPADM

## 2020-02-25 RX ORDER — TAMSULOSIN HYDROCHLORIDE 0.4 MG/1
0.4 CAPSULE ORAL DAILY
Status: DISCONTINUED | OUTPATIENT
Start: 2020-02-26 | End: 2020-02-26 | Stop reason: HOSPADM

## 2020-02-25 RX ORDER — HYDROMORPHONE HYDROCHLORIDE 1 MG/ML
.3-.5 INJECTION, SOLUTION INTRAMUSCULAR; INTRAVENOUS; SUBCUTANEOUS EVERY 5 MIN PRN
Status: DISCONTINUED | OUTPATIENT
Start: 2020-02-25 | End: 2020-02-25 | Stop reason: HOSPADM

## 2020-02-25 RX ORDER — PROPOFOL 10 MG/ML
INJECTION, EMULSION INTRAVENOUS PRN
Status: DISCONTINUED | OUTPATIENT
Start: 2020-02-25 | End: 2020-02-25

## 2020-02-25 RX ORDER — CEFAZOLIN SODIUM 1 G/3ML
1 INJECTION, POWDER, FOR SOLUTION INTRAMUSCULAR; INTRAVENOUS SEE ADMIN INSTRUCTIONS
Status: DISCONTINUED | OUTPATIENT
Start: 2020-02-25 | End: 2020-02-25 | Stop reason: HOSPADM

## 2020-02-25 RX ORDER — SIMVASTATIN 10 MG
20 TABLET ORAL EVERY MORNING
Status: DISCONTINUED | OUTPATIENT
Start: 2020-02-26 | End: 2020-02-26 | Stop reason: HOSPADM

## 2020-02-25 RX ORDER — NALOXONE HYDROCHLORIDE 0.4 MG/ML
.1-.4 INJECTION, SOLUTION INTRAMUSCULAR; INTRAVENOUS; SUBCUTANEOUS
Status: DISCONTINUED | OUTPATIENT
Start: 2020-02-25 | End: 2020-02-25

## 2020-02-25 RX ORDER — NALOXONE HYDROCHLORIDE 0.4 MG/ML
.1-.4 INJECTION, SOLUTION INTRAMUSCULAR; INTRAVENOUS; SUBCUTANEOUS
Status: DISCONTINUED | OUTPATIENT
Start: 2020-02-25 | End: 2020-02-26 | Stop reason: HOSPADM

## 2020-02-25 RX ORDER — ONDANSETRON 4 MG/1
4 TABLET, ORALLY DISINTEGRATING ORAL EVERY 30 MIN PRN
Status: DISCONTINUED | OUTPATIENT
Start: 2020-02-25 | End: 2020-02-25 | Stop reason: HOSPADM

## 2020-02-25 RX ORDER — BISACODYL 10 MG
10 SUPPOSITORY, RECTAL RECTAL DAILY PRN
Status: DISCONTINUED | OUTPATIENT
Start: 2020-02-25 | End: 2020-02-26 | Stop reason: HOSPADM

## 2020-02-25 RX ORDER — PREGABALIN 150 MG/1
150 CAPSULE ORAL ONCE
Status: COMPLETED | OUTPATIENT
Start: 2020-02-25 | End: 2020-02-25

## 2020-02-25 RX ORDER — SODIUM CHLORIDE, SODIUM LACTATE, POTASSIUM CHLORIDE, CALCIUM CHLORIDE 600; 310; 30; 20 MG/100ML; MG/100ML; MG/100ML; MG/100ML
INJECTION, SOLUTION INTRAVENOUS CONTINUOUS
Status: DISCONTINUED | OUTPATIENT
Start: 2020-02-25 | End: 2020-02-25 | Stop reason: HOSPADM

## 2020-02-25 RX ORDER — FENTANYL CITRATE 50 UG/ML
INJECTION, SOLUTION INTRAMUSCULAR; INTRAVENOUS PRN
Status: DISCONTINUED | OUTPATIENT
Start: 2020-02-25 | End: 2020-02-25

## 2020-02-25 RX ORDER — GLYCOPYRROLATE 0.2 MG/ML
INJECTION, SOLUTION INTRAMUSCULAR; INTRAVENOUS PRN
Status: DISCONTINUED | OUTPATIENT
Start: 2020-02-25 | End: 2020-02-25

## 2020-02-25 RX ORDER — MAGNESIUM HYDROXIDE 1200 MG/15ML
LIQUID ORAL PRN
Status: DISCONTINUED | OUTPATIENT
Start: 2020-02-25 | End: 2020-02-25 | Stop reason: HOSPADM

## 2020-02-25 RX ORDER — POLYETHYLENE GLYCOL 3350 17 G/17G
17 POWDER, FOR SOLUTION ORAL DAILY
Status: DISCONTINUED | OUTPATIENT
Start: 2020-02-25 | End: 2020-02-26 | Stop reason: HOSPADM

## 2020-02-25 RX ORDER — FENTANYL CITRATE 0.05 MG/ML
25-50 INJECTION, SOLUTION INTRAMUSCULAR; INTRAVENOUS
Status: DISCONTINUED | OUTPATIENT
Start: 2020-02-25 | End: 2020-02-25 | Stop reason: HOSPADM

## 2020-02-25 RX ORDER — EPHEDRINE SULFATE 50 MG/ML
INJECTION, SOLUTION INTRAMUSCULAR; INTRAVENOUS; SUBCUTANEOUS PRN
Status: DISCONTINUED | OUTPATIENT
Start: 2020-02-25 | End: 2020-02-25

## 2020-02-25 RX ORDER — SODIUM CHLORIDE, SODIUM LACTATE, POTASSIUM CHLORIDE, CALCIUM CHLORIDE 600; 310; 30; 20 MG/100ML; MG/100ML; MG/100ML; MG/100ML
INJECTION, SOLUTION INTRAVENOUS CONTINUOUS PRN
Status: DISCONTINUED | OUTPATIENT
Start: 2020-02-25 | End: 2020-02-25

## 2020-02-25 RX ORDER — ACETAMINOPHEN 500 MG
1000 TABLET ORAL ONCE
Status: COMPLETED | OUTPATIENT
Start: 2020-02-25 | End: 2020-02-25

## 2020-02-25 RX ORDER — NEOSTIGMINE METHYLSULFATE 1 MG/ML
VIAL (ML) INJECTION PRN
Status: DISCONTINUED | OUTPATIENT
Start: 2020-02-25 | End: 2020-02-25

## 2020-02-25 RX ORDER — VANCOMYCIN HYDROCHLORIDE 1 G/20ML
INJECTION, POWDER, LYOPHILIZED, FOR SOLUTION INTRAVENOUS PRN
Status: DISCONTINUED | OUTPATIENT
Start: 2020-02-25 | End: 2020-02-25 | Stop reason: HOSPADM

## 2020-02-25 RX ORDER — ONDANSETRON 2 MG/ML
INJECTION INTRAMUSCULAR; INTRAVENOUS PRN
Status: DISCONTINUED | OUTPATIENT
Start: 2020-02-25 | End: 2020-02-25

## 2020-02-25 RX ORDER — DEXAMETHASONE SODIUM PHOSPHATE 4 MG/ML
INJECTION, SOLUTION INTRA-ARTICULAR; INTRALESIONAL; INTRAMUSCULAR; INTRAVENOUS; SOFT TISSUE PRN
Status: DISCONTINUED | OUTPATIENT
Start: 2020-02-25 | End: 2020-02-25

## 2020-02-25 RX ORDER — ONDANSETRON 2 MG/ML
4 INJECTION INTRAMUSCULAR; INTRAVENOUS EVERY 6 HOURS PRN
Status: DISCONTINUED | OUTPATIENT
Start: 2020-02-25 | End: 2020-02-26 | Stop reason: HOSPADM

## 2020-02-25 RX ORDER — OXYCODONE HYDROCHLORIDE 5 MG/1
5-10 TABLET ORAL
Status: DISCONTINUED | OUTPATIENT
Start: 2020-02-25 | End: 2020-02-26 | Stop reason: HOSPADM

## 2020-02-25 RX ORDER — CEFAZOLIN SODIUM 2 G/100ML
2 INJECTION, SOLUTION INTRAVENOUS
Status: COMPLETED | OUTPATIENT
Start: 2020-02-25 | End: 2020-02-25

## 2020-02-25 RX ORDER — CEFAZOLIN SODIUM 2 G/100ML
2 INJECTION, SOLUTION INTRAVENOUS EVERY 8 HOURS
Status: COMPLETED | OUTPATIENT
Start: 2020-02-25 | End: 2020-02-26

## 2020-02-25 RX ORDER — ACETAMINOPHEN 325 MG/1
975 TABLET ORAL EVERY 8 HOURS
Status: DISCONTINUED | OUTPATIENT
Start: 2020-02-25 | End: 2020-02-26 | Stop reason: HOSPADM

## 2020-02-25 RX ORDER — ONDANSETRON 2 MG/ML
4 INJECTION INTRAMUSCULAR; INTRAVENOUS EVERY 30 MIN PRN
Status: DISCONTINUED | OUTPATIENT
Start: 2020-02-25 | End: 2020-02-25 | Stop reason: HOSPADM

## 2020-02-25 RX ORDER — FENTANYL CITRATE 50 UG/ML
50-100 INJECTION, SOLUTION INTRAMUSCULAR; INTRAVENOUS
Status: DISCONTINUED | OUTPATIENT
Start: 2020-02-25 | End: 2020-02-25 | Stop reason: HOSPADM

## 2020-02-25 RX ORDER — VECURONIUM BROMIDE 1 MG/ML
INJECTION, POWDER, LYOPHILIZED, FOR SOLUTION INTRAVENOUS PRN
Status: DISCONTINUED | OUTPATIENT
Start: 2020-02-25 | End: 2020-02-25

## 2020-02-25 RX ORDER — DEXTROSE MONOHYDRATE, SODIUM CHLORIDE, AND POTASSIUM CHLORIDE 50; 1.49; 4.5 G/1000ML; G/1000ML; G/1000ML
INJECTION, SOLUTION INTRAVENOUS CONTINUOUS
Status: DISCONTINUED | OUTPATIENT
Start: 2020-02-25 | End: 2020-02-26 | Stop reason: HOSPADM

## 2020-02-25 RX ADMIN — PHENYLEPHRINE HYDROCHLORIDE 100 MCG: 10 INJECTION INTRAVENOUS at 07:48

## 2020-02-25 RX ADMIN — DEXAMETHASONE SODIUM PHOSPHATE 4 MG: 4 INJECTION, SOLUTION INTRA-ARTICULAR; INTRALESIONAL; INTRAMUSCULAR; INTRAVENOUS; SOFT TISSUE at 07:45

## 2020-02-25 RX ADMIN — LIDOCAINE HYDROCHLORIDE 100 MG: 20 INJECTION, SOLUTION INFILTRATION; PERINEURAL at 07:39

## 2020-02-25 RX ADMIN — CEFAZOLIN SODIUM 2 G: 2 INJECTION, SOLUTION INTRAVENOUS at 17:26

## 2020-02-25 RX ADMIN — Medication 5 MG: at 08:23

## 2020-02-25 RX ADMIN — HYDROMORPHONE HYDROCHLORIDE 0.5 MG: 1 INJECTION, SOLUTION INTRAMUSCULAR; INTRAVENOUS; SUBCUTANEOUS at 11:35

## 2020-02-25 RX ADMIN — TIMOLOL MALEATE 1 DROP: 5 SOLUTION/ DROPS OPHTHALMIC at 21:28

## 2020-02-25 RX ADMIN — SODIUM CHLORIDE, POTASSIUM CHLORIDE, SODIUM LACTATE AND CALCIUM CHLORIDE: 600; 310; 30; 20 INJECTION, SOLUTION INTRAVENOUS at 09:33

## 2020-02-25 RX ADMIN — PHENYLEPHRINE HYDROCHLORIDE 100 MCG: 10 INJECTION INTRAVENOUS at 08:17

## 2020-02-25 RX ADMIN — ACETAMINOPHEN 1000 MG: 500 TABLET, FILM COATED ORAL at 06:52

## 2020-02-25 RX ADMIN — ROCURONIUM BROMIDE 50 MG: 10 INJECTION INTRAVENOUS at 07:39

## 2020-02-25 RX ADMIN — TRANEXAMIC ACID 1 G: 100 INJECTION, SOLUTION INTRAVENOUS at 07:56

## 2020-02-25 RX ADMIN — PHENYLEPHRINE HYDROCHLORIDE 100 MCG: 10 INJECTION INTRAVENOUS at 08:23

## 2020-02-25 RX ADMIN — NEOSTIGMINE METHYLSULFATE 4.5 MG: 1 INJECTION, SOLUTION INTRAVENOUS at 10:11

## 2020-02-25 RX ADMIN — GLYCOPYRROLATE 0.7 MG: 0.2 INJECTION, SOLUTION INTRAMUSCULAR; INTRAVENOUS at 10:11

## 2020-02-25 RX ADMIN — GLYCOPYRROLATE 0.1 MG: 0.2 INJECTION, SOLUTION INTRAMUSCULAR; INTRAVENOUS at 08:23

## 2020-02-25 RX ADMIN — PHENYLEPHRINE HYDROCHLORIDE 100 MCG: 10 INJECTION INTRAVENOUS at 08:01

## 2020-02-25 RX ADMIN — Medication 10 MG: at 07:45

## 2020-02-25 RX ADMIN — VECURONIUM BROMIDE 1 MG: 1 INJECTION, POWDER, LYOPHILIZED, FOR SOLUTION INTRAVENOUS at 09:00

## 2020-02-25 RX ADMIN — FENTANYL CITRATE 50 MCG: 50 INJECTION, SOLUTION INTRAMUSCULAR; INTRAVENOUS at 09:30

## 2020-02-25 RX ADMIN — FENTANYL CITRATE 100 MCG: 50 INJECTION, SOLUTION INTRAMUSCULAR; INTRAVENOUS at 07:39

## 2020-02-25 RX ADMIN — ACETAMINOPHEN 975 MG: 325 TABLET, FILM COATED ORAL at 21:10

## 2020-02-25 RX ADMIN — POTASSIUM CHLORIDE, DEXTROSE MONOHYDRATE AND SODIUM CHLORIDE: 150; 5; 450 INJECTION, SOLUTION INTRAVENOUS at 14:57

## 2020-02-25 RX ADMIN — TRANEXAMIC ACID 1 G: 100 INJECTION, SOLUTION INTRAVENOUS at 09:58

## 2020-02-25 RX ADMIN — PREGABALIN 150 MG: 150 CAPSULE ORAL at 06:52

## 2020-02-25 RX ADMIN — PHENYLEPHRINE HYDROCHLORIDE 100 MCG: 10 INJECTION INTRAVENOUS at 08:44

## 2020-02-25 RX ADMIN — PHENYLEPHRINE HYDROCHLORIDE 100 MCG: 10 INJECTION INTRAVENOUS at 09:54

## 2020-02-25 RX ADMIN — PROPOFOL 10 MG: 10 INJECTION, EMULSION INTRAVENOUS at 07:39

## 2020-02-25 RX ADMIN — FENTANYL CITRATE 25 MCG: 0.05 INJECTION, SOLUTION INTRAMUSCULAR; INTRAVENOUS at 10:57

## 2020-02-25 RX ADMIN — FENTANYL CITRATE 25 MCG: 0.05 INJECTION, SOLUTION INTRAMUSCULAR; INTRAVENOUS at 10:46

## 2020-02-25 RX ADMIN — PHENYLEPHRINE HYDROCHLORIDE 100 MCG: 10 INJECTION INTRAVENOUS at 08:06

## 2020-02-25 RX ADMIN — CEFAZOLIN SODIUM 2 G: 2 INJECTION, SOLUTION INTRAVENOUS at 07:47

## 2020-02-25 RX ADMIN — VECURONIUM BROMIDE 1 MG: 1 INJECTION, POWDER, LYOPHILIZED, FOR SOLUTION INTRAVENOUS at 09:24

## 2020-02-25 RX ADMIN — PHENYLEPHRINE HYDROCHLORIDE 100 MCG: 10 INJECTION INTRAVENOUS at 10:00

## 2020-02-25 RX ADMIN — CEFAZOLIN SODIUM 1 G: 2 INJECTION, SOLUTION INTRAVENOUS at 09:46

## 2020-02-25 RX ADMIN — ALBUMIN HUMAN: 0.05 INJECTION, SOLUTION INTRAVENOUS at 09:03

## 2020-02-25 RX ADMIN — POLYETHYLENE GLYCOL 3350 17 G: 17 POWDER, FOR SOLUTION ORAL at 14:13

## 2020-02-25 RX ADMIN — ONDANSETRON 4 MG: 2 INJECTION INTRAMUSCULAR; INTRAVENOUS at 09:54

## 2020-02-25 RX ADMIN — FENTANYL CITRATE 50 MCG: 50 INJECTION, SOLUTION INTRAMUSCULAR; INTRAVENOUS at 09:25

## 2020-02-25 RX ADMIN — SODIUM CHLORIDE, POTASSIUM CHLORIDE, SODIUM LACTATE AND CALCIUM CHLORIDE: 600; 310; 30; 20 INJECTION, SOLUTION INTRAVENOUS at 07:36

## 2020-02-25 RX ADMIN — Medication 10 MG: at 07:48

## 2020-02-25 RX ADMIN — PHENYLEPHRINE HYDROCHLORIDE 100 MCG: 10 INJECTION INTRAVENOUS at 08:38

## 2020-02-25 RX ADMIN — PHENYLEPHRINE HYDROCHLORIDE 100 MCG: 10 INJECTION INTRAVENOUS at 08:12

## 2020-02-25 RX ADMIN — SENNOSIDES AND DOCUSATE SODIUM 2 TABLET: 8.6; 5 TABLET ORAL at 21:10

## 2020-02-25 RX ADMIN — PHENYLEPHRINE HYDROCHLORIDE 150 MCG: 10 INJECTION INTRAVENOUS at 08:54

## 2020-02-25 RX ADMIN — ACETAMINOPHEN 975 MG: 325 TABLET, FILM COATED ORAL at 14:14

## 2020-02-25 RX ADMIN — FENTANYL CITRATE 50 MCG: 50 INJECTION, SOLUTION INTRAMUSCULAR; INTRAVENOUS at 09:44

## 2020-02-25 RX ADMIN — PHENYLEPHRINE HYDROCHLORIDE 0.25 MCG/KG/MIN: 10 INJECTION INTRAVENOUS at 08:47

## 2020-02-25 RX ADMIN — PHENYLEPHRINE HYDROCHLORIDE 50 MCG: 10 INJECTION INTRAVENOUS at 09:38

## 2020-02-25 ASSESSMENT — MIFFLIN-ST. JEOR: SCORE: 1544.07

## 2020-02-25 ASSESSMENT — ACTIVITIES OF DAILY LIVING (ADL)
ADLS_ACUITY_SCORE: 14
ADLS_ACUITY_SCORE: 14

## 2020-02-25 ASSESSMENT — LIFESTYLE VARIABLES: TOBACCO_USE: 0

## 2020-02-25 ASSESSMENT — COPD QUESTIONNAIRES: COPD: 0

## 2020-02-25 NOTE — OR NURSING
Report given to Silva NGUYEN. Patient transferred in stable condition with capno machine and one bag personal belongings. Eyeglasses and wallet with wife.

## 2020-02-25 NOTE — PROGRESS NOTES
Medication History Completed by Medication Scribe  Admission medication history interview status for the 2/25/2020  admission is complete. See EPIC admission navigator for prior to admission medications     Medication history sources: Patient, H&P and Care Everywhere  Medication history source reliability: Moderate  Adherence assessment: N/A Not Observed    Significant changes made to the medication list:  None      Additional medication history information:   Patient brought own home meds: REFRESH EYE DROPS    Medication reconciliation completed by provider prior to medication history? No    Time spent in this activity: 25 MINUTES      Prior to Admission medications    Medication Sig Last Dose Taking? Auth Provider   acetaminophen (TYLENOL) 500 MG tablet Take 1,000 mg by mouth daily as needed for mild pain (NECK/SHOULDER PAIN) 2/24/2020 at PM Yes Reported, Patient   allopurinol (ZYLOPRIM) 300 MG tablet Take 1 tablet (300 mg) by mouth daily 2/25/2020 at 0415 Yes Neville Nick MD   lisinopril (PRINIVIL/ZESTRIL) 10 MG tablet Take 1 tablet (10 mg) by mouth daily 2/25/2020 at 0415 Yes Neville Nick MD   polyvinyl alcohol-povidone PF (REFRESH) 1.4-0.6 % ophthalmic solution Place 1 drop into both eyes 3 times daily as needed  2/24/2020 at PM Yes Reported, Patient   sildenafil (VIAGRA) 50 MG tablet Take 50 mg by mouth daily as needed  UNKNOWN at PRN Yes Reported, Patient   simvastatin (ZOCOR) 20 MG tablet Take 1 tablet (20 mg) by mouth every morning 2/25/2020 at 0415 Yes Neville Nick MD   tamsulosin (FLOMAX) 0.4 MG capsule Take 1 capsule (0.4 mg) by mouth daily 2/25/2020 at 0415 Yes Neville Nick MD   timolol (TIMOPTIC) 0.5 % ophthalmic solution Place 1 drop into both eyes 2 times daily  2/25/2020 at 0415 Yes Unknown, Entered By History

## 2020-02-25 NOTE — ANESTHESIA PREPROCEDURE EVALUATION
"Anesthesia Pre-Procedure Evaluation    Patient: Greyson Haas   MRN: [id number] : 1940          Preoperative Diagnosis: Primary osteoarthritis of right hip [M16.11]    Procedure(s):  RIGHT TOTAL HIP ARTHROPLASTY DIRECT ANTERIOR APPROACH    Past Medical History:   Diagnosis Date      RENAL INSUFFICIENCY      Arthritis      Calculus of kidney ,      Chronic Kidney Disease, Stage III 2005     Chronic Tinnitus      Diverticulosis of colon (without mention of hemorrhage)      Essential hypertension, benign      Gastroesophageal reflux disease     minor     Gouty arthropathy 2008     Gouty tophi of other sites     R third toe     HYPERLIPIDEMIA      Hyperplastic Polyps Colon      LVH      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 Rubin     Nocturia     x 2-3     Pulmonary Nodule, stable      Tubular Adenoma 3/07     Unspecified glaucoma(365.9)     Followed by Dr. Monique     Past Surgical History:   Procedure Laterality Date     ARTHROPLASTY HIP Left 2017    LEFT TOTAL HIP ARTHROPLASTY(DEPUY)^;  Surgeon: Aurelio Hood MD;  Location: [facility] OR     C NONSPECIFIC PROCEDURE      cysto, R ureteral stent, ESWL     C NONSPECIFIC PROCEDURE  CHILD    TONSILLECTOMY     C NONSPECIFIC PROCEDURE  AGE 4    L heel osteomyelitis with \"bone scraping\"     DISCECTOMY, FUSION CERVICAL ANTERIOR TWO LEVELS, COMBINED N/A 2018    Procedure: C5-7 anterior cervical discectomy and fusion;  Surgeon: Mina Grove MD;  Location:  OR     OPTICAL TRACKING SYSTEM FUSION SPINE POSTERIOR LUMBAR TWO LEVELS N/A 2015    Procedure: OPTICAL TRACKING SYSTEM FUSION SPINE POSTERIOR LUMBAR TWO LEVELS;  Surgeon: Mina Grove MD;  Location:  OR     REVERSE ARTHROPLASTY SHOULDER Right 2020    Procedure: Right reverse total shoulder arthroplasty;  Surgeon: Manolo Short MD;  Location: " [facility] OR     SURGICAL HISTORY OF -   1991    B Shoulder surgeries     SURGICAL HISTORY OF -   1992    lumbar laminectomy     SURGICAL HISTORY OF -   1998    lumbar decompression    Dr. Pop     SURGICAL HISTORY OF -   9/06    L shoulder    Dr. Foley     SURGICAL HISTORY OF -   11/07    partial amputation R 3rd toe (tophus)  Dr. Neal     SURGICAL HISTORY OF -   2010    bilateral cataracts       SURGICAL HISTORY OF -   1/11    Mohs L chest, Dr. Anthony     SURGICAL HISTORY OF -   1/11    L TKA   Dr. Thomson       Anesthesia Evaluation     . Pt has had prior anesthetic. Type: General    No history of anesthetic complications          ROS/MED HX    ENT/Pulmonary:     (+)Past use , . .   (-) tobacco use, asthma, COPD and sleep apnea   Neurologic:  - neg neurologic ROS   (+)other neuro s/p cervical fusion    Cardiovascular: Comment: Interpretation Summary     1. The left ventricle is normal in structure, function and size. The visual  ejection fraction is estimated at 60%.  2. The right ventricle is normal in structure, function and size.  3. No valve disease.  4. The ascending aorta is Mildly dilated (3.9cm).     No previous echo for comparison.  _____________________________________________________________________________  __       (+) Dyslipidemia, hypertension----. : . . . :. .       METS/Exercise Tolerance:     Hematologic:         Musculoskeletal: Comment: Hx of cervical fusion  (+) arthritis,  -       GI/Hepatic:     (+) GERD       Renal/Genitourinary:     (+) chronic renal disease, type: CRI, Nephrolithiasis ,       Endo:      (-) Type I DM and Type II DM   Psychiatric:         Infectious Disease:         Malignancy:   (+) Malignancy History of GI          Other:                          Physical Exam  Normal systems: cardiovascular and pulmonary    Airway   Mallampati: II  TM distance: >3 FB  Neck ROM: full    Dental   (+) caps  Comment: Upper incisor cap and crown    Cardiovascular       Pulmonary             Lab  "Results   Component Value Date    WBC 5.6 02/18/2020    HGB 13.3 02/25/2020    HCT 42.6 02/18/2020     02/18/2020    CRP 2.6 03/17/2010    SED 6 06/21/2018     02/21/2020    POTASSIUM 4.7 02/21/2020    CHLORIDE 108 02/21/2020    CO2 27 02/21/2020    BUN 20 02/21/2020    CR 1.21 02/25/2020     (H) 02/21/2020    MIREYA 9.3 02/21/2020    ALBUMIN 3.5 12/16/2019    PROTTOTAL 6.9 12/16/2019    ALT 20 02/07/2020    AST 17 12/16/2019    ALKPHOS 67 12/16/2019    BILITOTAL 0.9 12/16/2019    PTT 27 01/25/2011    INR 2.2 (A) 10/02/2017    TSH 2.41 10/29/2019       Preop Vitals  BP Readings from Last 3 Encounters:   02/25/20 (!) 140/81   02/19/20 126/80   02/07/20 118/76    Pulse Readings from Last 3 Encounters:   02/19/20 63   02/07/20 68   01/09/20 71      Resp Readings from Last 3 Encounters:   02/25/20 18   02/19/20 16   02/07/20 16    SpO2 Readings from Last 3 Encounters:   02/25/20 96%   02/19/20 97%   02/07/20 97%      Temp Readings from Last 1 Encounters:   02/25/20 36.2  C (97.1  F) (Oral)    Ht Readings from Last 1 Encounters:   02/25/20 1.702 m (5' 7\")      Wt Readings from Last 1 Encounters:   02/25/20 87.5 kg (193 lb)    Estimated body mass index is 30.23 kg/m  as calculated from the following:    Height as of this encounter: 1.702 m (5' 7\").    Weight as of this encounter: 87.5 kg (193 lb).       Anesthesia Plan      History & Physical Review  History and physical reviewed and following examination; no interval change.    ASA Status:  2 .    NPO Status:  > 8 hours    Plan for General and ETT with Intravenous induction. Maintenance will be Inhalation.    PONV prophylaxis:  Ondansetron (or other 5HT-3) and Dexamethasone or Solumedrol  Soft bite on wake up      Postoperative Care  Postoperative pain management:  Multi-modal analgesia.  Plan for postoperative opioid use.    Consents  Anesthetic plan, risks, benefits and alternatives discussed with:  Patient..                 Red Melendez MD  "

## 2020-02-25 NOTE — ANESTHESIA POSTPROCEDURE EVALUATION
Patient: Greyson Haas    Procedure(s):  RIGHT TOTAL HIP ARTHROPLASTY DIRECT ANTERIOR APPROACH    Diagnosis:Primary osteoarthritis of right hip [M16.11]  Diagnosis Additional Information: No value filed.    Anesthesia Type:  General, ETT    Note:  Anesthesia Post Evaluation    Patient location during evaluation: PACU  Patient participation: Able to fully participate in evaluation  Level of consciousness: awake  Pain management: adequate  Airway patency: patent  Cardiovascular status: acceptable  Respiratory status: acceptable  Hydration status: acceptable  PONV: none             Last vitals:  Vitals:    02/25/20 1045 02/25/20 1100 02/25/20 1115   BP: 115/78 131/82 130/83   Pulse: 67 63 62   Resp: 14 20 10   Temp: 35.6  C (96.1  F) 35.6  C (96.1  F) 35.9  C (96.6  F)   SpO2: 99% 99% 97%         Electronically Signed By: Jonathan Horan MD  February 25, 2020  11:25 AM

## 2020-02-25 NOTE — CONSULTS
Hospitalist Consult Note  2/25/2020  12:45 PM      This is a 79 year old male with a past medical history of HTN on monotherapy and chronic renal insufficiency. The patient presents today for an elective right DOTTIE, anterior approach. Surgery was performed by Dr. Bhakta, under general anesthesia without any surgical complications noted. The patient remains with vital signs within normal limits, pain is well managed, and care thus far is as expected. I have reviewed the H&P, reviewed and reconciled prior to admission medications. I have also discussed this patient's care with the bedside nurse who denies any acute complications at this time.    Patient's hypertension is longstanding, and stable on monotherapy.  Postoperative BP near patient's baseline 110-140s/80s.  Chronic renal insufficiency -creatinine ~1.6, pre op creat had increased slightly from baseline, but normalized on day of surgery.   --Patient will be going home postop day 1, no changes to PTA regimen  --follow up with PCP in 1 week to recheck labs    Given the above, will defer formal consult. Routine post-operative cares, IVF, DVT prophylaxis, and pain management to orthopedic service. Recommend judicious use of narcotics given patient's age and risk for delirium. Will check some basic lab work in the AM to assess for acute blood loss anemia given surgery. PT and OT to assess per Ortho. Bowel regimen in place while on pain regimen. No changes to PTA medication regimen at discharge unless issues arise throughout stay.  Happy to be reconsulted in the future if medical issues arise. The Hospitalist service appreciates this consult.       KE Coles [facility]  Hospitalist Service  Pager: [phone] (7a - 6p)

## 2020-02-25 NOTE — PLAN OF CARE
PT:  Patient plan: Return home tomorrow with spouse  Current status: Pt is POD 0 R DOTTIE direct anterior approach. Currently pt requires SBA for bed mobility, SBA sit to/from stand with FWW, CGA for gait of 120 ft with FWW with dec in balance noted. Pt insistent that his R LE is much longer than the L. Pt demonstrates pain, dec strength, balance, activity tolerance and difficulty ambulating and transferring and would benefit from skilled PT services in order to improve this.  Anticipated status at discharge: Pt will need to be able to climb 2 stairs with no rail, AD and assist of 1 or less, transfer and ambulate with FWW and assist of 1 or less.

## 2020-02-25 NOTE — PROGRESS NOTES
02/25/20 1559   Quick Adds   Type of Visit Initial PT Evaluation   Living Environment   Lives With spouse   Living Arrangements house   Home Accessibility stairs to enter home;stairs within home   Number of Stairs, Main Entrance 2   Stair Railings, Main Entrance none   Number of Stairs, Within Home, Primary 10   Stair Railings, Within Home, Primary railing on right side (ascending)   Self-Care   Usual Activity Tolerance good   Current Activity Tolerance moderate   Regular Exercise Yes   Equipment Currently Used at Home none  (owns cane, FWW, raised toilet seat)   Functional Level Prior   Ambulation 0-->independent   Transferring 0-->independent   Fall history within last six months no   Which of the above functional risks had a recent onset or change? none   General Information   Onset of Illness/Injury or Date of Surgery - Date 02/25/20   Referring Physician Nabeel Del Castillo MD   Patient/Family Goals Statement Return home with spouse tomorrow   Pertinent History of Current Problem (include personal factors and/or comorbidities that impact the POC) Pt is POD 0 R DOTTIE direct anterior approach. PMH: monotherapy, renal insufficiency, multiple prior surgeries including L DOTTIE. History of dislocations in L DOTTIE.   Precautions/Limitations fall precautions   Weight-Bearing Status - LLE full weight-bearing   Weight-Bearing Status - RLE weight-bearing as tolerated   Cognitive Status Examination   Orientation orientation to person, place and time   Level of Consciousness alert   Follows Commands and Answers Questions 100% of the time;able to follow single-step instructions   Personal Safety and Judgment intact   Memory intact   Pain Assessment   Patient Currently in Pain Yes, see Vital Sign flowsheet  (4/10 at rest)   Posture    Posture Forward head position;Protracted shoulders   Range of Motion (ROM)   ROM Comment R hip limited by pain and surgery L LE WFL   Strength   Strength Comments R LE functionally weak, L LE WFL   Bed  "Mobility   Bed Mobility Comments Supine to sit with SBA'   Transfer Skills   Transfer Comments Sit to stand with fww and CGA   Gait   Gait Comments Pt ambulated 10 ft with FWW and CGA, feels like the R LE is longer   Balance   Balance Comments Balance dec wtih gait. Pt states he has poor balance at baseline.   Sensory Examination   Sensory Perception Comments Denies numbness or tingling   General Therapy Interventions   Planned Therapy Interventions bed mobility training;gait training;strengthening;transfer training   Clinical Impression   Criteria for Skilled Therapeutic Intervention yes, treatment indicated   PT Diagnosis Difficutly ambualting   Influenced by the following impairments Pain, dec strength, balance, activity tolerance   Functional limitations due to impairments Difficulty ambulating and transferring   Clinical Presentation Stable/Uncomplicated   Clinical Presentation Rationale medically stable poset-op   Clinical Decision Making (Complexity) Low complexity   Therapy Frequency 2x/day   Predicted Duration of Therapy Intervention (days/wks) 2 days   Anticipated Discharge Disposition Other (see comments)  (defer to ortho surgeon/PA)   Risk & Benefits of therapy have been explained Yes   Patient, Family & other staff in agreement with plan of care Yes   [facility] [employer] TM \"6 Clicks\"   2016, Trustees of [facility], under license to [employer].  All rights reserved.   6 Clicks Short Forms Basic Mobility Inpatient Short Form   [facility] AM-PAC  \"6 Clicks\" V.2 Basic Mobility Inpatient Short Form   1. Turning from your back to your side while in a flat bed without using bedrails? 4 - None   2. Moving from lying on your back to sitting on the side of a flat bed without using bedrails? 3 - A Little   3. Moving to and from a bed to a chair (including a wheelchair)? 3 - A Little   4. Standing up from a chair using your arms (e.g., wheelchair, or bedside chair)? 3 - A Little   5. To walk " in hospital room? 3 - A Little   6. Climbing 3-5 steps with a railing? 3 - A Little   Basic Mobility Raw Score (Score out of 24.Lower scores equate to lower levels of function) 19   Total Evaluation Time   Total Evaluation Time (Minutes) 15

## 2020-02-25 NOTE — BRIEF OP NOTE
[facility]    Brief Operative Note    Pre-operative diagnosis: Right hip OA  Post-operative diagnosis same  Procedure: RIGHT DIRECT ANTERIOR TOTAL HIP ARTHROPLASTY  Surgeon(s) and Role:     * Nabeel Skinner MD - Primary     * Triston Callahan PA-C - Assisting  Anesthesia: General   Estimated blood loss: 300 ml  Drains:  None  Specimens: None  Findings:  Advanced OA  Complications: None    Plan: DC home POD1 w/family assist.  DVT prophylaxis w/ASA 325mg QD x6wks.      Implants:   Implant Name Type Inv. Item Serial No.  Lot No. LRB No. Used   IMP CUP ACE PINNACLE 62MM 1217- Total Joint Component/Insert IMP CUP ACE PINNACLE 62MM 1217-  &amp;[facility]-C [id number] Right 1   IMP APEX HOLE ELIMINATOR HIP DEPUY DURALOC 1246- Metallic Hardware/Elizabeth IMP APEX HOLE ELIMINATOR HIP DEPUY DURALOC 1246-  &amp;[facility]-C [id number] Right 1   IMP SCR BONE CAN ACE 6.5X25MM 1217- Metallic Hardware/Elizabeth IMP SCR BONE CAN ACE 6.5X25MM 1217-  &amp;[facility] [id number] Right 1   IMP SCR BONE CAN ACE 6.5X35MM 1217- Metallic Hardware/Elizabeth IMP SCR BONE CAN ACE 6.5X35MM 1217-  [facility]amp[facility] [id number] Right 1   IMP SCR DEPUY PINNACLE CANC 6.5X15MM 1217- Metallic Hardware/Elizabeth IMP SCR DEPUY PINNACLE CANC 6.5X15MM 1217-  [facility]&amp;[facility] [id number] Right 1   PINNACLE ALTRX POLYETHYLENE ACETABULAR LINER +4 NEUTRAL 36MM ID 62MM OD    Depuy [id number] Right 1   IMP STEM FEM DEPUY ACTIS STD COLLAR TPR SZ 8MM 1010- Total Joint Component/Insert IMP STEM FEM DEPUY ACTIS STD COLLAR TPR SZ 8MM 1010-  J [id number] Right 1   IMP HEAD FEMORAL DEPUY CERAMIC 36MM +1.5MM 1365- Total Joint Component/Insert IMP HEAD FEMORAL DEPUY CERAMIC 36MM +1.5MM 1365-  J&amp;[facility]-C [id number] Right 1

## 2020-02-26 ENCOUNTER — APPOINTMENT (OUTPATIENT)
Dept: OCCUPATIONAL THERAPY | Facility: CLINIC | Age: 80
DRG: 470 | End: 2020-02-26
Attending: ORTHOPAEDIC SURGERY
Payer: COMMERCIAL

## 2020-02-26 ENCOUNTER — APPOINTMENT (OUTPATIENT)
Dept: PHYSICAL THERAPY | Facility: CLINIC | Age: 80
DRG: 470 | End: 2020-02-26
Attending: ORTHOPAEDIC SURGERY
Payer: COMMERCIAL

## 2020-02-26 VITALS
OXYGEN SATURATION: 95 % | RESPIRATION RATE: 14 BRPM | HEIGHT: 67 IN | HEART RATE: 73 BPM | TEMPERATURE: 99.3 F | WEIGHT: 193 LBS | SYSTOLIC BLOOD PRESSURE: 110 MMHG | DIASTOLIC BLOOD PRESSURE: 59 MMHG | BODY MASS INDEX: 30.29 KG/M2

## 2020-02-26 LAB
GLUCOSE SERPL-MCNC: 119 MG/DL (ref 70–99)
HGB BLD-MCNC: 11.6 G/DL (ref 13.3–17.7)

## 2020-02-26 PROCEDURE — 82947 ASSAY GLUCOSE BLOOD QUANT: CPT | Performed by: ORTHOPAEDIC SURGERY

## 2020-02-26 PROCEDURE — 97116 GAIT TRAINING THERAPY: CPT | Mod: GP | Performed by: PHYSICAL THERAPY ASSISTANT

## 2020-02-26 PROCEDURE — 97530 THERAPEUTIC ACTIVITIES: CPT | Mod: GP

## 2020-02-26 PROCEDURE — 25000132 ZZH RX MED GY IP 250 OP 250 PS 637: Mod: GY | Performed by: ORTHOPAEDIC SURGERY

## 2020-02-26 PROCEDURE — 25000132 ZZH RX MED GY IP 250 OP 250 PS 637: Mod: GY | Performed by: NURSE PRACTITIONER

## 2020-02-26 PROCEDURE — 25000128 H RX IP 250 OP 636: Performed by: ORTHOPAEDIC SURGERY

## 2020-02-26 PROCEDURE — 97110 THERAPEUTIC EXERCISES: CPT | Mod: GP | Performed by: PHYSICAL THERAPY ASSISTANT

## 2020-02-26 PROCEDURE — 97116 GAIT TRAINING THERAPY: CPT | Mod: GP

## 2020-02-26 PROCEDURE — 97535 SELF CARE MNGMENT TRAINING: CPT | Mod: GO

## 2020-02-26 PROCEDURE — 97165 OT EVAL LOW COMPLEX 30 MIN: CPT | Mod: GO

## 2020-02-26 PROCEDURE — 36415 COLL VENOUS BLD VENIPUNCTURE: CPT | Performed by: ORTHOPAEDIC SURGERY

## 2020-02-26 PROCEDURE — 85018 HEMOGLOBIN: CPT | Performed by: ORTHOPAEDIC SURGERY

## 2020-02-26 RX ORDER — OXYCODONE HYDROCHLORIDE 5 MG/1
5-10 TABLET ORAL EVERY 6 HOURS PRN
Qty: 5 TABLET | Refills: 0 | Status: SHIPPED | OUTPATIENT
Start: 2020-02-26 | End: 2020-02-29

## 2020-02-26 RX ORDER — ASPIRIN 325 MG
325 TABLET ORAL DAILY
Qty: 42 TABLET | Refills: 0 | Status: SHIPPED | OUTPATIENT
Start: 2020-02-26 | End: 2020-04-08

## 2020-02-26 RX ADMIN — OXYCODONE HYDROCHLORIDE 10 MG: 5 TABLET ORAL at 14:02

## 2020-02-26 RX ADMIN — TAMSULOSIN HYDROCHLORIDE 0.4 MG: 0.4 CAPSULE ORAL at 08:45

## 2020-02-26 RX ADMIN — OXYCODONE HYDROCHLORIDE 10 MG: 5 TABLET ORAL at 06:42

## 2020-02-26 RX ADMIN — TIMOLOL MALEATE 1 DROP: 5 SOLUTION/ DROPS OPHTHALMIC at 08:45

## 2020-02-26 RX ADMIN — OXYCODONE HYDROCHLORIDE 5 MG: 5 TABLET ORAL at 00:21

## 2020-02-26 RX ADMIN — CEFAZOLIN SODIUM 2 G: 2 INJECTION, SOLUTION INTRAVENOUS at 02:18

## 2020-02-26 RX ADMIN — OXYCODONE HYDROCHLORIDE 10 MG: 5 TABLET ORAL at 03:40

## 2020-02-26 RX ADMIN — ACETAMINOPHEN 975 MG: 325 TABLET, FILM COATED ORAL at 06:42

## 2020-02-26 RX ADMIN — ACETAMINOPHEN 975 MG: 325 TABLET, FILM COATED ORAL at 14:02

## 2020-02-26 ASSESSMENT — ACTIVITIES OF DAILY LIVING (ADL)
ADLS_ACUITY_SCORE: 14
ADLS_ACUITY_SCORE: 16
PREVIOUS_RESPONSIBILITIES: DRIVING;FINANCES;MEDICATION MANAGEMENT
ADLS_ACUITY_SCORE: 16
ADLS_ACUITY_SCORE: 14
ADLS_ACUITY_SCORE: 14

## 2020-02-26 NOTE — PLAN OF CARE
Pt A/Ox4. VSS on 1 L NC. Pain managed with PRN oxy and scheduled tylenol. Dressing CDI. DTV. Will continue to monitor.

## 2020-02-26 NOTE — PLAN OF CARE
Pt A/O x4. VSS on 2L O2. CMS intact. Up with x1. Regular diet. Denies pain. Dressing CDI. Resting between cares. Will continue to monitor.

## 2020-02-26 NOTE — PLAN OF CARE
Patient plan: home today with his wife  Current status: Pt is agreeable to therapy. Some c/o pain and felt it was time for pain medication. Pt's wife was not present for therapy today. Bed mobility Min A cueing for safe hand placement, pt demonstrated guarding of RUE due to previous shoulder surgery. Performed stairs 3 steps x 2 with SEC and railing on L with Min A, LOB with turning at the top on second trial requiring Min A to stabilize. Ambulated 150' x 1 with Min A and FWW. Pt demonstrated some shakiness in LLE with gait and stairs.   Anticipated status at discharge: Pt will need to be able to climb 2 stairs with no rail, AD and assist of 1 or less, transfers and ambulation with FWW and assist of 1 or less.

## 2020-02-26 NOTE — PLAN OF CARE
Patient plan: Home  Current status: OT orders received, initial eval complete, treatment initiated. Pt admitted for R DOTTIE- anterior approach. Pt lives w/his spouse in a one level home w/ 2 SALVATORE.. Pt was previously IND for ADL's.   Pt SBA for Sit<>stand EOB, mod-I for toilet transfer. Pt donned/doffed LE cothing w/min A initially, then mod I with education of ae. Pt has sock aid and reacher at home. IND for UE dressing. SBA- Mod I for g/h in stance at sink. No further IP OT needs. Orders complete    Anticipated status at discharge: Pt will benefit from assist of spouse for LB dressing as needed     Occupational Therapy Discharge Summary    Reason for therapy discharge:    All goals and outcomes met, no further needs identified.    Progress towards therapy goal(s). See goals on Care Plan [facility] electronic health record for goal details.  Goals met    Therapy recommendation(s):    No further therapy is recommended.

## 2020-02-26 NOTE — PROGRESS NOTES
Pt declined all scheduled PO meds this morning except for flomax. He states that he will take all the medications when he gets home this evening assuming discharge this afternoon. Pt also states that he will not need any pain medications at discharge as he has left over medication from his previous surgery in January. Pt still due to void at this time.

## 2020-02-26 NOTE — PLAN OF CARE
VSS. Dressing to hip CDI, CMS intact to BLEs. Gave PRN pain medication x1. Pt declined pain meds prior to PT. Pt was due to void, able to go but low output. Bladder scanned for 120cc PVR. Pt denies pain related to bladder. Plan for possible discharge today.

## 2020-02-26 NOTE — PROGRESS NOTES
"Orthopedic Surgery  2/26/2020  POD #1    S: Patient voices no complaints today. Ambulating. Pain controlled.    O: Blood pressure (!) 82/53, pulse 67, temperature 98.7  F (37.1  C), temperature source Oral, resp. rate 13, height 1.702 m (5' 7\"), weight 87.5 kg (193 lb), SpO2 94 %.  Lab Results   Component Value Date    HGB 11.6 02/26/2020     Neurovascularly intact.  Calves are negative bilaterally, both soft and nontender.  The dressing is C/D/I.    A: Mr. Haas is doing well status post right total hip arthroplasty.    P:   1. No dressing change, patient to remove outer dressing on POD3, leaving mesh adhesive in place.  2. Mobilize and continue physical therapy.   3. Discharge to home today.  -  Triston Callahan PA-C  [phone]    As above,    [place]  "

## 2020-02-26 NOTE — PLAN OF CARE
Patient plan: home today with his wife  Current status: Patient is POD #1 R direct anterior THR, pain reported to be well-controlled this morning. Patient initially required min assist for supine to sit bed mobility, following practice patient able to perform with SBA. Sit <> stand transfers with SBA, cueing initially for safe hand placements with good return demo following instruction. Patient ambulated 180' x 2, 50' x 1 with FWW and CGA due to impaired balance. Patient navigated up/down 3 steps x 3 reps, 1 railing on left and SEC, min assist +1. Patient left in supine at end of session with 3/10 pain in R hip, denied further needs to address pain besides rest.  Anticipated status at discharge: Pt will need to be able to climb 2 stairs with no rail, AD and assist of 1 or less, transfers and ambulation with FWW and assist of 1 or less.

## 2020-02-26 NOTE — PROGRESS NOTES
02/26/20 0900   Quick Adds   Type of Visit Initial Occupational Therapy Evaluation   Living Environment   Lives With spouse   Living Arrangements house   Home Accessibility stairs to enter home   Number of Stairs, Main Entrance 2   Stair Railings, Main Entrance none   Number of Stairs, Within Home, Primary none   Functional Level   Ambulation 1-->assistive equipment   Transferring 1-->assistive equipment   Toileting 0-->independent   Bathing 0-->independent   Dressing 0-->independent   Eating 0-->independent   Communication 0-->understands/communicates without difficulty   Swallowing 0-->swallows foods/liquids without difficulty   Cognition 0 - no cognition issues reported   Fall history within last six months no   Which of the above functional risks had a recent onset or change? ambulation;transferring;dressing   Prior Functional Level Comment Pt was previously IND w/all ADL's Useda cane at baseline for mobility   General Information   Onset of Illness/Injury or Date of Surgery - Date 02/25/20   Referring Physician Nabeel Juan MD   Patient/Family Goals Statement Home   Additional Occupational Profile Info/Pertinent History of Current Problem R DOTTIE- anterior approach   Precautions/Limitations no known precautions/limitations   Weight-Bearing Status - RLE weight-bearing as tolerated   Cognitive Status Examination   Orientation orientation to person, place and time   Level of Consciousness alert   Sensory Examination   Sensory Quick Adds No deficits were identified   Pain Assessment   Patient Currently in Pain Yes, see Vital Sign flowsheet   Range of Motion (ROM)   ROM Comment WFL   Strength   Strength Comments WFL    Hand Strength   Hand Strength Comments Grossly equal in BUE   Transfer Skill: Sit to Stand   Level of [place]: Sit/Stand stand-by assist   Physical Assist/Nonphysical Assist: Sit/Stand supervision   Transfer Skill: Sit to Stand weight-bearing as tolerated   Assistive Device for Transfer:  "Sit/Stand rolling walker   Transfer Skill: Toilet Transfer   Level of [place]: Toilet stand-by assist   Physical Assist/Nonphysical Assist: Toilet supervision   Weight-Bearing Restrictions: Toilet weight-bearing as tolerated   Assistive Device rolling walker;grab bars   Upper Body Dressing   Level of [place]: Dress Upper Body independent   Lower Body Dressing   Level of [place]: Dress Lower Body minimum assist (75% patients effort)   Physical Assist/Nonphysical Assist: Dress Lower Body supervision   Toileting   Level of [place]: Toilet independent   Assistive Device grab bars;rolling walker   Instrumental Activities of Daily Living (IADL)   Previous Responsibilities driving;finances;medication management   Activities of Daily Living Analysis   Impairments Contributing to Impaired Activities of Daily Living balance impaired;pain;strength decreased   General Therapy Interventions   Planned Therapy Interventions ADL retraining   Clinical Impression   Criteria for Skilled Therapeutic Interventions Met yes, treatment indicated   OT Diagnosis Impaired functional mobility, decreased IND w/aDL   Influenced by the following impairments decreased strength, pain, impaired balance   Assessment of Occupational Performance 1-3 Performance Deficits   Identified Performance Deficits ADL, functional mobility   Clinical Decision Making (Complexity) Low complexity   Therapy Frequency Daily   Predicted Duration of Therapy Intervention (days/wks) eval and one treat   Anticipated Equipment Needs at Discharge sock aide;reacher   Anticipated Discharge Disposition Home with Assist   Risks and Benefits of Treatment have been explained. Yes   Patient, Family & other staff in agreement with plan of care Yes   [facility] TM \"6 Clicks\"   2016, Trustees of [facility], under license to [employer].  All rights reserved.   6 Clicks Short Forms Daily Activity Inpatient Short Form   [facility] AM-PAC  " "\"6 Clicks\" Daily Activity Inpatient Short Form   1. Putting on and taking off regular lower body clothing? 3 - A Little   2. Bathing (including washing, rinsing, drying)? 3 - A Little   3. Toileting, which includes using toilet, bedpan or urinal? 4 - None   4. Putting on and taking off regular upper body clothing? 4 - None   5. Taking care of personal grooming such as brushing teeth? 4 - None   6. Eating meals? 4 - None   Daily Activity Raw Score (Score out of 24.Lower scores equate to lower levels of function) 22   Total Evaluation Time   Total Evaluation Time (Minutes) 10     "

## 2020-02-27 ENCOUNTER — TELEPHONE (OUTPATIENT)
Dept: INTERNAL MEDICINE | Facility: CLINIC | Age: 80
End: 2020-02-27

## 2020-02-28 NOTE — TELEPHONE ENCOUNTER
ED / Discharge Outreach Protocol    Patient Contact    Attempt # 2      Was call answered?  No.  Left message on voicemail with information to call me back.    Erica LYNNEN, RN, PHN

## 2020-02-28 NOTE — TELEPHONE ENCOUNTER
"Hospital/TCU/ED for chronic condition Discharge Protocol    \"Hi, my name is Tami Bentley RN, a registered nurse, and I am calling from [facility].  I am calling to follow up and see how things are going for you after your recent emergency visit/hospital/TCU stay.\"    Tell me how you are doing now that you are home?\" fine- hasd a hip replacement      Discharge Instructions    \"Let's review your discharge instructions.  What is/are the follow-up recommendations?  Pt. Response: follow up with surgeon    \"Has an appointment with your primary care provider been scheduled?\"   No (schedule appointment)- patient is to follow with surgeon not pcp for follow up.    \"When you see the provider, I would recommend that you bring your medications with you.\"    Medications    \"Tell me what changed about your medicines when you discharged?\"    Changes to chronic meds?    0-1    \"What questions do you have about your medications?\"    None     New diagnoses of heart failure, COPD, diabetes, or MI?    No              Post Discharge Medication Reconciliation Status: discharge medications reconciled, continue medications without change.    Was MTM referral placed (*Make sure to put transitions as reason for referral)?   No    Call Summary    \"What questions or concerns do you have about your recent visit and your follow-up care?\"     none    \"If you have questions or things don't continue to improve, we encourage you contact us through the main clinic number (give number).  Even if the clinic is not open, triage nurses are available 24/7 to help you.     We would like you to know that our clinic has extended hours (provide information).  We also have urgent care (provide details on closest location and hours/contact info)\"      \"Thank you for your time and take care!\"             "

## 2020-03-03 NOTE — DISCHARGE SUMMARY
"Discharge Summary    Greyson Haas MRN# [id number]   YOB: 1940 Age: 79 year old     Date of Admission: 2/25/2020    Date of Discharge: 2/26/2020    Reason for Admission: Greyson Haas is an 79 year old male who was admitted to the hospital following surgery.    Preoperative Diagnosis: Primary osteoarthritis of right hip [M16.11]    Postoperative Diagnosis: Primary osteoarthritis of right hip [M16.11]    Procedure Completed:  Right total hip arthroplasty    Hospital Course:  Mr. Haas was admitted and underwent the above procedure. The patient tolerated the procedure well. There were no complications. Following surgery he was admitted to the floor.  During his stay he did not require any blood transfusions. His pain was controlled with oral pain medication.  During his stay he progressed well in physical therapy and all the therapy goals were met.     Discharge Physical Exam:  /59 (BP Location: Right arm)   Pulse 73   Temp 99.3  F (37.4  C) (Oral)   Resp 14   Ht 1.702 m (5' 7\")   Wt 87.5 kg (193 lb)   SpO2 95%   BMI 30.23 kg/m    Neurovascularly intact, distal pulses present bilaterally.  Calves are negative bilaterally, both soft and nontender.    Assessment: Mr. Haas is stable and doing well status post Right total hip arthroplasty.    Plan: We will discharge him home on analgesics and deep venous thrombosis prophylaxis.  He will follow-up with me approximately 2 weeks from surgery.  He may call [phone] to schedule an appointment.    Meds:   Greyson Haas   Home Medication Instructions ALDAIR:[id number]    Printed on:03/03/20 6879   Medication Information                      acetaminophen (TYLENOL) 500 MG tablet  Take 1,000 mg by mouth daily as needed for mild pain (NECK/SHOULDER PAIN)             allopurinol (ZYLOPRIM) 300 MG tablet  Take 1 tablet (300 mg) by mouth daily             aspirin (ASA) 325 MG tablet  Take 1 tablet (325 mg) by mouth daily           "   lisinopril (PRINIVIL/ZESTRIL) 10 MG tablet  Take 1 tablet (10 mg) by mouth daily             polyvinyl alcohol-povidone PF (REFRESH) 1.4-0.6 % ophthalmic solution  Place 1 drop into both eyes 3 times daily as needed              sildenafil (VIAGRA) 50 MG tablet  Take 50 mg by mouth daily as needed              simvastatin (ZOCOR) 20 MG tablet  Take 1 tablet (20 mg) by mouth every morning             tamsulosin (FLOMAX) 0.4 MG capsule  Take 1 capsule (0.4 mg) by mouth daily             timolol (TIMOPTIC) 0.5 % ophthalmic solution  Place 1 drop into both eyes 2 times daily

## 2020-03-09 NOTE — OP NOTE
DATE OF SERVICE:  2/25/2020    SURGEON  PETTY VALENCIA M.D.    ASSISTANT  Mikhail Callahan PA-C    PREOPERATIVE DIAGNOSIS   Right hip osteoarthritis, failed to respond to conservative management.    POSTOPERATIVE DIAGNOSIS   Right hip osteoarthritis, failed to respond to conservative management.    TITLE OF PROCEDURE   Right total hip arthroplasty, Depuy uncemented components, direct anterior approach.    PROCEDURE  The patient was brought to the operating room and after satisfactory anesthesia was placed on the [place] table. The right  lower extremity was then prepped and draped in the usual sterile fashion. Image intensification was utilized during the case for component positioning as well as leg length assessment. An incision was made just lateral to the ASIS and coursing toward the proximal femur. Dissection was carried down to the TFL. The fascia overlying the TFL was incised and dissection was carried down to the interval between TFL and rectus femoris. This was identified. A lateral cobra retractor was placed followed by a medial cobra around the femoral neck. The leash vessels, anterior circumflex, was identified and was coagulated. The underlying fascia was released. Dissection was carried down to the hip capsule. Capsule was identified and a capsulotomy was performed in a T-type fashion first along the intertrochanteric line and then secondarily up along the femoral neck and head, finally completed by releasing along the saddle of the trochanter. The capsular edges were tagged for later repair. The hip was then externally rotated and medial capsular release was performed such that the lesser trochanter could be palpated. Following this, the femoral neck was osteotomized as per the preoperative plan. The femoral head was removed with a corkscrew without difficulty. The acetabulum was exposed and was reamed sequentially up to 62 mm. This was reamed under both direct visualization as well as the aid of image  intensification. A 62 mm [place] cup was impacted into place in approximately 40 to 45 degrees of abduction, and 15 degrees of anteversion. Three screws were placed and this gave excellent fixation. The 36 mm +4mm lateralized neutral liner was impacted into place.     Attention was then directed to the femur. The hook was placed underneath the proximal aspect of the femur between the trochanteric ridge and gluteus kat. The hip was then brought into external rotation, adduction, and extension. The femur was then carefully elevated using the appropriate releases off the inside of the greater trochanter. Once the femur was elevated, a starter broach was placed followed by sequential broaches. The broaches were performed up to size 8 actis, which gave excellent torsinal as well as axial stability. Trial reduction was performed with a standard offset +1.5mm head. The hip was reduced and with hip reduction the combined anteversion looked excellent. The hip was brought into full extension and external rotation. There was no evidence of instability. As well, x-rays were printed and compared with the opposite side and found to have good length and restoration of offset.  It was felt that an additional stem size could not be placed.   The hip was then dislocated and then the proximal femur was then brought back up into the proximal aspect of the wound. The real size 8 actis stem, standard offset was impacted into place. Again this gave excellent torsion as well as axial stability. The real +1.5mm ceramic biolox head was impacted into place and the hip was reduced again. The image intensification confirmed excellent position of the components.   A 3 minute dilute betadine soak was performed.  The wound was thoroughly irrigated. One gram of vancomycin powder was placed deep and superficial prior to closure.  The capsule was closed with interrupted 0 Vicryl suture and tissues infiltrated with toradol/marcaine mixture. The  tensor fascia was closed with a running 0 Stratafix suture. The subcutaneous layer was closed with interrupted 2-0 Vicryl, 2-0 Stratafix, and 3-0 subcuticular monocryl was placed followed by a mesh dressing with skin glue. Sterile dressing was applied. The patient left the operating room in satisfactory condition. Patient received 1 gm of tranexamic acid pre-op and at closure.    A skilled first assistant was necessary for this procedure for assistance with patient positioning, prepping, draping, surgical visualization, performance of the repair, wound closure, and application of the dressing.  The assistant was present for the entire procedure.

## 2020-03-26 ENCOUNTER — TELEPHONE (OUTPATIENT)
Dept: INTERNAL MEDICINE | Facility: CLINIC | Age: 80
End: 2020-03-26

## 2020-03-26 NOTE — TELEPHONE ENCOUNTER
Spoke with patient, reviewed disability form questions, and health history. Filled out form to best of our ability. Form signed by Dr. Gentile in Dr. Nick's absence. Completed form copied for scanning and original sent to pt per his request. Yris Acevedo, CMA

## 2020-03-26 NOTE — TELEPHONE ENCOUNTER
Reason for Call:  Form, our goal is to have forms completed with 72 hours, however, some forms may require a visit or additional information.    Type of letter, form or note:  disability    Who is the form from?: application for [employer] (if other please explain)    Where did the form come from: we have forms reqested by pt CRM request    What clinic location was the form placed at?: Internal Medicine    Where the form was placed: Given to MA/RN    What number is listed as a contact on the form?:        Additional comments: soon    Call taken on 3/26/2020 at 1:56 PM by Jaqueline Thayer

## 2020-04-03 DIAGNOSIS — E78.5 HYPERLIPIDEMIA LDL GOAL <130: ICD-10-CM

## 2020-04-03 RX ORDER — SIMVASTATIN 20 MG
20 TABLET ORAL EVERY MORNING
Qty: 90 TABLET | Refills: 2 | Status: SHIPPED | OUTPATIENT
Start: 2020-04-03 | End: 2021-02-05

## 2020-04-03 NOTE — TELEPHONE ENCOUNTER
"Requested Prescriptions   Pending Prescriptions Disp Refills     simvastatin (ZOCOR) 20 MG tablet [Pharmacy Med Name: SIMVASTATIN 20 MG Tablet] 90 tablet 0     Sig: TAKE 1 TABLET (20 MG) BY MOUTH EVERY MORNING       Statins Protocol Passed - 4/3/2020  1:38 PM        Passed - LDL on file in past 12 months     Recent Labs   Lab Test 02/07/20  1049   *             Passed - No abnormal creatine kinase in past 12 months     Recent Labs   Lab Test 04/23/15  1139                   Passed - Recent (12 mo) or future (30 days) visit within the authorizing provider's specialty     Patient has had an office visit with the authorizing provider or a provider within the authorizing providers department within the previous 12 mos or has a future within next 30 days. See \"Patient Info\" tab in inbasket, or \"Choose Columns\" in Meds & Orders section of the refill encounter.              Passed - Medication is active on med list        Passed - Patient is age 18 or older             "

## 2020-04-07 ENCOUNTER — TELEPHONE (OUTPATIENT)
Dept: PHYSICAL THERAPY | Facility: CLINIC | Age: 80
End: 2020-04-07

## 2020-04-07 NOTE — TELEPHONE ENCOUNTER
Courtesy call during COVID.  Spoke with pt and he does have concerns with limited strength but does not want to pursue virtual visit at this time.  [facility] # given.

## 2020-06-03 ENCOUNTER — HOSPITAL ENCOUNTER (OUTPATIENT)
Dept: CARDIOLOGY | Facility: CLINIC | Age: 80
Discharge: HOME | End: 2020-06-03
Attending: INTERNAL MEDICINE | Admitting: INTERNAL MEDICINE
Payer: COMMERCIAL

## 2020-06-03 DIAGNOSIS — I77.810 ASCENDING AORTA DILATATION (H): ICD-10-CM

## 2020-06-03 PROCEDURE — 93306 TTE W/DOPPLER COMPLETE: CPT

## 2020-06-03 PROCEDURE — 93306 TTE W/DOPPLER COMPLETE: CPT | Mod: 26 | Performed by: INTERNAL MEDICINE

## 2020-06-11 ENCOUNTER — TRANSFERRED RECORDS (OUTPATIENT)
Dept: HEALTH INFORMATION MANAGEMENT | Facility: CLINIC | Age: 80
End: 2020-06-11

## 2020-06-16 ENCOUNTER — MYC MEDICAL ADVICE (OUTPATIENT)
Dept: INTERNAL MEDICINE | Facility: CLINIC | Age: 80
End: 2020-06-16

## 2020-06-17 NOTE — TELEPHONE ENCOUNTER
Good news.  echo is stable, and hasn't changed - recheck in 2 years.  Remind him about [facility] messaging

## 2020-08-12 ENCOUNTER — TRANSFERRED RECORDS (OUTPATIENT)
Dept: HEALTH INFORMATION MANAGEMENT | Facility: CLINIC | Age: 80
End: 2020-08-12

## 2020-08-25 DIAGNOSIS — I10 ESSENTIAL HYPERTENSION: Primary | ICD-10-CM

## 2020-08-25 DIAGNOSIS — M10.9 GOUT OF MULTIPLE SITES, UNSPECIFIED CAUSE, UNSPECIFIED CHRONICITY: ICD-10-CM

## 2020-08-25 DIAGNOSIS — E78.5 HYPERLIPIDEMIA LDL GOAL <130: ICD-10-CM

## 2020-08-26 RX ORDER — ALLOPURINOL 300 MG/1
1 TABLET ORAL DAILY
Qty: 90 TABLET | Refills: 3 | Status: SHIPPED | OUTPATIENT
Start: 2020-08-26 | End: 2021-06-09

## 2020-11-03 ENCOUNTER — OFFICE VISIT (OUTPATIENT)
Dept: INTERNAL MEDICINE | Facility: CLINIC | Age: 80
End: 2020-11-03
Payer: COMMERCIAL

## 2020-11-03 VITALS
OXYGEN SATURATION: 97 % | TEMPERATURE: 97.5 F | HEART RATE: 70 BPM | HEIGHT: 67 IN | BODY MASS INDEX: 29.73 KG/M2 | SYSTOLIC BLOOD PRESSURE: 110 MMHG | RESPIRATION RATE: 15 BRPM | DIASTOLIC BLOOD PRESSURE: 72 MMHG | WEIGHT: 189.4 LBS

## 2020-11-03 DIAGNOSIS — Z96.641 STATUS POST RIGHT HIP REPLACEMENT: ICD-10-CM

## 2020-11-03 DIAGNOSIS — R07.89 CHEST WALL PAIN: Primary | ICD-10-CM

## 2020-11-03 PROCEDURE — 99213 OFFICE O/P EST LOW 20 MIN: CPT | Performed by: INTERNAL MEDICINE

## 2020-11-03 ASSESSMENT — MIFFLIN-ST. JEOR: SCORE: 1527.74

## 2020-11-03 NOTE — PROGRESS NOTES
Subjective     Greyson Haas is a 79 year old male who presents to clinic today for the following health issues:    HPI       Patient was initially contacted because clinic scheduling note made comment of cough and productive sputum.  Patient was advised that based on the symptoms of more likely will benefit from a virtual visit due to concern for Covid.  Patient became very upset and states that that was not the reason why he was coming in and he refused a virtual visit and wanted to be seen.    Patient states that about 3 weeks ago and had a big snowstorm he was out shoveling snow and using his snowblower.  Immediately after its use he developed pain and discomfort in the  left lower lateral chest wall and upper left abdominal wall.  Symptoms were apparently quite severe about a week to 10 days ago and have now been improving quite a bit.  Reports no other associated symptoms although he states that about 3 weeks ago he had little bit of phlegm that was draining down the back of his throat but this is since resolved.  His symptoms tend to be worse with movement and not associated with a pleuritic component or cough, hemoptysis or fever.    Musculoskeletal problem/pain      Duration: 3 weeks     Description  Location: left side     Intensity:  Initially severe, now more mild     Accompanying signs and symptoms: sharp pain in the left side, worse in the mornings, worse with movement     History  Previous similar problem: no   Previous evaluation:  none    Precipitating or alleviating factors:  Trauma or overuse: no   Aggravating factors include: lifting leg, getting out of bed     Therapies tried and outcome: tylenol- helps        Review of Systems   CONSTITUTIONAL: NEGATIVE for fever, chills, change in weight  EYES: NEGATIVE for vision changes or irritation  ENT/MOUTH: NEGATIVE for ear, mouth  RESP: NEGATIVE for significant cough or SOB  GI: NEGATIVE for nausea, abdominal pain, heartburn, or change in bowel  "habits  : NEGATIVE for frequency, dysuria, or hematuria  NEURO: NEGATIVE for weakness, dizziness or paresthesias  HEME: NEGATIVE for bleeding problems  PSYCHIATRIC: NEGATIVE for changes in mood or affect      Objective    /72   Pulse 70   Temp 97.5  F (36.4  C) (Temporal)   Resp 15   Ht 1.702 m (5' 7\")   Wt 85.9 kg (189 lb 6.4 oz)   SpO2 97%   BMI 29.66 kg/m    Body mass index is 29.66 kg/m .  Physical Exam   GENERAL: alert and no distress using a cane  RESP: lungs clear to auscultation - no rales, rhonchi or wheezes  Mild lower left lateral chest wall pain.  CV: regular rate and rhythm, normal S1 S2, no S3 or S4, no click or rub, no peripheral edema and peripheral pulses strong  MS: no gross musculoskeletal defects noted with mild rotation outward right foot post surgery with ambulation  NEURO: Normal strength and tone, mentation intact and speech normal  PSYCH: mentation appears normal, affect normal/bright          Assessment & Plan       (R07.89) Chest wall pain  (primary encounter diagnosis)  Comment:   Plan: The symptoms appear to be more musculoskeletal in origin based on history as well as exam.  Currently improving.  We discussed chest x-ray for reassurance but he is not interested.  I suspect he should see continued improvement and we will recommend observation at this point.    Status post right hip replacement  Patient does have a slight outward right foot deviation which is post total hip arthroplasty.  He was advised that he probably should follow-up and see his orthopedist to discuss if this is considered to be a normal finding post surgery.       BMI:   Estimated body mass index is 30.23 kg/m  as calculated from the following:    Height as of 2/25/20: 1.702 m (5' 7\").    Weight as of 2/25/20: 87.5 kg (193 lb).          See Patient Instructions    Return for if symptoms recur or worsen.    Saran Martinez MD  [facility]    "

## 2020-11-05 DIAGNOSIS — N40.1 BENIGN NON-NODULAR PROSTATIC HYPERPLASIA WITH LOWER URINARY TRACT SYMPTOMS: ICD-10-CM

## 2020-11-05 RX ORDER — TAMSULOSIN HYDROCHLORIDE 0.4 MG/1
CAPSULE ORAL
Qty: 90 CAPSULE | Refills: 3 | Status: SHIPPED | OUTPATIENT
Start: 2020-11-05 | End: 2021-04-27

## 2020-11-30 LAB
AST SERPL-CCNC: 24 U/L (ref 5–34)
CREAT SERPL-MCNC: 1.4 MG/DL (ref 0.7–1.2)
GLUCOSE SERPL-MCNC: 94 MG/DL (ref 70–105)
POTASSIUM SERPL-SCNC: 4.7 MMOL/L (ref 3.5–5)
TSH SERPL-ACNC: 2.42 UIU/ML (ref 0.35–4.94)

## 2020-12-07 ENCOUNTER — TELEPHONE (OUTPATIENT)
Dept: INTERNAL MEDICINE | Facility: CLINIC | Age: 80
End: 2020-12-07

## 2020-12-07 NOTE — TELEPHONE ENCOUNTER
Unless patient has another reason for this, we should assume this is a reaction to the vaccine, and he should avoid further shingles vaccinations.    I will put this on his chart as an allergic reaction - ask him to let us know if he feels differently.

## 2020-12-07 NOTE — TELEPHONE ENCOUNTER
Patient called reporting he got a Shingrix vaccine on Monday at [facility]. Two days ago he got 2 red spots on his chest, somewhat itchy and burning. Informed this could be a side effect of hives and can apply hydrocortisone or try benadryl if not driving. Can apply ice for itching or baking soda paste. Pt agreed with this plan and will call back is symptoms don't improve.     What side effects may I notice from receiving this medicine?  Side effects that you should report to your doctor or health care professional as soon as possible:    allergic reactions like skin rash, itching or hives, swelling of the face, lips, or tongue    breathing problems

## 2020-12-08 NOTE — TELEPHONE ENCOUNTER
Pt wonders if he should get the second one then?  If he doesn't will he still have some protection with just the one shot?

## 2020-12-08 NOTE — TELEPHONE ENCOUNTER
I would expect some level of protection with just one shot, and would advise against the second one.

## 2020-12-14 NOTE — PROGRESS NOTES
Discharge Note    Progress reporting period is from initial evaluation date (please see noted date below) to Feb 21, 2020.  Linked Episodes   Type: Episode: Status: Noted: Resolved: Last update: Updated by:   PHYSICAL THERAPY s/p R reverse TSA 1-24-20 Active 1/24/2020 4/7/2020 10:26 AM Farzana Pedroza, PT      Comments:       Greyson failed to follow up and current status is unknown.  Please see information below for last relevant information on current status.  Patient seen for 3 visits.    SUBJECTIVE  Subjective changes noted by patient:  Sunday awoke with right neck, upper trap, and upper arm pain. Hx of C4-5 fusion. Sitting and driving are very painful. Unsure what he did. Saw surgeon Monday, healing well, discuss new pain with PT. Having hip replaced so won't be to PT for a few weeks.  .  Current pain level is  .     Previous pain level was  0/10.   Changes in function:  Yes (See Goal flowsheet attached for changes in current functional level)  Adverse reaction to treatment or activity: None    OBJECTIVE  Changes noted in objective findings: R shoulder AROM: flex=136, abd=122. Passive ER=22. CAROM: flex=33% (neck pain), ext=33% (R neck pain/UT), LR=75%, RR=50% (R neck/UT/upper arm pain), LSB=33%, RSB=20% (R neck pain), retraction=cannot reach neutral. Posture: FH, rounded shoulders, winging on R scap. Poor scap retr/depr on R, fair on L.      ASSESSMENT/PLAN  Diagnosis: s/p R reverse TSA   Updated problem list and treatment plan:   No updated problem list or treatment plan as patient did not return to PT and they are discharged at this time.    STG/LTGs have been met or progress has been made towards goals:  Yes, please see goal flowsheet for most current information  Assessment of Progress: current status is unknown.    Last current status: Pt is progressing as expected(Has a new issue appears to be cervical radiculopathy.)   Self Management Plans:  HEP  I have re-evaluated this patient and find that the  nature, scope, duration and intensity of the therapy is appropriate for the medical condition of the patient.  Greyson continues to require the following intervention to meet STG and LTG's:  HEP.    Recommendations:  Discharge with current home program.  Patient to follow up with MD as needed.    Please refer to the daily flowsheet for treatment today, total treatment time and time spent performing 1:1 timed codes.

## 2020-12-21 ENCOUNTER — TRANSFERRED RECORDS (OUTPATIENT)
Dept: HEALTH INFORMATION MANAGEMENT | Facility: CLINIC | Age: 80
End: 2020-12-21

## 2021-01-05 ENCOUNTER — TRANSFERRED RECORDS (OUTPATIENT)
Dept: HEALTH INFORMATION MANAGEMENT | Facility: CLINIC | Age: 81
End: 2021-01-05

## 2021-02-03 ENCOUNTER — TELEPHONE (OUTPATIENT)
Dept: INTERNAL MEDICINE | Facility: CLINIC | Age: 81
End: 2021-02-03

## 2021-02-03 DIAGNOSIS — E78.5 HYPERLIPIDEMIA LDL GOAL <130: ICD-10-CM

## 2021-02-03 NOTE — TELEPHONE ENCOUNTER
Reason for Call:  Medication or medication refill:    Do you use a [facility]?  Name of the pharmacy and phone number for the current request:      [facility] [place] - [address] AT [facility]    [phone] [facility]    Name of the medication requested: simvastatin (ZOCOR) 20 MG tabletsimvastatin (ZOCOR) 20 MG tablet    Other request: The patient has a new pharmacy because he has switched insurances    Can we leave a detailed message on this number? YES    Phone number patient can be reached at: Home number on file [phone] (home)    Best Time: Anytime    Call taken on 2/3/2021 at 9:39 AM by Morena Ye

## 2021-02-03 NOTE — LETTER
February 5, 2021    Greyson Haas  [address] [facility] [address]        Dear Greyson,    While refilling your prescription today, we noticed that you are due to have LABS DRAWN and an IN PERSON or VIRTUAL visit with your Provider. (Video-preferred or Telephone)  We will refill your prescription for 90 days, but that appointment must be made before any additional refills can be approved.     Taking care of your health is important to us and we look forward to seeing you in the near future.  Please call us at [phone] or [phone] (or use [employer]) to schedule.  Please disregard this notice if you have already made an appointment.        Sincerely,          [facility]

## 2021-02-05 RX ORDER — SIMVASTATIN 20 MG
20 TABLET ORAL EVERY MORNING
Qty: 90 TABLET | Refills: 0 | Status: SHIPPED | OUTPATIENT
Start: 2021-02-05 | End: 2021-05-07

## 2021-02-05 NOTE — TELEPHONE ENCOUNTER
Medication is being filled for 1 time refill only due to:  Future labs ordered lipids.     Prescription approved per [facility] Refill Protocol.    Erica LYNNEN, RN, PHN

## 2021-03-20 ENCOUNTER — HEALTH MAINTENANCE LETTER (OUTPATIENT)
Age: 81
End: 2021-03-20

## 2021-03-24 ENCOUNTER — OFFICE VISIT (OUTPATIENT)
Dept: INTERNAL MEDICINE | Facility: CLINIC | Age: 81
End: 2021-03-24
Payer: COMMERCIAL

## 2021-03-24 VITALS
WEIGHT: 193.3 LBS | HEART RATE: 74 BPM | SYSTOLIC BLOOD PRESSURE: 96 MMHG | RESPIRATION RATE: 18 BRPM | OXYGEN SATURATION: 96 % | DIASTOLIC BLOOD PRESSURE: 60 MMHG | TEMPERATURE: 98.1 F | BODY MASS INDEX: 30.28 KG/M2

## 2021-03-24 DIAGNOSIS — Z98.890 STATUS POST BIOPSY OF SKIN: Primary | ICD-10-CM

## 2021-03-24 PROCEDURE — 99212 OFFICE O/P EST SF 10 MIN: CPT | Performed by: PHYSICIAN ASSISTANT

## 2021-03-24 NOTE — PROGRESS NOTES
Assessment & Plan     Status post biopsy of skin                 Looks to be doing ok, no infection   Reassurance given  Monitor   Use gauze and Micropore tape instead of bandaid        Return in about 3 months (around 6/24/2021) for Routine Visit, regular primary provider.    NOAM Gillespie [facility] MAXINE Rubi is a 80 year old who presents for the following health issues     HPI       [facility] Patient just wants to have it looked at as he does not think it is healing like it should be.   Patient with prior hx of Mohs surgery in the same area. Bx results per patient were all ok.   No drainage noted.  Some slight discomfort in the area.           Review of Systems   Constitutional, HEENT, cardiovascular, pulmonary, gi and gu systems are negative, except as otherwise noted.      Objective    BP 96/60   Pulse 74   Temp 98.1  F (36.7  C) (Tympanic)   Resp 18   Wt 87.7 kg (193 lb 4.8 oz)   SpO2 96%   BMI 30.28 kg/m    Body mass index is 30.28 kg/m .  Physical Exam   GENERAL: healthy, alert and no distress  RESP: lungs clear to auscultation - no rales, rhonchi or wheezes  CV: regular rates and rhythm  SKIN: left anterior upper chest wall  With deep punch type biopsy noted, about 1 cm in size clean base and pink edges no drainage.   Skin irritation in the area of adhesive from bandaid

## 2021-03-30 DIAGNOSIS — I10 ESSENTIAL HYPERTENSION: ICD-10-CM

## 2021-03-30 RX ORDER — LISINOPRIL 10 MG/1
10 TABLET ORAL DAILY
Qty: 90 TABLET | Refills: 1 | Status: SHIPPED | OUTPATIENT
Start: 2021-03-30 | End: 2022-05-06

## 2021-03-30 NOTE — TELEPHONE ENCOUNTER
Patient calling [facility]    He recalls having brief [facility] the [facility]. No records seen in chart.      Erica LYNNEN, RN, PHN

## 2021-03-30 NOTE — TELEPHONE ENCOUNTER
Routing refill request to provider for review/approval because:    ACE Inhibitors (Including Combos) Protocol [id number]/30/2021 09:18 AM   Normal serum creatinine on file in past 12 months Protocol Details    Normal serum potassium on file in past 12 months

## 2021-04-27 DIAGNOSIS — N52.9 ERECTILE DYSFUNCTION, UNSPECIFIED ERECTILE DYSFUNCTION TYPE: Primary | ICD-10-CM

## 2021-04-27 DIAGNOSIS — N40.1 BENIGN NON-NODULAR PROSTATIC HYPERPLASIA WITH LOWER URINARY TRACT SYMPTOMS: ICD-10-CM

## 2021-04-27 NOTE — TELEPHONE ENCOUNTER
Not RN Protocol.    sildenafil (VIAGRA) 50 MG tablet     --   Sig - Route: Take 50 mg by mouth daily as needed  - Oral   Class: Historical     Please refill as appropriate.    Elsie Moralez RN  [facility]

## 2021-04-29 RX ORDER — TAMSULOSIN HYDROCHLORIDE 0.4 MG/1
CAPSULE ORAL
Qty: 90 CAPSULE | Refills: 0 | Status: SHIPPED | OUTPATIENT
Start: 2021-04-29 | End: 2021-07-28

## 2021-04-30 RX ORDER — SILDENAFIL 50 MG/1
50 TABLET, FILM COATED ORAL DAILY PRN
Qty: 20 TABLET | Refills: 0 | Status: SHIPPED | OUTPATIENT
Start: 2021-04-30 | End: 2021-09-28

## 2021-05-04 DIAGNOSIS — E78.5 HYPERLIPIDEMIA LDL GOAL <130: ICD-10-CM

## 2021-05-06 NOTE — TELEPHONE ENCOUNTER
Routing refill request to provider for review/approval because:  Taamra given x1 and patient did not follow up, please advise  Labs not current:    LDL Cholesterol Calculated   Date Value Ref Range Status   02/07/2020 133 (H) <100 mg/dL Final     Comment:     Above desirable:  100-129 mg/dl  Borderline High:  130-159 mg/dL  High:             160-189 mg/dL  Very high:       >189 mg/dl     Naomie Pabon RN

## 2021-05-07 RX ORDER — SIMVASTATIN 20 MG
20 TABLET ORAL EVERY MORNING
Qty: 30 TABLET | Refills: 0 | Status: SHIPPED | OUTPATIENT
Start: 2021-05-07 | End: 2021-06-09

## 2021-05-07 NOTE — TELEPHONE ENCOUNTER
Patient over due for visit with PCP, or needs to establish care with a new PCP as Dr Nikc leaving.

## 2021-05-10 RX ORDER — SIMVASTATIN 20 MG
20 TABLET ORAL EVERY MORNING
Qty: 30 TABLET | Refills: 0 | OUTPATIENT
Start: 2021-05-10

## 2021-05-10 NOTE — TELEPHONE ENCOUNTER
Patient is asking if Dr Martinez will refill the medication as he wants him to be his pcp. Patient is all out of medication.

## 2021-06-09 ENCOUNTER — OFFICE VISIT (OUTPATIENT)
Dept: INTERNAL MEDICINE | Facility: CLINIC | Age: 81
End: 2021-06-09
Payer: COMMERCIAL

## 2021-06-09 VITALS
WEIGHT: 188 LBS | HEIGHT: 67 IN | SYSTOLIC BLOOD PRESSURE: 114 MMHG | OXYGEN SATURATION: 98 % | TEMPERATURE: 98.9 F | HEART RATE: 75 BPM | BODY MASS INDEX: 29.51 KG/M2 | DIASTOLIC BLOOD PRESSURE: 66 MMHG

## 2021-06-09 DIAGNOSIS — I10 ESSENTIAL HYPERTENSION: ICD-10-CM

## 2021-06-09 DIAGNOSIS — M10.9 GOUT OF MULTIPLE SITES, UNSPECIFIED CAUSE, UNSPECIFIED CHRONICITY: ICD-10-CM

## 2021-06-09 DIAGNOSIS — I77.810 ASCENDING AORTA DILATATION (H): ICD-10-CM

## 2021-06-09 DIAGNOSIS — R26.89 BALANCE PROBLEMS: Primary | ICD-10-CM

## 2021-06-09 DIAGNOSIS — E78.5 HYPERLIPIDEMIA LDL GOAL <130: ICD-10-CM

## 2021-06-09 DIAGNOSIS — N18.31 STAGE 3A CHRONIC KIDNEY DISEASE (H): ICD-10-CM

## 2021-06-09 LAB
CREAT SERPL-MCNC: 1.63 MG/DL (ref 0.66–1.25)
GFR SERPL CREATININE-BSD FRML MDRD: 39 ML/MIN/{1.73_M2}

## 2021-06-09 PROCEDURE — 82565 ASSAY OF CREATININE: CPT | Performed by: INTERNAL MEDICINE

## 2021-06-09 PROCEDURE — 99214 OFFICE O/P EST MOD 30 MIN: CPT | Performed by: INTERNAL MEDICINE

## 2021-06-09 PROCEDURE — 36415 COLL VENOUS BLD VENIPUNCTURE: CPT | Performed by: INTERNAL MEDICINE

## 2021-06-09 RX ORDER — LATANOPROST 50 UG/ML
SOLUTION/ DROPS OPHTHALMIC
COMMUNITY
Start: 2021-03-25

## 2021-06-09 RX ORDER — SIMVASTATIN 20 MG
20 TABLET ORAL EVERY MORNING
Qty: 90 TABLET | Refills: 0 | Status: SHIPPED | OUTPATIENT
Start: 2021-06-09 | End: 2021-09-14

## 2021-06-09 RX ORDER — TIMOLOL MALEATE 6.8 MG/ML
1 SOLUTION/ DROPS OPHTHALMIC DAILY
COMMUNITY
Start: 2020-11-30

## 2021-06-09 RX ORDER — ALLOPURINOL 300 MG/1
1 TABLET ORAL DAILY
Qty: 90 TABLET | Refills: 3 | Status: SHIPPED | OUTPATIENT
Start: 2021-06-09 | End: 2022-10-14

## 2021-06-09 ASSESSMENT — MIFFLIN-ST. JEOR: SCORE: 1516.39

## 2021-06-09 NOTE — PATIENT INSTRUCTIONS
PLAN:  1.  [facility] and review.   Consider if there is any need for second opinion.    2.  Check Creatinine  3.  Check lipids soon, fasting.

## 2021-06-09 NOTE — PROGRESS NOTES
"    Assessment & Plan     ASSESSMENT:   1.  Gait problems, reportedly due to peripheral neuropathy - per patient.  At this point, patient has apparently been told this is not treatable (though I cannot confirm this).  We discussed the option for a 2nd opinion, including this being done at [facility] (my preference, as no records would need to be exchanged) versus the [facility].  At this point, patient declines pursuing a second opinion.    2.  Follow-up CKD3  3.  Elevated PSA.   Discussed possible options to follow-up and patient feels he would likely not want any aggressive treatment if diagnosed with prostate cancer.  Therefore, he is comfortable not retesting at this time.   He understands that he could have underlying CA despite negative biopsies in 2019.   Also understands that any cancer would be less likely to be aggressive at his age.    4.  Hyperlipidemia, overdue for lab check  5.  Hypertension, controlled  6.  Ascending aortic dilatation, last echo done 1 year ago -would repeat in another year    History of cervical fusion 11/2018    PLAN:  1.  [facility] and [facility].   Unclear if there is any need for second neurological opinion.  One of my partners will need to review this, as I am leaving practice this week.  2.  Check Creatinine --> worsened to 1.63 --> will have patient push fluids, stop his leftover Celebrex, and recheck kidney function with upcoming lipids  3.  Check lipids in the near future, fasting.             BMI:   Estimated body mass index is 29.44 kg/m  as calculated from the following:    Height as of this encounter: 1.702 m (5' 7\").    Weight as of this encounter: 85.3 kg (188 lb).           No follow-ups on file.    Neville Nick MD  [facility]    Sam Rubi is a 80 year old who presents for the following health issues     HPI   Med check    Hyperlipidemia Follow-Up      Are you regularly taking any medication or " supplement to lower your cholesterol?   Yes- Simvastatin    Are you having muscle aches or other side effects that you think could be caused by your cholesterol lowering medication?  No    Hypertension Follow-up      Do you check your blood pressure regularly outside of the clinic? Yes     Are you following a low salt diet? No    Are your blood pressures ever more than 140 on the top number (systolic) OR more   than 90 on the bottom number (diastolic), for example 140/90? Yes  How many days per week do you miss taking your medication? Patient monitors BP and adjusts meds himself        Additional issues to address:  1.  Ongoing leg and balance problems.   Legs are strong, but is very unbalanced, cannot walk without cane.   Limps around house, but not from pain.   Legs just don't work well.   Can walk 1/4 mile with cane.  Has to hold onto furniture at home  - Previous evaluation within [facility], patient had declined EMG previously  - [facility] has [facility]. [facility] are not available but labs from 2020 have been scanned into chart.  -Patient feels he was last seen in March or so, more labs done [facility], and EMG done too.  None of these results were available, patient is not aware that this showed anything significant.  He reports showed that he lost muscle tone.   No imaging has been done recently.    - patient reports that he was told that there is no treatment available for his peripheral neuropathy, apparent cause of his symptoms  2.  Follow-up chronic kidney disease.  Though he knows he should not use NSAIDs, Greyson has been taking celebrex again when he golfs, for at least the past few weeks.  He reports having leftover Celebrex 200 mg and using this 2-3 times weekly at present.   Also uses tylenol 1000 mg prn.   Celebrex helps his legs, knees, L hip.  [facility] from [facility]/2020 showed creat 1.4  3.  Follow-up elevated PSA from January 2019.   Had prostate biopsy  "done Dr. Casas 2019.  No records to indicate follow-up since then, and patient is not of the opinion that he needed to follow-up with urology.   He wants to discuss having PSA rechecked.      Review of Systems   Constitutional, HEENT, cardiovascular, pulmonary, gi and gu systems are negative, except as otherwise noted.      Objective    /66   Pulse 75   Temp 98.9  F (37.2  C) (Tympanic)   Ht 1.702 m (5' 7\")   Wt 85.3 kg (188 lb)   SpO2 98%   BMI 29.44 kg/m    Body mass index is 29.44 kg/m .  Physical Exam   Regular pulse  Strength seems intact  Affect and mentation intact, patient somewhat distractible as usual  Able to stand without difficulty, but gait is very broad-based and uneven  Extended neuro exam not performed at this time  After conversation, patient declines prostate check                "

## 2021-06-29 DIAGNOSIS — M10.9 GOUT OF MULTIPLE SITES, UNSPECIFIED CAUSE, UNSPECIFIED CHRONICITY: ICD-10-CM

## 2021-06-29 DIAGNOSIS — N18.31 STAGE 3A CHRONIC KIDNEY DISEASE (H): ICD-10-CM

## 2021-06-29 DIAGNOSIS — I10 ESSENTIAL HYPERTENSION: ICD-10-CM

## 2021-06-29 DIAGNOSIS — E78.5 HYPERLIPIDEMIA LDL GOAL <130: ICD-10-CM

## 2021-06-29 LAB
ALBUMIN SERPL-MCNC: 3.7 G/DL (ref 3.4–5)
ALP SERPL-CCNC: 63 U/L (ref 40–150)
ALT SERPL W P-5'-P-CCNC: 24 U/L (ref 0–70)
ANION GAP SERPL CALCULATED.3IONS-SCNC: 2 MMOL/L (ref 3–14)
AST SERPL W P-5'-P-CCNC: 21 U/L (ref 0–45)
BILIRUB SERPL-MCNC: 0.6 MG/DL (ref 0.2–1.3)
BUN SERPL-MCNC: 20 MG/DL (ref 7–30)
CALCIUM SERPL-MCNC: 9 MG/DL (ref 8.5–10.1)
CHLORIDE SERPL-SCNC: 109 MMOL/L (ref 94–109)
CHOLEST SERPL-MCNC: 202 MG/DL
CO2 SERPL-SCNC: 25 MMOL/L (ref 20–32)
CREAT SERPL-MCNC: 1.3 MG/DL (ref 0.66–1.25)
CREAT UR-MCNC: 144 MG/DL
GFR SERPL CREATININE-BSD FRML MDRD: 51 ML/MIN/{1.73_M2}
GLUCOSE SERPL-MCNC: 105 MG/DL (ref 70–99)
HDLC SERPL-MCNC: 65 MG/DL
LDLC SERPL CALC-MCNC: 107 MG/DL
MICROALBUMIN UR-MCNC: 204 MG/L
MICROALBUMIN/CREAT UR: 141.67 MG/G CR (ref 0–17)
NONHDLC SERPL-MCNC: 137 MG/DL
POTASSIUM SERPL-SCNC: 4.1 MMOL/L (ref 3.4–5.3)
PROT SERPL-MCNC: 7.1 G/DL (ref 6.8–8.8)
SODIUM SERPL-SCNC: 136 MMOL/L (ref 133–144)
TRIGL SERPL-MCNC: 152 MG/DL
URATE SERPL-MCNC: 5.8 MG/DL (ref 3.5–7.2)

## 2021-06-29 PROCEDURE — 80053 COMPREHEN METABOLIC PANEL: CPT | Performed by: INTERNAL MEDICINE

## 2021-06-29 PROCEDURE — 36415 COLL VENOUS BLD VENIPUNCTURE: CPT | Performed by: INTERNAL MEDICINE

## 2021-06-29 PROCEDURE — 80061 LIPID PANEL: CPT | Performed by: INTERNAL MEDICINE

## 2021-06-29 PROCEDURE — 82043 UR ALBUMIN QUANTITATIVE: CPT | Performed by: INTERNAL MEDICINE

## 2021-06-29 PROCEDURE — 84550 ASSAY OF BLOOD/URIC ACID: CPT | Performed by: INTERNAL MEDICINE

## 2021-07-29 ENCOUNTER — OFFICE VISIT (OUTPATIENT)
Dept: URGENT CARE | Facility: URGENT CARE | Age: 81
End: 2021-07-29
Payer: COMMERCIAL

## 2021-07-29 VITALS
BODY MASS INDEX: 29.6 KG/M2 | DIASTOLIC BLOOD PRESSURE: 77 MMHG | OXYGEN SATURATION: 95 % | WEIGHT: 189 LBS | TEMPERATURE: 96.9 F | SYSTOLIC BLOOD PRESSURE: 113 MMHG | RESPIRATION RATE: 16 BRPM | HEART RATE: 62 BPM

## 2021-07-29 DIAGNOSIS — R11.0 NAUSEA: ICD-10-CM

## 2021-07-29 DIAGNOSIS — R19.7 DIARRHEA, UNSPECIFIED TYPE: Primary | ICD-10-CM

## 2021-07-29 LAB
ALBUMIN SERPL-MCNC: 3.6 G/DL (ref 3.4–5)
ALBUMIN UR-MCNC: 100 MG/DL
ALP SERPL-CCNC: 66 U/L (ref 40–150)
ALT SERPL W P-5'-P-CCNC: 26 U/L (ref 0–70)
AMYLASE SERPL-CCNC: 71 U/L (ref 30–110)
ANION GAP SERPL CALCULATED.3IONS-SCNC: 5 MMOL/L (ref 3–14)
APPEARANCE UR: CLEAR
AST SERPL W P-5'-P-CCNC: 24 U/L (ref 0–45)
BACTERIA #/AREA URNS HPF: ABNORMAL /HPF
BASOPHILS # BLD AUTO: 0 10E3/UL (ref 0–0.2)
BASOPHILS NFR BLD AUTO: 0 %
BILIRUB SERPL-MCNC: 0.8 MG/DL (ref 0.2–1.3)
BILIRUB UR QL STRIP: NEGATIVE
BUN SERPL-MCNC: 17 MG/DL (ref 7–30)
CALCIUM SERPL-MCNC: 9.2 MG/DL (ref 8.5–10.1)
CHLORIDE BLD-SCNC: 111 MMOL/L (ref 94–109)
CO2 SERPL-SCNC: 22 MMOL/L (ref 20–32)
COLOR UR AUTO: YELLOW
CREAT SERPL-MCNC: 1.4 MG/DL (ref 0.66–1.25)
EOSINOPHIL # BLD AUTO: 0.1 10E3/UL (ref 0–0.7)
EOSINOPHIL NFR BLD AUTO: 2 %
ERYTHROCYTE [DISTWIDTH] IN BLOOD BY AUTOMATED COUNT: 14.4 % (ref 10–15)
GFR SERPL CREATININE-BSD FRML MDRD: 47 ML/MIN/1.73M2
GLUCOSE BLD-MCNC: 104 MG/DL (ref 70–99)
GLUCOSE UR STRIP-MCNC: NEGATIVE MG/DL
HCT VFR BLD AUTO: 43.1 % (ref 40–53)
HGB BLD-MCNC: 14.3 G/DL (ref 13.3–17.7)
HGB UR QL STRIP: ABNORMAL
HYALINE CASTS #/AREA URNS LPF: ABNORMAL /LPF
KETONES UR STRIP-MCNC: NEGATIVE MG/DL
LEUKOCYTE ESTERASE UR QL STRIP: NEGATIVE
LYMPHOCYTES # BLD AUTO: 1.1 10E3/UL (ref 0.8–5.3)
LYMPHOCYTES NFR BLD AUTO: 23 %
MCH RBC QN AUTO: 30.6 PG (ref 26.5–33)
MCHC RBC AUTO-ENTMCNC: 33.2 G/DL (ref 31.5–36.5)
MCV RBC AUTO: 92 FL (ref 78–100)
MONOCYTES # BLD AUTO: 0.6 10E3/UL (ref 0–1.3)
MONOCYTES NFR BLD AUTO: 11 %
MUCOUS THREADS #/AREA URNS LPF: PRESENT /LPF
NEUTROPHILS # BLD AUTO: 3.1 10E3/UL (ref 1.6–8.3)
NEUTROPHILS NFR BLD AUTO: 64 %
NITRATE UR QL: NEGATIVE
PH UR STRIP: 6 [PH] (ref 5–7)
PLATELET # BLD AUTO: 146 10E3/UL (ref 150–450)
POTASSIUM BLD-SCNC: 3.9 MMOL/L (ref 3.4–5.3)
PROT SERPL-MCNC: 7.3 G/DL (ref 6.8–8.8)
RBC # BLD AUTO: 4.67 10E6/UL (ref 4.4–5.9)
RBC #/AREA URNS AUTO: ABNORMAL /HPF
SODIUM SERPL-SCNC: 138 MMOL/L (ref 133–144)
SP GR UR STRIP: 1.02 (ref 1–1.03)
SQUAMOUS #/AREA URNS AUTO: ABNORMAL /LPF
UROBILINOGEN UR STRIP-ACNC: 0.2 E.U./DL
WBC # BLD AUTO: 4.9 10E3/UL (ref 4–11)
WBC #/AREA URNS AUTO: ABNORMAL /HPF

## 2021-07-29 PROCEDURE — 36415 COLL VENOUS BLD VENIPUNCTURE: CPT | Performed by: NURSE PRACTITIONER

## 2021-07-29 PROCEDURE — 81001 URINALYSIS AUTO W/SCOPE: CPT | Performed by: NURSE PRACTITIONER

## 2021-07-29 PROCEDURE — 85025 COMPLETE CBC W/AUTO DIFF WBC: CPT | Performed by: NURSE PRACTITIONER

## 2021-07-29 PROCEDURE — 87177 OVA AND PARASITES SMEARS: CPT | Performed by: NURSE PRACTITIONER

## 2021-07-29 PROCEDURE — 87506 IADNA-DNA/RNA PROBE TQ 6-11: CPT | Performed by: NURSE PRACTITIONER

## 2021-07-29 PROCEDURE — 82150 ASSAY OF AMYLASE: CPT | Performed by: NURSE PRACTITIONER

## 2021-07-29 PROCEDURE — 87209 SMEAR COMPLEX STAIN: CPT | Performed by: NURSE PRACTITIONER

## 2021-07-29 PROCEDURE — 99214 OFFICE O/P EST MOD 30 MIN: CPT | Performed by: NURSE PRACTITIONER

## 2021-07-29 PROCEDURE — 80053 COMPREHEN METABOLIC PANEL: CPT | Performed by: NURSE PRACTITIONER

## 2021-07-29 RX ORDER — ONDANSETRON 4 MG/1
4 TABLET, ORALLY DISINTEGRATING ORAL ONCE
Status: ACTIVE | OUTPATIENT
Start: 2021-07-29

## 2021-07-29 NOTE — PATIENT INSTRUCTIONS
Loperamide per package instructions.        Patient Education     Diarrhea with Uncertain Cause (Adult)    Diarrhea is when stools are loose and watery. This can be caused by:    Viral infections    Bacterial infections    Food poisoning    Parasites    Irritable bowel syndrome (IBS)    Inflammatory bowel diseases such as ulcerative colitis, Crohn's disease, and celiac disease    Food intolerance, such as to lactose, the sugar found in milk and milk products    Reaction to medicines like antibiotics, laxatives, cancer drugs, and antacids  Along with diarrhea, you may also have:    Abdominal pain and cramping    Nausea and vomiting    Loss of bowel control    Fever and chills    Bloody stools  In some cases, antibiotics may help to treat diarrhea. You may have a stool sample test. This is done to see what is causing your diarrhea, and if antibiotics will help treat it. The results of a stool sample test may take up to 2 days. The healthcare provider may not give you antibiotics until he or she has the stool test results.  Diarrhea can cause dehydration. This is the loss of too much water and other fluids from the body. When this occurs, body fluid must be replaced. This can be done with oral rehydration solutions. Oral rehydration solutions are available at drugstores and grocery stores without a prescription. Sports drinks are not the best choice if you are very dehydrated. They have too much sugar and not enough electrolytes.  Home care  Follow all instructions given by your healthcare provider. Rest at home for the next 24 hours, or until you feel better. Avoid caffeine, tobacco, and alcohol. These can make diarrhea, cramping, and pain worse.  If taking medicines:    Over-the-counter nausea and diarrhea medicines are generally OK unless you experience fever or blood stool. Check with your doctor first in those circumstances.    You may use acetaminophen or NSAID medicines like ibuprofen or naproxen to reduce pain  and fever. Don t use these if you have chronic liver or kidney disease, or ever had a stomach ulcer or gastrointestinal bleeding. Don't use NSAID medicines if you are already taking one for another condition (like arthritis) or are on daily aspirin therapy (such as for heart disease or after a stroke). Talk with your healthcare provider first.    If antibiotics were prescribed, be sure you take them until they are finished. Don t stop taking them even when you feel better. Antibiotics must be taken as a full course.  To prevent the spread of illness:    Remember that washing with soap and water and using alcohol-based  is the best way to prevent the spread of infection. Dry your hands with a single use towel (like a paper towel).    Clean the toilet after each use.    Wash your hands before eating.    Wash your hands before and after preparing food. Keep in mind that people with diarrhea or vomiting should not prepare food for others.    Wash your hands after using cutting boards, countertops, and knives that have been in contact with raw foods.    Wash and then peel fruits and vegetables.    Keep uncooked meats away from cooked and ready-to-eat foods.    Use a food thermometer when cooking. Cook poultry to at least 165 F (74 C). Cook ground meat (beef, veal, pork, lamb) to at least 160 F (71 C). Cook fresh beef, veal, lamb, and pork to at least 145 F (63 C).    Don t eat raw or undercooked eggs (poached or donnie side up), poultry, meat, or unpasteurized milk and juices.  Food and drinks  The main goal while treating vomiting or diarrhea is to prevent dehydration. This is done by taking small amounts of liquids often.    Keep in mind that liquids are more important than food right now.    Drink only small amounts of liquids at a time.    Don t force yourself to eat, especially if you are having cramping, vomiting, or diarrhea. Don t eat large amounts at a time, even if you are hungry.    If you eat, avoid  fatty, greasy, spicy, or fried foods.    Don t eat dairy foods or drink milk if you have diarrhea. These can make diarrhea worse.  During the first 24 hours you can try:    Oral rehydration solutions.  Sports drinks may be used if you are not too dehydrated and are otherwise healthy.    Soft drinks without caffeine    Ginger ale    Water (plain or flavored)    Decaf tea or coffee    Clear broth, consommé, or bouillon    Gelatin, popsicles, or frozen fruit juice bars  The second 24 hours, if you are feeling better, you can add:    Hot cereal, plain toast, bread, rolls, or crackers    Plain noodles, rice, mashed potatoes, chicken noodle soup, or rice soup    Unsweetened canned fruit (no pineapple)    Bananas  As you recover:    Limit fat intake to less than 15 grams per day. Don t eat margarine, butter, oils, mayonnaise, sauces, gravies, fried foods, peanut butter, meat, poultry, or fish.    Limit fiber. Don t eat raw or cooked vegetables, fresh fruits except bananas, or bran cereals.    Limit caffeine and chocolate.    Limit dairy.    Don t use spices or seasonings except salt.    Go back to your normal diet over time, as you feel better and your symptoms improve.    If the symptoms come back, go back to a simple diet or clear liquids.  Follow-up care  Follow up with your healthcare provider, or as advised. If a stool sample was taken or cultures were done, call the healthcare provider for the results as instructed.  Call 911  Call 911 if you have any of these symptoms:    Trouble breathing    Confusion    Extreme drowsiness or trouble walking    Loss of consciousness    Rapid heart rate    Chest pain    Stiff neck    Seizure  When to seek medical advice  Call your healthcare provider right away if any of these occur:    Abdominal pain that gets worse    Constant lower right abdominal pain    Continued vomiting and inability to keep liquids down    Diarrhea more than 5 times a day    Blood in vomit or stool    Dark  urine or no urine for 8 hours, dry mouth and tongue, tiredness, weakness, or dizziness    Drowsiness    New rash    You don t get better in 2 to 3 days    Fever of 100.4 F (38 C) or higher, or as directed by your healthcare provider  [employer] last reviewed this educational content on 6/1/2018 2000-2021 [employer]. All rights reserved. This information is not intended as a substitute for professional medical care. Always follow your healthcare professional's instructions.

## 2021-07-29 NOTE — PROGRESS NOTES
Chief Complaint   Patient presents with     Gastrointestinal Problem     Diarrhea for few weeks, stomache aches         ICD-10-CM    1. Diarrhea, unspecified type  R19.7 UA with Microscopic reflex to Culture - lab collect     CBC with platelets and differential     Enteric Bacteria and Virus Panel by KATIE Stool     Ova and Parasite Exam Routine     Comprehensive metabolic panel (BMP + Alb, Alk Phos, ALT, AST, Total. Bili, TP)     Amylase     CBC with platelets and differential     Enteric Bacteria and Virus Panel by KATIE Stool     Ova and Parasite Exam Routine     Comprehensive metabolic panel (BMP + Alb, Alk Phos, ALT, AST, Total. Bili, TP)     Amylase     Urine Microscopic   2. Nausea  R11.0 ondansetron (ZOFRAN-ODT) ODT tab 4 mg     Comprehensive metabolic panel (BMP + Alb, Alk Phos, ALT, AST, Total. Bili, TP)     Amylase     Comprehensive metabolic panel (BMP + Alb, Alk Phos, ALT, AST, Total. Bili, TP)     Amylase     Collect stool samples and return as soon as possible. Drink plenty of fluid and return immediately if you develop fever.    Medical Decision Making     Differential diagnosis includes but is not limited to: Gastritis, gastroenteritis, enteritis, colitis, appendicitis, viral infection, ileus, bowel obstruction, volvulus, intussusception, gas pains, indigestion, GERD, UTI, pyelonephritis, gastric ulcer, duodenal ulcer, viscus perforation, abdominal hernia, internal hernia, travelers diarrhea      Results for orders placed or performed in visit on 07/29/21 (from the past 24 hour(s))   CBC with platelets and differential    Narrative    The following orders were created for panel order CBC with platelets and differential.  Procedure                               Abnormality         Status                     ---------                               -----------         ------                     CBC with platelets and d...[[id number]]  Abnormal            Final result                 Please view results for  these tests on the individual orders.   Comprehensive metabolic panel (BMP + Alb, Alk Phos, ALT, AST, Total. Bili, TP)   Result Value Ref Range    Sodium 138 133 - 144 mmol/L    Potassium 3.9 3.4 - 5.3 mmol/L    Chloride 111 (H) 94 - 109 mmol/L    Carbon Dioxide (CO2) 22 20 - 32 mmol/L    Anion Gap 5 3 - 14 mmol/L    Urea Nitrogen 17 7 - 30 mg/dL    Creatinine 1.40 (H) 0.66 - 1.25 mg/dL    Calcium 9.2 8.5 - 10.1 mg/dL    Glucose 104 (H) 70 - 99 mg/dL    Alkaline Phosphatase 66 40 - 150 U/L    AST 24 0 - 45 U/L    ALT 26 0 - 70 U/L    Protein Total 7.3 6.8 - 8.8 g/dL    Albumin 3.6 3.4 - 5.0 g/dL    Bilirubin Total 0.8 0.2 - 1.3 mg/dL    GFR Estimate 47 (L) >60 mL/min/1.73m2   Amylase   Result Value Ref Range    Amylase 71 30 - 110 U/L   CBC with platelets and differential   Result Value Ref Range    WBC Count 4.9 4.0 - 11.0 10e3/uL    RBC Count 4.67 4.40 - 5.90 10e6/uL    Hemoglobin 14.3 13.3 - 17.7 g/dL    Hematocrit 43.1 40.0 - 53.0 %    MCV 92 78 - 100 fL    MCH 30.6 26.5 - 33.0 pg    MCHC 33.2 31.5 - 36.5 g/dL    RDW 14.4 10.0 - 15.0 %    Platelet Count 146 (L) 150 - 450 10e3/uL    % Neutrophils 64 %    % Lymphocytes 23 %    % Monocytes 11 %    % Eosinophils 2 %    % Basophils 0 %    Absolute Neutrophils 3.1 1.6 - 8.3 10e3/uL    Absolute Lymphocytes 1.1 0.8 - 5.3 10e3/uL    Absolute Monocytes 0.6 0.0 - 1.3 10e3/uL    Absolute Eosinophils 0.1 0.0 - 0.7 10e3/uL    Absolute Basophils 0.0 0.0 - 0.2 10e3/uL   UA with Microscopic reflex to Culture - lab collect    Specimen: Urine, Clean Catch   Result Value Ref Range    Color Urine Yellow Colorless, Straw, Light Yellow, Yellow    Appearance Urine Clear Clear    Glucose Urine Negative Negative mg/dL    Bilirubin Urine Negative Negative    Ketones Urine Negative Negative mg/dL    Specific Gravity Urine 1.025 1.003 - 1.035    Blood Urine Small (A) Negative    pH Urine 6.0 5.0 - 7.0    Protein Albumin Urine 100  (A) Negative mg/dL    Urobilinogen Urine 0.2 0.2, 1.0  E.U./dL    Nitrite Urine Negative Negative    Leukocyte Esterase Urine Negative Negative   Urine Microscopic   Result Value Ref Range    Bacteria Urine Few (A) None Seen /HPF    RBC Urine 0-2 0-2 /HPF /HPF    WBC Urine 0-5 0-5 /HPF /HPF    Squamous Epithelials Urine Few (A) None Seen /LPF    Mucus Urine Present (A) None Seen /LPF    Hyaline Casts Urine 5-10 (A) None Seen /LPF    Narrative    Urine Culture not indicated       Subjective     Greyson Haas is an 80 year old male who presents to clinic today for watery stools multiple times a day for 3 weeks. Nausea started a couple of days ago.      ROS: 10 point ROS neg other than the symptoms noted above in the HPI.       Objective    /77   Pulse 62   Temp 96.9  F (36.1  C) (Tympanic)   Resp 16   Wt 85.7 kg (189 lb)   SpO2 95%   BMI 29.60 kg/m      Physical Exam       GENERAL APPEARANCE: healthy appearing, alert     EYES: PERRL, EOMI, sclera non-icteric     HENT: oral exam benign, mucus membranes intact, without ulcers or lesions     NECK: no adenopathy or asymmetry, thyroid normal to palpation     RESP: lungs clear to auscultation - no rales, rhonchi or wheezes     CV: regular rates and rhythm, no murmurs, rubs, or gallop     ABDOMEN: soft, nontender, without hepatosplenomegaly or masses and hyperactive BS     MS: extremities normal- no gross deformities noted; normal muscle tone.     SKIN: no suspicious lesions or rashes     NEURO: Normal strength and tone, mentation intact and speech normal     PSYCH: normal thought process; no significant mood disturbance    Patient Instructions   Loperamide per package instructions.        Patient Education     Diarrhea with Uncertain Cause (Adult)    Diarrhea is when stools are loose and watery. This can be caused by:    Viral infections    Bacterial infections    Food poisoning    Parasites    Irritable bowel syndrome (IBS)    Inflammatory bowel diseases such as ulcerative colitis, Crohn's disease, and celiac  disease    Food intolerance, such as to lactose, the sugar found in milk and milk products    Reaction to medicines like antibiotics, laxatives, cancer drugs, and antacids  Along with diarrhea, you may also have:    Abdominal pain and cramping    Nausea and vomiting    Loss of bowel control    Fever and chills    Bloody stools  In some cases, antibiotics may help to treat diarrhea. You may have a stool sample test. This is done to see what is causing your diarrhea, and if antibiotics will help treat it. The results of a stool sample test may take up to 2 days. The healthcare provider may not give you antibiotics until he or she has the stool test results.  Diarrhea can cause dehydration. This is the loss of too much water and other fluids from the body. When this occurs, body fluid must be replaced. This can be done with oral rehydration solutions. Oral rehydration solutions are available at drugstores and grocery stores without a prescription. Sports drinks are not the best choice if you are very dehydrated. They have too much sugar and not enough electrolytes.  Home care  Follow all instructions given by your healthcare provider. Rest at home for the next 24 hours, or until you feel better. Avoid caffeine, tobacco, and alcohol. These can make diarrhea, cramping, and pain worse.  If taking medicines:    Over-the-counter nausea and diarrhea medicines are generally OK unless you experience fever or blood stool. Check with your doctor first in those circumstances.    You may use acetaminophen or NSAID medicines like ibuprofen or naproxen to reduce pain and fever. Don t use these if you have chronic liver or kidney disease, or ever had a stomach ulcer or gastrointestinal bleeding. Don't use NSAID medicines if you are already taking one for another condition (like arthritis) or are on daily aspirin therapy (such as for heart disease or after a stroke). Talk with your healthcare provider first.    If antibiotics were  prescribed, be sure you take them until they are finished. Don t stop taking them even when you feel better. Antibiotics must be taken as a full course.  To prevent the spread of illness:    Remember that washing with soap and water and using alcohol-based  is the best way to prevent the spread of infection. Dry your hands with a single use towel (like a paper towel).    Clean the toilet after each use.    Wash your hands before eating.    Wash your hands before and after preparing food. Keep in mind that people with diarrhea or vomiting should not prepare food for others.    Wash your hands after using cutting boards, countertops, and knives that have been in contact with raw foods.    Wash and then peel fruits and vegetables.    Keep uncooked meats away from cooked and ready-to-eat foods.    Use a food thermometer when cooking. Cook poultry to at least 165 F (74 C). Cook ground meat (beef, veal, pork, lamb) to at least 160 F (71 C). Cook fresh beef, veal, lamb, and pork to at least 145 F (63 C).    Don t eat raw or undercooked eggs (poached or donnie side up), poultry, meat, or unpasteurized milk and juices.  Food and drinks  The main goal while treating vomiting or diarrhea is to prevent dehydration. This is done by taking small amounts of liquids often.    Keep in mind that liquids are more important than food right now.    Drink only small amounts of liquids at a time.    Don t force yourself to eat, especially if you are having cramping, vomiting, or diarrhea. Don t eat large amounts at a time, even if you are hungry.    If you eat, avoid fatty, greasy, spicy, or fried foods.    Don t eat dairy foods or drink milk if you have diarrhea. These can make diarrhea worse.  During the first 24 hours you can try:    Oral rehydration solutions.  Sports drinks may be used if you are not too dehydrated and are otherwise healthy.    Soft drinks without caffeine    Ginger ale    Water (plain or flavored)    Decaf  tea or coffee    Clear broth, consommé, or bouillon    Gelatin, popsicles, or frozen fruit juice bars  The second 24 hours, if you are feeling better, you can add:    Hot cereal, plain toast, bread, rolls, or crackers    Plain noodles, rice, mashed potatoes, chicken noodle soup, or rice soup    Unsweetened canned fruit (no pineapple)    Bananas  As you recover:    Limit fat intake to less than 15 grams per day. Don t eat margarine, butter, oils, mayonnaise, sauces, gravies, fried foods, peanut butter, meat, poultry, or fish.    Limit fiber. Don t eat raw or cooked vegetables, fresh fruits except bananas, or bran cereals.    Limit caffeine and chocolate.    Limit dairy.    Don t use spices or seasonings except salt.    Go back to your normal diet over time, as you feel better and your symptoms improve.    If the symptoms come back, go back to a simple diet or clear liquids.  Follow-up care  Follow up with your healthcare provider, or as advised. If a stool sample was taken or cultures were done, call the healthcare provider for the results as instructed.  Call 911  Call 911 if you have any of these symptoms:    Trouble breathing    Confusion    Extreme drowsiness or trouble walking    Loss of consciousness    Rapid heart rate    Chest pain    Stiff neck    Seizure  When to seek medical advice  Call your healthcare provider right away if any of these occur:    Abdominal pain that gets worse    Constant lower right abdominal pain    Continued vomiting and inability to keep liquids down    Diarrhea more than 5 times a day    Blood in vomit or stool    Dark urine or no urine for 8 hours, dry mouth and tongue, tiredness, weakness, or dizziness    Drowsiness    New rash    You don t get better in 2 to 3 days    Fever of 100.4 F (38 C) or higher, or as directed by your healthcare provider  [employer] last reviewed this educational content on 6/1/2018 2000-2021 [employer]. All rights reserved. This  information is not intended as a substitute for professional medical care. Always follow your healthcare professional's instructions.               KE Hernandez, CNP  [facility] Provider

## 2021-07-30 LAB
C COLI+JEJUNI+LARI FUSA STL QL NAA+PROBE: NOT DETECTED
EC STX1 GENE STL QL NAA+PROBE: NOT DETECTED
EC STX2 GENE STL QL NAA+PROBE: NOT DETECTED
NOROV GI+II ORF1-ORF2 JNC STL QL NAA+PR: NOT DETECTED
O+P STL MICRO: NEGATIVE
RVA NSP5 STL QL NAA+PROBE: NOT DETECTED
SALMONELLA SP RPOD STL QL NAA+PROBE: NOT DETECTED
SHIGELLA SP+EIEC IPAH STL QL NAA+PROBE: NOT DETECTED
V CHOL+PARA RFBL+TRKH+TNAA STL QL NAA+PR: NOT DETECTED
Y ENTERO RECN STL QL NAA+PROBE: NOT DETECTED

## 2021-08-11 NOTE — ANESTHESIA CARE TRANSFER NOTE
Patient: Greyson Haas    Procedure(s):  RIGHT TOTAL HIP ARTHROPLASTY DIRECT ANTERIOR APPROACH    Diagnosis: Primary osteoarthritis of right hip [M16.11]  Diagnosis Additional Information: No value filed.    Anesthesia Type:   General, ETT     Note:  Airway :Face Mask  Patient transferred to:PACU  Comments: Neuromuscular blockade reversed after TOF 4/4, spontaneous respirations, adequate tidal volumes, followed commands to voice, extubated atraumatically, extubated with suction, airway patent after extubation.  Oxygen via facemask at 6 liters per minute to PACU. Oxygen tubing connected to wall O2 in PACU, SpO2, NiBP, and EKG monitors and alarms on and functioning, Beatris Hugger warmer connected to patient gown, report on patient's clinical status given to PACU RN, RN questions answered.     /87  HR 75  RR 12  O2 95%  Temp 96.2    Handoff Report: Identifed the Patient, Identified the Reponsible Provider, Reviewed the pertinent medical history, Discussed the surgical course, Reviewed Intra-OP anesthesia mangement and issues during anesthesia, Set expectations for post-procedure period and Allowed opportunity for questions and acknowledgement of understanding      Vitals: (Last set prior to Anesthesia Care Transfer)    CRNA VITALS  2/25/2020 0952 - 2/25/2020 1029      2/25/2020             SpO2:  97 %    Resp Rate (observed):  (!) 5    Resp Rate (set):  10                Electronically Signed By: Christine Marie Volp Hodgkins, CRNA, APRN CRNA  February 25, 2020  10:29 AM  
yes

## 2021-09-04 ENCOUNTER — HEALTH MAINTENANCE LETTER (OUTPATIENT)
Age: 81
End: 2021-09-04

## 2021-09-07 DIAGNOSIS — E78.5 HYPERLIPIDEMIA LDL GOAL <130: ICD-10-CM

## 2021-09-09 ENCOUNTER — MYC MEDICAL ADVICE (OUTPATIENT)
Dept: INTERNAL MEDICINE | Facility: CLINIC | Age: 81
End: 2021-09-09

## 2021-09-09 RX ORDER — SIMVASTATIN 20 MG
TABLET ORAL
Qty: 90 TABLET | Refills: 0 | OUTPATIENT
Start: 2021-09-09

## 2021-09-09 NOTE — TELEPHONE ENCOUNTER
Routing refill request to provider for review/approval because:  Meets protocols- last office visit June 2021.   Needs new PCP    Lupe Fernandez RN  [facility]

## 2021-09-10 DIAGNOSIS — E78.5 HYPERLIPIDEMIA LDL GOAL <130: ICD-10-CM

## 2021-09-10 RX ORDER — SIMVASTATIN 20 MG
20 TABLET ORAL EVERY MORNING
Qty: 30 TABLET | Refills: 0 | OUTPATIENT
Start: 2021-09-10

## 2021-09-10 NOTE — TELEPHONE ENCOUNTER
Patients wife called in because Simvastatin was refused, appointment set for 9/28 with Dr. Martinez     Patient is out of medication. Refill pended for 30 days     Arun Sainz RN

## 2021-09-10 NOTE — LETTER
[facility]  [address] [address]  [phone]  September 13, 2021  Greyson Haas  [address] [facility] [address]    Dear Greyson,    I am contacting you regarding the refill request we received for you. After reviewing your chart it looks like you are overdue for your annual and for a med check. Please call [phone] or schedule this through my chart to continue to receive refills. If you anticipate running out before your appointment let us know and we can send in a zoltan refill.       Thank you,     [facility] nursing staff

## 2021-09-14 RX ORDER — SIMVASTATIN 20 MG
20 TABLET ORAL EVERY MORNING
Qty: 90 TABLET | Refills: 0 | Status: SHIPPED | OUTPATIENT
Start: 2021-09-14 | End: 2021-09-28

## 2021-09-14 NOTE — TELEPHONE ENCOUNTER
Pt called to check on this to see why simvastatin was not renewed     He has a visit to est with new PCP     Pt upset it was not filled and he has an appt     Next 5 appointments (look out 90 days)    Sep 28, 2021  9:40 AM  Office Visit with Saran Martinez MD  [facility] ([facility] ) [address] [place] [id number] [phone]        Yanira NEWELL RN

## 2021-09-28 ENCOUNTER — OFFICE VISIT (OUTPATIENT)
Dept: INTERNAL MEDICINE | Facility: CLINIC | Age: 81
End: 2021-09-28
Payer: COMMERCIAL

## 2021-09-28 VITALS
HEART RATE: 68 BPM | DIASTOLIC BLOOD PRESSURE: 80 MMHG | WEIGHT: 190 LBS | HEIGHT: 67 IN | RESPIRATION RATE: 14 BRPM | SYSTOLIC BLOOD PRESSURE: 138 MMHG | TEMPERATURE: 96.8 F | OXYGEN SATURATION: 98 % | BODY MASS INDEX: 29.82 KG/M2

## 2021-09-28 DIAGNOSIS — R26.9 ABNORMAL GAIT: ICD-10-CM

## 2021-09-28 DIAGNOSIS — N40.1 BENIGN NON-NODULAR PROSTATIC HYPERPLASIA WITH LOWER URINARY TRACT SYMPTOMS: ICD-10-CM

## 2021-09-28 DIAGNOSIS — I10 ESSENTIAL HYPERTENSION: Primary | ICD-10-CM

## 2021-09-28 DIAGNOSIS — E78.5 HYPERLIPIDEMIA LDL GOAL <130: ICD-10-CM

## 2021-09-28 PROCEDURE — 99214 OFFICE O/P EST MOD 30 MIN: CPT | Performed by: INTERNAL MEDICINE

## 2021-09-28 RX ORDER — TAMSULOSIN HYDROCHLORIDE 0.4 MG/1
CAPSULE ORAL
Qty: 90 CAPSULE | Refills: 0 | Status: CANCELLED | OUTPATIENT
Start: 2021-09-28

## 2021-09-28 RX ORDER — LATANOPROST 50 UG/ML
1 SOLUTION/ DROPS OPHTHALMIC DAILY
COMMUNITY
End: 2022-11-14

## 2021-09-28 RX ORDER — SIMVASTATIN 20 MG
20 TABLET ORAL EVERY MORNING
Qty: 90 TABLET | Refills: 1 | Status: SHIPPED | OUTPATIENT
Start: 2021-09-28 | End: 2022-09-22

## 2021-09-28 RX ORDER — GABAPENTIN 300 MG/1
300 CAPSULE ORAL 3 TIMES DAILY
Qty: 60 CAPSULE | Refills: 0 | Status: SHIPPED | OUTPATIENT
Start: 2021-09-28 | End: 2021-09-28

## 2021-09-28 RX ORDER — GABAPENTIN 300 MG/1
300 CAPSULE ORAL 2 TIMES DAILY
Qty: 60 CAPSULE | Refills: 0 | Status: SHIPPED | OUTPATIENT
Start: 2021-09-28 | End: 2022-11-14

## 2021-09-28 RX ORDER — GABAPENTIN 300 MG/1
300 CAPSULE ORAL 2 TIMES DAILY
Qty: 60 CAPSULE | Refills: 0 | Status: SHIPPED | OUTPATIENT
Start: 2021-09-28 | End: 2021-09-28

## 2021-09-28 RX ORDER — ALLOPURINOL 300 MG/1
TABLET ORAL
COMMUNITY
Start: 2020-11-30 | End: 2021-09-28

## 2021-09-28 RX ORDER — LISINOPRIL 10 MG/1
10 TABLET ORAL DAILY
Qty: 90 TABLET | Refills: 1 | Status: CANCELLED | OUTPATIENT
Start: 2021-09-28

## 2021-09-28 ASSESSMENT — MIFFLIN-ST. JEOR: SCORE: 1525.46

## 2021-09-28 NOTE — PROGRESS NOTES
"    Assessment & Plan     Essential hypertension  Stable on therapy continue with current medical management.    Benign non-nodular prostatic hyperplasia with lower urinary tract symptoms  Discussed treatment options.  Continue with supportive care    Hyperlipidemia LDL goal <130  Refilled, LDL level listed below  - simvastatin (ZOCOR) 20 MG tablet; Take 1 tablet (20 mg) by mouth every morning    LDL Cholesterol Calculated   Date Value Ref Range Status   06/29/2021 107 (H) <100 mg/dL Final     Comment:     Above desirable:  100-129 mg/dl  Borderline High:  130-159 mg/dL  High:             160-189 mg/dL  Very high:       >189 mg/dl         Abnormal gait  Discussed options available.  Suggested may benefit from a trial of gabapentin.  We discussed its risks as well as its benefits and side effects.  I discussed dosing which is slightly reduced due to his creatinine.  Suggest a 300 mg dose in the evening with advancement to twice daily dosing over the next 1 to 2 weeks to see how he tolerates.  Would like to try and see if we can find other alternatives to treatment as the patient is potentially suggesting he would like to consider taking chronic narcotics which I would not be in favor  - gabapentin (NEURONTIN) 300 MG capsule; Take 1 capsule (300 mg) by mouth 2 times daily     BMI:   Estimated body mass index is 29.76 kg/m  as calculated from the following:    Height as of this encounter: 1.702 m (5' 7\").    Weight as of this encounter: 86.2 kg (190 lb).       See Patient Instructions  He was advised to consider updating through the shingles vaccination and flu shot.  Return in about 1 month (around 10/28/2021) for if symptoms recur or worsen.    Saran Martinez MD  [facility]    Sam Rubi is a 80 year old who presents for the following health issues     HPI     Establish care, previously seen by Dr. Nick.  Patient has seen me once prior but normally has been followed by Dr." Park    Hyperlipidemia Follow-Up      Are you regularly taking any medication or supplement to lower your cholesterol?   Yes- simvastatin    Are you having muscle aches or other side effects that you think could be caused by your cholesterol lowering medication?  No    Hypertension Follow-up      Do you check your blood pressure regularly outside of the clinic? Yes     Are you following a low salt diet? No    Are your blood pressures ever more than 140 on the top number (systolic) OR more   than 90 on the bottom number (diastolic), for example 140/90? No      How many servings of fruits and vegetables do you eat daily?  2-3    On average, how many sweetened beverages do you drink each day (Examples: soda, juice, sweet tea, etc.  Do NOT count diet or artificially sweetened beverages)?   0    How many days per week do you exercise enough to make your heart beat faster? 7    How many minutes a day do you exercise enough to make your heart beat faster? 10 - 19    How many days per week do you miss taking your medication? 0    Review of Systems   CONSTITUTIONAL: NEGATIVE for fever, chills, change in weight  EYES: NEGATIVE for vision changes or irritation  ENT/MOUTH: NEGATIVE for ear, mouth and throat problems  RESP: NEGATIVE for significant cough or SOB  CV: NEGATIVE for chest pain, palpitations or peripheral edema  GI: NEGATIVE for nausea, abdominal pain, heartburn, or change in bowel habits  : NEGATIVE for frequency, dysuria, or hematuria  MUSCULOSKELETAL: NEGATIVE for significant arthralgias or myalgia  NEURO: He has had some chronic ongoing issues in regards to his joints and lower extremities due to his neuropathy and balance difficulties.  [facility].  They have not treated him for such.  He reports chronic pain and discomfort to the point where he actually has been taking some narcotic on his own prior to being active.  HEME: NEGATIVE for  "bleeding problems  PSYCHIATRIC: NEGATIVE for changes in mood or affect      Objective    /80   Pulse 68   Temp 96.8  F (36  C) (Temporal)   Resp 14   Ht 1.702 m (5' 7\")   Wt 86.2 kg (190 lb)   SpO2 98%   BMI 29.76 kg/m    Body mass index is 29.76 kg/m .     Physical Exam   GENERAL: alert and no distress  EYES: Eyes grossly normal to inspection, PERRL and conjunctivae and sclerae normal  HENT: ear canals and TM's normal, nose and mouth without ulcers or lesions  NECK: no adenopathy, no asymmetry, masses, or scars and thyroid normal to palpation  RESP: lungs clear to auscultation - no rales, rhonchi or wheezes  CV: regular rate and rhythm, normal S1 S2, no S3 or S4, no click or rub,  MS: no gross musculoskeletal defects noted  NEURO: No focal changes less chronic gait changes LE, using cane.  It is slightly wide-based.  PSYCH: mentation appears normal, affect normal/bright            "

## 2021-11-01 DIAGNOSIS — N40.1 BENIGN NON-NODULAR PROSTATIC HYPERPLASIA WITH LOWER URINARY TRACT SYMPTOMS: ICD-10-CM

## 2021-11-02 RX ORDER — TAMSULOSIN HYDROCHLORIDE 0.4 MG/1
CAPSULE ORAL
Qty: 90 CAPSULE | Refills: 3 | Status: SHIPPED | OUTPATIENT
Start: 2021-11-02 | End: 2022-11-23

## 2022-04-01 ENCOUNTER — MYC MEDICAL ADVICE (OUTPATIENT)
Dept: INTERNAL MEDICINE | Facility: CLINIC | Age: 82
End: 2022-04-01
Payer: COMMERCIAL

## 2022-04-01 DIAGNOSIS — N18.31 STAGE 3A CHRONIC KIDNEY DISEASE (H): Primary | ICD-10-CM

## 2022-04-01 NOTE — TELEPHONE ENCOUNTER
Please see mychart from patient and advise appropriate course of action.    Mary Jurado RN  [facility] Triage Nurse

## 2022-04-08 ENCOUNTER — TELEPHONE (OUTPATIENT)
Dept: INTERNAL MEDICINE | Facility: CLINIC | Age: 82
End: 2022-04-08

## 2022-04-08 ENCOUNTER — LAB (OUTPATIENT)
Dept: LAB | Facility: CLINIC | Age: 82
End: 2022-04-08
Payer: COMMERCIAL

## 2022-04-08 DIAGNOSIS — N18.31 STAGE 3A CHRONIC KIDNEY DISEASE (H): ICD-10-CM

## 2022-04-08 LAB
ALBUMIN SERPL-MCNC: 4 G/DL (ref 3.4–5)
ALP SERPL-CCNC: 59 U/L (ref 40–150)
ALT SERPL W P-5'-P-CCNC: 24 U/L (ref 0–70)
ANION GAP SERPL CALCULATED.3IONS-SCNC: 6 MMOL/L (ref 3–14)
AST SERPL W P-5'-P-CCNC: 20 U/L (ref 0–45)
BILIRUB SERPL-MCNC: 0.8 MG/DL (ref 0.2–1.3)
BUN SERPL-MCNC: 27 MG/DL (ref 7–30)
CALCIUM SERPL-MCNC: 9.7 MG/DL (ref 8.5–10.1)
CHLORIDE BLD-SCNC: 112 MMOL/L (ref 94–109)
CO2 SERPL-SCNC: 22 MMOL/L (ref 20–32)
CREAT SERPL-MCNC: 1.59 MG/DL (ref 0.66–1.25)
GFR SERPL CREATININE-BSD FRML MDRD: 43 ML/MIN/1.73M2
GLUCOSE BLD-MCNC: 100 MG/DL (ref 70–99)
POTASSIUM BLD-SCNC: 5.3 MMOL/L (ref 3.4–5.3)
PROT SERPL-MCNC: 7.3 G/DL (ref 6.8–8.8)
SODIUM SERPL-SCNC: 140 MMOL/L (ref 133–144)

## 2022-04-08 PROCEDURE — 36415 COLL VENOUS BLD VENIPUNCTURE: CPT

## 2022-04-08 PROCEDURE — 80053 COMPREHEN METABOLIC PANEL: CPT

## 2022-04-08 NOTE — TELEPHONE ENCOUNTER
Please contact and suggest patient make appt. to schedule for f/u discussion about labs as noted, see MYCHART message

## 2022-04-12 ENCOUNTER — OFFICE VISIT (OUTPATIENT)
Dept: INTERNAL MEDICINE | Facility: CLINIC | Age: 82
End: 2022-04-12
Payer: COMMERCIAL

## 2022-04-12 VITALS
WEIGHT: 186.5 LBS | SYSTOLIC BLOOD PRESSURE: 122 MMHG | TEMPERATURE: 97.5 F | RESPIRATION RATE: 16 BRPM | BODY MASS INDEX: 29.21 KG/M2 | DIASTOLIC BLOOD PRESSURE: 74 MMHG | HEART RATE: 67 BPM | OXYGEN SATURATION: 97 %

## 2022-04-12 DIAGNOSIS — M10.9 GOUT OF MULTIPLE SITES, UNSPECIFIED CAUSE, UNSPECIFIED CHRONICITY: ICD-10-CM

## 2022-04-12 DIAGNOSIS — G60.9 IDIOPATHIC NEUROPATHY: ICD-10-CM

## 2022-04-12 DIAGNOSIS — L03.031 PARONYCHIA OF TOE, RIGHT: Primary | ICD-10-CM

## 2022-04-12 PROCEDURE — 99214 OFFICE O/P EST MOD 30 MIN: CPT | Performed by: PHYSICIAN ASSISTANT

## 2022-04-12 RX ORDER — CEPHALEXIN 500 MG/1
500 CAPSULE ORAL 3 TIMES DAILY
Qty: 21 CAPSULE | Refills: 0 | Status: SHIPPED | OUTPATIENT
Start: 2022-04-12 | End: 2022-11-14

## 2022-04-12 NOTE — PROGRESS NOTES
"  Assessment & Plan     Paronychia of toe, right    - cephALEXin (KEFLEX) 500 MG capsule; Take 1 capsule (500 mg) by mouth in the morning and 1 capsule (500 mg) at noon and 1 capsule (500 mg) in the evening.    Gout of multiple sites, unspecified cause, unspecified chronicity  Reviewed hx and looked at feet. No sign of any acute flare today   reassurance   Continue on allopurinol   Would not recommend NSAIDs due to CKD-     Idiopathic neuropathy  Stable - has been having a lot of foot pain  [facility]               BMI:   Estimated body mass index is 29.21 kg/m  as calculated from the following:    Height as of 9/28/21: 1.702 m (5' 7\").    Weight as of this encounter: 84.6 kg (186 lb 8 oz).           Return in about 4 weeks (around 5/10/2022) for Routine Visit, regular primary provider.    [facility]illespie [facility]    Sam Rubi is a 81 year old who presents for the following health issues     HPI     Pain History:    Have you seen anyone else for your pain? No  Where in your body do you have pain? Foot stings in both feet with some swelling also and some pain    Musculoskeletal problem/pain      Duration: a long time     Worse over the past month    Description  Location: bilateral feet   Joint in feet, with pain     Intensity:  mild    Accompanying signs and symptoms: left foot second toe with crusting noted at the nail margin     History  Previous similar problem: YES  Previous evaluation:  none    Precipitating or alleviating factors:  Trauma or overuse: no   Aggravating factors include: walking    Therapies tried and outcome: warm soak yesterday and this did help      Also with concerns about gout. He is on allopurinol for maintenance. Last uric acid level in normal range - 9 months ago   No diet changes.   [facility]     Review of Systems   Constitutional, HEENT, cardiovascular, pulmonary, gi and gu systems are negative, " except as otherwise noted.      Objective    /74   Pulse 67   Temp 97.5  F (36.4  C) (Tympanic)   Resp 16   Wt 84.6 kg (186 lb 8 oz)   SpO2 97%   BMI 29.21 kg/m    Body mass index is 29.21 kg/m .  Physical Exam   GENERAL: healthy, alert and no distress  RESP: lungs clear to auscultation - no rales, rhonchi or wheezes  CV: regular rates and rhythm and normal S1 S2, no S3 or S4  MS: no general swelling or redness/warm noted in the feet  Arthritis changes in multiple toes  2nd toe lateral edge with redness and crusting noted.     SKIN:

## 2022-04-16 ENCOUNTER — HEALTH MAINTENANCE LETTER (OUTPATIENT)
Age: 82
End: 2022-04-16

## 2022-05-04 DIAGNOSIS — I10 ESSENTIAL HYPERTENSION: ICD-10-CM

## 2022-05-06 RX ORDER — LISINOPRIL 10 MG/1
TABLET ORAL
Qty: 90 TABLET | Refills: 0 | Status: SHIPPED | OUTPATIENT
Start: 2022-05-06 | End: 2022-09-22

## 2022-05-06 NOTE — TELEPHONE ENCOUNTER
Routing refill request to provider for review/approval because:  Creatinine   Date Value Ref Range Status   04/08/2022 1.59 (H) 0.66 - 1.25 mg/dL Final   06/29/2021 1.30 (H) 0.66 - 1.25 mg/dL Final         Arun Sainz RN  [facility] Triage Nurse

## 2022-09-20 DIAGNOSIS — E78.5 HYPERLIPIDEMIA LDL GOAL <130: ICD-10-CM

## 2022-09-20 DIAGNOSIS — I10 ESSENTIAL HYPERTENSION: ICD-10-CM

## 2022-09-20 NOTE — LETTER
[facility]  [address] [address]  [phone]            Greyson Hasa  [address] [facility] [address]        September 22, 2022    Dear Greyson,    While refilling your lisinopril (ZESTRIL) 10 MG tablet prescription today, we noticed that you are due for an appointment with your provider.  We will refill your prescription for 90 days, but a follow-up appointment must be made before any additional refills can be approved.     Taking care of your health is important to us and we look forward to seeing you in the near future.  Please call us at [phone] or [phone] (or use [employer]) to schedule an appointment.     Please disregard this notice if you have already made an appointment.    Sincerely,        [facility]

## 2022-09-22 RX ORDER — LISINOPRIL 10 MG/1
TABLET ORAL
Qty: 90 TABLET | Refills: 0 | Status: SHIPPED | OUTPATIENT
Start: 2022-09-22 | End: 2023-01-02

## 2022-09-22 RX ORDER — SIMVASTATIN 20 MG
TABLET ORAL
Qty: 90 TABLET | Refills: 0 | Status: SHIPPED | OUTPATIENT
Start: 2022-09-22 | End: 2023-04-19

## 2022-09-22 NOTE — TELEPHONE ENCOUNTER
Routing refill request to provider for review/approval because:  Creatinine   Date Value Ref Range Status   04/08/2022 1.59 (H) 0.66 - 1.25 mg/dL Final   06/29/2021 1.30 (H) 0.66 - 1.25 mg/dL Final     LDL  Nisha GHOSH RN, BSN

## 2022-10-13 DIAGNOSIS — M10.9 GOUT OF MULTIPLE SITES, UNSPECIFIED CAUSE, UNSPECIFIED CHRONICITY: ICD-10-CM

## 2022-10-14 RX ORDER — ALLOPURINOL 300 MG/1
TABLET ORAL
Qty: 30 TABLET | Refills: 0 | Status: SHIPPED | OUTPATIENT
Start: 2022-10-14 | End: 2022-12-01

## 2022-10-14 NOTE — TELEPHONE ENCOUNTER
Routing refill request to provider for review/approval because:  Labs out of range/Labs not current:   Creatinine   Date Value Ref Range Status   04/08/2022 1.59 (H) 0.66 - 1.25 mg/dL Final   06/29/2021 1.30 (H) 0.66 - 1.25 mg/dL Final     Not current: CBC and uric acid    Next OV:   Appointments in Next Year      Nov 14, 2022  3:30 PM  (Arrive by 3:10 PM)  Provider Visit with Saran Martinez MD  [facility] ([facility] ) [phone]           Medication pended for review.    Malik Lynne RN

## 2022-10-22 ENCOUNTER — HEALTH MAINTENANCE LETTER (OUTPATIENT)
Age: 82
End: 2022-10-22

## 2022-11-14 ENCOUNTER — OFFICE VISIT (OUTPATIENT)
Dept: INTERNAL MEDICINE | Facility: CLINIC | Age: 82
End: 2022-11-14
Payer: COMMERCIAL

## 2022-11-14 VITALS
RESPIRATION RATE: 15 BRPM | HEART RATE: 81 BPM | WEIGHT: 187 LBS | OXYGEN SATURATION: 96 % | BODY MASS INDEX: 29.35 KG/M2 | TEMPERATURE: 97 F | DIASTOLIC BLOOD PRESSURE: 64 MMHG | SYSTOLIC BLOOD PRESSURE: 108 MMHG | HEIGHT: 67 IN

## 2022-11-14 DIAGNOSIS — M10.9 GOUT OF MULTIPLE SITES, UNSPECIFIED CAUSE, UNSPECIFIED CHRONICITY: ICD-10-CM

## 2022-11-14 DIAGNOSIS — N18.30 STAGE 3 CHRONIC KIDNEY DISEASE, UNSPECIFIED WHETHER STAGE 3A OR 3B CKD (H): Primary | ICD-10-CM

## 2022-11-14 DIAGNOSIS — I77.810 ASCENDING AORTA DILATATION (H): ICD-10-CM

## 2022-11-14 DIAGNOSIS — E78.5 HYPERLIPIDEMIA LDL GOAL <130: ICD-10-CM

## 2022-11-14 DIAGNOSIS — R19.7 DIARRHEA, UNSPECIFIED TYPE: ICD-10-CM

## 2022-11-14 DIAGNOSIS — I10 ESSENTIAL HYPERTENSION: ICD-10-CM

## 2022-11-14 LAB
ALBUMIN SERPL-MCNC: 4 G/DL (ref 3.4–5)
ALP SERPL-CCNC: 60 U/L (ref 40–150)
ALT SERPL W P-5'-P-CCNC: 21 U/L (ref 0–70)
ANION GAP SERPL CALCULATED.3IONS-SCNC: 8 MMOL/L (ref 3–14)
AST SERPL W P-5'-P-CCNC: 21 U/L (ref 0–45)
BILIRUB SERPL-MCNC: 0.9 MG/DL (ref 0.2–1.3)
BUN SERPL-MCNC: 24 MG/DL (ref 7–30)
CALCIUM SERPL-MCNC: 9.5 MG/DL (ref 8.5–10.1)
CHLORIDE BLD-SCNC: 109 MMOL/L (ref 94–109)
CHOLEST SERPL-MCNC: 186 MG/DL
CO2 SERPL-SCNC: 22 MMOL/L (ref 20–32)
CREAT SERPL-MCNC: 1.57 MG/DL (ref 0.66–1.25)
CREAT UR-MCNC: 147 MG/DL
ERYTHROCYTE [DISTWIDTH] IN BLOOD BY AUTOMATED COUNT: 14.1 % (ref 10–15)
FASTING STATUS PATIENT QL REPORTED: YES
GFR SERPL CREATININE-BSD FRML MDRD: 44 ML/MIN/1.73M2
GLUCOSE BLD-MCNC: 107 MG/DL (ref 70–99)
HCT VFR BLD AUTO: 42.9 % (ref 40–53)
HDLC SERPL-MCNC: 64 MG/DL
HGB BLD-MCNC: 13.9 G/DL (ref 13.3–17.7)
LDLC SERPL CALC-MCNC: 95 MG/DL
MCH RBC QN AUTO: 30.2 PG (ref 26.5–33)
MCHC RBC AUTO-ENTMCNC: 32.4 G/DL (ref 31.5–36.5)
MCV RBC AUTO: 93 FL (ref 78–100)
MICROALBUMIN UR-MCNC: 160 MG/L
MICROALBUMIN/CREAT UR: 108.84 MG/G CR (ref 0–17)
NONHDLC SERPL-MCNC: 122 MG/DL
PLATELET # BLD AUTO: 183 10E3/UL (ref 150–450)
POTASSIUM BLD-SCNC: 4.6 MMOL/L (ref 3.4–5.3)
PROT SERPL-MCNC: 7.4 G/DL (ref 6.8–8.8)
RBC # BLD AUTO: 4.6 10E6/UL (ref 4.4–5.9)
SODIUM SERPL-SCNC: 139 MMOL/L (ref 133–144)
TRIGL SERPL-MCNC: 135 MG/DL
URATE SERPL-MCNC: 4.8 MG/DL (ref 3.5–7.2)
WBC # BLD AUTO: 6.1 10E3/UL (ref 4–11)

## 2022-11-14 PROCEDURE — 36415 COLL VENOUS BLD VENIPUNCTURE: CPT | Performed by: INTERNAL MEDICINE

## 2022-11-14 PROCEDURE — 0124A COVID-19,PF,PFIZER BOOSTER BIVALENT: CPT | Performed by: INTERNAL MEDICINE

## 2022-11-14 PROCEDURE — 80061 LIPID PANEL: CPT | Performed by: INTERNAL MEDICINE

## 2022-11-14 PROCEDURE — 91312 COVID-19,PF,PFIZER BOOSTER BIVALENT: CPT | Performed by: INTERNAL MEDICINE

## 2022-11-14 PROCEDURE — 85027 COMPLETE CBC AUTOMATED: CPT | Performed by: INTERNAL MEDICINE

## 2022-11-14 PROCEDURE — 84550 ASSAY OF BLOOD/URIC ACID: CPT | Performed by: INTERNAL MEDICINE

## 2022-11-14 PROCEDURE — 82043 UR ALBUMIN QUANTITATIVE: CPT | Performed by: INTERNAL MEDICINE

## 2022-11-14 PROCEDURE — 99214 OFFICE O/P EST MOD 30 MIN: CPT | Performed by: INTERNAL MEDICINE

## 2022-11-14 PROCEDURE — 80053 COMPREHEN METABOLIC PANEL: CPT | Performed by: INTERNAL MEDICINE

## 2022-11-14 NOTE — PROGRESS NOTES
"  Assessment & Plan     Stage 3 chronic kidney disease, unspecified whether stage 3a or 3b CKD (H)  Labs ordered as fasting per routine  - Albumin Random Urine Quantitative with Creat Ratio; Future  - Comprehensive metabolic panel (BMP + Alb, Alk Phos, ALT, AST, Total. Bili, TP); Future    Hyperlipidemia LDL goal <130  Stable on therapy  - Lipid panel reflex to direct LDL Non-fasting; Future    Essential hypertension  Stable on therapy  - CBC with platelets; Future  - Comprehensive metabolic panel (BMP + Alb, Alk Phos, ALT, AST, Total. Bili, TP); Future    Gout of multiple sites, unspecified cause, unspecified chronicity  Stable on therapy  - Uric acid; Future      (I77.810) Ascending aorta dilatation (H)  Comment: discussed options of repeat TTE.  Plan: Echocardiogram Complete            BMI:   Estimated body mass index is 29.29 kg/m  as calculated from the following:    Height as of this encounter: 1.702 m (5' 7\").    Weight as of this encounter: 84.8 kg (187 lb).       See Patient Instructions    Return in about 6 months (around 5/14/2023) for BP Recheck.    Saran Martinez MD  [facility]    Subjective :    Greyson is a 81 year old accompanied by his self, presenting for the following health issues:    Recheck Medication and Musculoskeletal Problem      HPI     Hyperlipidemia Follow-Up      Are you regularly taking any medication or supplement to lower your cholesterol?   Yes- Simvastatin    Are you having muscle aches or other side effects that you think could be caused by your cholesterol lowering medication?  No    Hypertension Follow-up      Do you check your blood pressure regularly outside of the clinic? No     Are you following a low salt diet? No    Are your blood pressures ever more than 140 on the top number (systolic) OR more   than 90 on the bottom number (diastolic), for example 140/90? N/a     Other concerns:  1. [facility]  " "Discussed options with patient.    Review of Systems   CONSTITUTIONAL: NEGATIVE for fever, chills, change in weight  EYES: NEGATIVE for vision changes or irritation  ENT/MOUTH: NEGATIVE for ear, mouth and throat problems  RESP: NEGATIVE for significant cough or SOB  CV: NEGATIVE for chest pain, palpitations or peripheral edema  GI: NEGATIVE for nausea, abdominal pain, heartburn, or change in bowel habits  : NEGATIVE for frequency, dysuria, or hematuria  NEURO: NEGATIVE for weakness, dizziness   HEME: NEGATIVE for bleeding problems  PSYCHIATRIC: NEGATIVE for changes in mood or affect      Objective    /64   Pulse 81   Temp 97  F (36.1  C) (Temporal)   Resp 15   Ht 1.702 m (5' 7\")   Wt 84.8 kg (187 lb)   SpO2 96%   BMI 29.29 kg/m    Body mass index is 29.29 kg/m .     Physical Exam   GENERAL: alert and no distress  EYES: Eyes grossly normal to inspection, PERRL and conjunctivae and sclerae normal  HENT: ear canals and TM's normal, nose and mouth without ulcers or lesions  NECK: no adenopathy, no asymmetry, masses, or scars and thyroid normal to palpation  RESP: lungs clear to auscultation - no rales, rhonchi or wheezes  CV: regular rate and rhythm, normal S1 S2, no S3 or S4, no click or rub  MS: no gross musculoskeletal defects noted with cane use. Mild antalgic gait.  NEURO: No focal changes less mild neuropathic changes in feet.  PSYCH: mentation appears normal, affect normal/bright              "

## 2022-11-21 DIAGNOSIS — N40.1 BENIGN NON-NODULAR PROSTATIC HYPERPLASIA WITH LOWER URINARY TRACT SYMPTOMS: ICD-10-CM

## 2022-11-23 RX ORDER — TAMSULOSIN HYDROCHLORIDE 0.4 MG/1
CAPSULE ORAL
Qty: 90 CAPSULE | Refills: 2 | Status: SHIPPED | OUTPATIENT
Start: 2022-11-23 | End: 2023-09-25

## 2022-11-23 NOTE — TELEPHONE ENCOUNTER
Prescription approved per [facility] Refill Protocol.  Teresa ARAUJO RN  [facility]

## 2022-12-09 ENCOUNTER — HOSPITAL ENCOUNTER (OUTPATIENT)
Dept: CARDIOLOGY | Facility: CLINIC | Age: 82
Discharge: HOME | End: 2022-12-09
Attending: INTERNAL MEDICINE | Admitting: INTERNAL MEDICINE
Payer: COMMERCIAL

## 2022-12-09 DIAGNOSIS — I77.810 ASCENDING AORTA DILATATION (H): ICD-10-CM

## 2022-12-09 LAB — LVEF ECHO: NORMAL

## 2022-12-09 PROCEDURE — 93306 TTE W/DOPPLER COMPLETE: CPT

## 2022-12-09 PROCEDURE — 93306 TTE W/DOPPLER COMPLETE: CPT | Mod: 26 | Performed by: INTERNAL MEDICINE

## 2022-12-12 ENCOUNTER — TELEPHONE (OUTPATIENT)
Dept: INTERNAL MEDICINE | Facility: CLINIC | Age: 82
End: 2022-12-12

## 2022-12-12 NOTE — TELEPHONE ENCOUNTER
Called and formally discussed the results of echocardiogram with patient.  Advised may benefit from more aggressive surveillance and cardiovascular assessment.  Patient does not want to proceed at present time.  I discussed with him the risk of rupture and statistics associated with his current diameter.  He is not interested in seeing anybody at this point.  I have recommended highly he consider establishing care but he has chosen to repeat an echocardiogram.  I suggested 6 months rather than 1 year for reassurance.

## 2023-01-02 DIAGNOSIS — I10 ESSENTIAL HYPERTENSION: ICD-10-CM

## 2023-01-02 RX ORDER — LISINOPRIL 10 MG/1
TABLET ORAL
Qty: 90 TABLET | Refills: 0 | Status: SHIPPED | OUTPATIENT
Start: 2023-01-02 | End: 2023-05-30

## 2023-01-26 ENCOUNTER — OFFICE VISIT (OUTPATIENT)
Dept: FAMILY MEDICINE | Facility: CLINIC | Age: 83
End: 2023-01-26
Payer: COMMERCIAL

## 2023-01-26 ENCOUNTER — NURSE TRIAGE (OUTPATIENT)
Dept: INTERNAL MEDICINE | Facility: CLINIC | Age: 83
End: 2023-01-26

## 2023-01-26 VITALS
BODY MASS INDEX: 29.91 KG/M2 | DIASTOLIC BLOOD PRESSURE: 77 MMHG | WEIGHT: 191 LBS | SYSTOLIC BLOOD PRESSURE: 118 MMHG | OXYGEN SATURATION: 97 % | HEART RATE: 68 BPM | TEMPERATURE: 97.3 F

## 2023-01-26 DIAGNOSIS — R19.7 DIARRHEA, UNSPECIFIED TYPE: Primary | ICD-10-CM

## 2023-01-26 PROCEDURE — 99214 OFFICE O/P EST MOD 30 MIN: CPT

## 2023-01-26 RX ORDER — AZITHROMYCIN 500 MG/1
500 TABLET, FILM COATED ORAL DAILY
Qty: 3 TABLET | Refills: 0 | Status: SHIPPED | OUTPATIENT
Start: 2023-01-26 | End: 2023-07-24

## 2023-01-26 NOTE — TELEPHONE ENCOUNTER
"Patient called with CC of diarrhea for the last five days.    Onset: five days ago  Diarrhea: 6-7x per day;   BM Consistency: \"totally watery\", \"greasy\"/mucusy  Vomiting: denies  Abdominal pain: \"nothing serious\", slight discomfort  Abdominal pain severity: mild  Oral intake: pt reports good oral intake of fluids  Hydration: urinating as usual; endorses mild weakness; denies dizziness and dry mouth   Exposure: denies exposure to illness; patient does not believe he ate any food that spoiled since \"my wife is fine\"; no travel recently  Antibiotics: patient took some leftover antibiotics yesterday and today; writer advised patient that antibiotics can cause loose stools and patient expressed verbal understanding  Other symptoms: denies fever and blood in the stool    Nurse Triage SBAR    Is this a 2nd Level Triage? YES, LICENSED PRACTITIONER REVIEW IS REQUIRED    Protocol Recommended Disposition:   See in Office Today    Recommendation:   Patient was advised to go be seen today by a HCP. Patient agreed to care advise. No appointments available today at [place], [place] or [facility] locations. Writer scheduled patient for:  1/26/2023 12:40 PM Walk-In, [facility]     Writer reviewed with patient that he should be seen immediately in ED if severe abdominal pain, blood in stool, dizziness or difficulty breathing presents. Patient expressed verbal understanding.    Does the patient meet one of the following criteria for ADS visit consideration? 16+ years old, with an [facility] PCP     TIP  Providers, please consider if this condition is appropriate for management at one of our Acute and Diagnostic Services sites.     If patient is a good candidate, please use dotphrase <dot>triageresponse and select Refer to ADS to document.    Deborah Snow RN  -[facility]        Reason for Disposition    MODERATE diarrhea (e.g., 4-6 times / day more than normal) and " present > 48 hours (2 days)    Additional Information    Negative: Shock suspected (e.g., cold/pale/clammy skin, too weak to stand, low BP, rapid pulse)    Negative: Difficult to awaken or acting confused (e.g., disoriented, slurred speech)    Negative: Sounds like a life-threatening emergency to the triager    Negative: Vomiting also present and worse than the diarrhea    Negative: Blood in stool and without diarrhea    Negative: SEVERE abdominal pain (e.g., excruciating) and present > 1 hour    Negative: SEVERE abdominal pain and age > 60 years    Negative: SEVERE diarrhea (e.g., 7 or more times / day more than normal) and age > 60 years    Negative: Constant abdominal pain lasting > 2 hours    Negative: Drinking very little and has signs of dehydration (e.g., no urine > 12 hours, very dry mouth, very lightheaded)    Negative: Patient sounds very sick or weak to the triager    Negative: SEVERE diarrhea (e.g., 7 or more times / day more than normal) and present > 24 hours (1 day)    Negative: Bloody, black, or tarry bowel movements (Exception: chronic-unchanged black-grey bowel movements and is taking iron pills or Pepto-Bismol)    Protocols used: DIARRHEA-A-OH

## 2023-01-26 NOTE — PROGRESS NOTES
Assessment & Plan     Diarrhea, unspecified type    - azithromycin (ZITHROMAX) 500 MG tablet  Dispense: 3 tablet; Refill: 0  - C. difficile Toxin B PCR with reflex to C. difficile Antigen and Toxins A/B EIA  - Ova and Parasite Exam Routine  - Cryptosporidium/Giardia Immunoassay       Patient Instructions   Take antibiotic as directed.   Drink plenty of fluids, mostly water.  Immodium over the counter for antidiarrheal as long as you don't have a fever.  Stay away from fruit juices, foods high in fiber and foods with high sugar content as these can cause diarrhea.  Present to primary care provider if symptoms worsen, you develop a high fever, severe weakness or fainting, increased abdominal pain, blood in stool or vomit, or failure to improve in 10 days.       Return in about 1 week (around 2/2/2023), or if symptoms worsen or fail to improve.    [facility]    Sam Rubi is a 82 year old male who presents to clinic today for the following health issues:  Chief Complaint   Patient presents with     Gastrointestinal Problem     Diarrhea ongoing for 5 days      Patient reports that he started to have diarrhea 5 days ago. He has had 6-7 loose watery stools per day. Today he has had 3-4 loose watery stools. He has no blood or mucous in his stools. He has no cramping, but feels his stomach rumbling. No fever/chills/sweats. No nausea or vomiting. No history of diarrhea. Had colon cancer 20 years ago which was treated with radiation therapy.  He took 2 pills of Amoxicillin yesterday, and one this am and felt some improvement. He is drinking liquids well. His wife is fine, she ate at the same places he did. No abdominal pain. No lightheadedness or confusion.         Review of Systems        Objective    /77   Pulse 68   Temp 97.3  F (36.3  C) (Tympanic)   Wt 86.6 kg (191 lb)   SpO2 97%   BMI 29.91 kg/m    Physical  Exam  Vitals reviewed.   Constitutional:       General: He is not in acute distress.     Appearance: Normal appearance. He is normal weight. He is not ill-appearing.   HENT:      Head: Normocephalic and atraumatic.      Right Ear: Tympanic membrane, ear canal and external ear normal.      Left Ear: Tympanic membrane, ear canal and external ear normal.      Nose: Nose normal.      Mouth/Throat:      Mouth: Mucous membranes are moist.      Pharynx: Oropharynx is clear.   Eyes:      Conjunctiva/sclera: Conjunctivae normal.      Pupils: Pupils are equal, round, and reactive to light.   Cardiovascular:      Rate and Rhythm: Normal rate and regular rhythm.      Heart sounds: Normal heart sounds.   Pulmonary:      Effort: Pulmonary effort is normal.      Breath sounds: Normal breath sounds.   Abdominal:      General: Abdomen is flat. Bowel sounds are normal. There is no distension.      Palpations: Abdomen is soft. There is no mass.      Tenderness: There is no abdominal tenderness. There is no guarding or rebound.   Musculoskeletal:      Cervical back: Neck supple.   Lymphadenopathy:      Cervical: No cervical adenopathy.   Skin:     General: Skin is warm and dry.      Comments: Turgor normal.   Neurological:      Mental Status: He is alert and oriented to person, place, and time.   Psychiatric:         Mood and Affect: Mood normal.         Behavior: Behavior normal.         Thought Content: Thought content normal.         Judgment: Judgment normal.

## 2023-01-27 PROCEDURE — 87209 SMEAR COMPLEX STAIN: CPT | Performed by: INTERNAL MEDICINE

## 2023-01-27 PROCEDURE — 87177 OVA AND PARASITES SMEARS: CPT | Performed by: INTERNAL MEDICINE

## 2023-01-27 PROCEDURE — 87329 GIARDIA AG IA: CPT | Performed by: INTERNAL MEDICINE

## 2023-01-27 PROCEDURE — 87328 CRYPTOSPORIDIUM AG IA: CPT | Performed by: INTERNAL MEDICINE

## 2023-01-30 LAB
C PARVUM AG STL QL IA: POSITIVE
G LAMBLIA AG STL QL IA: NEGATIVE
O+P STL MICRO: NEGATIVE

## 2023-02-13 NOTE — PROGRESS NOTES
Discharge instructions reviewed w/ pt and pt's wife.  Declined Oxycodone and Aspirin script, meds available at home.  No further questions at this time.  Pain controlled w/ Oxycodone and Tylenol.  Up w/ SBA and walker.  Voiding per urinal.  Discharged to home via wheelchair, tranport by pt's wife and son.       End-Point And Post-Procedure Care: The procedure continued until mild erythema/edema was noted.  Immediately following the procedure, the pt's own PRP was dripped onto the treatment zones.  Post care reviewed with patient.

## 2023-04-19 DIAGNOSIS — E78.5 HYPERLIPIDEMIA LDL GOAL <130: ICD-10-CM

## 2023-04-19 RX ORDER — SIMVASTATIN 20 MG
TABLET ORAL
Qty: 90 TABLET | Refills: 1 | Status: SHIPPED | OUTPATIENT
Start: 2023-04-19 | End: 2024-04-10

## 2023-04-19 NOTE — TELEPHONE ENCOUNTER
Prescription approved per [facility] Refill Protocol.  Mary Jurado, RN  [facility] Triage Nurse

## 2023-05-30 DIAGNOSIS — I10 ESSENTIAL HYPERTENSION: ICD-10-CM

## 2023-05-30 RX ORDER — LISINOPRIL 10 MG/1
TABLET ORAL
Qty: 90 TABLET | Refills: 0 | OUTPATIENT
Start: 2023-05-30

## 2023-05-30 RX ORDER — LISINOPRIL 10 MG/1
10 TABLET ORAL DAILY
Qty: 90 TABLET | Refills: 0 | Status: SHIPPED | OUTPATIENT
Start: 2023-05-30 | End: 2023-07-24

## 2023-06-01 ENCOUNTER — HEALTH MAINTENANCE LETTER (OUTPATIENT)
Age: 83
End: 2023-06-01

## 2023-07-14 DIAGNOSIS — R26.9 ABNORMAL GAIT: ICD-10-CM

## 2023-07-14 RX ORDER — GABAPENTIN 300 MG/1
CAPSULE ORAL
Qty: 60 CAPSULE | Refills: 0 | OUTPATIENT
Start: 2023-07-14

## 2023-07-14 NOTE — TELEPHONE ENCOUNTER
Patient called and stated that he has been taking the Gabapentin again to help with his neuropathy. Patient stated that it has been helping and would like a refill of the medication.     June NORIEGA RN  [facility] Triage Team

## 2023-07-14 NOTE — TELEPHONE ENCOUNTER
Called and left message for the patient to call the clinic back. Upon chart review, medication not active on the med list. Last filled back in 2021.     Please schedule patient for a VV with PCP if patient would like to discuss restarting this medication.     Thank you,    Mary Jurado RN

## 2023-07-17 NOTE — TELEPHONE ENCOUNTER
TO PCP:    Pt called clinic back, stated he doesn't want to pay $35 for a VV and that OV are free for him. Scheduled OV with pt, no openings with team until 7/24.    Pt has been taking gabapentin 300 mg 1x daily for the past month and it has been helpful.    Is this visit sufficient for pt to get refills?     EVANGELIST LAM RN on 7/17/2023 at 9:22 AM    Future Appointments 7/17/2023 - 1/13/2024      Date Visit Type Length Department Provider     7/24/2023  3:00 PM OFFICE VISIT 30 min  INTERNAL MEDICINE Ginette Gentile MD    Location Instructions:     [facility] is in the [facility] at [address] in [place]. This just east of the [address] exit off of [address]. Free parking is available; access the lot from [address] [place] [address] or [facility].

## 2023-07-24 ENCOUNTER — OFFICE VISIT (OUTPATIENT)
Dept: INTERNAL MEDICINE | Facility: CLINIC | Age: 83
End: 2023-07-24
Payer: COMMERCIAL

## 2023-07-24 VITALS
SYSTOLIC BLOOD PRESSURE: 124 MMHG | OXYGEN SATURATION: 96 % | DIASTOLIC BLOOD PRESSURE: 72 MMHG | HEART RATE: 72 BPM | TEMPERATURE: 98.3 F | WEIGHT: 190.1 LBS | BODY MASS INDEX: 29.77 KG/M2

## 2023-07-24 DIAGNOSIS — G62.9 PERIPHERAL POLYNEUROPATHY: Primary | ICD-10-CM

## 2023-07-24 PROCEDURE — 99213 OFFICE O/P EST LOW 20 MIN: CPT | Performed by: INTERNAL MEDICINE

## 2023-07-24 RX ORDER — LISINOPRIL 10 MG/1
5 TABLET ORAL DAILY
COMMUNITY
Start: 2023-07-24 | End: 2024-01-03

## 2023-07-24 RX ORDER — GABAPENTIN 300 MG/1
300 CAPSULE ORAL 2 TIMES DAILY
Qty: 180 CAPSULE | Refills: 3 | Status: SHIPPED | OUTPATIENT
Start: 2023-07-24 | End: 2024-07-30

## 2023-07-24 NOTE — PROGRESS NOTES
ASSESSMENT/PLAN                                                      (G62.9) Peripheral polyneuropathy  (primary encounter diagnosis)  Plan: RESTART gabapentin 300 mg twice daily; can increase dose/frequency as needed/tolerated.    Ginette Gentile MD   [address] [address]  T: [phone], F: [phone]    SUBJECTIVE                                                      Greyson Haas is a very pleasant 82 year old male who presents to restart gabapentin:    Patient used to be on gabapentin for peripheral neuropathy but stopped it several years ago because he did not think it was helping. He recently restarted an old prescription of gabapentin and noticed significant improvement in his neuropathy and would like to start taking it again regularly.    OBJECTIVE                                                      /72   Pulse 72   Temp 98.3  F (36.8  C) (Temporal)   Wt 86.2 kg (190 lb 1.6 oz)   SpO2 96%   BMI 29.77 kg/m    Constitutional: well-appearing  Musculoskeletal: walks with cane   Psych: normal judgment and insight; normal mood and affect; recent and remote memory intact    ---    (Note documentation was completed, in part, with [employer] voice-recognition software. Documentation was reviewed, but some grammatical, spelling, and word errors may remain.)

## 2023-09-25 DIAGNOSIS — N40.1 BENIGN NON-NODULAR PROSTATIC HYPERPLASIA WITH LOWER URINARY TRACT SYMPTOMS: ICD-10-CM

## 2023-09-25 RX ORDER — TAMSULOSIN HYDROCHLORIDE 0.4 MG/1
CAPSULE ORAL
Qty: 90 CAPSULE | Refills: 0 | Status: SHIPPED | OUTPATIENT
Start: 2023-09-25 | End: 2024-01-29

## 2023-09-25 NOTE — TELEPHONE ENCOUNTER
Please see recent appointment on 7/24/23 where blood pressure was normal.  Does patient need to come in for a blood pressure check with you as well?

## 2024-01-03 DIAGNOSIS — I10 ESSENTIAL HYPERTENSION: Primary | ICD-10-CM

## 2024-01-03 RX ORDER — LISINOPRIL 10 MG/1
10 TABLET ORAL DAILY
Qty: 90 TABLET | Refills: 0 | Status: SHIPPED | OUTPATIENT
Start: 2024-01-03 | End: 2024-04-10

## 2024-01-27 DIAGNOSIS — N40.1 BENIGN NON-NODULAR PROSTATIC HYPERPLASIA WITH LOWER URINARY TRACT SYMPTOMS: ICD-10-CM

## 2024-01-29 RX ORDER — TAMSULOSIN HYDROCHLORIDE 0.4 MG/1
CAPSULE ORAL
Qty: 90 CAPSULE | Refills: 0 | Status: SHIPPED | OUTPATIENT
Start: 2024-01-29 | End: 2024-04-30

## 2024-01-29 NOTE — TELEPHONE ENCOUNTER
Prescription approved per [facility] Refill Protocol.  Sandhya Webster RN  [facility]

## 2024-02-10 DIAGNOSIS — M10.9 GOUT OF MULTIPLE SITES, UNSPECIFIED CAUSE, UNSPECIFIED CHRONICITY: ICD-10-CM

## 2024-02-12 RX ORDER — ALLOPURINOL 300 MG/1
TABLET ORAL
Qty: 90 TABLET | Refills: 0 | OUTPATIENT
Start: 2024-02-12

## 2024-02-15 ENCOUNTER — OFFICE VISIT (OUTPATIENT)
Dept: INTERNAL MEDICINE | Facility: CLINIC | Age: 84
End: 2024-02-15
Payer: COMMERCIAL

## 2024-02-15 VITALS
RESPIRATION RATE: 16 BRPM | TEMPERATURE: 97.8 F | WEIGHT: 197.8 LBS | SYSTOLIC BLOOD PRESSURE: 110 MMHG | DIASTOLIC BLOOD PRESSURE: 68 MMHG | OXYGEN SATURATION: 97 % | BODY MASS INDEX: 31.04 KG/M2 | HEART RATE: 69 BPM | HEIGHT: 67 IN

## 2024-02-15 DIAGNOSIS — N18.30 STAGE 3 CHRONIC KIDNEY DISEASE, UNSPECIFIED WHETHER STAGE 3A OR 3B CKD (H): ICD-10-CM

## 2024-02-15 DIAGNOSIS — M10.9 GOUT OF MULTIPLE SITES, UNSPECIFIED CAUSE, UNSPECIFIED CHRONICITY: ICD-10-CM

## 2024-02-15 DIAGNOSIS — Z12.11 SCREEN FOR COLON CANCER: ICD-10-CM

## 2024-02-15 DIAGNOSIS — Z00.00 ENCOUNTER FOR SUBSEQUENT ANNUAL WELLNESS VISIT (AWV) IN MEDICARE PATIENT: Primary | ICD-10-CM

## 2024-02-15 DIAGNOSIS — E78.5 HYPERLIPIDEMIA LDL GOAL <130: ICD-10-CM

## 2024-02-15 LAB
ALBUMIN SERPL BCG-MCNC: 4.3 G/DL (ref 3.5–5.2)
ALP SERPL-CCNC: 62 U/L (ref 40–150)
ALT SERPL W P-5'-P-CCNC: 17 U/L (ref 0–70)
ANION GAP SERPL CALCULATED.3IONS-SCNC: 11 MMOL/L (ref 7–15)
AST SERPL W P-5'-P-CCNC: 28 U/L (ref 0–45)
BILIRUB SERPL-MCNC: 0.6 MG/DL
BUN SERPL-MCNC: 26.6 MG/DL (ref 8–23)
CALCIUM SERPL-MCNC: 9.6 MG/DL (ref 8.8–10.2)
CHLORIDE SERPL-SCNC: 106 MMOL/L (ref 98–107)
CHOLEST SERPL-MCNC: 211 MG/DL
CREAT SERPL-MCNC: 1.55 MG/DL (ref 0.67–1.17)
DEPRECATED HCO3 PLAS-SCNC: 21 MMOL/L (ref 22–29)
EGFRCR SERPLBLD CKD-EPI 2021: 44 ML/MIN/1.73M2
ERYTHROCYTE [DISTWIDTH] IN BLOOD BY AUTOMATED COUNT: 13.6 % (ref 10–15)
FASTING STATUS PATIENT QL REPORTED: NO
GLUCOSE SERPL-MCNC: 96 MG/DL (ref 70–99)
HCT VFR BLD AUTO: 40.6 % (ref 40–53)
HDLC SERPL-MCNC: 57 MG/DL
HGB BLD-MCNC: 13.3 G/DL (ref 13.3–17.7)
LDLC SERPL CALC-MCNC: 122 MG/DL
MCH RBC QN AUTO: 30.6 PG (ref 26.5–33)
MCHC RBC AUTO-ENTMCNC: 32.8 G/DL (ref 31.5–36.5)
MCV RBC AUTO: 94 FL (ref 78–100)
NONHDLC SERPL-MCNC: 154 MG/DL
PLATELET # BLD AUTO: 148 10E3/UL (ref 150–450)
POTASSIUM SERPL-SCNC: 4.7 MMOL/L (ref 3.4–5.3)
PROT SERPL-MCNC: 7.1 G/DL (ref 6.4–8.3)
RBC # BLD AUTO: 4.34 10E6/UL (ref 4.4–5.9)
SODIUM SERPL-SCNC: 138 MMOL/L (ref 135–145)
TRIGL SERPL-MCNC: 162 MG/DL
URATE SERPL-MCNC: 5.3 MG/DL (ref 3.4–7)
WBC # BLD AUTO: 6.6 10E3/UL (ref 4–11)

## 2024-02-15 PROCEDURE — 99213 OFFICE O/P EST LOW 20 MIN: CPT | Mod: 25 | Performed by: INTERNAL MEDICINE

## 2024-02-15 PROCEDURE — 84550 ASSAY OF BLOOD/URIC ACID: CPT | Performed by: INTERNAL MEDICINE

## 2024-02-15 PROCEDURE — G0439 PPPS, SUBSEQ VISIT: HCPCS | Performed by: INTERNAL MEDICINE

## 2024-02-15 PROCEDURE — 85027 COMPLETE CBC AUTOMATED: CPT | Performed by: INTERNAL MEDICINE

## 2024-02-15 PROCEDURE — 36415 COLL VENOUS BLD VENIPUNCTURE: CPT | Performed by: INTERNAL MEDICINE

## 2024-02-15 PROCEDURE — 80053 COMPREHEN METABOLIC PANEL: CPT | Performed by: INTERNAL MEDICINE

## 2024-02-15 PROCEDURE — 80061 LIPID PANEL: CPT | Performed by: INTERNAL MEDICINE

## 2024-02-15 RX ORDER — ALLOPURINOL 300 MG/1
1 TABLET ORAL DAILY
Qty: 90 TABLET | Refills: 0 | Status: SHIPPED | OUTPATIENT
Start: 2024-02-15 | End: 2024-02-15

## 2024-02-15 RX ORDER — ALLOPURINOL 300 MG/1
1 TABLET ORAL DAILY
Qty: 90 TABLET | Refills: 0 | Status: SHIPPED | OUTPATIENT
Start: 2024-02-15 | End: 2024-07-30

## 2024-02-15 SDOH — HEALTH STABILITY: PHYSICAL HEALTH: ON AVERAGE, HOW MANY MINUTES DO YOU ENGAGE IN EXERCISE AT THIS LEVEL?: 30 MIN

## 2024-02-15 SDOH — HEALTH STABILITY: PHYSICAL HEALTH: ON AVERAGE, HOW MANY DAYS PER WEEK DO YOU ENGAGE IN MODERATE TO STRENUOUS EXERCISE (LIKE A BRISK WALK)?: 6 DAYS

## 2024-02-15 ASSESSMENT — SOCIAL DETERMINANTS OF HEALTH (SDOH): HOW OFTEN DO YOU GET TOGETHER WITH FRIENDS OR RELATIVES?: THREE TIMES A WEEK

## 2024-02-15 NOTE — PROGRESS NOTES
"Preventive Care Visit  [facility]  Saran Martinez MD, Internal Medicine  Feb 15, 2024    Assessment & Plan     Encounter for subsequent annual wellness visit (AWV) in Medicare patient  Discussed and recommended vaccines and patient will consider such.  Patient would like to [facility]  Orders have been placed.  - Lipid panel reflex to direct LDL Non-fasting; Future  - Comprehensive metabolic panel; Future  - CBC with platelets; Future    He discussed with me that he would like all other aspects of his health [facility] due to better coverage    Stage 3 chronic kidney disease, unspecified whether stage 3a or 3b CKD (H)  Recheck renal function and encourage hydration  - Comprehensive metabolic panel; Future    Hyperlipidemia LDL goal <130  Labs ordered as fasting per routine  - Lipid panel reflex to direct LDL Non-fasting; Future    Gout of multiple sites, unspecified cause, unspecified chronicity  Stable with patient requesting refill of allopurinol, recheck uric acid  - allopurinol (ZYLOPRIM) 300 MG tablet; Take 1 tablet (300 mg) by mouth daily  - Uric acid; Future  - OFFICE/OUTPT VISIT,NEHEMIAH WHITE III    Screen for colon cancer  Patient states that apparently has another colonoscopy upcoming [facility] in [facility].      BMI  Estimated body mass index is 30.98 kg/m  as calculated from the following:    Height as of this encounter: 1.702 m (5' 7\").    Weight as of this encounter: 89.7 kg (197 lb 12.8 oz).       Counseling  Appropriate preventive services were discussed with this patient, including applicable screening as appropriate for fall prevention, nutrition, physical activity, Tobacco-use cessation, weight loss and cognition.  Checklist reviewing preventive services available has been given to the patient.  Reviewed patient's diet, addressing concerns and/or questions.     Patient has been advised of split billing " requirements and indicates understanding: Yes    Work on weight loss  Regular exercise    Sam Rubi is a 83 year old, presenting for the following:  Wellness Visit    Patient is unsure why he is here.  He states he was informed he needed a wellness exam but he informs me that he has [facility]% [facility] and has an appointment next Tuesday to get all of his blood work done as well as his wellness exam through them also.          2/15/2024     2:21 PM   Additional Questions   Roomed by MARYANNE Neri     Health Care Directive  Patient has a Health Care Directive on file  Advance care planning document is on file and is current.    HPI          2/15/2024   General Health   How would you rate your overall physical health? Good   Feel stress (tense, anxious, or unable to sleep) Not at all         2/15/2024   Nutrition   Diet: Regular (no restrictions)         2/15/2024   Exercise   Days per week of moderate/strenous exercise 6 days   Average minutes spent exercising at this level 30 min         2/15/2024   Social Factors   Frequency of gathering with friends or relatives Three times a week   Worry food won't last until get money to buy more Patient declined   Food not last or not have enough money for food? Patient declined   Do you have housing?  Patient declined   Are you worried about losing your housing? Patient declined   Lack of transportation? Patient declined   Unable to get utilities (heat,electricity)? Patient declined         2/15/2024   Fall Risk   Fallen 2 or more times in the past year? No   Trouble with walking or balance? Yes   Gait Speed Test (Document in seconds) 5   Gait Speed Test Interpretation Less than or equal to 5.00 seconds - PASS          2/15/2024   Activities of Daily Living- Home Safety   Needs help with the following daily activites None of the above   Safety concerns in the home None of the above         2/15/2024   Dental   Dentist two times every year? Yes          2/15/2024   Hearing Screening   Hearing concerns? None of the above         2/15/2024   Driving Risk Screening   Patient/family members have concerns about driving No         2/15/2024   General Alertness/Fatigue Screening   Have you been more tired than usual lately? No         2/15/2024   Urinary Incontinence Screening   Bothered by leaking urine in past 6 months No         Today's PHQ-2 Score:       2/15/2024     2:19 PM   PHQ-2 (  [employer])   Q1: Little interest or pleasure in doing things 0   Q2: Feeling down, depressed or hopeless 0   PHQ-2 Score 0   Q1: Little interest or pleasure in doing things Not at all   Q2: Feeling down, depressed or hopeless Not at all   PHQ-2 Score 0           2/15/2024   Substance Use   Alcohol more than 3/day or more than 7/wk No   Do you have a current opioid prescription? No   How severe/bad is pain from 1 to 10? 3/10   Do you use any other substances recreationally? (!) ALCOHOL    (!) PRESCRIPTION DRUGS    (!) AMPHETAMINES     Social History     Tobacco Use    Smoking status: Former     Packs/day: 1.00     Years: 27.00     Additional pack years: 0.00     Total pack years: 27.00     Types: Cigarettes     Quit date: 1983     Years since quittin.1    Smokeless tobacco: Never   Vaping Use    Vaping Use: Never used   Substance Use Topics    Alcohol use: Yes     Comment: Ave 2 daily or more.  Heavy days 5-6, some days none.    Drug use: No     Reviewed and updated as needed this visit by Provider                      Current providers sharing in care for this patient include:  Patient Care Team:  Saran Martinez MD as PCP - General (Internal Medicine)  Saran Martinez MD as Assigned PCP    The following health maintenance items are reviewed in Epic and correct as of today:  Health Maintenance   Topic Date Due    RSV VACCINE (Pregnancy & 60+) (1 - 1-dose 60+ series) Never done    MEDICARE ANNUAL WELLNESS VISIT  2021    COLORECTAL CANCER SCREENING  2023  "   BMP  11/14/2023    LIPID  11/14/2023    MICROALBUMIN  11/14/2023    ANNUAL REVIEW OF HM ORDERS  11/14/2023    HEMOGLOBIN  11/14/2023    ADVANCE CARE PLANNING  02/13/2025    FALL RISK ASSESSMENT  02/15/2025    DTAP/TDAP/TD IMMUNIZATION (3 - Td or Tdap) 10/30/2029    PHQ-2 (once per calendar year)  Completed    INFLUENZA VACCINE  Completed    Pneumococcal Vaccine: 65+ Years  Completed    URINALYSIS  Completed    COVID-19 Vaccine  Completed    IPV IMMUNIZATION  Aged Out    HPV IMMUNIZATION  Aged Out    MENINGITIS IMMUNIZATION  Aged Out    RSV MONOCLONAL ANTIBODY  Aged Out    ZOSTER IMMUNIZATION  Discontinued         Review of Systems  CONSTITUTIONAL: NEGATIVE for fever, chills, change in weight  EYES: NEGATIVE for vision changes or irritation  ENT/MOUTH: NEGATIVE for ear, mouth and throat problems  RESP: NEGATIVE for significant cough or SOB  CV: NEGATIVE for chest pain, palpitations or peripheral edema  GI: NEGATIVE for nausea, abdominal pain, heartburn, or change in bowel habits  : NEGATIVE for frequency, dysuria, or hematuria  MUSCULOSKELETAL: NEGATIVE for significant arthralgias or myalgia  NEURO: NEGATIVE for weakness, dizziness or paresthesias  ENDOCRINE: NEGATIVE for temperature intolerance, skin/hair changes  HEME: NEGATIVE for bleeding problems  PSYCHIATRIC: NEGATIVE for changes in mood or affect     Objective    Exam  /68   Pulse 69   Temp 97.8  F (36.6  C) (Temporal)   Resp 16   Ht 1.702 m (5' 7\")   Wt 89.7 kg (197 lb 12.8 oz)   SpO2 97%   BMI 30.98 kg/m     Estimated body mass index is 30.98 kg/m  as calculated from the following:    Height as of this encounter: 1.702 m (5' 7\").    Weight as of this encounter: 89.7 kg (197 lb 12.8 oz).    Physical Exam  GENERAL: alert and no distress  EYES: Eyes grossly normal to inspection, PERRL and conjunctivae and sclerae normal  HENT: ear canals and TM's normal, nose and mouth without ulcers or lesions  RESP: lungs clear to auscultation - no rales, " rhonchi or wheezes  CV: regular rate and rhythm, normal S1 S2, no S3 or S4, no murmur, click or rub, no peripheral edema  ABDOMEN: soft, nontender, no hepatosplenomegaly, no masses and bowel sounds normal  NEURO: No focal changes  PSYCH: mentation appears normal, affect normal/bright         2/15/2024   Mini Cog   Clock Draw Score 2 Normal   3 Item Recall 3 objects recalled   Mini Cog Total Score 5          Signed Electronically by: Saran Martinez MD

## 2024-04-10 DIAGNOSIS — I10 ESSENTIAL HYPERTENSION: ICD-10-CM

## 2024-04-10 DIAGNOSIS — E78.5 HYPERLIPIDEMIA LDL GOAL <130: ICD-10-CM

## 2024-04-10 RX ORDER — LISINOPRIL 10 MG/1
10 TABLET ORAL DAILY
Qty: 90 TABLET | Refills: 0 | Status: SHIPPED | OUTPATIENT
Start: 2024-04-10

## 2024-04-10 RX ORDER — SIMVASTATIN 20 MG
TABLET ORAL
Qty: 90 TABLET | Refills: 2 | Status: SHIPPED | OUTPATIENT
Start: 2024-04-10

## 2024-04-10 NOTE — TELEPHONE ENCOUNTER
Prescription approved per [facility] Refill Protocol.  Mary Jurado, RN  [facility] Triage Nurse

## 2024-04-29 DIAGNOSIS — N40.1 BENIGN NON-NODULAR PROSTATIC HYPERPLASIA WITH LOWER URINARY TRACT SYMPTOMS: ICD-10-CM

## 2024-04-30 RX ORDER — TAMSULOSIN HYDROCHLORIDE 0.4 MG/1
CAPSULE ORAL
Qty: 90 CAPSULE | Refills: 2 | Status: SHIPPED | OUTPATIENT
Start: 2024-04-30

## 2024-04-30 NOTE — TELEPHONE ENCOUNTER
Prescription approved per [facility] Refill Protocol.  Mary Jurado, RN  [facility] Triage Nurse

## 2024-07-30 DIAGNOSIS — M10.9 GOUT OF MULTIPLE SITES, UNSPECIFIED CAUSE, UNSPECIFIED CHRONICITY: ICD-10-CM

## 2024-07-30 DIAGNOSIS — G62.9 PERIPHERAL POLYNEUROPATHY: ICD-10-CM

## 2024-07-30 RX ORDER — GABAPENTIN 300 MG/1
300 CAPSULE ORAL 2 TIMES DAILY
Qty: 180 CAPSULE | Refills: 0 | Status: SHIPPED | OUTPATIENT
Start: 2024-07-30

## 2024-07-30 RX ORDER — ALLOPURINOL 300 MG/1
1 TABLET ORAL DAILY
Qty: 90 TABLET | Refills: 0 | Status: SHIPPED | OUTPATIENT
Start: 2024-07-30

## 2025-01-16 ENCOUNTER — PATIENT OUTREACH (OUTPATIENT)
Dept: CARE COORDINATION | Facility: CLINIC | Age: 85
End: 2025-01-16
Payer: COMMERCIAL

## 2025-01-30 ENCOUNTER — PATIENT OUTREACH (OUTPATIENT)
Dept: CARE COORDINATION | Facility: CLINIC | Age: 85
End: 2025-01-30
Payer: COMMERCIAL

## 2025-02-11 DIAGNOSIS — G62.9 PERIPHERAL POLYNEUROPATHY: ICD-10-CM

## 2025-02-11 NOTE — TELEPHONE ENCOUNTER
Please Verify as it appears that this medication has not been requested for refill since July of last year.  Unclear On Medication and patient has not been seen in clinic for about a year.

## 2025-02-12 NOTE — TELEPHONE ENCOUNTER
Patient Contact    Attempt # 1    Was call answered?  No.  Unable to leave message. Number not in service. C2C with wife not valid.     Sent YuMe message to pt.   Postponing to follow-up.    Thank you,  Norah Dao RN

## 2025-02-13 NOTE — TELEPHONE ENCOUNTER
message not read. # not in service.     Patient Contact    Attempt # 2    Was call answered?  No.  Left message on voicemail with information to call me back.

## 2025-02-14 NOTE — TELEPHONE ENCOUNTER
message has not been read        Patient Contact    Attempt # 2    Was call answered?  No.  Phone number is not in service.

## 2025-02-17 RX ORDER — GABAPENTIN 300 MG/1
300 CAPSULE ORAL 2 TIMES DAILY
Qty: 180 CAPSULE | Refills: 0 | OUTPATIENT
Start: 2025-02-17

## 2025-02-17 NOTE — TELEPHONE ENCOUNTER
2-14-25 outreach was third attempt. Closing encounter due to maximum attempts made to reach patient have been unsuccessful.     Elissa Luong RN

## 2025-03-22 ENCOUNTER — HEALTH MAINTENANCE LETTER (OUTPATIENT)
Age: 85
End: 2025-03-22

## (undated) DEVICE — GLOVE PROTEXIS BLUE W/NEU-THERA 8.0  2D73EB80

## (undated) DEVICE — LINEN POUCH DBL 5427

## (undated) DEVICE — BONE CLEANING TIP INTERPULSE  0210-010-000

## (undated) DEVICE — BNDG ELASTIC 4"X5YDS UNSTERILE 6611-40

## (undated) DEVICE — DRAPE MAYO STAND 23X54 8337

## (undated) DEVICE — SUCTION TIP YANKAUER STR K87

## (undated) DEVICE — SU STRATAFIX PDS PLUS 0 CT-1 30CM SXPP1B450

## (undated) DEVICE — SPONGE KITTNER 30-101

## (undated) DEVICE — NDL SPINAL 18GA 3.5" 405184

## (undated) DEVICE — GLOVE PROTEXIS BLUE W/NEU-THERA 8.5  2D73EB85

## (undated) DEVICE — SU MONOCRYL 3-0 PS-2 27" Y427H

## (undated) DEVICE — SU VICRYL 2-0 CT-1 27" UND J259H

## (undated) DEVICE — PREP CHLORAPREP 26ML TINTED ORANGE  260815

## (undated) DEVICE — RX SURGIFLO HEMOSTATIC MATRIX 8ML 2991

## (undated) DEVICE — DRAPE MICROSCOPE OPMI ZEISS 48X118" 306071-0000-000

## (undated) DEVICE — DRSG ADAPTIC 3X8" 6113

## (undated) DEVICE — DRAPE CONVERTORS U-DRAPE 60X72" 8476

## (undated) DEVICE — SPONGE LAP 18X18" X8435

## (undated) DEVICE — BLADE KNIFE SURG 10 371110

## (undated) DEVICE — SUCTION TIP YANKAUER W/O VENT K86

## (undated) DEVICE — STPL SKIN PROXIMATE 35 WIDE PMW35

## (undated) DEVICE — SYR 10ML FINGER CONTROL W/O NDL 309695

## (undated) DEVICE — CATH TRAY FOLEY COUDE SURESTEP 16FR W/URNE MTR STLK A304716A

## (undated) DEVICE — SU ETHIBOND 2 V-37 4X30" MX69G

## (undated) DEVICE — SU MONOCRYL 4-0 PS-2 27" UND Y426H

## (undated) DEVICE — NDL 21GA 1.5"

## (undated) DEVICE — SUCTION IRR SYSTEM W/O TIP INTERPULSE HANDPIECE 0210-100-000

## (undated) DEVICE — ESU ELEC BLADE 6" COATED E1450-6

## (undated) DEVICE — SU ETHIBOND 0 CTX CR  8X18" CX31D

## (undated) DEVICE — TUBING SUCTION MEDI-VAC SOFT 3/16"X20' N520A

## (undated) DEVICE — SOL NACL 0.9% IRRIG 1000ML BOTTLE 2F7124

## (undated) DEVICE — IOM 15 MIN UP TO 7 HOURS

## (undated) DEVICE — IMM PILLOW ABDUCT HIP MED 31143061

## (undated) DEVICE — DRAIN JACKSON PRATT RESERVOIR 100ML SU130-1305

## (undated) DEVICE — DRAPE IOBAN INCISE 23X17" 6650EZ

## (undated) DEVICE — SU FIBERWIRE 5 CCS-1 BLUE  AR-7211

## (undated) DEVICE — DRAPE C-ARM 60X42" 1013

## (undated) DEVICE — SU VICRYL 3-0 SH CR 8X18" J774

## (undated) DEVICE — PACK TOTAL HIP W/U DRAPE SOP15HUFSC

## (undated) DEVICE — DRAIN JACKSON PRATT CHANNEL 10FR RND SIL W/TROCAR JP-2227

## (undated) DEVICE — SYR BULB IRRIG 50ML LATEX FREE 0035280

## (undated) DEVICE — SPONGE SURGIFOAM 100 1974

## (undated) DEVICE — Device

## (undated) DEVICE — GLOVE PROTEXIS W/NEU-THERA 6.5  2D73TE65

## (undated) DEVICE — LINEN TOWEL PACK X5 5464

## (undated) DEVICE — GLOVE PROTEXIS W/NEU-THERA 8.5  2D73TE85

## (undated) DEVICE — PACK SHOULDER RIDGES

## (undated) DEVICE — DRSG KERLIX FLUFFS X5

## (undated) DEVICE — BLADE SAW SAGITTAL STRK 19.5X95X1.27MM 2108-109-000S15

## (undated) DEVICE — DECANTER BAG 2002S

## (undated) DEVICE — PACK SET-UP STD 9102

## (undated) DEVICE — KIT PATIENT CARE HANA TABLE PROFX SUPINE 6855

## (undated) DEVICE — NDL BLUNT 18GA 1" W/O FILTER 305181

## (undated) DEVICE — NDL 22GA 1.5"

## (undated) DEVICE — HOOD FLYTE W/PEELAWAY 408-800-100

## (undated) DEVICE — GLOVE PROTEXIS POWDER FREE 8.5 ORTHOPEDIC 2D73ET85

## (undated) DEVICE — CLOSURE SYS SKIN PREMIERPRO EXOFIN FUSION 4X22CM STRL 3472

## (undated) DEVICE — SU VICRYL 2-0 CT-2 CR 8X18" J726D

## (undated) DEVICE — GOWN REINFORCED XXLG 9071

## (undated) DEVICE — WIPES FOLEY CARE SURESTEP PROVON DFC100

## (undated) DEVICE — SU DERMABOND ADVANCED .7ML DNX12

## (undated) DEVICE — SU VICRYL 2-0 CP-1 27" UND J266H

## (undated) DEVICE — SPONGE COTTONOID 1/2X1/2" 80-1400

## (undated) DEVICE — SOL WATER IRRIG 1000ML BOTTLE 2F7114

## (undated) DEVICE — SOLUTION WOUND CLEANSING 3/4OZ 10% PVP EA-L3011FB-50

## (undated) DEVICE — ESU BIPOLAR SEALER AQUAMANTYS 6MM 23-112-1

## (undated) DEVICE — DRAPE SHOULDER PACK SPLITS 29365

## (undated) DEVICE — ESU PENCIL W/HOLSTER E2350H

## (undated) DEVICE — LINEN ORTHO ACL PACK 5447

## (undated) DEVICE — PACK SMALL SPINE RIDGES

## (undated) DEVICE — GLOVE PROTEXIS POWDER FREE 7.5 ORTHOPEDIC 2D73ET75

## (undated) DEVICE — GLOVE PROTEXIS W/NEU-THERA 8.0  2D73TE80

## (undated) DEVICE — ESU GROUND PAD ADULT W/CORD E7507

## (undated) DEVICE — SU VICRYL 0 CTX 36" J370H

## (undated) DEVICE — GLOVE PROTEXIS BLUE W/NEU-THERA 7.0  2D73EB70

## (undated) DEVICE — MIDAS REX DISSECTING TOOL  14MH30

## (undated) DEVICE — SUCTION MANIFOLD NEPTUNE 2 SYS 4 PORT 0702-020-000

## (undated) DEVICE — SET HANDPIECE INTERPULSE W/COAXIAL FAN SPRAY TIP 0210118000

## (undated) DEVICE — BLADE CLIPPER SGL USE 9680

## (undated) DEVICE — DRSG ABDOMINAL 07 1/2X8" 7197D

## (undated) DEVICE — SU STRATAFIX PDS PLUS 2-0 SPIRAL CT-1 30CM SXPP1B410

## (undated) DEVICE — SOL BENZOIN 0.5OZ

## (undated) DEVICE — ESU GROUND PAD UNIVERSAL W/O CORD

## (undated) DEVICE — DRAPE STERI U 1015

## (undated) DEVICE — SYR 05ML LL W/O NDL

## (undated) DEVICE — BLADE SAW SAGITTAL STRK 20.7X85X0.89MM 2108-109-000S13

## (undated) DEVICE — DRAIN ROUND W/RESERV KIT JACKSON PRATT 10FR 400ML SU130-402D

## (undated) DEVICE — BONE CLEANING TIP INTERPULSE FEMORAL CANAL 0210-008-000

## (undated) DEVICE — MANIFOLD NEPTUNE 4 PORT 700-20

## (undated) DEVICE — GLOVE PROTEXIS MICRO 6.5  2D73PM65

## (undated) DEVICE — TAPE DURAPORE 3" SILK 1538-3

## (undated) DEVICE — LINEN FULL SHEET 5511

## (undated) DEVICE — SU VICRYL 0 CTX CR 8X18" J764D

## (undated) DEVICE — SU VICRYL 0 CT-1 27" J340H

## (undated) DEVICE — NDL SPINAL 22GA 3.5" QUINCKE 405181

## (undated) DEVICE — PREP SKIN SCRUB TRAY 4461A

## (undated) DEVICE — IMM COLLAR CERVICAL MED UNIVERSAL 3X24" 79-83500

## (undated) DEVICE — SU ETHIBOND 5 V-37 4X30" MB66G

## (undated) DEVICE — DRSG STERI STRIP 1/2X4" R1547

## (undated) DEVICE — GLOVE PROTEXIS POWDER FREE 6.5 ORTHOPEDIC 2D73ET65

## (undated) DEVICE — SU VICRYL 2-0 CT-2 27" UND J269H

## (undated) DEVICE — LINEN HALF SHEET 5512

## (undated) DEVICE — DRAPE SHEET REV FOLD 3/4 9349

## (undated) DEVICE — DEVICE RETRIEVER HEWSON 71111579

## (undated) DEVICE — SU WND CLOSURE VLOC 180 ABS 0 24" GS-25 VLOCL0436

## (undated) DEVICE — SOL NACL 0.9% INJ 250ML BAG 2B1322Q

## (undated) DEVICE — STPL SKIN 35W APPOSE 8886803712

## (undated) DEVICE — GLOVE PROTEXIS W/NEU-THERA 7.5  2D73TE75

## (undated) DEVICE — DRAPE IOBAN ISOLATION VERTICAL 320X21CM 6617

## (undated) DEVICE — GLOVE PROTEXIS POWDER FREE 8.0 ORTHOPEDIC 2D73ET80

## (undated) DEVICE — BAG CLEAR TRASH 1.3M 39X33" P4040C

## (undated) DEVICE — GLOVE PROTEXIS BLUE W/NEU-THERA 6.5  2D73EB65

## (undated) DEVICE — DRSG GAUZE 4X4" 3033

## (undated) DEVICE — DRAPE X-RAY TUBE 00-901169-01-OEC

## (undated) DEVICE — ESU CLEANER TIP 31142717

## (undated) DEVICE — ESU ELEC BLADE 2.75" COATED/INSULATED E1455

## (undated) DEVICE — DRSG TELFA ISLAND 4X10"

## (undated) DEVICE — PEN MARKING SKIN W/LABELS 31145884

## (undated) DEVICE — BLADE KNIFE SURG 11 371111

## (undated) DEVICE — SYR 50ML LL W/O NDL 309653

## (undated) RX ORDER — HYDROMORPHONE HYDROCHLORIDE 1 MG/ML
INJECTION, SOLUTION INTRAMUSCULAR; INTRAVENOUS; SUBCUTANEOUS
Status: DISPENSED
Start: 2017-09-18

## (undated) RX ORDER — ONDANSETRON 2 MG/ML
INJECTION INTRAMUSCULAR; INTRAVENOUS
Status: DISPENSED
Start: 2018-11-12

## (undated) RX ORDER — NEOSTIGMINE METHYLSULFATE 1 MG/ML
VIAL (ML) INJECTION
Status: DISPENSED
Start: 2020-01-08

## (undated) RX ORDER — CEFAZOLIN SODIUM 1 G/3ML
INJECTION, POWDER, FOR SOLUTION INTRAMUSCULAR; INTRAVENOUS
Status: DISPENSED
Start: 2020-02-25

## (undated) RX ORDER — FENTANYL CITRATE 50 UG/ML
INJECTION, SOLUTION INTRAMUSCULAR; INTRAVENOUS
Status: DISPENSED
Start: 2017-09-18

## (undated) RX ORDER — VANCOMYCIN HYDROCHLORIDE 1 G/20ML
INJECTION, POWDER, LYOPHILIZED, FOR SOLUTION INTRAVENOUS
Status: DISPENSED
Start: 2018-11-12

## (undated) RX ORDER — DEXAMETHASONE SODIUM PHOSPHATE 4 MG/ML
INJECTION, SOLUTION INTRA-ARTICULAR; INTRALESIONAL; INTRAMUSCULAR; INTRAVENOUS; SOFT TISSUE
Status: DISPENSED
Start: 2020-02-25

## (undated) RX ORDER — PROPOFOL 10 MG/ML
INJECTION, EMULSION INTRAVENOUS
Status: DISPENSED
Start: 2017-09-18

## (undated) RX ORDER — BUPIVACAINE HYDROCHLORIDE AND EPINEPHRINE 5; 5 MG/ML; UG/ML
INJECTION, SOLUTION EPIDURAL; INTRACAUDAL; PERINEURAL
Status: DISPENSED
Start: 2017-09-18

## (undated) RX ORDER — BUPIVACAINE HYDROCHLORIDE AND EPINEPHRINE 2.5; 5 MG/ML; UG/ML
INJECTION, SOLUTION EPIDURAL; INFILTRATION; INTRACAUDAL; PERINEURAL
Status: DISPENSED
Start: 2020-01-08

## (undated) RX ORDER — ALBUMIN, HUMAN INJ 5% 5 %
SOLUTION INTRAVENOUS
Status: DISPENSED
Start: 2020-02-25

## (undated) RX ORDER — PHENYLEPHRINE HCL IN 0.9% NACL 1 MG/10 ML
SYRINGE (ML) INTRAVENOUS
Status: DISPENSED
Start: 2018-11-12

## (undated) RX ORDER — DEXAMETHASONE SODIUM PHOSPHATE 4 MG/ML
INJECTION, SOLUTION INTRA-ARTICULAR; INTRALESIONAL; INTRAMUSCULAR; INTRAVENOUS; SOFT TISSUE
Status: DISPENSED
Start: 2018-11-12

## (undated) RX ORDER — GLYCOPYRROLATE 0.2 MG/ML
INJECTION INTRAMUSCULAR; INTRAVENOUS
Status: DISPENSED
Start: 2020-01-08

## (undated) RX ORDER — PROPOFOL 10 MG/ML
INJECTION, EMULSION INTRAVENOUS
Status: DISPENSED
Start: 2018-11-12

## (undated) RX ORDER — KETAMINE HCL IN 0.9 % NACL 50 MG/5 ML
SYRINGE (ML) INTRAVENOUS
Status: DISPENSED
Start: 2018-11-12

## (undated) RX ORDER — DEXAMETHASONE SODIUM PHOSPHATE 4 MG/ML
INJECTION, SOLUTION INTRA-ARTICULAR; INTRALESIONAL; INTRAMUSCULAR; INTRAVENOUS; SOFT TISSUE
Status: DISPENSED
Start: 2017-09-18

## (undated) RX ORDER — CEFAZOLIN SODIUM 2 G/100ML
INJECTION, SOLUTION INTRAVENOUS
Status: DISPENSED
Start: 2018-11-12

## (undated) RX ORDER — FENTANYL CITRATE 50 UG/ML
INJECTION, SOLUTION INTRAMUSCULAR; INTRAVENOUS
Status: DISPENSED
Start: 2020-02-25

## (undated) RX ORDER — FENTANYL CITRATE 50 UG/ML
INJECTION, SOLUTION INTRAMUSCULAR; INTRAVENOUS
Status: DISPENSED
Start: 2020-01-08

## (undated) RX ORDER — ACYCLOVIR 200 MG/1
CAPSULE ORAL
Status: DISPENSED
Start: 2020-02-25

## (undated) RX ORDER — LIDOCAINE HYDROCHLORIDE 10 MG/ML
INJECTION, SOLUTION EPIDURAL; INFILTRATION; INTRACAUDAL; PERINEURAL
Status: DISPENSED
Start: 2018-11-12

## (undated) RX ORDER — PROPOFOL 10 MG/ML
INJECTION, EMULSION INTRAVENOUS
Status: DISPENSED
Start: 2020-02-25

## (undated) RX ORDER — EPHEDRINE SULFATE 50 MG/ML
INJECTION, SOLUTION INTRAMUSCULAR; INTRAVENOUS; SUBCUTANEOUS
Status: DISPENSED
Start: 2018-11-12

## (undated) RX ORDER — CEFAZOLIN SODIUM 2 G/100ML
INJECTION, SOLUTION INTRAVENOUS
Status: DISPENSED
Start: 2017-09-18

## (undated) RX ORDER — HYDROMORPHONE HYDROCHLORIDE 1 MG/ML
INJECTION, SOLUTION INTRAMUSCULAR; INTRAVENOUS; SUBCUTANEOUS
Status: DISPENSED
Start: 2020-02-25

## (undated) RX ORDER — LIDOCAINE HYDROCHLORIDE 10 MG/ML
INJECTION, SOLUTION EPIDURAL; INFILTRATION; INTRACAUDAL; PERINEURAL
Status: DISPENSED
Start: 2020-01-08

## (undated) RX ORDER — BUPIVACAINE HYDROCHLORIDE AND EPINEPHRINE 5; 5 MG/ML; UG/ML
INJECTION, SOLUTION EPIDURAL; INTRACAUDAL; PERINEURAL
Status: DISPENSED
Start: 2020-02-25

## (undated) RX ORDER — LIDOCAINE HYDROCHLORIDE 20 MG/ML
INJECTION, SOLUTION EPIDURAL; INFILTRATION; INTRACAUDAL; PERINEURAL
Status: DISPENSED
Start: 2020-02-25

## (undated) RX ORDER — FENTANYL CITRATE 0.05 MG/ML
INJECTION, SOLUTION INTRAMUSCULAR; INTRAVENOUS
Status: DISPENSED
Start: 2020-02-25

## (undated) RX ORDER — KETOROLAC TROMETHAMINE 30 MG/ML
INJECTION, SOLUTION INTRAMUSCULAR; INTRAVENOUS
Status: DISPENSED
Start: 2020-02-25

## (undated) RX ORDER — ONDANSETRON 2 MG/ML
INJECTION INTRAMUSCULAR; INTRAVENOUS
Status: DISPENSED
Start: 2020-02-25

## (undated) RX ORDER — NEOSTIGMINE METHYLSULFATE 1 MG/ML
VIAL (ML) INJECTION
Status: DISPENSED
Start: 2020-02-25

## (undated) RX ORDER — GLYCOPYRROLATE 0.2 MG/ML
INJECTION INTRAMUSCULAR; INTRAVENOUS
Status: DISPENSED
Start: 2018-11-12

## (undated) RX ORDER — GLYCOPYRROLATE 0.2 MG/ML
INJECTION, SOLUTION INTRAMUSCULAR; INTRAVENOUS
Status: DISPENSED
Start: 2020-02-25

## (undated) RX ORDER — ONDANSETRON 2 MG/ML
INJECTION INTRAMUSCULAR; INTRAVENOUS
Status: DISPENSED
Start: 2020-01-08

## (undated) RX ORDER — CEFAZOLIN SODIUM 2 G/100ML
INJECTION, SOLUTION INTRAVENOUS
Status: DISPENSED
Start: 2020-01-08

## (undated) RX ORDER — ACETAMINOPHEN 500 MG
TABLET ORAL
Status: DISPENSED
Start: 2020-02-25

## (undated) RX ORDER — PREGABALIN 150 MG/1
CAPSULE ORAL
Status: DISPENSED
Start: 2020-02-25

## (undated) RX ORDER — DEXAMETHASONE SODIUM PHOSPHATE 4 MG/ML
INJECTION, SOLUTION INTRA-ARTICULAR; INTRALESIONAL; INTRAMUSCULAR; INTRAVENOUS; SOFT TISSUE
Status: DISPENSED
Start: 2020-01-08

## (undated) RX ORDER — VANCOMYCIN HYDROCHLORIDE 1 G/20ML
INJECTION, POWDER, LYOPHILIZED, FOR SOLUTION INTRAVENOUS
Status: DISPENSED
Start: 2020-02-25

## (undated) RX ORDER — FENTANYL CITRATE 50 UG/ML
INJECTION, SOLUTION INTRAMUSCULAR; INTRAVENOUS
Status: DISPENSED
Start: 2018-11-12

## (undated) RX ORDER — CEFAZOLIN SODIUM 2 G/100ML
INJECTION, SOLUTION INTRAVENOUS
Status: DISPENSED
Start: 2020-02-25

## (undated) RX ORDER — ONDANSETRON 2 MG/ML
INJECTION INTRAMUSCULAR; INTRAVENOUS
Status: DISPENSED
Start: 2017-09-18

## (undated) RX ORDER — LIDOCAINE HYDROCHLORIDE 20 MG/ML
INJECTION, SOLUTION EPIDURAL; INFILTRATION; INTRACAUDAL; PERINEURAL
Status: DISPENSED
Start: 2017-09-18

## (undated) RX ORDER — PROPOFOL 10 MG/ML
INJECTION, EMULSION INTRAVENOUS
Status: DISPENSED
Start: 2020-01-08

## (undated) RX ORDER — VECURONIUM BROMIDE 1 MG/ML
INJECTION, POWDER, LYOPHILIZED, FOR SOLUTION INTRAVENOUS
Status: DISPENSED
Start: 2020-02-25

## (undated) RX ORDER — BUPIVACAINE HYDROCHLORIDE 2.5 MG/ML
INJECTION, SOLUTION EPIDURAL; INFILTRATION; INTRACAUDAL
Status: DISPENSED
Start: 2017-09-18

## (undated) RX ORDER — EPHEDRINE SULFATE 50 MG/ML
INJECTION, SOLUTION INTRAMUSCULAR; INTRAVENOUS; SUBCUTANEOUS
Status: DISPENSED
Start: 2020-01-08

## (undated) RX ORDER — BUPIVACAINE HYDROCHLORIDE AND EPINEPHRINE 5; 5 MG/ML; UG/ML
INJECTION, SOLUTION EPIDURAL; INTRACAUDAL; PERINEURAL
Status: DISPENSED
Start: 2018-11-12

## (undated) RX ORDER — NEOSTIGMINE METHYLSULFATE 1 MG/ML
VIAL (ML) INJECTION
Status: DISPENSED
Start: 2018-11-12